# Patient Record
Sex: FEMALE | Race: WHITE | Employment: OTHER | URBAN - NONMETROPOLITAN AREA
[De-identification: names, ages, dates, MRNs, and addresses within clinical notes are randomized per-mention and may not be internally consistent; named-entity substitution may affect disease eponyms.]

---

## 2017-02-11 ENCOUNTER — HOSPITAL ENCOUNTER (INPATIENT)
Age: 77
LOS: 2 days | Discharge: HOME | DRG: 310 | End: 2017-02-13
Attending: EMERGENCY MEDICINE | Admitting: INTERNAL MEDICINE
Payer: MEDICARE

## 2017-02-11 ENCOUNTER — APPOINTMENT (OUTPATIENT)
Dept: GENERAL RADIOLOGY | Age: 77
DRG: 310 | End: 2017-02-11
Payer: MEDICARE

## 2017-02-11 DIAGNOSIS — R73.9 HYPERGLYCEMIA: ICD-10-CM

## 2017-02-11 DIAGNOSIS — I48.91 ATRIAL FIBRILLATION WITH TACHYCARDIC VENTRICULAR RATE (HCC): ICD-10-CM

## 2017-02-11 DIAGNOSIS — R07.9 CHEST PAIN, UNSPECIFIED TYPE: Primary | ICD-10-CM

## 2017-02-11 LAB
ANION GAP SERPL CALCULATED.3IONS-SCNC: 17 MMOL/L (ref 7–19)
BUN BLDV-MCNC: 23 MG/DL (ref 8–23)
CALCIUM SERPL-MCNC: 9.8 MG/DL (ref 8.8–10.2)
CHLORIDE BLD-SCNC: 93 MMOL/L (ref 98–111)
CO2: 24 MMOL/L (ref 22–29)
CREAT SERPL-MCNC: 0.7 MG/DL (ref 0.5–0.9)
GFR NON-AFRICAN AMERICAN: >60
GLUCOSE BLD-MCNC: 204 MG/DL (ref 74–109)
HCT VFR BLD CALC: 42.2 % (ref 37–47)
HEMOGLOBIN: 13.7 G/DL (ref 12–16)
INR BLD: 1.24 (ref 0.88–1.18)
MCH RBC QN AUTO: 28.8 PG (ref 27–31)
MCHC RBC AUTO-ENTMCNC: 32.5 G/DL (ref 33–37)
MCV RBC AUTO: 88.7 FL (ref 81–99)
PDW BLD-RTO: 13.9 % (ref 11.5–14.5)
PERFORMED ON: NORMAL
PERFORMED ON: NORMAL
PLATELET # BLD: 296 K/UL (ref 130–400)
PMV BLD AUTO: 10.6 FL (ref 7.4–10.4)
POC TROPONIN I: 0 NG/ML (ref 0–0.08)
POC TROPONIN I: 0.01 NG/ML (ref 0–0.08)
POTASSIUM SERPL-SCNC: 4.6 MMOL/L (ref 3.5–5)
PRO-BNP: 1288 PG/ML (ref 0–900)
PROTHROMBIN TIME: 15.5 SEC (ref 12–14.6)
RBC # BLD: 4.76 M/UL (ref 4.2–5.4)
SODIUM BLD-SCNC: 134 MMOL/L (ref 136–145)
WBC # BLD: 8.8 K/UL (ref 4.8–10.8)

## 2017-02-11 PROCEDURE — 6360000002 HC RX W HCPCS: Performed by: EMERGENCY MEDICINE

## 2017-02-11 PROCEDURE — 85027 COMPLETE CBC AUTOMATED: CPT

## 2017-02-11 PROCEDURE — 85610 PROTHROMBIN TIME: CPT

## 2017-02-11 PROCEDURE — 36415 COLL VENOUS BLD VENIPUNCTURE: CPT

## 2017-02-11 PROCEDURE — 93005 ELECTROCARDIOGRAM TRACING: CPT

## 2017-02-11 PROCEDURE — 84484 ASSAY OF TROPONIN QUANT: CPT

## 2017-02-11 PROCEDURE — 2140000000 HC CCU INTERMEDIATE R&B

## 2017-02-11 PROCEDURE — 2500000003 HC RX 250 WO HCPCS: Performed by: EMERGENCY MEDICINE

## 2017-02-11 PROCEDURE — 83880 ASSAY OF NATRIURETIC PEPTIDE: CPT

## 2017-02-11 PROCEDURE — 99285 EMERGENCY DEPT VISIT HI MDM: CPT

## 2017-02-11 PROCEDURE — 6370000000 HC RX 637 (ALT 250 FOR IP): Performed by: EMERGENCY MEDICINE

## 2017-02-11 PROCEDURE — 71010 XR CHEST PORTABLE: CPT

## 2017-02-11 PROCEDURE — 2580000003 HC RX 258: Performed by: EMERGENCY MEDICINE

## 2017-02-11 PROCEDURE — 99284 EMERGENCY DEPT VISIT MOD MDM: CPT | Performed by: EMERGENCY MEDICINE

## 2017-02-11 PROCEDURE — 80048 BASIC METABOLIC PNL TOTAL CA: CPT

## 2017-02-11 RX ORDER — ATORVASTATIN CALCIUM 40 MG/1
40 TABLET, FILM COATED ORAL NIGHTLY
COMMUNITY
End: 2022-03-21 | Stop reason: SDUPTHER

## 2017-02-11 RX ORDER — DILTIAZEM HYDROCHLORIDE 5 MG/ML
5 INJECTION INTRAVENOUS ONCE
Status: COMPLETED | OUTPATIENT
Start: 2017-02-11 | End: 2017-02-11

## 2017-02-11 RX ORDER — ASPIRIN 325 MG
325 TABLET ORAL ONCE
Status: COMPLETED | OUTPATIENT
Start: 2017-02-11 | End: 2017-02-11

## 2017-02-11 RX ORDER — PIOGLITAZONEHYDROCHLORIDE 30 MG/1
30 TABLET ORAL DAILY
COMMUNITY
End: 2017-05-03

## 2017-02-11 RX ORDER — FUROSEMIDE 20 MG/1
20 TABLET ORAL DAILY
COMMUNITY
End: 2017-03-28 | Stop reason: SDUPTHER

## 2017-02-11 RX ORDER — FUROSEMIDE 10 MG/ML
20 INJECTION INTRAMUSCULAR; INTRAVENOUS ONCE
Status: COMPLETED | OUTPATIENT
Start: 2017-02-11 | End: 2017-02-11

## 2017-02-11 RX ORDER — GABAPENTIN 300 MG/1
300 CAPSULE ORAL
COMMUNITY
End: 2021-03-29

## 2017-02-11 RX ADMIN — DILTIAZEM HYDROCHLORIDE 5 MG/HR: 5 INJECTION INTRAVENOUS at 22:16

## 2017-02-11 RX ADMIN — FUROSEMIDE 20 MG: 10 INJECTION, SOLUTION INTRAMUSCULAR; INTRAVENOUS at 23:53

## 2017-02-11 RX ADMIN — DILTIAZEM HYDROCHLORIDE 5 MG: 5 INJECTION INTRAVENOUS at 22:11

## 2017-02-11 RX ADMIN — ASPIRIN 325 MG ORAL TABLET 325 MG: 325 PILL ORAL at 22:30

## 2017-02-11 ASSESSMENT — ENCOUNTER SYMPTOMS
VOMITING: 0
ABDOMINAL PAIN: 0
EYE PAIN: 0
SHORTNESS OF BREATH: 1
DIARRHEA: 0

## 2017-02-12 PROBLEM — I48.20 CHRONIC ATRIAL FIBRILLATION (HCC): Status: ACTIVE | Noted: 2017-02-12

## 2017-02-12 LAB
CHOLESTEROL, TOTAL: 233 MG/DL (ref 160–199)
GLUCOSE BLD-MCNC: 146 MG/DL (ref 70–99)
GLUCOSE BLD-MCNC: 153 MG/DL (ref 70–99)
GLUCOSE BLD-MCNC: 166 MG/DL (ref 70–99)
GLUCOSE BLD-MCNC: 217 MG/DL (ref 70–99)
HDLC SERPL-MCNC: 54 MG/DL (ref 65–121)
LDL CHOLESTEROL CALCULATED: 148 MG/DL
PERFORMED ON: ABNORMAL
TRIGL SERPL-MCNC: 156 MG/DL (ref 150–199)
TROPONIN: <0.01 NG/ML (ref 0–0.03)

## 2017-02-12 PROCEDURE — 6370000000 HC RX 637 (ALT 250 FOR IP): Performed by: INTERNAL MEDICINE

## 2017-02-12 PROCEDURE — 80061 LIPID PANEL: CPT

## 2017-02-12 PROCEDURE — 84484 ASSAY OF TROPONIN QUANT: CPT

## 2017-02-12 PROCEDURE — 36415 COLL VENOUS BLD VENIPUNCTURE: CPT

## 2017-02-12 PROCEDURE — 82948 REAGENT STRIP/BLOOD GLUCOSE: CPT

## 2017-02-12 PROCEDURE — 99223 1ST HOSP IP/OBS HIGH 75: CPT | Performed by: INTERNAL MEDICINE

## 2017-02-12 PROCEDURE — 2140000000 HC CCU INTERMEDIATE R&B

## 2017-02-12 PROCEDURE — 2700000000 HC OXYGEN THERAPY PER DAY

## 2017-02-12 RX ORDER — FUROSEMIDE 20 MG/1
20 TABLET ORAL DAILY
Status: DISCONTINUED | OUTPATIENT
Start: 2017-02-12 | End: 2017-02-13 | Stop reason: HOSPADM

## 2017-02-12 RX ORDER — GABAPENTIN 300 MG/1
300 CAPSULE ORAL 2 TIMES DAILY
Status: DISCONTINUED | OUTPATIENT
Start: 2017-02-12 | End: 2017-02-13 | Stop reason: HOSPADM

## 2017-02-12 RX ORDER — SODIUM CHLORIDE 0.9 % (FLUSH) 0.9 %
10 SYRINGE (ML) INJECTION EVERY 12 HOURS SCHEDULED
Status: DISCONTINUED | OUTPATIENT
Start: 2017-02-12 | End: 2017-02-13 | Stop reason: HOSPADM

## 2017-02-12 RX ORDER — ACETAMINOPHEN 325 MG/1
650 TABLET ORAL EVERY 4 HOURS PRN
Status: DISCONTINUED | OUTPATIENT
Start: 2017-02-12 | End: 2017-02-13 | Stop reason: HOSPADM

## 2017-02-12 RX ORDER — ATORVASTATIN CALCIUM 40 MG/1
40 TABLET, FILM COATED ORAL DAILY
Status: DISCONTINUED | OUTPATIENT
Start: 2017-02-12 | End: 2017-02-12

## 2017-02-12 RX ORDER — SODIUM CHLORIDE 0.9 % (FLUSH) 0.9 %
10 SYRINGE (ML) INJECTION PRN
Status: DISCONTINUED | OUTPATIENT
Start: 2017-02-12 | End: 2017-02-13 | Stop reason: HOSPADM

## 2017-02-12 RX ORDER — ATORVASTATIN CALCIUM 80 MG/1
80 TABLET, FILM COATED ORAL DAILY
Status: DISCONTINUED | OUTPATIENT
Start: 2017-02-13 | End: 2017-02-13 | Stop reason: HOSPADM

## 2017-02-12 RX ORDER — ASPIRIN 325 MG
325 TABLET ORAL DAILY
Status: DISCONTINUED | OUTPATIENT
Start: 2017-02-12 | End: 2017-02-13 | Stop reason: HOSPADM

## 2017-02-12 RX ORDER — PIOGLITAZONEHYDROCHLORIDE 30 MG/1
30 TABLET ORAL DAILY
Status: DISCONTINUED | OUTPATIENT
Start: 2017-02-12 | End: 2017-02-13 | Stop reason: HOSPADM

## 2017-02-12 RX ADMIN — PIOGLITAZONE 30 MG: 30 TABLET ORAL at 20:53

## 2017-02-12 RX ADMIN — METFORMIN HYDROCHLORIDE 1000 MG: 500 TABLET, FILM COATED ORAL at 17:49

## 2017-02-12 RX ADMIN — ACETAMINOPHEN 650 MG: 325 TABLET, FILM COATED ORAL at 17:48

## 2017-02-12 RX ADMIN — ASPIRIN 325 MG ORAL TABLET 325 MG: 325 PILL ORAL at 09:35

## 2017-02-12 RX ADMIN — Medication 50 MG: at 17:49

## 2017-02-12 RX ADMIN — FUROSEMIDE 20 MG: 20 TABLET ORAL at 17:49

## 2017-02-12 RX ADMIN — GABAPENTIN 300 MG: 300 CAPSULE ORAL at 20:47

## 2017-02-12 RX ADMIN — ATORVASTATIN CALCIUM 40 MG: 40 TABLET, FILM COATED ORAL at 17:49

## 2017-02-12 ASSESSMENT — PAIN SCALES - GENERAL
PAINLEVEL_OUTOF10: 0
PAINLEVEL_OUTOF10: 8

## 2017-02-13 VITALS
WEIGHT: 219.4 LBS | SYSTOLIC BLOOD PRESSURE: 103 MMHG | HEIGHT: 65 IN | DIASTOLIC BLOOD PRESSURE: 65 MMHG | OXYGEN SATURATION: 92 % | HEART RATE: 98 BPM | BODY MASS INDEX: 36.55 KG/M2 | RESPIRATION RATE: 18 BRPM | TEMPERATURE: 97.1 F

## 2017-02-13 LAB
GLUCOSE BLD-MCNC: 136 MG/DL (ref 70–99)
GLUCOSE BLD-MCNC: 160 MG/DL (ref 70–99)
PERFORMED ON: ABNORMAL
PERFORMED ON: ABNORMAL

## 2017-02-13 PROCEDURE — 2580000003 HC RX 258: Performed by: INTERNAL MEDICINE

## 2017-02-13 PROCEDURE — 2700000000 HC OXYGEN THERAPY PER DAY

## 2017-02-13 PROCEDURE — 99238 HOSP IP/OBS DSCHRG MGMT 30/<: CPT | Performed by: INTERNAL MEDICINE

## 2017-02-13 PROCEDURE — 6370000000 HC RX 637 (ALT 250 FOR IP): Performed by: INTERNAL MEDICINE

## 2017-02-13 PROCEDURE — 82948 REAGENT STRIP/BLOOD GLUCOSE: CPT

## 2017-02-13 RX ORDER — DIGOXIN 250 MCG
250 TABLET ORAL DAILY
Qty: 30 TABLET | Refills: 3 | Status: SHIPPED | OUTPATIENT
Start: 2017-02-13 | End: 2017-02-14

## 2017-02-13 RX ORDER — DIGOXIN 250 MCG
250 TABLET ORAL DAILY
Qty: 30 TABLET | Refills: 3 | Status: SHIPPED | OUTPATIENT
Start: 2017-02-13 | End: 2017-02-13

## 2017-02-13 RX ORDER — DIGOXIN 250 MCG
250 TABLET ORAL DAILY
Status: DISCONTINUED | OUTPATIENT
Start: 2017-02-13 | End: 2017-02-13 | Stop reason: HOSPADM

## 2017-02-13 RX ADMIN — Medication 10 ML: at 08:17

## 2017-02-13 RX ADMIN — ATORVASTATIN CALCIUM 80 MG: 80 TABLET, FILM COATED ORAL at 08:18

## 2017-02-13 RX ADMIN — METFORMIN HYDROCHLORIDE 1000 MG: 500 TABLET, FILM COATED ORAL at 08:18

## 2017-02-13 RX ADMIN — FUROSEMIDE 20 MG: 20 TABLET ORAL at 08:18

## 2017-02-13 RX ADMIN — RIVAROXABAN 10 MG: 10 TABLET, FILM COATED ORAL at 08:18

## 2017-02-13 RX ADMIN — Medication 50 MG: at 08:18

## 2017-02-13 RX ADMIN — DIGOXIN 250 MCG: 250 TABLET ORAL at 15:14

## 2017-02-13 RX ADMIN — GABAPENTIN 300 MG: 300 CAPSULE ORAL at 08:18

## 2017-02-13 RX ADMIN — ACETAMINOPHEN 650 MG: 325 TABLET, FILM COATED ORAL at 15:14

## 2017-02-13 RX ADMIN — ASPIRIN 325 MG ORAL TABLET 325 MG: 325 PILL ORAL at 08:18

## 2017-02-13 RX ADMIN — PIOGLITAZONE 30 MG: 30 TABLET ORAL at 08:18

## 2017-02-13 ASSESSMENT — PAIN SCALES - GENERAL: PAINLEVEL_OUTOF10: 2

## 2017-02-14 ENCOUNTER — TELEPHONE (OUTPATIENT)
Dept: CARDIOLOGY | Age: 77
End: 2017-02-14

## 2017-02-14 LAB
EKG P AXIS: NORMAL DEGREES
EKG P AXIS: NORMAL DEGREES
EKG P-R INTERVAL: 158 MS
EKG P-R INTERVAL: 184 MS
EKG Q-T INTERVAL: 325 MS
EKG Q-T INTERVAL: 347 MS
EKG QRS DURATION: 105 MS
EKG QRS DURATION: 86 MS
EKG QTC CALCULATION (BAZETT): 470 MS
EKG QTC CALCULATION (BAZETT): 471 MS
EKG T AXIS: 3 DEGREES
EKG T AXIS: 4 DEGREES

## 2017-02-14 RX ORDER — DIGOXIN 125 MCG
250 TABLET ORAL DAILY
Qty: 60 TABLET | Refills: 3 | Status: ON HOLD | OUTPATIENT
Start: 2017-02-14 | End: 2017-04-01 | Stop reason: HOSPADM

## 2017-02-20 ENCOUNTER — TELEPHONE (OUTPATIENT)
Dept: CARDIOLOGY | Age: 77
End: 2017-02-20

## 2017-02-22 ENCOUNTER — TELEPHONE (OUTPATIENT)
Dept: CARDIOLOGY | Age: 77
End: 2017-02-22

## 2017-03-01 ENCOUNTER — TELEPHONE (OUTPATIENT)
Dept: CARDIOLOGY | Age: 77
End: 2017-03-01

## 2017-03-03 ENCOUNTER — TELEPHONE (OUTPATIENT)
Dept: CARDIOLOGY | Age: 77
End: 2017-03-03

## 2017-03-07 ENCOUNTER — TELEPHONE (OUTPATIENT)
Dept: CARDIOLOGY | Age: 77
End: 2017-03-07

## 2017-03-21 ENCOUNTER — HOSPITAL ENCOUNTER (OUTPATIENT)
Dept: NON INVASIVE DIAGNOSTICS | Age: 77
Discharge: HOME | End: 2017-03-21
Payer: MEDICARE

## 2017-03-21 ENCOUNTER — OFFICE VISIT (OUTPATIENT)
Dept: CARDIOLOGY | Age: 77
End: 2017-03-21
Payer: MEDICARE

## 2017-03-21 VITALS
BODY MASS INDEX: 37.9 KG/M2 | HEART RATE: 74 BPM | HEIGHT: 64 IN | SYSTOLIC BLOOD PRESSURE: 134 MMHG | WEIGHT: 222 LBS | DIASTOLIC BLOOD PRESSURE: 64 MMHG

## 2017-03-21 DIAGNOSIS — I48.20 CHRONIC ATRIAL FIBRILLATION (HCC): Primary | ICD-10-CM

## 2017-03-21 DIAGNOSIS — R06.09 DOE (DYSPNEA ON EXERTION): ICD-10-CM

## 2017-03-21 DIAGNOSIS — I48.20 CHRONIC ATRIAL FIBRILLATION (HCC): ICD-10-CM

## 2017-03-21 LAB
LV EF: 58 %
LVEF MODALITY: NORMAL

## 2017-03-21 PROCEDURE — G8417 CALC BMI ABV UP PARAM F/U: HCPCS | Performed by: CLINICAL NURSE SPECIALIST

## 2017-03-21 PROCEDURE — G8484 FLU IMMUNIZE NO ADMIN: HCPCS | Performed by: CLINICAL NURSE SPECIALIST

## 2017-03-21 PROCEDURE — 1123F ACP DISCUSS/DSCN MKR DOCD: CPT | Performed by: CLINICAL NURSE SPECIALIST

## 2017-03-21 PROCEDURE — G8400 PT W/DXA NO RESULTS DOC: HCPCS | Performed by: CLINICAL NURSE SPECIALIST

## 2017-03-21 PROCEDURE — 1090F PRES/ABSN URINE INCON ASSESS: CPT | Performed by: CLINICAL NURSE SPECIALIST

## 2017-03-21 PROCEDURE — 99213 OFFICE O/P EST LOW 20 MIN: CPT | Performed by: CLINICAL NURSE SPECIALIST

## 2017-03-21 PROCEDURE — 93306 TTE W/DOPPLER COMPLETE: CPT

## 2017-03-21 PROCEDURE — 93000 ELECTROCARDIOGRAM COMPLETE: CPT | Performed by: CLINICAL NURSE SPECIALIST

## 2017-03-21 PROCEDURE — 1036F TOBACCO NON-USER: CPT | Performed by: CLINICAL NURSE SPECIALIST

## 2017-03-21 PROCEDURE — 3014F SCREEN MAMMO DOC REV: CPT | Performed by: CLINICAL NURSE SPECIALIST

## 2017-03-21 PROCEDURE — 4040F PNEUMOC VAC/ADMIN/RCVD: CPT | Performed by: CLINICAL NURSE SPECIALIST

## 2017-03-21 PROCEDURE — G8427 DOCREV CUR MEDS BY ELIG CLIN: HCPCS | Performed by: CLINICAL NURSE SPECIALIST

## 2017-03-21 PROCEDURE — 3017F COLORECTAL CA SCREEN DOC REV: CPT | Performed by: CLINICAL NURSE SPECIALIST

## 2017-03-28 ENCOUNTER — DIRECT ADMIT ORDERS (OUTPATIENT)
Dept: CARDIOLOGY | Age: 77
End: 2017-03-28

## 2017-03-28 ENCOUNTER — OFFICE VISIT (OUTPATIENT)
Dept: CARDIOLOGY | Age: 77
End: 2017-03-28
Payer: MEDICARE

## 2017-03-28 VITALS
SYSTOLIC BLOOD PRESSURE: 110 MMHG | HEIGHT: 64 IN | DIASTOLIC BLOOD PRESSURE: 68 MMHG | WEIGHT: 220 LBS | BODY MASS INDEX: 37.56 KG/M2

## 2017-03-28 DIAGNOSIS — I48.20 CHRONIC ATRIAL FIBRILLATION (HCC): Primary | ICD-10-CM

## 2017-03-28 DIAGNOSIS — R06.09 DOE (DYSPNEA ON EXERTION): ICD-10-CM

## 2017-03-28 DIAGNOSIS — I48.19 PERSISTENT ATRIAL FIBRILLATION (HCC): Primary | ICD-10-CM

## 2017-03-28 PROCEDURE — 1123F ACP DISCUSS/DSCN MKR DOCD: CPT | Performed by: INTERNAL MEDICINE

## 2017-03-28 PROCEDURE — 3014F SCREEN MAMMO DOC REV: CPT | Performed by: INTERNAL MEDICINE

## 2017-03-28 PROCEDURE — G8400 PT W/DXA NO RESULTS DOC: HCPCS | Performed by: INTERNAL MEDICINE

## 2017-03-28 PROCEDURE — 1090F PRES/ABSN URINE INCON ASSESS: CPT | Performed by: INTERNAL MEDICINE

## 2017-03-28 PROCEDURE — G8427 DOCREV CUR MEDS BY ELIG CLIN: HCPCS | Performed by: INTERNAL MEDICINE

## 2017-03-28 PROCEDURE — 99212 OFFICE O/P EST SF 10 MIN: CPT | Performed by: INTERNAL MEDICINE

## 2017-03-28 PROCEDURE — G8484 FLU IMMUNIZE NO ADMIN: HCPCS | Performed by: INTERNAL MEDICINE

## 2017-03-28 PROCEDURE — 4040F PNEUMOC VAC/ADMIN/RCVD: CPT | Performed by: INTERNAL MEDICINE

## 2017-03-28 PROCEDURE — 1036F TOBACCO NON-USER: CPT | Performed by: INTERNAL MEDICINE

## 2017-03-28 PROCEDURE — 3017F COLORECTAL CA SCREEN DOC REV: CPT | Performed by: INTERNAL MEDICINE

## 2017-03-28 PROCEDURE — G8417 CALC BMI ABV UP PARAM F/U: HCPCS | Performed by: INTERNAL MEDICINE

## 2017-03-28 RX ORDER — NEOMYCIN SULFATE, POLYMYXIN B SULFATE, AND DEXAMETHASONE 3.5; 10000; 1 MG/G; [USP'U]/G; MG/G
OINTMENT OPHTHALMIC
Status: ON HOLD | COMMUNITY
Start: 2017-01-18 | End: 2017-03-30 | Stop reason: ALTCHOICE

## 2017-03-28 RX ORDER — CEFUROXIME AXETIL 250 MG/1
TABLET ORAL
Status: ON HOLD | COMMUNITY
Start: 2016-12-25 | End: 2017-03-30 | Stop reason: ALTCHOICE

## 2017-03-28 RX ORDER — FUROSEMIDE 40 MG/1
40 TABLET ORAL DAILY
Qty: 30 TABLET | Refills: 3 | Status: SHIPPED | OUTPATIENT
Start: 2017-03-28 | End: 2017-09-28 | Stop reason: SDUPTHER

## 2017-03-29 RX ORDER — SODIUM CHLORIDE 0.9 % (FLUSH) 0.9 %
10 SYRINGE (ML) INJECTION PRN
Status: CANCELLED | OUTPATIENT
Start: 2017-03-29

## 2017-03-29 RX ORDER — ONDANSETRON 2 MG/ML
4 INJECTION INTRAMUSCULAR; INTRAVENOUS EVERY 6 HOURS PRN
Status: CANCELLED | OUTPATIENT
Start: 2017-03-29

## 2017-03-29 RX ORDER — ACETAMINOPHEN 325 MG/1
650 TABLET ORAL EVERY 4 HOURS PRN
Status: CANCELLED | OUTPATIENT
Start: 2017-03-29

## 2017-03-29 RX ORDER — SODIUM CHLORIDE 0.9 % (FLUSH) 0.9 %
10 SYRINGE (ML) INJECTION EVERY 12 HOURS SCHEDULED
Status: CANCELLED | OUTPATIENT
Start: 2017-03-29

## 2017-03-30 ENCOUNTER — APPOINTMENT (OUTPATIENT)
Dept: GENERAL RADIOLOGY | Age: 77
DRG: 310 | End: 2017-03-30
Attending: INTERNAL MEDICINE
Payer: MEDICARE

## 2017-03-30 ENCOUNTER — HOSPITAL ENCOUNTER (INPATIENT)
Age: 77
LOS: 2 days | Discharge: HOME | DRG: 310 | End: 2017-04-01
Attending: INTERNAL MEDICINE | Admitting: INTERNAL MEDICINE
Payer: MEDICARE

## 2017-03-30 DIAGNOSIS — I48.19 PERSISTENT ATRIAL FIBRILLATION (HCC): ICD-10-CM

## 2017-03-30 LAB
ALBUMIN SERPL-MCNC: 4.4 G/DL (ref 3.5–5.2)
ALP BLD-CCNC: 91 U/L (ref 35–104)
ALT SERPL-CCNC: 10 U/L (ref 5–33)
ANION GAP SERPL CALCULATED.3IONS-SCNC: 17 MMOL/L (ref 7–19)
AST SERPL-CCNC: 15 U/L (ref 5–32)
BASOPHILS ABSOLUTE: 0.1 K/UL (ref 0–0.2)
BASOPHILS RELATIVE PERCENT: 0.7 % (ref 0–1)
BILIRUB SERPL-MCNC: 0.5 MG/DL (ref 0.2–1.2)
BUN BLDV-MCNC: 21 MG/DL (ref 8–23)
CALCIUM SERPL-MCNC: 9.5 MG/DL (ref 8.8–10.2)
CHLORIDE BLD-SCNC: 98 MMOL/L (ref 98–111)
CO2: 27 MMOL/L (ref 22–29)
CREAT SERPL-MCNC: 0.6 MG/DL (ref 0.5–0.9)
EOSINOPHILS ABSOLUTE: 0.2 K/UL (ref 0–0.6)
EOSINOPHILS RELATIVE PERCENT: 2.4 % (ref 0–5)
GFR NON-AFRICAN AMERICAN: >60
GLOBULIN: 3.3 G/DL
GLUCOSE BLD-MCNC: 106 MG/DL (ref 74–109)
GLUCOSE BLD-MCNC: 161 MG/DL (ref 70–99)
GLUCOSE BLD-MCNC: 164 MG/DL (ref 70–99)
HCT VFR BLD CALC: 38.7 % (ref 37–47)
HEMOGLOBIN: 12.5 G/DL (ref 12–16)
LYMPHOCYTES ABSOLUTE: 1.9 K/UL (ref 1.1–4.5)
LYMPHOCYTES RELATIVE PERCENT: 18.8 % (ref 20–40)
MCH RBC QN AUTO: 28.6 PG (ref 27–31)
MCHC RBC AUTO-ENTMCNC: 32.3 G/DL (ref 33–37)
MCV RBC AUTO: 88.6 FL (ref 81–99)
MONOCYTES ABSOLUTE: 1 K/UL (ref 0–0.9)
MONOCYTES RELATIVE PERCENT: 9.7 % (ref 0–10)
NEUTROPHILS ABSOLUTE: 6.9 K/UL (ref 1.5–7.5)
NEUTROPHILS RELATIVE PERCENT: 68.4 % (ref 50–65)
PDW BLD-RTO: 15 % (ref 11.5–14.5)
PERFORMED ON: ABNORMAL
PERFORMED ON: ABNORMAL
PLATELET # BLD: 277 K/UL (ref 130–400)
PMV BLD AUTO: 11.2 FL (ref 7.4–10.4)
POTASSIUM SERPL-SCNC: 3.8 MMOL/L (ref 3.5–5)
RBC # BLD: 4.37 M/UL (ref 4.2–5.4)
SODIUM BLD-SCNC: 142 MMOL/L (ref 136–145)
TOTAL PROTEIN: 7.7 G/DL (ref 6.6–8.7)
WBC # BLD: 10.1 K/UL (ref 4.8–10.8)

## 2017-03-30 PROCEDURE — 36415 COLL VENOUS BLD VENIPUNCTURE: CPT

## 2017-03-30 PROCEDURE — 85025 COMPLETE CBC W/AUTO DIFF WBC: CPT

## 2017-03-30 PROCEDURE — 80053 COMPREHEN METABOLIC PANEL: CPT

## 2017-03-30 PROCEDURE — 82948 REAGENT STRIP/BLOOD GLUCOSE: CPT

## 2017-03-30 PROCEDURE — 2140000000 HC CCU INTERMEDIATE R&B

## 2017-03-30 PROCEDURE — 71020 XR CHEST STANDARD TWO VW: CPT

## 2017-03-30 PROCEDURE — 6370000000 HC RX 637 (ALT 250 FOR IP): Performed by: INTERNAL MEDICINE

## 2017-03-30 PROCEDURE — 2580000003 HC RX 258: Performed by: INTERNAL MEDICINE

## 2017-03-30 PROCEDURE — 99999 PR OFFICE/OUTPT VISIT,PROCEDURE ONLY: CPT | Performed by: INTERNAL MEDICINE

## 2017-03-30 RX ORDER — SODIUM CHLORIDE 0.9 % (FLUSH) 0.9 %
10 SYRINGE (ML) INJECTION PRN
Status: DISCONTINUED | OUTPATIENT
Start: 2017-03-30 | End: 2017-04-01 | Stop reason: HOSPADM

## 2017-03-30 RX ORDER — SOTALOL HYDROCHLORIDE 80 MG/1
80 TABLET ORAL 2 TIMES DAILY
Status: DISCONTINUED | OUTPATIENT
Start: 2017-03-30 | End: 2017-03-31

## 2017-03-30 RX ORDER — PIOGLITAZONEHYDROCHLORIDE 30 MG/1
30 TABLET ORAL DAILY
Status: DISCONTINUED | OUTPATIENT
Start: 2017-03-30 | End: 2017-04-01 | Stop reason: HOSPADM

## 2017-03-30 RX ORDER — GABAPENTIN 300 MG/1
300 CAPSULE ORAL 2 TIMES DAILY
Status: DISCONTINUED | OUTPATIENT
Start: 2017-03-30 | End: 2017-04-01 | Stop reason: HOSPADM

## 2017-03-30 RX ORDER — ATORVASTATIN CALCIUM 40 MG/1
40 TABLET, FILM COATED ORAL DAILY
Status: DISCONTINUED | OUTPATIENT
Start: 2017-03-30 | End: 2017-04-01 | Stop reason: HOSPADM

## 2017-03-30 RX ORDER — ACETAMINOPHEN 325 MG/1
650 TABLET ORAL EVERY 4 HOURS PRN
Status: DISCONTINUED | OUTPATIENT
Start: 2017-03-30 | End: 2017-04-01 | Stop reason: HOSPADM

## 2017-03-30 RX ORDER — ONDANSETRON 2 MG/ML
4 INJECTION INTRAMUSCULAR; INTRAVENOUS EVERY 6 HOURS PRN
Status: DISCONTINUED | OUTPATIENT
Start: 2017-03-30 | End: 2017-04-01 | Stop reason: HOSPADM

## 2017-03-30 RX ORDER — FUROSEMIDE 40 MG/1
40 TABLET ORAL DAILY
Status: DISCONTINUED | OUTPATIENT
Start: 2017-03-30 | End: 2017-04-01 | Stop reason: HOSPADM

## 2017-03-30 RX ORDER — SODIUM CHLORIDE 0.9 % (FLUSH) 0.9 %
10 SYRINGE (ML) INJECTION EVERY 12 HOURS SCHEDULED
Status: DISCONTINUED | OUTPATIENT
Start: 2017-03-30 | End: 2017-04-01 | Stop reason: HOSPADM

## 2017-03-30 RX ADMIN — RIVAROXABAN 20 MG: 20 TABLET, FILM COATED ORAL at 17:32

## 2017-03-30 RX ADMIN — GABAPENTIN 300 MG: 300 CAPSULE ORAL at 21:59

## 2017-03-30 RX ADMIN — SOTALOL HYDROCHLORIDE 80 MG: 80 TABLET ORAL at 10:53

## 2017-03-30 RX ADMIN — ACETAMINOPHEN 650 MG: 325 TABLET, FILM COATED ORAL at 22:00

## 2017-03-30 RX ADMIN — METFORMIN HYDROCHLORIDE 1000 MG: 500 TABLET, FILM COATED ORAL at 17:32

## 2017-03-30 RX ADMIN — Medication 10 ML: at 22:13

## 2017-03-30 RX ADMIN — SOTALOL HYDROCHLORIDE 80 MG: 80 TABLET ORAL at 21:59

## 2017-03-30 ASSESSMENT — PAIN SCALES - GENERAL
PAINLEVEL_OUTOF10: 0

## 2017-03-31 LAB
GLUCOSE BLD-MCNC: 108 MG/DL (ref 70–99)
GLUCOSE BLD-MCNC: 130 MG/DL (ref 70–99)
GLUCOSE BLD-MCNC: 156 MG/DL (ref 70–99)
GLUCOSE BLD-MCNC: 221 MG/DL (ref 70–99)
PERFORMED ON: ABNORMAL

## 2017-03-31 PROCEDURE — 6370000000 HC RX 637 (ALT 250 FOR IP): Performed by: INTERNAL MEDICINE

## 2017-03-31 PROCEDURE — 99232 SBSQ HOSP IP/OBS MODERATE 35: CPT | Performed by: INTERNAL MEDICINE

## 2017-03-31 PROCEDURE — 82948 REAGENT STRIP/BLOOD GLUCOSE: CPT

## 2017-03-31 PROCEDURE — 94660 CPAP INITIATION&MGMT: CPT

## 2017-03-31 PROCEDURE — 93005 ELECTROCARDIOGRAM TRACING: CPT

## 2017-03-31 PROCEDURE — 2140000000 HC CCU INTERMEDIATE R&B

## 2017-03-31 PROCEDURE — 2700000000 HC OXYGEN THERAPY PER DAY

## 2017-03-31 PROCEDURE — 2580000003 HC RX 258: Performed by: INTERNAL MEDICINE

## 2017-03-31 RX ORDER — SOTALOL HYDROCHLORIDE 80 MG/1
40 TABLET ORAL 2 TIMES DAILY
Status: DISCONTINUED | OUTPATIENT
Start: 2017-03-31 | End: 2017-04-01 | Stop reason: HOSPADM

## 2017-03-31 RX ORDER — SODIUM CHLORIDE 9 MG/ML
INJECTION, SOLUTION INTRAVENOUS CONTINUOUS
Status: DISCONTINUED | OUTPATIENT
Start: 2017-03-31 | End: 2017-04-01 | Stop reason: HOSPADM

## 2017-03-31 RX ADMIN — GABAPENTIN 300 MG: 300 CAPSULE ORAL at 21:41

## 2017-03-31 RX ADMIN — PIOGLITAZONE 30 MG: 30 TABLET ORAL at 09:14

## 2017-03-31 RX ADMIN — METFORMIN HYDROCHLORIDE 1000 MG: 500 TABLET, FILM COATED ORAL at 17:07

## 2017-03-31 RX ADMIN — SOTALOL HYDROCHLORIDE 40 MG: 80 TABLET ORAL at 21:41

## 2017-03-31 RX ADMIN — METFORMIN HYDROCHLORIDE 1000 MG: 500 TABLET, FILM COATED ORAL at 09:14

## 2017-03-31 RX ADMIN — RIVAROXABAN 20 MG: 20 TABLET, FILM COATED ORAL at 17:07

## 2017-03-31 RX ADMIN — ACETAMINOPHEN 650 MG: 325 TABLET, FILM COATED ORAL at 15:41

## 2017-03-31 RX ADMIN — SOTALOL HYDROCHLORIDE 40 MG: 80 TABLET ORAL at 10:51

## 2017-03-31 RX ADMIN — SERTRALINE HYDROCHLORIDE 50 MG: 50 TABLET ORAL at 09:14

## 2017-03-31 RX ADMIN — Medication 10 ML: at 09:15

## 2017-03-31 RX ADMIN — FUROSEMIDE 40 MG: 40 TABLET ORAL at 09:14

## 2017-03-31 RX ADMIN — Medication 10 ML: at 21:42

## 2017-03-31 RX ADMIN — GABAPENTIN 300 MG: 300 CAPSULE ORAL at 09:14

## 2017-03-31 RX ADMIN — ATORVASTATIN CALCIUM 40 MG: 40 TABLET, FILM COATED ORAL at 09:14

## 2017-03-31 ASSESSMENT — PAIN SCALES - GENERAL
PAINLEVEL_OUTOF10: 0
PAINLEVEL_OUTOF10: 4

## 2017-04-01 VITALS
OXYGEN SATURATION: 96 % | TEMPERATURE: 97 F | HEIGHT: 64 IN | WEIGHT: 221.2 LBS | DIASTOLIC BLOOD PRESSURE: 82 MMHG | RESPIRATION RATE: 18 BRPM | SYSTOLIC BLOOD PRESSURE: 120 MMHG | HEART RATE: 72 BPM | BODY MASS INDEX: 37.76 KG/M2

## 2017-04-01 LAB
ANION GAP SERPL CALCULATED.3IONS-SCNC: 17 MMOL/L (ref 7–19)
BUN BLDV-MCNC: 33 MG/DL (ref 8–23)
CALCIUM SERPL-MCNC: 8.6 MG/DL (ref 8.8–10.2)
CHLORIDE BLD-SCNC: 93 MMOL/L (ref 98–111)
CO2: 23 MMOL/L (ref 22–29)
CREAT SERPL-MCNC: 0.8 MG/DL (ref 0.5–0.9)
GFR NON-AFRICAN AMERICAN: >60
GLUCOSE BLD-MCNC: 113 MG/DL (ref 70–99)
GLUCOSE BLD-MCNC: 125 MG/DL (ref 70–99)
GLUCOSE BLD-MCNC: 227 MG/DL (ref 74–109)
PERFORMED ON: ABNORMAL
PERFORMED ON: ABNORMAL
POTASSIUM SERPL-SCNC: 4 MMOL/L (ref 3.5–5)
SODIUM BLD-SCNC: 133 MMOL/L (ref 136–145)

## 2017-04-01 PROCEDURE — 99024 POSTOP FOLLOW-UP VISIT: CPT | Performed by: INTERNAL MEDICINE

## 2017-04-01 PROCEDURE — 6360000002 HC RX W HCPCS

## 2017-04-01 PROCEDURE — 2580000003 HC RX 258: Performed by: INTERNAL MEDICINE

## 2017-04-01 PROCEDURE — 6370000000 HC RX 637 (ALT 250 FOR IP): Performed by: INTERNAL MEDICINE

## 2017-04-01 PROCEDURE — 80048 BASIC METABOLIC PNL TOTAL CA: CPT

## 2017-04-01 PROCEDURE — 36415 COLL VENOUS BLD VENIPUNCTURE: CPT

## 2017-04-01 PROCEDURE — 92960 CARDIOVERSION ELECTRIC EXT: CPT | Performed by: INTERNAL MEDICINE

## 2017-04-01 PROCEDURE — 5A2204Z RESTORATION OF CARDIAC RHYTHM, SINGLE: ICD-10-PCS | Performed by: INTERNAL MEDICINE

## 2017-04-01 PROCEDURE — 82948 REAGENT STRIP/BLOOD GLUCOSE: CPT

## 2017-04-01 RX ORDER — SODIUM CHLORIDE 0.9 % (FLUSH) 0.9 %
10 SYRINGE (ML) INJECTION PRN
Status: DISCONTINUED | OUTPATIENT
Start: 2017-04-01 | End: 2017-04-01 | Stop reason: HOSPADM

## 2017-04-01 RX ORDER — SODIUM CHLORIDE 0.9 % (FLUSH) 0.9 %
10 SYRINGE (ML) INJECTION EVERY 12 HOURS SCHEDULED
Status: DISCONTINUED | OUTPATIENT
Start: 2017-04-01 | End: 2017-04-01 | Stop reason: HOSPADM

## 2017-04-01 RX ORDER — SOTALOL HYDROCHLORIDE 80 MG/1
40 TABLET ORAL 2 TIMES DAILY
Qty: 60 TABLET | Refills: 3 | Status: SHIPPED | OUTPATIENT
Start: 2017-04-01 | End: 2017-05-23 | Stop reason: DRUGHIGH

## 2017-04-01 RX ADMIN — GABAPENTIN 300 MG: 300 CAPSULE ORAL at 09:26

## 2017-04-01 RX ADMIN — ATORVASTATIN CALCIUM 40 MG: 40 TABLET, FILM COATED ORAL at 09:26

## 2017-04-01 RX ADMIN — SODIUM CHLORIDE: 9 INJECTION, SOLUTION INTRAVENOUS at 05:51

## 2017-04-01 RX ADMIN — SOTALOL HYDROCHLORIDE 40 MG: 80 TABLET ORAL at 09:26

## 2017-04-01 RX ADMIN — FUROSEMIDE 40 MG: 40 TABLET ORAL at 09:27

## 2017-04-01 RX ADMIN — PIOGLITAZONE 30 MG: 30 TABLET ORAL at 09:26

## 2017-04-01 RX ADMIN — SERTRALINE HYDROCHLORIDE 50 MG: 50 TABLET ORAL at 09:26

## 2017-04-01 RX ADMIN — METFORMIN HYDROCHLORIDE 1000 MG: 500 TABLET, FILM COATED ORAL at 14:34

## 2017-04-03 LAB
EKG P AXIS: NORMAL DEGREES
EKG P-R INTERVAL: NORMAL MS
EKG Q-T INTERVAL: 418 MS
EKG QRS DURATION: 84 MS
EKG QTC CALCULATION (BAZETT): 421 MS
EKG T AXIS: 157 DEGREES

## 2017-04-24 ENCOUNTER — HOSPITAL ENCOUNTER (OUTPATIENT)
Dept: WOMENS IMAGING | Age: 77
Discharge: HOME | End: 2017-04-24
Payer: MEDICARE

## 2017-04-24 DIAGNOSIS — Z12.31 ENCOUNTER FOR SCREENING MAMMOGRAM FOR MALIGNANT NEOPLASM OF BREAST: ICD-10-CM

## 2017-04-24 PROCEDURE — 77063 BREAST TOMOSYNTHESIS BI: CPT

## 2017-04-25 RX ORDER — DOXYCYCLINE HYCLATE 100 MG
100 TABLET ORAL DAILY
Status: ON HOLD | COMMUNITY
End: 2017-06-03 | Stop reason: HOSPADM

## 2017-04-25 RX ORDER — BETAMETHASONE DIPROPIONATE 0.5 MG/G
1 CREAM TOPICAL 2 TIMES DAILY
Status: ON HOLD | COMMUNITY
End: 2017-06-02 | Stop reason: ALTCHOICE

## 2017-05-03 ENCOUNTER — OFFICE VISIT (OUTPATIENT)
Dept: GASTROENTEROLOGY | Age: 77
End: 2017-05-03
Payer: MEDICARE

## 2017-05-03 VITALS
BODY MASS INDEX: 36.49 KG/M2 | DIASTOLIC BLOOD PRESSURE: 62 MMHG | WEIGHT: 219 LBS | SYSTOLIC BLOOD PRESSURE: 90 MMHG | HEIGHT: 65 IN | HEART RATE: 95 BPM | OXYGEN SATURATION: 91 %

## 2017-05-03 DIAGNOSIS — Z85.038 HISTORY OF COLON CANCER: Primary | ICD-10-CM

## 2017-05-03 PROCEDURE — 99214 OFFICE O/P EST MOD 30 MIN: CPT | Performed by: NURSE PRACTITIONER

## 2017-05-03 PROCEDURE — 1123F ACP DISCUSS/DSCN MKR DOCD: CPT | Performed by: NURSE PRACTITIONER

## 2017-05-03 PROCEDURE — 3017F COLORECTAL CA SCREEN DOC REV: CPT | Performed by: NURSE PRACTITIONER

## 2017-05-03 PROCEDURE — G8417 CALC BMI ABV UP PARAM F/U: HCPCS | Performed by: NURSE PRACTITIONER

## 2017-05-03 PROCEDURE — 3014F SCREEN MAMMO DOC REV: CPT | Performed by: NURSE PRACTITIONER

## 2017-05-03 PROCEDURE — 4040F PNEUMOC VAC/ADMIN/RCVD: CPT | Performed by: NURSE PRACTITIONER

## 2017-05-03 PROCEDURE — G8400 PT W/DXA NO RESULTS DOC: HCPCS | Performed by: NURSE PRACTITIONER

## 2017-05-03 PROCEDURE — G8427 DOCREV CUR MEDS BY ELIG CLIN: HCPCS | Performed by: NURSE PRACTITIONER

## 2017-05-03 PROCEDURE — 1036F TOBACCO NON-USER: CPT | Performed by: NURSE PRACTITIONER

## 2017-05-03 PROCEDURE — 1090F PRES/ABSN URINE INCON ASSESS: CPT | Performed by: NURSE PRACTITIONER

## 2017-05-03 RX ORDER — PIOGLITAZONEHYDROCHLORIDE 30 MG/1
30 TABLET ORAL DAILY
COMMUNITY
End: 2018-05-17 | Stop reason: ALTCHOICE

## 2017-05-03 ASSESSMENT — ENCOUNTER SYMPTOMS
ABDOMINAL PAIN: 0
CONSTIPATION: 0
COUGH: 0
DIARRHEA: 0
SHORTNESS OF BREATH: 0
NAUSEA: 0
BACK PAIN: 0
RECTAL PAIN: 0
CHEST TIGHTNESS: 0
ABDOMINAL DISTENTION: 0
VOMITING: 0
BLOOD IN STOOL: 0
VOICE CHANGE: 0
SORE THROAT: 0

## 2017-05-23 ENCOUNTER — OFFICE VISIT (OUTPATIENT)
Dept: CARDIOLOGY | Age: 77
End: 2017-05-23
Payer: MEDICARE

## 2017-05-23 VITALS
WEIGHT: 218 LBS | BODY MASS INDEX: 37.22 KG/M2 | SYSTOLIC BLOOD PRESSURE: 130 MMHG | DIASTOLIC BLOOD PRESSURE: 62 MMHG | HEART RATE: 63 BPM | HEIGHT: 64 IN

## 2017-05-23 DIAGNOSIS — I48.0 PAF (PAROXYSMAL ATRIAL FIBRILLATION) (HCC): Primary | ICD-10-CM

## 2017-05-23 DIAGNOSIS — Z79.01 CHRONIC ANTICOAGULATION: ICD-10-CM

## 2017-05-23 DIAGNOSIS — Z87.898 HISTORY OF BRADYCARDIA: ICD-10-CM

## 2017-05-23 PROCEDURE — 1090F PRES/ABSN URINE INCON ASSESS: CPT | Performed by: NURSE PRACTITIONER

## 2017-05-23 PROCEDURE — 3017F COLORECTAL CA SCREEN DOC REV: CPT | Performed by: NURSE PRACTITIONER

## 2017-05-23 PROCEDURE — 93000 ELECTROCARDIOGRAM COMPLETE: CPT | Performed by: NURSE PRACTITIONER

## 2017-05-23 PROCEDURE — 4040F PNEUMOC VAC/ADMIN/RCVD: CPT | Performed by: NURSE PRACTITIONER

## 2017-05-23 PROCEDURE — 99214 OFFICE O/P EST MOD 30 MIN: CPT | Performed by: NURSE PRACTITIONER

## 2017-05-23 PROCEDURE — G8427 DOCREV CUR MEDS BY ELIG CLIN: HCPCS | Performed by: NURSE PRACTITIONER

## 2017-05-23 PROCEDURE — 1123F ACP DISCUSS/DSCN MKR DOCD: CPT | Performed by: NURSE PRACTITIONER

## 2017-05-23 PROCEDURE — G8417 CALC BMI ABV UP PARAM F/U: HCPCS | Performed by: NURSE PRACTITIONER

## 2017-05-23 PROCEDURE — 3014F SCREEN MAMMO DOC REV: CPT | Performed by: NURSE PRACTITIONER

## 2017-05-23 PROCEDURE — G8400 PT W/DXA NO RESULTS DOC: HCPCS | Performed by: NURSE PRACTITIONER

## 2017-05-23 PROCEDURE — 1036F TOBACCO NON-USER: CPT | Performed by: NURSE PRACTITIONER

## 2017-05-23 RX ORDER — SOTALOL HYDROCHLORIDE 80 MG/1
80 TABLET ORAL 2 TIMES DAILY
COMMUNITY
End: 2017-05-30 | Stop reason: ALTCHOICE

## 2017-05-30 ENCOUNTER — OFFICE VISIT (OUTPATIENT)
Dept: CARDIOLOGY | Age: 77
End: 2017-05-30
Payer: MEDICARE

## 2017-05-30 VITALS
WEIGHT: 214 LBS | SYSTOLIC BLOOD PRESSURE: 122 MMHG | HEIGHT: 62 IN | DIASTOLIC BLOOD PRESSURE: 62 MMHG | HEART RATE: 81 BPM | BODY MASS INDEX: 39.38 KG/M2

## 2017-05-30 DIAGNOSIS — I48.19 PERSISTENT ATRIAL FIBRILLATION (HCC): Primary | ICD-10-CM

## 2017-05-30 DIAGNOSIS — I48.19 PERSISTENT ATRIAL FIBRILLATION (HCC): ICD-10-CM

## 2017-05-30 DIAGNOSIS — Z79.01 CHRONIC ANTICOAGULATION: ICD-10-CM

## 2017-05-30 LAB
ANION GAP SERPL CALCULATED.3IONS-SCNC: 20 MMOL/L (ref 7–19)
BUN BLDV-MCNC: 23 MG/DL (ref 8–23)
CALCIUM SERPL-MCNC: 10 MG/DL (ref 8.8–10.2)
CHLORIDE BLD-SCNC: 97 MMOL/L (ref 98–111)
CO2: 23 MMOL/L (ref 22–29)
CREAT SERPL-MCNC: 0.7 MG/DL (ref 0.5–0.9)
GFR NON-AFRICAN AMERICAN: >60
GLUCOSE BLD-MCNC: 51 MG/DL (ref 74–109)
POTASSIUM SERPL-SCNC: 3.7 MMOL/L (ref 3.5–5)
SODIUM BLD-SCNC: 140 MMOL/L (ref 136–145)

## 2017-05-30 PROCEDURE — 3014F SCREEN MAMMO DOC REV: CPT | Performed by: NURSE PRACTITIONER

## 2017-05-30 PROCEDURE — G8400 PT W/DXA NO RESULTS DOC: HCPCS | Performed by: NURSE PRACTITIONER

## 2017-05-30 PROCEDURE — 1123F ACP DISCUSS/DSCN MKR DOCD: CPT | Performed by: NURSE PRACTITIONER

## 2017-05-30 PROCEDURE — 93000 ELECTROCARDIOGRAM COMPLETE: CPT | Performed by: NURSE PRACTITIONER

## 2017-05-30 PROCEDURE — 99214 OFFICE O/P EST MOD 30 MIN: CPT | Performed by: NURSE PRACTITIONER

## 2017-05-30 PROCEDURE — 1036F TOBACCO NON-USER: CPT | Performed by: NURSE PRACTITIONER

## 2017-05-30 PROCEDURE — G8417 CALC BMI ABV UP PARAM F/U: HCPCS | Performed by: NURSE PRACTITIONER

## 2017-05-30 PROCEDURE — 1090F PRES/ABSN URINE INCON ASSESS: CPT | Performed by: NURSE PRACTITIONER

## 2017-05-30 PROCEDURE — G8427 DOCREV CUR MEDS BY ELIG CLIN: HCPCS | Performed by: NURSE PRACTITIONER

## 2017-05-30 PROCEDURE — 3017F COLORECTAL CA SCREEN DOC REV: CPT | Performed by: NURSE PRACTITIONER

## 2017-05-30 PROCEDURE — 4040F PNEUMOC VAC/ADMIN/RCVD: CPT | Performed by: NURSE PRACTITIONER

## 2017-05-30 RX ORDER — ERGOCALCIFEROL 1.25 MG/1
50000 CAPSULE ORAL
Status: ON HOLD | COMMUNITY
End: 2020-09-15

## 2017-05-30 RX ORDER — SOTALOL HYDROCHLORIDE 80 MG/1
80 TABLET ORAL 2 TIMES DAILY
COMMUNITY
End: 2017-11-14 | Stop reason: SDUPTHER

## 2017-05-31 ENCOUNTER — SURG/PROC ORDERS (OUTPATIENT)
Dept: CARDIOLOGY | Age: 77
End: 2017-05-31

## 2017-05-31 DIAGNOSIS — I48.19 PERSISTENT ATRIAL FIBRILLATION (HCC): Primary | ICD-10-CM

## 2017-05-31 RX ORDER — SODIUM CHLORIDE 9 MG/ML
INJECTION, SOLUTION INTRAVENOUS CONTINUOUS
Status: CANCELLED | OUTPATIENT
Start: 2017-05-31

## 2017-06-01 ENCOUNTER — APPOINTMENT (OUTPATIENT)
Dept: GENERAL RADIOLOGY | Age: 77
DRG: 638 | End: 2017-06-01
Payer: MEDICARE

## 2017-06-01 ENCOUNTER — HOSPITAL ENCOUNTER (INPATIENT)
Age: 77
LOS: 2 days | Discharge: HOME | DRG: 638 | End: 2017-06-03
Attending: EMERGENCY MEDICINE | Admitting: FAMILY MEDICINE
Payer: MEDICARE

## 2017-06-01 ENCOUNTER — APPOINTMENT (OUTPATIENT)
Dept: CT IMAGING | Age: 77
DRG: 638 | End: 2017-06-01
Payer: MEDICARE

## 2017-06-01 DIAGNOSIS — I48.19 PERSISTENT ATRIAL FIBRILLATION (HCC): ICD-10-CM

## 2017-06-01 DIAGNOSIS — J18.9 PNEUMONIA DUE TO ORGANISM: Primary | ICD-10-CM

## 2017-06-01 DIAGNOSIS — Z79.01 CHRONIC ANTICOAGULATION: ICD-10-CM

## 2017-06-01 PROBLEM — E16.0 HYPOGLYCEMIA DUE TO INSULIN: Status: ACTIVE | Noted: 2017-06-01

## 2017-06-01 PROBLEM — E11.9 TYPE 2 DIABETES MELLITUS WITHOUT COMPLICATION, WITH LONG-TERM CURRENT USE OF INSULIN (HCC): Status: ACTIVE | Noted: 2017-06-01

## 2017-06-01 PROBLEM — E87.6 HYPOKALEMIA: Status: ACTIVE | Noted: 2017-06-01

## 2017-06-01 PROBLEM — T38.3X5A HYPOGLYCEMIA DUE TO INSULIN: Status: ACTIVE | Noted: 2017-06-01

## 2017-06-01 PROBLEM — R93.89 ABNORMAL CHEST X-RAY: Status: ACTIVE | Noted: 2017-06-01

## 2017-06-01 PROBLEM — Z79.4 TYPE 2 DIABETES MELLITUS WITHOUT COMPLICATION, WITH LONG-TERM CURRENT USE OF INSULIN (HCC): Status: ACTIVE | Noted: 2017-06-01

## 2017-06-01 PROBLEM — E16.2 HYPOGLYCEMIC ENCEPHALOPATHY: Status: ACTIVE | Noted: 2017-06-01

## 2017-06-01 LAB
ALBUMIN SERPL-MCNC: 4.2 G/DL (ref 3.5–5.2)
ALP BLD-CCNC: 65 U/L (ref 35–104)
ALT SERPL-CCNC: 6 U/L (ref 5–33)
ANION GAP SERPL CALCULATED.3IONS-SCNC: 13 MMOL/L (ref 7–19)
AST SERPL-CCNC: 15 U/L (ref 5–32)
BASOPHILS ABSOLUTE: 0.1 K/UL (ref 0–0.2)
BASOPHILS RELATIVE PERCENT: 1.1 % (ref 0–1)
BILIRUB SERPL-MCNC: 0.4 MG/DL (ref 0.2–1.2)
BUN BLDV-MCNC: 18 MG/DL (ref 8–23)
CALCIUM SERPL-MCNC: 8.7 MG/DL (ref 8.8–10.2)
CHLORIDE BLD-SCNC: 102 MMOL/L (ref 98–111)
CO2: 27 MMOL/L (ref 22–29)
CREAT SERPL-MCNC: 0.7 MG/DL (ref 0.5–0.9)
EOSINOPHILS ABSOLUTE: 0.2 K/UL (ref 0–0.6)
EOSINOPHILS RELATIVE PERCENT: 2.6 % (ref 0–5)
GFR NON-AFRICAN AMERICAN: >60
GLUCOSE BLD-MCNC: 136 MG/DL (ref 70–99)
GLUCOSE BLD-MCNC: 72 MG/DL (ref 74–109)
GLUCOSE BLD-MCNC: 76 MG/DL (ref 70–99)
GLUCOSE BLD-MCNC: 81 MG/DL
GLUCOSE BLD-MCNC: 81 MG/DL (ref 70–99)
HCT VFR BLD CALC: 31.8 % (ref 37–47)
HEMOGLOBIN: 10.1 G/DL (ref 12–16)
LYMPHOCYTES ABSOLUTE: 1.6 K/UL (ref 1.1–4.5)
LYMPHOCYTES RELATIVE PERCENT: 19.4 % (ref 20–40)
MCH RBC QN AUTO: 28.9 PG (ref 27–31)
MCHC RBC AUTO-ENTMCNC: 31.8 G/DL (ref 33–37)
MCV RBC AUTO: 90.9 FL (ref 81–99)
MONOCYTES ABSOLUTE: 0.8 K/UL (ref 0–0.9)
MONOCYTES RELATIVE PERCENT: 9.7 % (ref 0–10)
NEUTROPHILS ABSOLUTE: 5.6 K/UL (ref 1.5–7.5)
NEUTROPHILS RELATIVE PERCENT: 66.5 % (ref 50–65)
PDW BLD-RTO: 15.3 % (ref 11.5–14.5)
PERFORMED ON: ABNORMAL
PERFORMED ON: NORMAL
PERFORMED ON: NORMAL
PLATELET # BLD: 262 K/UL (ref 130–400)
PMV BLD AUTO: 10.3 FL (ref 9.4–12.3)
POTASSIUM SERPL-SCNC: 3.2 MMOL/L (ref 3.5–5)
RBC # BLD: 3.5 M/UL (ref 4.2–5.4)
SODIUM BLD-SCNC: 142 MMOL/L (ref 136–145)
TOTAL PROTEIN: 6.6 G/DL (ref 6.6–8.7)
WBC # BLD: 8.5 K/UL (ref 4.8–10.8)

## 2017-06-01 PROCEDURE — 82948 REAGENT STRIP/BLOOD GLUCOSE: CPT

## 2017-06-01 PROCEDURE — 36415 COLL VENOUS BLD VENIPUNCTURE: CPT

## 2017-06-01 PROCEDURE — 6360000002 HC RX W HCPCS: Performed by: EMERGENCY MEDICINE

## 2017-06-01 PROCEDURE — 87040 BLOOD CULTURE FOR BACTERIA: CPT

## 2017-06-01 PROCEDURE — 80053 COMPREHEN METABOLIC PANEL: CPT

## 2017-06-01 PROCEDURE — 94640 AIRWAY INHALATION TREATMENT: CPT

## 2017-06-01 PROCEDURE — 70450 CT HEAD/BRAIN W/O DYE: CPT

## 2017-06-01 PROCEDURE — 1210000000 HC MED SURG R&B

## 2017-06-01 PROCEDURE — 99285 EMERGENCY DEPT VISIT HI MDM: CPT

## 2017-06-01 PROCEDURE — 99284 EMERGENCY DEPT VISIT MOD MDM: CPT | Performed by: EMERGENCY MEDICINE

## 2017-06-01 PROCEDURE — 99222 1ST HOSP IP/OBS MODERATE 55: CPT | Performed by: INTERNAL MEDICINE

## 2017-06-01 PROCEDURE — 99223 1ST HOSP IP/OBS HIGH 75: CPT | Performed by: FAMILY MEDICINE

## 2017-06-01 PROCEDURE — 85025 COMPLETE CBC W/AUTO DIFF WBC: CPT

## 2017-06-01 PROCEDURE — 2580000003 HC RX 258: Performed by: EMERGENCY MEDICINE

## 2017-06-01 PROCEDURE — 71010 XR CHEST PORTABLE: CPT

## 2017-06-01 RX ORDER — NICOTINE POLACRILEX 4 MG
15 LOZENGE BUCCAL PRN
Status: DISCONTINUED | OUTPATIENT
Start: 2017-06-01 | End: 2017-06-03 | Stop reason: HOSPADM

## 2017-06-01 RX ORDER — SODIUM CHLORIDE 0.9 % (FLUSH) 0.9 %
10 SYRINGE (ML) INJECTION EVERY 12 HOURS SCHEDULED
Status: DISCONTINUED | OUTPATIENT
Start: 2017-06-01 | End: 2017-06-03 | Stop reason: HOSPADM

## 2017-06-01 RX ORDER — DEXTROSE MONOHYDRATE 50 MG/ML
100 INJECTION, SOLUTION INTRAVENOUS PRN
Status: DISCONTINUED | OUTPATIENT
Start: 2017-06-01 | End: 2017-06-03 | Stop reason: HOSPADM

## 2017-06-01 RX ORDER — ACETAMINOPHEN 325 MG/1
650 TABLET ORAL EVERY 4 HOURS PRN
Status: DISCONTINUED | OUTPATIENT
Start: 2017-06-01 | End: 2017-06-03 | Stop reason: HOSPADM

## 2017-06-01 RX ORDER — SODIUM CHLORIDE 0.9 % (FLUSH) 0.9 %
10 SYRINGE (ML) INJECTION EVERY 12 HOURS SCHEDULED
Status: DISCONTINUED | OUTPATIENT
Start: 2017-06-01 | End: 2017-06-01 | Stop reason: SDUPTHER

## 2017-06-01 RX ORDER — DEXTROSE MONOHYDRATE 25 G/50ML
12.5 INJECTION, SOLUTION INTRAVENOUS PRN
Status: DISCONTINUED | OUTPATIENT
Start: 2017-06-01 | End: 2017-06-03 | Stop reason: HOSPADM

## 2017-06-01 RX ORDER — ONDANSETRON 2 MG/ML
4 INJECTION INTRAMUSCULAR; INTRAVENOUS EVERY 6 HOURS PRN
Status: DISCONTINUED | OUTPATIENT
Start: 2017-06-01 | End: 2017-06-03 | Stop reason: HOSPADM

## 2017-06-01 RX ORDER — ALBUTEROL SULFATE 2.5 MG/3ML
2.5 SOLUTION RESPIRATORY (INHALATION) EVERY 4 HOURS PRN
Status: DISCONTINUED | OUTPATIENT
Start: 2017-06-01 | End: 2017-06-03 | Stop reason: HOSPADM

## 2017-06-01 RX ORDER — SODIUM CHLORIDE 0.9 % (FLUSH) 0.9 %
10 SYRINGE (ML) INJECTION PRN
Status: DISCONTINUED | OUTPATIENT
Start: 2017-06-01 | End: 2017-06-01 | Stop reason: SDUPTHER

## 2017-06-01 RX ORDER — POTASSIUM CHLORIDE 20 MEQ/1
40 TABLET, EXTENDED RELEASE ORAL ONCE
Status: COMPLETED | OUTPATIENT
Start: 2017-06-02 | End: 2017-06-02

## 2017-06-01 RX ORDER — SODIUM CHLORIDE 0.9 % (FLUSH) 0.9 %
10 SYRINGE (ML) INJECTION PRN
Status: DISCONTINUED | OUTPATIENT
Start: 2017-06-01 | End: 2017-06-03 | Stop reason: HOSPADM

## 2017-06-01 RX ADMIN — CEFTRIAXONE 1 G: 1 INJECTION, POWDER, FOR SOLUTION INTRAMUSCULAR; INTRAVENOUS at 20:40

## 2017-06-01 RX ADMIN — IPRATROPIUM BROMIDE 0.5 MG: 0.5 SOLUTION RESPIRATORY (INHALATION) at 20:13

## 2017-06-01 RX ADMIN — ALBUTEROL SULFATE 2.5 MG: 2.5 SOLUTION RESPIRATORY (INHALATION) at 20:13

## 2017-06-02 ENCOUNTER — APPOINTMENT (OUTPATIENT)
Dept: GENERAL RADIOLOGY | Age: 77
DRG: 638 | End: 2017-06-02
Payer: MEDICARE

## 2017-06-02 ENCOUNTER — HOSPITAL ENCOUNTER (OUTPATIENT)
Dept: CARDIAC CATH/INVASIVE PROCEDURES | Age: 77
Discharge: HOME | End: 2017-06-02
Payer: MEDICARE

## 2017-06-02 PROBLEM — E83.42 HYPOMAGNESEMIA: Status: ACTIVE | Noted: 2017-06-02

## 2017-06-02 LAB
ANION GAP SERPL CALCULATED.3IONS-SCNC: 16 MMOL/L (ref 7–19)
ANION GAP SERPL CALCULATED.3IONS-SCNC: 18 MMOL/L (ref 7–19)
BACTERIA: ABNORMAL /HPF
BASOPHILS ABSOLUTE: 0.1 K/UL (ref 0–0.2)
BASOPHILS RELATIVE PERCENT: 0.9 % (ref 0–1)
BILIRUBIN URINE: NEGATIVE
BLOOD, URINE: NEGATIVE
BUN BLDV-MCNC: 12 MG/DL (ref 8–23)
BUN BLDV-MCNC: 16 MG/DL (ref 8–23)
CALCIUM SERPL-MCNC: 8.2 MG/DL (ref 8.8–10.2)
CALCIUM SERPL-MCNC: 8.5 MG/DL (ref 8.8–10.2)
CHLORIDE BLD-SCNC: 100 MMOL/L (ref 98–111)
CHLORIDE BLD-SCNC: 102 MMOL/L (ref 98–111)
CLARITY: CLEAR
CO2: 21 MMOL/L (ref 22–29)
CO2: 25 MMOL/L (ref 22–29)
COLOR: YELLOW
CREAT SERPL-MCNC: 0.6 MG/DL (ref 0.5–0.9)
CREAT SERPL-MCNC: 0.6 MG/DL (ref 0.5–0.9)
EOSINOPHILS ABSOLUTE: 0.2 K/UL (ref 0–0.6)
EOSINOPHILS RELATIVE PERCENT: 2.1 % (ref 0–5)
EPITHELIAL CELLS, UA: ABNORMAL /HPF
GFR NON-AFRICAN AMERICAN: >60
GFR NON-AFRICAN AMERICAN: >60
GLUCOSE BLD-MCNC: 106 MG/DL (ref 70–99)
GLUCOSE BLD-MCNC: 123 MG/DL (ref 70–99)
GLUCOSE BLD-MCNC: 127 MG/DL (ref 70–99)
GLUCOSE BLD-MCNC: 128 MG/DL (ref 74–109)
GLUCOSE BLD-MCNC: 130 MG/DL (ref 74–109)
GLUCOSE BLD-MCNC: 162 MG/DL (ref 70–99)
GLUCOSE BLD-MCNC: 238 MG/DL (ref 70–99)
GLUCOSE BLD-MCNC: 67 MG/DL (ref 70–99)
GLUCOSE URINE: NEGATIVE MG/DL
HBA1C MFR BLD: 6.7 %
HCT VFR BLD CALC: 31 % (ref 37–47)
HEMOGLOBIN: 9.9 G/DL (ref 12–16)
KETONES, URINE: NEGATIVE MG/DL
LEUKOCYTE ESTERASE, URINE: ABNORMAL
LYMPHOCYTES ABSOLUTE: 1.9 K/UL (ref 1.1–4.5)
LYMPHOCYTES RELATIVE PERCENT: 23.2 % (ref 20–40)
MAGNESIUM: 1.5 MG/DL (ref 1.6–2.4)
MCH RBC QN AUTO: 29.3 PG (ref 27–31)
MCHC RBC AUTO-ENTMCNC: 31.9 G/DL (ref 33–37)
MCV RBC AUTO: 91.7 FL (ref 81–99)
MONOCYTES ABSOLUTE: 0.8 K/UL (ref 0–0.9)
MONOCYTES RELATIVE PERCENT: 10.1 % (ref 0–10)
NEUTROPHILS ABSOLUTE: 5.1 K/UL (ref 1.5–7.5)
NEUTROPHILS RELATIVE PERCENT: 63 % (ref 50–65)
NITRITE, URINE: NEGATIVE
PDW BLD-RTO: 15.3 % (ref 11.5–14.5)
PERFORMED ON: ABNORMAL
PH UA: 5.5
PLATELET # BLD: 255 K/UL (ref 130–400)
PMV BLD AUTO: 10.4 FL (ref 9.4–12.3)
POTASSIUM SERPL-SCNC: 3.3 MMOL/L (ref 3.5–5)
POTASSIUM SERPL-SCNC: 3.6 MMOL/L (ref 3.5–5)
PROTEIN UA: NEGATIVE MG/DL
RBC # BLD: 3.38 M/UL (ref 4.2–5.4)
SODIUM BLD-SCNC: 141 MMOL/L (ref 136–145)
SODIUM BLD-SCNC: 141 MMOL/L (ref 136–145)
SPECIFIC GRAVITY UA: 1.02
UROBILINOGEN, URINE: 0.2 E.U./DL
WBC # BLD: 8.1 K/UL (ref 4.8–10.8)
WBC UA: ABNORMAL /HPF (ref 0–5)

## 2017-06-02 PROCEDURE — 6370000000 HC RX 637 (ALT 250 FOR IP): Performed by: FAMILY MEDICINE

## 2017-06-02 PROCEDURE — 92960 CARDIOVERSION ELECTRIC EXT: CPT | Performed by: INTERNAL MEDICINE

## 2017-06-02 PROCEDURE — 71020 XR CHEST STANDARD TWO VW: CPT

## 2017-06-02 PROCEDURE — 94660 CPAP INITIATION&MGMT: CPT

## 2017-06-02 PROCEDURE — 2580000003 HC RX 258: Performed by: FAMILY MEDICINE

## 2017-06-02 PROCEDURE — 82948 REAGENT STRIP/BLOOD GLUCOSE: CPT

## 2017-06-02 PROCEDURE — 99024 POSTOP FOLLOW-UP VISIT: CPT | Performed by: INTERNAL MEDICINE

## 2017-06-02 PROCEDURE — 93005 ELECTROCARDIOGRAM TRACING: CPT

## 2017-06-02 PROCEDURE — 83735 ASSAY OF MAGNESIUM: CPT

## 2017-06-02 PROCEDURE — 81001 URINALYSIS AUTO W/SCOPE: CPT

## 2017-06-02 PROCEDURE — 99232 SBSQ HOSP IP/OBS MODERATE 35: CPT | Performed by: INTERNAL MEDICINE

## 2017-06-02 PROCEDURE — 2580000003 HC RX 258: Performed by: NURSE PRACTITIONER

## 2017-06-02 PROCEDURE — 80048 BASIC METABOLIC PNL TOTAL CA: CPT

## 2017-06-02 PROCEDURE — 36415 COLL VENOUS BLD VENIPUNCTURE: CPT

## 2017-06-02 PROCEDURE — 5A2204Z RESTORATION OF CARDIAC RHYTHM, SINGLE: ICD-10-PCS | Performed by: INTERNAL MEDICINE

## 2017-06-02 PROCEDURE — 6360000002 HC RX W HCPCS: Performed by: NURSE PRACTITIONER

## 2017-06-02 PROCEDURE — 87086 URINE CULTURE/COLONY COUNT: CPT

## 2017-06-02 PROCEDURE — 6360000002 HC RX W HCPCS

## 2017-06-02 PROCEDURE — 1210000000 HC MED SURG R&B

## 2017-06-02 PROCEDURE — 83036 HEMOGLOBIN GLYCOSYLATED A1C: CPT

## 2017-06-02 PROCEDURE — 85025 COMPLETE CBC W/AUTO DIFF WBC: CPT

## 2017-06-02 RX ORDER — SOTALOL HYDROCHLORIDE 80 MG/1
80 TABLET ORAL 2 TIMES DAILY
Status: DISCONTINUED | OUTPATIENT
Start: 2017-06-02 | End: 2017-06-03 | Stop reason: HOSPADM

## 2017-06-02 RX ORDER — SODIUM CHLORIDE 9 MG/ML
INJECTION, SOLUTION INTRAVENOUS CONTINUOUS
Status: DISCONTINUED | OUTPATIENT
Start: 2017-06-02 | End: 2017-06-03 | Stop reason: HOSPADM

## 2017-06-02 RX ORDER — FLUTICASONE PROPIONATE 50 MCG
1 SPRAY, SUSPENSION (ML) NASAL 2 TIMES DAILY
Status: ON HOLD | COMMUNITY
End: 2019-01-06 | Stop reason: HOSPADM

## 2017-06-02 RX ORDER — FUROSEMIDE 40 MG/1
40 TABLET ORAL DAILY
Status: DISCONTINUED | OUTPATIENT
Start: 2017-06-02 | End: 2017-06-03 | Stop reason: HOSPADM

## 2017-06-02 RX ORDER — GABAPENTIN 300 MG/1
300 CAPSULE ORAL 2 TIMES DAILY
Status: DISCONTINUED | OUTPATIENT
Start: 2017-06-02 | End: 2017-06-03 | Stop reason: HOSPADM

## 2017-06-02 RX ORDER — ATORVASTATIN CALCIUM 40 MG/1
40 TABLET, FILM COATED ORAL NIGHTLY
Status: DISCONTINUED | OUTPATIENT
Start: 2017-06-02 | End: 2017-06-03 | Stop reason: HOSPADM

## 2017-06-02 RX ADMIN — GABAPENTIN 300 MG: 300 CAPSULE ORAL at 01:10

## 2017-06-02 RX ADMIN — GABAPENTIN 300 MG: 300 CAPSULE ORAL at 21:49

## 2017-06-02 RX ADMIN — SERTRALINE HYDROCHLORIDE 25 MG: 50 TABLET ORAL at 21:50

## 2017-06-02 RX ADMIN — SOTALOL HYDROCHLORIDE 80 MG: 80 TABLET ORAL at 01:10

## 2017-06-02 RX ADMIN — MAGNESIUM SULFATE HEPTAHYDRATE 2 G: 500 INJECTION, SOLUTION INTRAMUSCULAR; INTRAVENOUS at 10:38

## 2017-06-02 RX ADMIN — SERTRALINE HYDROCHLORIDE 50 MG: 50 TABLET ORAL at 01:10

## 2017-06-02 RX ADMIN — SOTALOL HYDROCHLORIDE 80 MG: 80 TABLET ORAL at 21:50

## 2017-06-02 RX ADMIN — RIVAROXABAN 20 MG: 20 TABLET, FILM COATED ORAL at 08:54

## 2017-06-02 RX ADMIN — FUROSEMIDE 40 MG: 40 TABLET ORAL at 08:55

## 2017-06-02 RX ADMIN — ATORVASTATIN CALCIUM 40 MG: 40 TABLET, FILM COATED ORAL at 21:50

## 2017-06-02 RX ADMIN — SOTALOL HYDROCHLORIDE 80 MG: 80 TABLET ORAL at 08:55

## 2017-06-02 RX ADMIN — GABAPENTIN 300 MG: 300 CAPSULE ORAL at 08:54

## 2017-06-02 RX ADMIN — ATORVASTATIN CALCIUM 40 MG: 40 TABLET, FILM COATED ORAL at 01:10

## 2017-06-02 RX ADMIN — DEXTROSE AND SODIUM CHLORIDE: 5; 450 INJECTION, SOLUTION INTRAVENOUS at 06:05

## 2017-06-02 RX ADMIN — POTASSIUM CHLORIDE 40 MEQ: 20 TABLET, EXTENDED RELEASE ORAL at 01:10

## 2017-06-02 RX ADMIN — DEXTROSE MONOHYDRATE 12.5 G: 25 INJECTION, SOLUTION INTRAVENOUS at 07:07

## 2017-06-02 RX ADMIN — Medication 10 ML: at 08:57

## 2017-06-02 ASSESSMENT — ENCOUNTER SYMPTOMS
WHEEZING: 0
NAUSEA: 0
DIARRHEA: 0
SPUTUM PRODUCTION: 0
ABDOMINAL PAIN: 0
BLURRED VISION: 0
CONSTIPATION: 0
SORE THROAT: 0
SHORTNESS OF BREATH: 0
COUGH: 0
VOMITING: 0

## 2017-06-03 VITALS
OXYGEN SATURATION: 94 % | HEIGHT: 64 IN | DIASTOLIC BLOOD PRESSURE: 52 MMHG | RESPIRATION RATE: 20 BRPM | TEMPERATURE: 97.2 F | WEIGHT: 216 LBS | BODY MASS INDEX: 36.88 KG/M2 | HEART RATE: 55 BPM | SYSTOLIC BLOOD PRESSURE: 100 MMHG

## 2017-06-03 LAB
GLUCOSE BLD-MCNC: 109 MG/DL (ref 70–99)
GLUCOSE BLD-MCNC: 143 MG/DL (ref 70–99)
PERFORMED ON: ABNORMAL
PERFORMED ON: ABNORMAL

## 2017-06-03 PROCEDURE — 2580000003 HC RX 258: Performed by: FAMILY MEDICINE

## 2017-06-03 PROCEDURE — 6370000000 HC RX 637 (ALT 250 FOR IP): Performed by: FAMILY MEDICINE

## 2017-06-03 PROCEDURE — 82948 REAGENT STRIP/BLOOD GLUCOSE: CPT

## 2017-06-03 RX ORDER — AMOXICILLIN AND CLAVULANATE POTASSIUM 875; 125 MG/1; MG/1
1 TABLET, FILM COATED ORAL 2 TIMES DAILY
Qty: 14 TABLET | Refills: 0 | Status: SHIPPED | OUTPATIENT
Start: 2017-06-03 | End: 2017-06-10

## 2017-06-03 RX ADMIN — Medication 10 ML: at 08:35

## 2017-06-03 RX ADMIN — GABAPENTIN 300 MG: 300 CAPSULE ORAL at 08:35

## 2017-06-03 RX ADMIN — SOTALOL HYDROCHLORIDE 80 MG: 80 TABLET ORAL at 08:35

## 2017-06-03 RX ADMIN — FUROSEMIDE 40 MG: 40 TABLET ORAL at 08:35

## 2017-06-03 RX ADMIN — RIVAROXABAN 20 MG: 20 TABLET, FILM COATED ORAL at 08:35

## 2017-06-04 LAB — URINE CULTURE, ROUTINE: NORMAL

## 2017-06-06 ENCOUNTER — ANESTHESIA (OUTPATIENT)
Dept: ENDOSCOPY | Age: 77
End: 2017-06-06
Payer: MEDICARE

## 2017-06-06 ENCOUNTER — ANESTHESIA EVENT (OUTPATIENT)
Dept: ENDOSCOPY | Age: 77
End: 2017-06-06
Payer: MEDICARE

## 2017-06-06 ENCOUNTER — HOSPITAL ENCOUNTER (OUTPATIENT)
Age: 77
Setting detail: OUTPATIENT SURGERY
Discharge: HOME | End: 2017-06-06
Attending: INTERNAL MEDICINE | Admitting: INTERNAL MEDICINE
Payer: MEDICARE

## 2017-06-06 VITALS
BODY MASS INDEX: 33.82 KG/M2 | TEMPERATURE: 97.3 F | HEART RATE: 57 BPM | SYSTOLIC BLOOD PRESSURE: 108 MMHG | OXYGEN SATURATION: 96 % | WEIGHT: 203 LBS | RESPIRATION RATE: 18 BRPM | HEIGHT: 65 IN | DIASTOLIC BLOOD PRESSURE: 58 MMHG

## 2017-06-06 VITALS
SYSTOLIC BLOOD PRESSURE: 71 MMHG | OXYGEN SATURATION: 90 % | RESPIRATION RATE: 17 BRPM | DIASTOLIC BLOOD PRESSURE: 32 MMHG

## 2017-06-06 LAB
BLOOD CULTURE, ROUTINE: NORMAL
CULTURE, BLOOD 2: NORMAL
EKG P AXIS: 64 DEGREES
EKG P-R INTERVAL: 196 MS
EKG Q-T INTERVAL: 520 MS
EKG QRS DURATION: 90 MS
EKG QTC CALCULATION (BAZETT): 508 MS
EKG T AXIS: 46 DEGREES
GLUCOSE BLD-MCNC: 87 MG/DL (ref 70–99)
PERFORMED ON: NORMAL

## 2017-06-06 PROCEDURE — 3609010300 HC COLONOSCOPY W/BIOPSY SINGLE/MULTIPLE: Performed by: INTERNAL MEDICINE

## 2017-06-06 PROCEDURE — 7100000010 HC PHASE II RECOVERY - FIRST 15 MIN: Performed by: INTERNAL MEDICINE

## 2017-06-06 PROCEDURE — 3700000000 HC ANESTHESIA ATTENDED CARE: Performed by: INTERNAL MEDICINE

## 2017-06-06 PROCEDURE — 45385 COLONOSCOPY W/LESION REMOVAL: CPT | Performed by: INTERNAL MEDICINE

## 2017-06-06 PROCEDURE — 7100000011 HC PHASE II RECOVERY - ADDTL 15 MIN: Performed by: INTERNAL MEDICINE

## 2017-06-06 PROCEDURE — 2580000003 HC RX 258: Performed by: INTERNAL MEDICINE

## 2017-06-06 PROCEDURE — 3700000001 HC ADD 15 MINUTES (ANESTHESIA): Performed by: INTERNAL MEDICINE

## 2017-06-06 PROCEDURE — 88305 TISSUE EXAM BY PATHOLOGIST: CPT

## 2017-06-06 PROCEDURE — 6360000002 HC RX W HCPCS: Performed by: NURSE ANESTHETIST, CERTIFIED REGISTERED

## 2017-06-06 PROCEDURE — 45380 COLONOSCOPY AND BIOPSY: CPT | Performed by: INTERNAL MEDICINE

## 2017-06-06 PROCEDURE — 82948 REAGENT STRIP/BLOOD GLUCOSE: CPT

## 2017-06-06 PROCEDURE — 2500000003 HC RX 250 WO HCPCS: Performed by: INTERNAL MEDICINE

## 2017-06-06 PROCEDURE — 99239 HOSP IP/OBS DSCHRG MGMT >30: CPT | Performed by: NURSE PRACTITIONER

## 2017-06-06 RX ORDER — SODIUM CHLORIDE, SODIUM LACTATE, POTASSIUM CHLORIDE, CALCIUM CHLORIDE 600; 310; 30; 20 MG/100ML; MG/100ML; MG/100ML; MG/100ML
INJECTION, SOLUTION INTRAVENOUS CONTINUOUS
Status: DISCONTINUED | OUTPATIENT
Start: 2017-06-06 | End: 2017-06-06 | Stop reason: HOSPADM

## 2017-06-06 RX ORDER — PROPOFOL 10 MG/ML
INJECTION, EMULSION INTRAVENOUS PRN
Status: DISCONTINUED | OUTPATIENT
Start: 2017-06-06 | End: 2017-06-06 | Stop reason: SDUPTHER

## 2017-06-06 RX ORDER — ONDANSETRON 2 MG/ML
4 INJECTION INTRAMUSCULAR; INTRAVENOUS
Status: DISCONTINUED | OUTPATIENT
Start: 2017-06-06 | End: 2017-06-06 | Stop reason: HOSPADM

## 2017-06-06 RX ORDER — PROMETHAZINE HYDROCHLORIDE 25 MG/ML
6.25 INJECTION, SOLUTION INTRAMUSCULAR; INTRAVENOUS
Status: DISCONTINUED | OUTPATIENT
Start: 2017-06-06 | End: 2017-06-06 | Stop reason: HOSPADM

## 2017-06-06 RX ORDER — DIPHENHYDRAMINE HYDROCHLORIDE 50 MG/ML
12.5 INJECTION INTRAMUSCULAR; INTRAVENOUS
Status: DISCONTINUED | OUTPATIENT
Start: 2017-06-06 | End: 2017-06-06 | Stop reason: HOSPADM

## 2017-06-06 RX ORDER — LIDOCAINE HYDROCHLORIDE 10 MG/ML
1 INJECTION, SOLUTION EPIDURAL; INFILTRATION; INTRACAUDAL; PERINEURAL ONCE
Status: COMPLETED | OUTPATIENT
Start: 2017-06-06 | End: 2017-06-06

## 2017-06-06 RX ADMIN — PROPOFOL 200 MG: 10 INJECTION, EMULSION INTRAVENOUS at 10:49

## 2017-06-06 RX ADMIN — LIDOCAINE HYDROCHLORIDE 5 ML: 10 INJECTION, SOLUTION EPIDURAL; INFILTRATION; INTRACAUDAL; PERINEURAL at 10:49

## 2017-06-06 RX ADMIN — SODIUM CHLORIDE, POTASSIUM CHLORIDE, SODIUM LACTATE AND CALCIUM CHLORIDE: 600; 310; 30; 20 INJECTION, SOLUTION INTRAVENOUS at 10:18

## 2017-06-06 ASSESSMENT — PAIN - FUNCTIONAL ASSESSMENT: PAIN_FUNCTIONAL_ASSESSMENT: 0-10

## 2017-07-11 ENCOUNTER — OFFICE VISIT (OUTPATIENT)
Dept: CARDIOLOGY | Age: 77
End: 2017-07-11
Payer: MEDICARE

## 2017-07-11 VITALS
WEIGHT: 214 LBS | DIASTOLIC BLOOD PRESSURE: 60 MMHG | HEIGHT: 65 IN | SYSTOLIC BLOOD PRESSURE: 100 MMHG | HEART RATE: 63 BPM | BODY MASS INDEX: 35.65 KG/M2

## 2017-07-11 DIAGNOSIS — E78.2 MIXED HYPERLIPIDEMIA: ICD-10-CM

## 2017-07-11 DIAGNOSIS — I10 ESSENTIAL HYPERTENSION: ICD-10-CM

## 2017-07-11 DIAGNOSIS — G47.33 OBSTRUCTIVE SLEEP APNEA SYNDROME: ICD-10-CM

## 2017-07-11 DIAGNOSIS — I48.0 PAF (PAROXYSMAL ATRIAL FIBRILLATION) (HCC): Primary | ICD-10-CM

## 2017-07-11 PROCEDURE — 1123F ACP DISCUSS/DSCN MKR DOCD: CPT | Performed by: CLINICAL NURSE SPECIALIST

## 2017-07-11 PROCEDURE — G8400 PT W/DXA NO RESULTS DOC: HCPCS | Performed by: CLINICAL NURSE SPECIALIST

## 2017-07-11 PROCEDURE — 1090F PRES/ABSN URINE INCON ASSESS: CPT | Performed by: CLINICAL NURSE SPECIALIST

## 2017-07-11 PROCEDURE — 4040F PNEUMOC VAC/ADMIN/RCVD: CPT | Performed by: CLINICAL NURSE SPECIALIST

## 2017-07-11 PROCEDURE — 3014F SCREEN MAMMO DOC REV: CPT | Performed by: CLINICAL NURSE SPECIALIST

## 2017-07-11 PROCEDURE — 93000 ELECTROCARDIOGRAM COMPLETE: CPT | Performed by: CLINICAL NURSE SPECIALIST

## 2017-07-11 PROCEDURE — G8427 DOCREV CUR MEDS BY ELIG CLIN: HCPCS | Performed by: CLINICAL NURSE SPECIALIST

## 2017-07-11 PROCEDURE — 3017F COLORECTAL CA SCREEN DOC REV: CPT | Performed by: CLINICAL NURSE SPECIALIST

## 2017-07-11 PROCEDURE — G8417 CALC BMI ABV UP PARAM F/U: HCPCS | Performed by: CLINICAL NURSE SPECIALIST

## 2017-07-11 PROCEDURE — 1036F TOBACCO NON-USER: CPT | Performed by: CLINICAL NURSE SPECIALIST

## 2017-07-11 PROCEDURE — 99214 OFFICE O/P EST MOD 30 MIN: CPT | Performed by: CLINICAL NURSE SPECIALIST

## 2017-07-11 ASSESSMENT — ENCOUNTER SYMPTOMS
ABDOMINAL PAIN: 0
ORTHOPNEA: 0
VOMITING: 0
SHORTNESS OF BREATH: 0
COUGH: 0
HEARTBURN: 0
BLURRED VISION: 0
NAUSEA: 0

## 2017-08-01 ENCOUNTER — OFFICE VISIT (OUTPATIENT)
Dept: NEUROLOGY | Age: 77
End: 2017-08-01
Payer: MEDICARE

## 2017-08-01 VITALS
HEART RATE: 49 BPM | DIASTOLIC BLOOD PRESSURE: 57 MMHG | BODY MASS INDEX: 35.61 KG/M2 | SYSTOLIC BLOOD PRESSURE: 121 MMHG | OXYGEN SATURATION: 97 % | WEIGHT: 214 LBS

## 2017-08-01 DIAGNOSIS — R09.02 HYPOXEMIA: ICD-10-CM

## 2017-08-01 DIAGNOSIS — G47.33 OBSTRUCTIVE SLEEP APNEA (ADULT) (PEDIATRIC): Primary | ICD-10-CM

## 2017-08-01 DIAGNOSIS — Z99.89 CPAP (CONTINUOUS POSITIVE AIRWAY PRESSURE) DEPENDENCE: ICD-10-CM

## 2017-08-01 DIAGNOSIS — G25.81 RESTLESS LEG SYNDROME: ICD-10-CM

## 2017-08-01 DIAGNOSIS — R40.0 SOMNOLENCE, DAYTIME: ICD-10-CM

## 2017-08-01 PROCEDURE — G8400 PT W/DXA NO RESULTS DOC: HCPCS | Performed by: PHYSICIAN ASSISTANT

## 2017-08-01 PROCEDURE — 4040F PNEUMOC VAC/ADMIN/RCVD: CPT | Performed by: PHYSICIAN ASSISTANT

## 2017-08-01 PROCEDURE — G8427 DOCREV CUR MEDS BY ELIG CLIN: HCPCS | Performed by: PHYSICIAN ASSISTANT

## 2017-08-01 PROCEDURE — 99214 OFFICE O/P EST MOD 30 MIN: CPT | Performed by: PHYSICIAN ASSISTANT

## 2017-08-01 PROCEDURE — 1036F TOBACCO NON-USER: CPT | Performed by: PHYSICIAN ASSISTANT

## 2017-08-01 PROCEDURE — G8417 CALC BMI ABV UP PARAM F/U: HCPCS | Performed by: PHYSICIAN ASSISTANT

## 2017-08-01 PROCEDURE — 1123F ACP DISCUSS/DSCN MKR DOCD: CPT | Performed by: PHYSICIAN ASSISTANT

## 2017-08-01 PROCEDURE — 1090F PRES/ABSN URINE INCON ASSESS: CPT | Performed by: PHYSICIAN ASSISTANT

## 2017-08-01 PROCEDURE — 3017F COLORECTAL CA SCREEN DOC REV: CPT | Performed by: PHYSICIAN ASSISTANT

## 2017-08-01 PROCEDURE — 3014F SCREEN MAMMO DOC REV: CPT | Performed by: PHYSICIAN ASSISTANT

## 2017-08-08 RX ORDER — SOTALOL HYDROCHLORIDE 80 MG/1
TABLET ORAL
Qty: 90 TABLET | Refills: 3 | Status: SHIPPED | OUTPATIENT
Start: 2017-08-08 | End: 2017-08-09 | Stop reason: SDUPTHER

## 2017-08-09 RX ORDER — SOTALOL HYDROCHLORIDE 80 MG/1
80 TABLET ORAL 2 TIMES DAILY
Qty: 180 TABLET | Refills: 3 | Status: SHIPPED | OUTPATIENT
Start: 2017-08-09 | End: 2018-08-06 | Stop reason: SDUPTHER

## 2017-08-15 ENCOUNTER — HOSPITAL ENCOUNTER (OUTPATIENT)
Dept: SLEEP CENTER | Age: 77
Discharge: HOME | End: 2017-08-15
Payer: MEDICARE

## 2017-08-15 PROCEDURE — 95811 POLYSOM 6/>YRS CPAP 4/> PARM: CPT | Performed by: PSYCHIATRY & NEUROLOGY

## 2017-08-15 PROCEDURE — 95811 POLYSOM 6/>YRS CPAP 4/> PARM: CPT

## 2017-08-20 DIAGNOSIS — G47.33 OBSTRUCTIVE SLEEP APNEA: Primary | ICD-10-CM

## 2017-09-28 RX ORDER — FUROSEMIDE 40 MG/1
TABLET ORAL
Qty: 90 TABLET | Refills: 2 | Status: ON HOLD | OUTPATIENT
Start: 2017-09-28 | End: 2019-01-06 | Stop reason: HOSPADM

## 2017-10-16 ENCOUNTER — TELEPHONE (OUTPATIENT)
Dept: NEUROLOGY | Age: 77
End: 2017-10-16

## 2017-10-16 ENCOUNTER — TELEPHONE (OUTPATIENT)
Dept: CARDIOLOGY | Age: 77
End: 2017-10-16

## 2017-10-17 NOTE — TELEPHONE ENCOUNTER
Called and spoke with patient daughter Ivan Reid. Kacy Razo know that her mom has an appointment on 12- at [phone] with our PA Lai Phillips. I asked the daughter Ivan Reid if her mom was needing to get in sooner, she stated that the December appointment should be OK. I have left patient on for 12-1-17.

## 2017-11-14 ENCOUNTER — OFFICE VISIT (OUTPATIENT)
Dept: CARDIOLOGY | Age: 77
End: 2017-11-14
Payer: MEDICARE

## 2017-11-14 VITALS
SYSTOLIC BLOOD PRESSURE: 100 MMHG | BODY MASS INDEX: 34.39 KG/M2 | HEART RATE: 50 BPM | HEIGHT: 66 IN | DIASTOLIC BLOOD PRESSURE: 58 MMHG | WEIGHT: 214 LBS

## 2017-11-14 DIAGNOSIS — I10 ESSENTIAL HYPERTENSION: ICD-10-CM

## 2017-11-14 DIAGNOSIS — I48.0 PAF (PAROXYSMAL ATRIAL FIBRILLATION) (HCC): Primary | ICD-10-CM

## 2017-11-14 DIAGNOSIS — E78.2 MIXED HYPERLIPIDEMIA: ICD-10-CM

## 2017-11-14 PROCEDURE — 3017F COLORECTAL CA SCREEN DOC REV: CPT | Performed by: INTERNAL MEDICINE

## 2017-11-14 PROCEDURE — 99213 OFFICE O/P EST LOW 20 MIN: CPT | Performed by: INTERNAL MEDICINE

## 2017-11-14 PROCEDURE — G8484 FLU IMMUNIZE NO ADMIN: HCPCS | Performed by: INTERNAL MEDICINE

## 2017-11-14 PROCEDURE — G8417 CALC BMI ABV UP PARAM F/U: HCPCS | Performed by: INTERNAL MEDICINE

## 2017-11-14 PROCEDURE — G8427 DOCREV CUR MEDS BY ELIG CLIN: HCPCS | Performed by: INTERNAL MEDICINE

## 2017-11-14 PROCEDURE — 4040F PNEUMOC VAC/ADMIN/RCVD: CPT | Performed by: INTERNAL MEDICINE

## 2017-11-14 PROCEDURE — 1123F ACP DISCUSS/DSCN MKR DOCD: CPT | Performed by: INTERNAL MEDICINE

## 2017-11-14 PROCEDURE — 1036F TOBACCO NON-USER: CPT | Performed by: INTERNAL MEDICINE

## 2017-11-14 PROCEDURE — G8400 PT W/DXA NO RESULTS DOC: HCPCS | Performed by: INTERNAL MEDICINE

## 2017-11-14 PROCEDURE — 1090F PRES/ABSN URINE INCON ASSESS: CPT | Performed by: INTERNAL MEDICINE

## 2017-11-14 NOTE — PROGRESS NOTES
[facility] [place] Office Visit    [facility] [place]  [id number]  Phone: [phone]  Fax: [phone]        11/14/2017    Chief Complaint / Reason for the Visit   Follow up of:  Atrial Fibrillation and HTN and Hyperlipidemia      Specialty Problems        Cardiology Problems    Persistent atrial fibrillation (HCC)        PAF (paroxysmal atrial fibrillation) ([facility]        Essential hypertension              Current Status Today According to the patient:  \"My hearts still beating\"    Subjective:  Ms. Ebony Patel is generally feeling stable. Ms. Ebony Patel has the following cardiac complaints / symptoms today:    1. Atrial Fibrillation, Is stable on current medications    2. HTN, Is stable on current medications    3.  Hyperlipidemia, Is stable on current medications        Ebony Patel is a 78 y.o. female with the following history as recorded in [facility]:    Patient Active Problem List    Diagnosis Date Noted    Persistent atrial fibrillation ([facility]      Priority: High    Obstructive sleep apnea 08/01/2017    CPAP (continuous positive airway pressure) dependence 08/01/2017    Somnolence, daytime 08/01/2017    Restless leg syndrome 08/01/2017    Hypoxemia 08/01/2017    Obstructive sleep apnea     Essential hypertension 07/11/2017    Mixed hyperlipidemia 07/11/2017    Obstructive sleep apnea syndrome 07/11/2017    Hypomagnesemia 06/02/2017    Hypoglycemia due to insulin 06/01/2017    Type 2 diabetes mellitus without complication, with long-term current use of insulin ([address] 06/01/2017    Hypoglycemic encephalopathy 06/01/2017    Abnormal chest x-ray 06/01/2017    Hypokalemia 06/01/2017    Pneumonia due to organism     Chronic anticoagulation 05/23/2017    PAF (paroxysmal atrial fibrillation) ([address] 05/23/2017    History of bradycardia 05/23/2017    History of colon cancer 05/03/2017     Current Outpatient Prescriptions to participate in her cardiovascular care. [facility] [address], Lisa Auguste am scribing for and in the presence of CLARISA Barahona MD,FACC. Chato Gutierrez MA     3:55 PM  CLARISA FLOWERS MD, [facility], personally performed the services described in this documentation as scribed by Chato Gutierrez in my presence, and it is both accurate and complete. Electronically signed by Jessica Peraza.  Antonia Barahona MD, [facility]    11/14/17 3:57 PM

## 2017-12-01 ENCOUNTER — OFFICE VISIT (OUTPATIENT)
Dept: NEUROLOGY | Age: 77
End: 2017-12-01
Payer: MEDICARE

## 2017-12-01 VITALS
WEIGHT: 214 LBS | BODY MASS INDEX: 35.65 KG/M2 | SYSTOLIC BLOOD PRESSURE: 104 MMHG | HEART RATE: 59 BPM | RESPIRATION RATE: 16 BRPM | HEIGHT: 65 IN | DIASTOLIC BLOOD PRESSURE: 60 MMHG

## 2017-12-01 DIAGNOSIS — Z99.89 CPAP (CONTINUOUS POSITIVE AIRWAY PRESSURE) DEPENDENCE: ICD-10-CM

## 2017-12-01 DIAGNOSIS — G25.81 RESTLESS LEG SYNDROME: ICD-10-CM

## 2017-12-01 DIAGNOSIS — R09.02 HYPOXEMIA: ICD-10-CM

## 2017-12-01 DIAGNOSIS — G47.33 OBSTRUCTIVE SLEEP APNEA: Primary | ICD-10-CM

## 2017-12-01 PROCEDURE — 1123F ACP DISCUSS/DSCN MKR DOCD: CPT | Performed by: PHYSICIAN ASSISTANT

## 2017-12-01 PROCEDURE — G8400 PT W/DXA NO RESULTS DOC: HCPCS | Performed by: PHYSICIAN ASSISTANT

## 2017-12-01 PROCEDURE — G8417 CALC BMI ABV UP PARAM F/U: HCPCS | Performed by: PHYSICIAN ASSISTANT

## 2017-12-01 PROCEDURE — G8484 FLU IMMUNIZE NO ADMIN: HCPCS | Performed by: PHYSICIAN ASSISTANT

## 2017-12-01 PROCEDURE — 1090F PRES/ABSN URINE INCON ASSESS: CPT | Performed by: PHYSICIAN ASSISTANT

## 2017-12-01 PROCEDURE — 3017F COLORECTAL CA SCREEN DOC REV: CPT | Performed by: PHYSICIAN ASSISTANT

## 2017-12-01 PROCEDURE — 1036F TOBACCO NON-USER: CPT | Performed by: PHYSICIAN ASSISTANT

## 2017-12-01 PROCEDURE — G8427 DOCREV CUR MEDS BY ELIG CLIN: HCPCS | Performed by: PHYSICIAN ASSISTANT

## 2017-12-01 PROCEDURE — 99213 OFFICE O/P EST LOW 20 MIN: CPT | Performed by: PHYSICIAN ASSISTANT

## 2017-12-01 PROCEDURE — 4040F PNEUMOC VAC/ADMIN/RCVD: CPT | Performed by: PHYSICIAN ASSISTANT

## 2017-12-01 NOTE — PATIENT INSTRUCTIONS
while you sleep. This device is only recommended for mild cases of sleep apnea. It may not be covered by your insurance. In rare cases, when nothing else helps, doctors recommend surgery to keep the airway open. Surgery to do this is not always effective, and even when it is, the problem can come back. Is sleep apnea dangerous?  It can be. People with sleep apnea do not get good-quality sleep, so they are often tired and not alert. This puts them at risk for car accidents and other types of accidents. Plus, studies show that people with sleep apnea are more likely than others to have high blood pressure, heart attacks, and other serious heart problems. In people with severe sleep apnea, getting treated (for example, with a CPAP machine) can help prevent some of these problems. Important information:  Medicare/private insurance CPAP/BiPAP/APAP requirements:  Medicare/private insurance has specific requirements for PAP compliance that must be met during the first 90 days of use to continue coverage for CPAP/BiPAP/APAP  from day 91 and beyond. The policy requires that patients use a PAP device 4 hours per 24 hour period, at least 70% of the time over a 30 day period. This data must be downloaded as a report direct from the PAP devices. This is called a compliance download. Your PAP supplier will assist you in this matter. Reminder:  Please bring a copy of the compliance download to your next office visit or have your supplier fax it to our office prior to your office visit. Note:  Where applicable, we will utilize PAP device efficiency reports, additional testing, and face-to-face  clinical evaluation subsequent to any treatment, changes in treatment, and continued treatment. Caution:  Please abstain from driving or engaging in other activities which may be hazardous in the presence of diminished alertness or daytime drowsiness.  And avoid the use of sedatives or alcohol, which can worsen sleep happening when you try to sleep. Many people get relief from symptoms when they get regular exercise, eat well, and avoid caffeine, alcohol, and tobacco.  Follow-up care is a key part of your treatment and safety. Be sure to make and go to all appointments, and call your doctor if you are having problems. It's also a good idea to know your test results and keep a list of the medicines you take. How can you care for yourself at home? · Take your medicines exactly as prescribed. Call your doctor if you think you are having a problem with your medicine. · Try bathing in hot or cold water. Applying a heating pad or ice bag to your legs may also help symptoms. · Stretch and massage your legs before bed or when discomfort begins. · Get some exercise for at least 30 minutes a day on most days of the week. Stop exercising at least 3 hours before bedtime. · Try to plan for situations where you will need to remain seated for long stretches. For example, if you are traveling by car, plan some stops so you can get out and walk around. · Tell your doctor about any medicines you are taking. This includes all over-the-counter, prescription, and herbal medicines. Some medicines, such as antidepressants, antihistamines, and cold and sinus medicines, can make your symptoms worse. · Avoid caffeine products, such as coffee, tea, cola, and chocolate. Caffeine can interrupt your sleep and stimulate you. · Do not smoke. Nicotine can make restless legs worse. If you need help quitting, talk to your doctor about stop-smoking programs and medicines. These can increase your chances of quitting for good. · Do not drink alcohol late in the evening. Take steps to help you sleep better  · Get plenty of sunlight in the outdoors, particularly later in the afternoon. · Use the evening hours for settling down. Avoid activities that challenge you in the hours before bedtime.   · Eat meals at regular times, and do not snack before bedtime. · Keep your bedroom quiet, dark, and cool. Try using a sleep mask and earplugs to help you sleep. · Limit how much you drink at night to reduce your need to get up to urinate. But do not go to bed thirsty. · Run a fan or other steady \"white noise\" during the night if noises wake you up. · [place] the bed for sleeping and sex. Do your reading or TV watching in another room. · Once you are in bed, relax from head to toe, and guide your mind to pleasant thoughts. · Do not stay in bed longer than 8 hours, and try to avoid naps. · If your symptoms usually improve around 4 a.m. to 6 a.m., try going to bed later than usual or allowing extra time for sleeping in to help you get the rest you need. When should you call for help? Watch closely for changes in your health, and be sure to contact your doctor if:  · You are still not getting enough sleep. · Your symptoms become more severe or happen more often. Where can you learn more? Go to [email or web address] and sign in to your Rdio account. Enter V891 in the [employer] box to learn more about \"Restless Legs Syndrome: Care Instructions. \"     If you do not have an account, please click on the \"Sign Up Now\" link. Current as of: October 14, 2016  Content Version: 11.3  © 9608-1087 icomply, [employer]. Care instructions adapted under license by [facility] ([facility]). If you have questions about a medical condition or this instruction, always ask your healthcare professional. [place] [address] any warranty or liability for your use of this information.

## 2017-12-01 NOTE — PROGRESS NOTES

## 2018-04-12 ENCOUNTER — TELEPHONE (OUTPATIENT)
Dept: CARDIOLOGY | Age: 78
End: 2018-04-12

## 2018-04-13 ENCOUNTER — TELEPHONE (OUTPATIENT)
Dept: CARDIOLOGY | Age: 78
End: 2018-04-13

## 2018-04-27 ENCOUNTER — HOSPITAL ENCOUNTER (OUTPATIENT)
Dept: WOMENS IMAGING | Age: 78
Discharge: HOME | End: 2018-04-27
Payer: MEDICARE

## 2018-04-27 DIAGNOSIS — Z12.31 ENCOUNTER FOR SCREENING MAMMOGRAM FOR BREAST CANCER: ICD-10-CM

## 2018-04-27 PROCEDURE — 77063 BREAST TOMOSYNTHESIS BI: CPT

## 2018-05-16 ENCOUNTER — TELEPHONE (OUTPATIENT)
Dept: CARDIOLOGY | Age: 78
End: 2018-05-16

## 2018-05-17 ENCOUNTER — OFFICE VISIT (OUTPATIENT)
Dept: CARDIOLOGY | Age: 78
End: 2018-05-17
Payer: MEDICARE

## 2018-05-17 VITALS
SYSTOLIC BLOOD PRESSURE: 142 MMHG | HEART RATE: 54 BPM | BODY MASS INDEX: 38.82 KG/M2 | DIASTOLIC BLOOD PRESSURE: 72 MMHG | HEIGHT: 65 IN | WEIGHT: 233 LBS

## 2018-05-17 DIAGNOSIS — R60.0 LOCALIZED EDEMA: ICD-10-CM

## 2018-05-17 DIAGNOSIS — I10 ESSENTIAL HYPERTENSION: ICD-10-CM

## 2018-05-17 DIAGNOSIS — I48.0 PAF (PAROXYSMAL ATRIAL FIBRILLATION) (HCC): Primary | ICD-10-CM

## 2018-05-17 PROCEDURE — G8400 PT W/DXA NO RESULTS DOC: HCPCS | Performed by: CLINICAL NURSE SPECIALIST

## 2018-05-17 PROCEDURE — 93000 ELECTROCARDIOGRAM COMPLETE: CPT | Performed by: CLINICAL NURSE SPECIALIST

## 2018-05-17 PROCEDURE — 99213 OFFICE O/P EST LOW 20 MIN: CPT | Performed by: CLINICAL NURSE SPECIALIST

## 2018-05-17 PROCEDURE — 4040F PNEUMOC VAC/ADMIN/RCVD: CPT | Performed by: CLINICAL NURSE SPECIALIST

## 2018-05-17 PROCEDURE — 1036F TOBACCO NON-USER: CPT | Performed by: CLINICAL NURSE SPECIALIST

## 2018-05-17 PROCEDURE — 1123F ACP DISCUSS/DSCN MKR DOCD: CPT | Performed by: CLINICAL NURSE SPECIALIST

## 2018-05-17 PROCEDURE — G8427 DOCREV CUR MEDS BY ELIG CLIN: HCPCS | Performed by: CLINICAL NURSE SPECIALIST

## 2018-05-17 PROCEDURE — G8417 CALC BMI ABV UP PARAM F/U: HCPCS | Performed by: CLINICAL NURSE SPECIALIST

## 2018-05-17 PROCEDURE — 1090F PRES/ABSN URINE INCON ASSESS: CPT | Performed by: CLINICAL NURSE SPECIALIST

## 2018-05-17 PROCEDURE — 3017F COLORECTAL CA SCREEN DOC REV: CPT | Performed by: CLINICAL NURSE SPECIALIST

## 2018-06-01 ENCOUNTER — OFFICE VISIT (OUTPATIENT)
Dept: NEUROLOGY | Age: 78
End: 2018-06-01
Payer: MEDICARE

## 2018-06-01 VITALS
BODY MASS INDEX: 38.32 KG/M2 | HEIGHT: 65 IN | WEIGHT: 230 LBS | OXYGEN SATURATION: 98 % | DIASTOLIC BLOOD PRESSURE: 77 MMHG | SYSTOLIC BLOOD PRESSURE: 133 MMHG | HEART RATE: 55 BPM

## 2018-06-01 DIAGNOSIS — G25.81 RESTLESS LEG SYNDROME: ICD-10-CM

## 2018-06-01 DIAGNOSIS — Z99.89 CPAP (CONTINUOUS POSITIVE AIRWAY PRESSURE) DEPENDENCE: ICD-10-CM

## 2018-06-01 DIAGNOSIS — R09.02 HYPOXEMIA: ICD-10-CM

## 2018-06-01 DIAGNOSIS — G47.33 OBSTRUCTIVE SLEEP APNEA: Primary | ICD-10-CM

## 2018-06-01 PROCEDURE — G8427 DOCREV CUR MEDS BY ELIG CLIN: HCPCS | Performed by: PHYSICIAN ASSISTANT

## 2018-06-01 PROCEDURE — 99213 OFFICE O/P EST LOW 20 MIN: CPT | Performed by: PHYSICIAN ASSISTANT

## 2018-06-01 PROCEDURE — G8417 CALC BMI ABV UP PARAM F/U: HCPCS | Performed by: PHYSICIAN ASSISTANT

## 2018-06-01 PROCEDURE — 1036F TOBACCO NON-USER: CPT | Performed by: PHYSICIAN ASSISTANT

## 2018-06-01 PROCEDURE — 4040F PNEUMOC VAC/ADMIN/RCVD: CPT | Performed by: PHYSICIAN ASSISTANT

## 2018-06-01 PROCEDURE — 3017F COLORECTAL CA SCREEN DOC REV: CPT | Performed by: PHYSICIAN ASSISTANT

## 2018-06-01 PROCEDURE — 1090F PRES/ABSN URINE INCON ASSESS: CPT | Performed by: PHYSICIAN ASSISTANT

## 2018-06-01 PROCEDURE — G8400 PT W/DXA NO RESULTS DOC: HCPCS | Performed by: PHYSICIAN ASSISTANT

## 2018-06-01 PROCEDURE — 1123F ACP DISCUSS/DSCN MKR DOCD: CPT | Performed by: PHYSICIAN ASSISTANT

## 2018-06-01 RX ORDER — PIOGLITAZONEHYDROCHLORIDE 30 MG/1
30 TABLET ORAL DAILY
COMMUNITY
End: 2018-12-18 | Stop reason: ALTCHOICE

## 2018-06-01 RX ORDER — LORATADINE 10 MG/1
10 CAPSULE, LIQUID FILLED ORAL NIGHTLY
Status: ON HOLD | COMMUNITY
End: 2020-09-15

## 2018-06-13 ENCOUNTER — TELEPHONE (OUTPATIENT)
Dept: NEUROLOGY | Age: 78
End: 2018-06-13

## 2018-08-06 RX ORDER — SOTALOL HYDROCHLORIDE 80 MG/1
TABLET ORAL
Qty: 180 TABLET | Refills: 3 | Status: ON HOLD | OUTPATIENT
Start: 2018-08-06 | End: 2019-01-06 | Stop reason: HOSPADM

## 2018-12-18 ENCOUNTER — OFFICE VISIT (OUTPATIENT)
Dept: CARDIOLOGY | Age: 78
End: 2018-12-18
Payer: MEDICARE

## 2018-12-18 VITALS
WEIGHT: 211 LBS | DIASTOLIC BLOOD PRESSURE: 60 MMHG | HEART RATE: 62 BPM | BODY MASS INDEX: 33.91 KG/M2 | SYSTOLIC BLOOD PRESSURE: 122 MMHG | HEIGHT: 66 IN

## 2018-12-18 DIAGNOSIS — I10 ESSENTIAL HYPERTENSION: ICD-10-CM

## 2018-12-18 DIAGNOSIS — G47.33 OBSTRUCTIVE SLEEP APNEA: ICD-10-CM

## 2018-12-18 DIAGNOSIS — I48.0 PAF (PAROXYSMAL ATRIAL FIBRILLATION) (HCC): Primary | ICD-10-CM

## 2018-12-18 PROCEDURE — G8427 DOCREV CUR MEDS BY ELIG CLIN: HCPCS | Performed by: CLINICAL NURSE SPECIALIST

## 2018-12-18 PROCEDURE — G8484 FLU IMMUNIZE NO ADMIN: HCPCS | Performed by: CLINICAL NURSE SPECIALIST

## 2018-12-18 PROCEDURE — G8400 PT W/DXA NO RESULTS DOC: HCPCS | Performed by: CLINICAL NURSE SPECIALIST

## 2018-12-18 PROCEDURE — 1036F TOBACCO NON-USER: CPT | Performed by: CLINICAL NURSE SPECIALIST

## 2018-12-18 PROCEDURE — G8417 CALC BMI ABV UP PARAM F/U: HCPCS | Performed by: CLINICAL NURSE SPECIALIST

## 2018-12-18 PROCEDURE — 1090F PRES/ABSN URINE INCON ASSESS: CPT | Performed by: CLINICAL NURSE SPECIALIST

## 2018-12-18 PROCEDURE — 99213 OFFICE O/P EST LOW 20 MIN: CPT | Performed by: CLINICAL NURSE SPECIALIST

## 2018-12-18 PROCEDURE — 1101F PT FALLS ASSESS-DOCD LE1/YR: CPT | Performed by: CLINICAL NURSE SPECIALIST

## 2018-12-18 PROCEDURE — 1123F ACP DISCUSS/DSCN MKR DOCD: CPT | Performed by: CLINICAL NURSE SPECIALIST

## 2018-12-18 PROCEDURE — 4040F PNEUMOC VAC/ADMIN/RCVD: CPT | Performed by: CLINICAL NURSE SPECIALIST

## 2018-12-18 ASSESSMENT — ENCOUNTER SYMPTOMS
BLURRED VISION: 0
SHORTNESS OF BREATH: 0
HEARTBURN: 0
VOMITING: 0
COUGH: 0
ORTHOPNEA: 0
NAUSEA: 0
ABDOMINAL PAIN: 0

## 2018-12-18 NOTE — PATIENT INSTRUCTIONS
chest.  ? Sweating. ? Shortness of breath. ? Nausea or vomiting. ? Pain, pressure, or a strange feeling in the back, neck, jaw, or upper belly or in one or both shoulders or arms. ? Lightheadedness or sudden weakness. ? A fast or irregular heartbeat. After you call 911, the  may tell you to chew 1 adult-strength or 2 to 4 low-dose aspirin. Wait for an ambulance. Do not try to drive yourself.     · You have symptoms of a stroke. These may include:  ? Sudden numbness, tingling, weakness, or loss of movement in your face, arm, or leg, especially on only one side of your body. ? Sudden vision changes. ? Sudden trouble speaking. ? Sudden confusion or trouble understanding simple statements. ? Sudden problems with walking or balance. ? A sudden, severe headache that is different from past headaches.     · You passed out (lost consciousness).    Call your doctor now or seek immediate medical care if:    · You have new or increased shortness of breath.     · You feel dizzy or lightheaded, or you feel like you may faint.     · Your heart rate becomes irregular.     · You can feel your heart flutter in your chest or skip heartbeats. Tell your doctor if these symptoms are new or worse.    Watch closely for changes in your health, and be sure to contact your doctor if you have any problems. Where can you learn more? Go to [email or web address] org and sign in to your Teneros account. Enter U020 in the [facility] box to learn more about \"Atrial Fibrillation: Care Instructions. \"     If you do not have an account, please click on the \"Sign Up Now\" link. Current as of: December 6, 2017  Content Version: 11.8  © 7293-6385 [employer]. Care instructions adapted under license by [employer].  If you have questions about a medical condition or this instruction, always ask your healthcare professional. Olya Ashby any warranty or liability for your

## 2018-12-18 NOTE — PROGRESS NOTES
History of blood transfusion     Hyperlipidemia     Hypertension     Mixed hyperlipidemia 7/11/2017    Obesity     Obstructive sleep apnea     AHI:  18.7;  In REM AHI:  39.3 per PSG, 11/2008; split-night PSG, 8/2017 AHI: 35.1    Osteoarthritis     Pneumonia due to organism     Restless leg syndrome 8/1/2017    Sinus complaint     Substance abuse ([facility]     Swelling      Past Surgical History:   Procedure Laterality Date    ADENOIDECTOMY      APPENDECTOMY      CARDIOVERSION      x 2     CHOLECYSTECTOMY      COLONOSCOPY  05/17/2013    Dr. Janell Heard COLONOSCOPY  03/22/2012    Dr Ken Octave yr recall    COLONOSCOPY  06/30/2008    Dr Josefa Bojorquez, 3 yr recall    HEMICOLECTOMY Right 1998    2 FT OF COLON REMOVED DUE TO CA    HYSTERECTOMY      DC COLONOSCOPY W/BIOPSY SINGLE/MULTIPLE N/A 6/6/2017    Dr Lyn Lynch colon anastomosis-Tubular AP (-) dysplasia x 2--3 yr recall    TONSILLECTOMY      TUMOR REMOVAL      UPPER GASTROINTESTINAL ENDOSCOPY  05/17/2013    Dr. Olivia Munguia ulcer disease-Chelo (+)    UPPER GASTROINTESTINAL ENDOSCOPY  03/28/2012    Dr Beverley Giron antral ulceration with a visible vessel     Family History   Problem Relation Age of Onset    No Known Problems Mother     No Known Problems Father     Colon Cancer Daughter     Colon Polyps Daughter     Esophageal Cancer Neg Hx     Liver Cancer Neg Hx     Rectal Cancer Neg Hx     Liver Disease Neg Hx     Stomach Cancer Neg Hx      Social History   Substance Use Topics    Smoking status: Never Smoker    Smokeless tobacco: Never Used    Alcohol use No      Current Outpatient Prescriptions   Medication Sig Dispense Refill    sotalol (BETAPACE) 80 MG tablet TAKE 1 TABLET BY MOUTH TWICE DAILY 180 tablet 3    loratadine (CLARITIN) 10 MG capsule Take 10 mg by mouth daily      furosemide (LASIX) 40 MG tablet TAKE 1 TABLET BY MOUTH DAILY 90 tablet 2    Neomycin-Polymyxin-Dexameth 0.1 % OINT Place 0.5 inches

## 2018-12-30 ENCOUNTER — APPOINTMENT (OUTPATIENT)
Dept: CT IMAGING | Age: 78
DRG: 871 | End: 2018-12-30
Payer: MEDICARE

## 2018-12-30 ENCOUNTER — APPOINTMENT (OUTPATIENT)
Dept: GENERAL RADIOLOGY | Age: 78
DRG: 871 | End: 2018-12-30
Payer: MEDICARE

## 2018-12-30 ENCOUNTER — HOSPITAL ENCOUNTER (INPATIENT)
Age: 78
LOS: 7 days | Discharge: HOME | DRG: 871 | End: 2019-01-06
Attending: EMERGENCY MEDICINE | Admitting: FAMILY MEDICINE
Payer: MEDICARE

## 2018-12-30 DIAGNOSIS — R19.7 VOMITING AND DIARRHEA: ICD-10-CM

## 2018-12-30 DIAGNOSIS — E87.5 HYPERKALEMIA: ICD-10-CM

## 2018-12-30 DIAGNOSIS — A41.9 SEPTICEMIA (HCC): ICD-10-CM

## 2018-12-30 DIAGNOSIS — R11.10 VOMITING AND DIARRHEA: ICD-10-CM

## 2018-12-30 DIAGNOSIS — N17.9 ACUTE RENAL FAILURE, UNSPECIFIED ACUTE RENAL FAILURE TYPE (HCC): Primary | ICD-10-CM

## 2018-12-30 DIAGNOSIS — E87.20 ACIDOSIS: ICD-10-CM

## 2018-12-30 DIAGNOSIS — G93.40 ENCEPHALOPATHY ACUTE: ICD-10-CM

## 2018-12-30 PROBLEM — R65.21 SEPTIC SHOCK (HCC): Status: ACTIVE | Noted: 2018-12-30

## 2018-12-30 PROBLEM — E87.8 HYPOCHLOREMIA: Status: ACTIVE | Noted: 2018-12-30

## 2018-12-30 PROBLEM — D53.9 MACROCYTIC ANEMIA: Status: ACTIVE | Noted: 2018-12-30

## 2018-12-30 PROBLEM — I50.9 CHF (CONGESTIVE HEART FAILURE) (HCC): Status: ACTIVE | Noted: 2018-12-30

## 2018-12-30 LAB
ALBUMIN SERPL-MCNC: 4 G/DL (ref 3.5–5.2)
ALP BLD-CCNC: 73 U/L (ref 35–104)
ALT SERPL-CCNC: 6 U/L (ref 5–33)
ANION GAP SERPL CALCULATED.3IONS-SCNC: 37 MMOL/L (ref 7–19)
ANION GAP SERPL CALCULATED.3IONS-SCNC: 38 MMOL/L (ref 7–19)
APTT: 33.4 SEC (ref 26–36.2)
AST SERPL-CCNC: 16 U/L (ref 5–32)
BASE EXCESS ARTERIAL: -23.1 MMOL/L (ref -2–2)
BASE EXCESS VENOUS: -16 MMOL/L
BASE EXCESS VENOUS: -20 MMOL/L
BASOPHILS ABSOLUTE: 0 K/UL (ref 0–0.2)
BASOPHILS RELATIVE PERCENT: 0.3 % (ref 0–1)
BILIRUB SERPL-MCNC: 0.3 MG/DL (ref 0.2–1.2)
BUN BLDV-MCNC: 94 MG/DL (ref 8–23)
BUN BLDV-MCNC: 96 MG/DL (ref 8–23)
CALCIUM SERPL-MCNC: 9.2 MG/DL (ref 8.8–10.2)
CALCIUM SERPL-MCNC: 9.8 MG/DL (ref 8.8–10.2)
CARBOXYHEMOGLOBIN ARTERIAL: 1.6 % (ref 0–5)
CARBOXYHEMOGLOBIN: 3.2 %
CARBOXYHEMOGLOBIN: 3.3 %
CHLORIDE BLD-SCNC: 88 MMOL/L (ref 98–111)
CHLORIDE BLD-SCNC: 94 MMOL/L (ref 98–111)
CO2: 6 MMOL/L (ref 22–29)
CO2: 7 MMOL/L (ref 22–29)
CREAT SERPL-MCNC: 10.1 MG/DL (ref 0.5–0.9)
CREAT SERPL-MCNC: 10.3 MG/DL (ref 0.5–0.9)
EOSINOPHILS ABSOLUTE: 0 K/UL (ref 0–0.6)
EOSINOPHILS RELATIVE PERCENT: 0 % (ref 0–5)
GFR NON-AFRICAN AMERICAN: 4
GFR NON-AFRICAN AMERICAN: 4
GLUCOSE BLD-MCNC: 140 MG/DL (ref 74–109)
GLUCOSE BLD-MCNC: 194 MG/DL (ref 74–109)
GLUCOSE BLD-MCNC: 87 MG/DL (ref 70–99)
HCO3 ARTERIAL: 5 MMOL/L (ref 22–26)
HCO3 VENOUS: 11 MMOL/L (ref 23–29)
HCO3 VENOUS: 8 MMOL/L (ref 23–29)
HCT VFR BLD CALC: 33.7 % (ref 37–47)
HEMOGLOBIN, ART, EXTENDED: 9.5 G/DL (ref 12–16)
HEMOGLOBIN: 9.6 G/DL (ref 12–16)
INR BLD: 1.37 (ref 0.88–1.18)
LACTIC ACID, SEPSIS: 8 MG/DL (ref 0.5–1.9)
LACTIC ACID, SEPSIS: 9.8 MG/DL (ref 0.5–1.9)
LACTIC ACID: 6.8 MMOL/L (ref 0.5–1.9)
LYMPHOCYTES ABSOLUTE: 2 K/UL (ref 1.1–4.5)
LYMPHOCYTES RELATIVE PERCENT: 16 % (ref 20–40)
MAGNESIUM: 1.7 MG/DL (ref 1.6–2.4)
MCH RBC QN AUTO: 28.7 PG (ref 27–31)
MCHC RBC AUTO-ENTMCNC: 28.5 G/DL (ref 33–37)
MCV RBC AUTO: 100.9 FL (ref 81–99)
METHEMOGLOBIN ARTERIAL: 1.2 %
MONOCYTES ABSOLUTE: 1 K/UL (ref 0–0.9)
MONOCYTES RELATIVE PERCENT: 7.9 % (ref 0–10)
NEUTROPHILS ABSOLUTE: 8.9 K/UL (ref 1.5–7.5)
NEUTROPHILS RELATIVE PERCENT: 72.8 % (ref 50–65)
O2 CONTENT ARTERIAL: 13 ML/DL
O2 CONTENT, VEN: 10 ML/DL
O2 CONTENT, VEN: 11 ML/DL
O2 SAT, ARTERIAL: 96.2 %
O2 SAT, VEN: 83 %
O2 SAT, VEN: 83 %
O2 THERAPY: ABNORMAL
PCO2 ARTERIAL: 17 MMHG (ref 35–45)
PCO2, VEN: 24 MMHG (ref 40–50)
PCO2, VEN: 27 MMHG (ref 40–50)
PDW BLD-RTO: 15.4 % (ref 11.5–14.5)
PERFORMED ON: NORMAL
PH ARTERIAL: 7.08 (ref 7.35–7.45)
PH VENOUS: 7.13 (ref 7.35–7.45)
PH VENOUS: 7.21 (ref 7.35–7.45)
PLATELET # BLD: 286 K/UL (ref 130–400)
PLATELET SLIDE REVIEW: ADEQUATE
PMV BLD AUTO: 10.6 FL (ref 9.4–12.3)
PO2 ARTERIAL: 100 MMHG (ref 80–100)
PO2, VEN: 46 MMHG
PO2, VEN: 49 MMHG
POTASSIUM SERPL-SCNC: 5.7 MMOL/L (ref 3.5–5)
POTASSIUM SERPL-SCNC: 6.8 MMOL/L (ref 3.5–5)
POTASSIUM, WHOLE BLOOD: 6.7
PRO-BNP: ABNORMAL PG/ML (ref 0–1800)
PROTHROMBIN TIME: 16.2 SEC (ref 12–14.6)
RBC # BLD: 3.34 M/UL (ref 4.2–5.4)
RBC # BLD: NORMAL 10*6/UL
SODIUM BLD-SCNC: 132 MMOL/L (ref 136–145)
SODIUM BLD-SCNC: 138 MMOL/L (ref 136–145)
TOTAL PROTEIN: 7.7 G/DL (ref 6.6–8.7)
TROPONIN: 0.01 NG/ML (ref 0–0.03)
WBC # BLD: 12.2 K/UL (ref 4.8–10.8)

## 2018-12-30 PROCEDURE — 2500000003 HC RX 250 WO HCPCS: Performed by: NURSE PRACTITIONER

## 2018-12-30 PROCEDURE — 6370000000 HC RX 637 (ALT 250 FOR IP): Performed by: FAMILY MEDICINE

## 2018-12-30 PROCEDURE — 99223 1ST HOSP IP/OBS HIGH 75: CPT | Performed by: FAMILY MEDICINE

## 2018-12-30 PROCEDURE — 36415 COLL VENOUS BLD VENIPUNCTURE: CPT

## 2018-12-30 PROCEDURE — 2580000003 HC RX 258: Performed by: FAMILY MEDICINE

## 2018-12-30 PROCEDURE — 76937 US GUIDE VASCULAR ACCESS: CPT | Performed by: EMERGENCY MEDICINE

## 2018-12-30 PROCEDURE — 83880 ASSAY OF NATRIURETIC PEPTIDE: CPT

## 2018-12-30 PROCEDURE — 84484 ASSAY OF TROPONIN QUANT: CPT

## 2018-12-30 PROCEDURE — 82803 BLOOD GASES ANY COMBINATION: CPT

## 2018-12-30 PROCEDURE — 6370000000 HC RX 637 (ALT 250 FOR IP): Performed by: EMERGENCY MEDICINE

## 2018-12-30 PROCEDURE — 82948 REAGENT STRIP/BLOOD GLUCOSE: CPT

## 2018-12-30 PROCEDURE — 85025 COMPLETE CBC W/AUTO DIFF WBC: CPT

## 2018-12-30 PROCEDURE — 85610 PROTHROMBIN TIME: CPT

## 2018-12-30 PROCEDURE — 02HV33Z INSERTION OF INFUSION DEVICE INTO SUPERIOR VENA CAVA, PERCUTANEOUS APPROACH: ICD-10-PCS | Performed by: EMERGENCY MEDICINE

## 2018-12-30 PROCEDURE — 36592 COLLECT BLOOD FROM PICC: CPT

## 2018-12-30 PROCEDURE — 93005 ELECTROCARDIOGRAM TRACING: CPT

## 2018-12-30 PROCEDURE — 99291 CRITICAL CARE FIRST HOUR: CPT | Performed by: EMERGENCY MEDICINE

## 2018-12-30 PROCEDURE — 74176 CT ABD & PELVIS W/O CONTRAST: CPT

## 2018-12-30 PROCEDURE — 2500000003 HC RX 250 WO HCPCS: Performed by: FAMILY MEDICINE

## 2018-12-30 PROCEDURE — 71045 X-RAY EXAM CHEST 1 VIEW: CPT

## 2018-12-30 PROCEDURE — 70450 CT HEAD/BRAIN W/O DYE: CPT

## 2018-12-30 PROCEDURE — 83735 ASSAY OF MAGNESIUM: CPT

## 2018-12-30 PROCEDURE — 2580000003 HC RX 258: Performed by: NURSE PRACTITIONER

## 2018-12-30 PROCEDURE — 2500000003 HC RX 250 WO HCPCS: Performed by: EMERGENCY MEDICINE

## 2018-12-30 PROCEDURE — C9113 INJ PANTOPRAZOLE SODIUM, VIA: HCPCS | Performed by: FAMILY MEDICINE

## 2018-12-30 PROCEDURE — 6360000002 HC RX W HCPCS: Performed by: FAMILY MEDICINE

## 2018-12-30 PROCEDURE — 2500000003 HC RX 250 WO HCPCS

## 2018-12-30 PROCEDURE — 2580000003 HC RX 258: Performed by: EMERGENCY MEDICINE

## 2018-12-30 PROCEDURE — 84132 ASSAY OF SERUM POTASSIUM: CPT

## 2018-12-30 PROCEDURE — 80053 COMPREHEN METABOLIC PANEL: CPT

## 2018-12-30 PROCEDURE — 85730 THROMBOPLASTIN TIME PARTIAL: CPT

## 2018-12-30 PROCEDURE — 36556 INSERT NON-TUNNEL CV CATH: CPT | Performed by: EMERGENCY MEDICINE

## 2018-12-30 PROCEDURE — 36600 WITHDRAWAL OF ARTERIAL BLOOD: CPT

## 2018-12-30 PROCEDURE — 2000000000 HC ICU R&B

## 2018-12-30 PROCEDURE — 87040 BLOOD CULTURE FOR BACTERIA: CPT

## 2018-12-30 PROCEDURE — 83605 ASSAY OF LACTIC ACID: CPT

## 2018-12-30 PROCEDURE — 96374 THER/PROPH/DIAG INJ IV PUSH: CPT

## 2018-12-30 PROCEDURE — 99291 CRITICAL CARE FIRST HOUR: CPT

## 2018-12-30 RX ORDER — DEXTROSE AND SODIUM CHLORIDE 5; .9 G/100ML; G/100ML
INJECTION, SOLUTION INTRAVENOUS CONTINUOUS
Status: DISCONTINUED | OUTPATIENT
Start: 2018-12-30 | End: 2018-12-31

## 2018-12-30 RX ORDER — CALCIUM CHLORIDE 100 MG/ML
1 INJECTION INTRAVENOUS; INTRAVENTRICULAR ONCE
Status: COMPLETED | OUTPATIENT
Start: 2018-12-30 | End: 2018-12-30

## 2018-12-30 RX ORDER — DEXTROSE MONOHYDRATE 25 G/50ML
12.5 INJECTION, SOLUTION INTRAVENOUS PRN
Status: DISCONTINUED | OUTPATIENT
Start: 2018-12-30 | End: 2019-01-06 | Stop reason: HOSPADM

## 2018-12-30 RX ORDER — SODIUM POLYSTYRENE SULFONATE 15 G/60ML
15 SUSPENSION ORAL; RECTAL ONCE
Status: COMPLETED | OUTPATIENT
Start: 2018-12-30 | End: 2018-12-30

## 2018-12-30 RX ORDER — ATORVASTATIN CALCIUM 40 MG/1
40 TABLET, FILM COATED ORAL DAILY
Status: DISCONTINUED | OUTPATIENT
Start: 2018-12-31 | End: 2019-01-06 | Stop reason: HOSPADM

## 2018-12-30 RX ORDER — DEXTROSE MONOHYDRATE 50 MG/ML
100 INJECTION, SOLUTION INTRAVENOUS PRN
Status: DISCONTINUED | OUTPATIENT
Start: 2018-12-30 | End: 2019-01-06 | Stop reason: HOSPADM

## 2018-12-30 RX ORDER — DEXTROSE MONOHYDRATE 25 G/50ML
25 INJECTION, SOLUTION INTRAVENOUS ONCE
Status: COMPLETED | OUTPATIENT
Start: 2018-12-30 | End: 2018-12-30

## 2018-12-30 RX ORDER — 0.9 % SODIUM CHLORIDE 0.9 %
10 VIAL (ML) INJECTION DAILY
Status: DISCONTINUED | OUTPATIENT
Start: 2018-12-30 | End: 2019-01-02

## 2018-12-30 RX ORDER — LEVOFLOXACIN 5 MG/ML
750 INJECTION, SOLUTION INTRAVENOUS ONCE
Status: DISCONTINUED | OUTPATIENT
Start: 2018-12-30 | End: 2018-12-30

## 2018-12-30 RX ORDER — ONDANSETRON 2 MG/ML
INJECTION INTRAMUSCULAR; INTRAVENOUS
Status: DISPENSED
Start: 2018-12-30 | End: 2018-12-31

## 2018-12-30 RX ORDER — PANTOPRAZOLE SODIUM 40 MG/10ML
40 INJECTION, POWDER, LYOPHILIZED, FOR SOLUTION INTRAVENOUS DAILY
Status: DISCONTINUED | OUTPATIENT
Start: 2018-12-30 | End: 2019-01-02

## 2018-12-30 RX ORDER — 0.9 % SODIUM CHLORIDE 0.9 %
30 INTRAVENOUS SOLUTION INTRAVENOUS ONCE
Status: COMPLETED | OUTPATIENT
Start: 2018-12-30 | End: 2018-12-30

## 2018-12-30 RX ORDER — SODIUM CHLORIDE 9 MG/ML
INJECTION, SOLUTION INTRAVENOUS CONTINUOUS
Status: DISCONTINUED | OUTPATIENT
Start: 2018-12-30 | End: 2018-12-31

## 2018-12-30 RX ORDER — SODIUM CHLORIDE 0.9 % (FLUSH) 0.9 %
10 SYRINGE (ML) INJECTION EVERY 12 HOURS SCHEDULED
Status: DISCONTINUED | OUTPATIENT
Start: 2018-12-30 | End: 2019-01-06 | Stop reason: HOSPADM

## 2018-12-30 RX ORDER — CEFEPIME HYDROCHLORIDE 2 G/50ML
1 INJECTION, SOLUTION INTRAVENOUS ONCE
Status: DISCONTINUED | OUTPATIENT
Start: 2018-12-30 | End: 2018-12-30

## 2018-12-30 RX ORDER — SODIUM CHLORIDE 0.9 % (FLUSH) 0.9 %
10 SYRINGE (ML) INJECTION PRN
Status: DISCONTINUED | OUTPATIENT
Start: 2018-12-30 | End: 2019-01-06 | Stop reason: HOSPADM

## 2018-12-30 RX ORDER — NICOTINE POLACRILEX 4 MG
15 LOZENGE BUCCAL PRN
Status: DISCONTINUED | OUTPATIENT
Start: 2018-12-30 | End: 2019-01-06 | Stop reason: HOSPADM

## 2018-12-30 RX ADMIN — DEXTROSE MONOHYDRATE 25 G: 25 INJECTION, SOLUTION INTRAVENOUS at 16:44

## 2018-12-30 RX ADMIN — INSULIN HUMAN 10 UNITS: 100 INJECTION, SOLUTION PARENTERAL at 16:44

## 2018-12-30 RX ADMIN — DEXTROSE MONOHYDRATE 25 G: 25 INJECTION, SOLUTION INTRAVENOUS at 20:11

## 2018-12-30 RX ADMIN — NOREPINEPHRINE BITARTRATE 10 MCG/MIN: 1 INJECTION INTRAVENOUS at 20:14

## 2018-12-30 RX ADMIN — CALCIUM CHLORIDE 1 G: 100 INJECTION, SOLUTION INTRAVENOUS at 16:44

## 2018-12-30 RX ADMIN — VANCOMYCIN HYDROCHLORIDE 1500 MG: 10 INJECTION, POWDER, LYOPHILIZED, FOR SOLUTION INTRAVENOUS at 20:17

## 2018-12-30 RX ADMIN — Medication 100 MEQ: at 22:59

## 2018-12-30 RX ADMIN — NOREPINEPHRINE BITARTRATE 2 MCG/MIN: 1 INJECTION INTRAVENOUS at 18:13

## 2018-12-30 RX ADMIN — Medication 10 ML: at 20:34

## 2018-12-30 RX ADMIN — SODIUM BICARBONATE 50 MEQ: 84 INJECTION, SOLUTION INTRAVENOUS at 16:44

## 2018-12-30 RX ADMIN — PIPERACILLIN SODIUM, TAZOBACTAM SODIUM 2.25 G: 2; .25 INJECTION, POWDER, LYOPHILIZED, FOR SOLUTION INTRAVENOUS at 20:17

## 2018-12-30 RX ADMIN — INSULIN HUMAN 10 UNITS: 100 INJECTION, SOLUTION PARENTERAL at 20:12

## 2018-12-30 RX ADMIN — SODIUM BICARBONATE 100 MEQ: 84 INJECTION, SOLUTION INTRAVENOUS at 22:59

## 2018-12-30 RX ADMIN — SODIUM CHLORIDE 2800 ML: 9 INJECTION, SOLUTION INTRAVENOUS at 16:56

## 2018-12-30 RX ADMIN — Medication 10 ML: at 20:16

## 2018-12-30 RX ADMIN — SODIUM BICARBONATE: 84 INJECTION INTRAVENOUS at 21:10

## 2018-12-30 RX ADMIN — SODIUM BICARBONATE 50 MEQ: 84 INJECTION, SOLUTION INTRAVENOUS at 20:11

## 2018-12-30 RX ADMIN — SODIUM CHLORIDE: 9 INJECTION, SOLUTION INTRAVENOUS at 18:18

## 2018-12-30 RX ADMIN — PANTOPRAZOLE SODIUM 40 MG: 40 INJECTION, POWDER, FOR SOLUTION INTRAVENOUS at 21:49

## 2018-12-30 RX ADMIN — SODIUM POLYSTYRENE SULFONATE 15 G: 15 SUSPENSION ORAL; RECTAL at 20:15

## 2018-12-30 RX ADMIN — NOREPINEPHRINE BITARTRATE 10.67 MCG/MIN: 1 INJECTION INTRAVENOUS at 18:32

## 2018-12-30 ASSESSMENT — ENCOUNTER SYMPTOMS
SORE THROAT: 0
VOMITING: 1
DIARRHEA: 1
BACK PAIN: 0
SHORTNESS OF BREATH: 0
ABDOMINAL PAIN: 1
RHINORRHEA: 0
NAUSEA: 1

## 2018-12-31 ENCOUNTER — APPOINTMENT (OUTPATIENT)
Dept: ULTRASOUND IMAGING | Age: 78
DRG: 871 | End: 2018-12-31
Payer: MEDICARE

## 2018-12-31 LAB
ABO/RH: NORMAL
ALBUMIN SERPL-MCNC: 3.2 G/DL (ref 3.5–5.2)
ALBUMIN SERPL-MCNC: 3.5 G/DL (ref 3.5–5.2)
ALBUMIN SERPL-MCNC: 3.9 G/DL (ref 3.5–5.2)
ALP BLD-CCNC: 55 U/L (ref 35–104)
ALP BLD-CCNC: 55 U/L (ref 35–104)
ALP BLD-CCNC: 65 U/L (ref 35–104)
ALT SERPL-CCNC: 7 U/L (ref 5–33)
ALT SERPL-CCNC: 8 U/L (ref 5–33)
ALT SERPL-CCNC: 8 U/L (ref 5–33)
AMORPHOUS: ABNORMAL /HPF
ANION GAP SERPL CALCULATED.3IONS-SCNC: 28 MMOL/L (ref 7–19)
ANION GAP SERPL CALCULATED.3IONS-SCNC: 34 MMOL/L (ref 7–19)
ANION GAP SERPL CALCULATED.3IONS-SCNC: 38 MMOL/L (ref 7–19)
ANION GAP SERPL CALCULATED.3IONS-SCNC: 38 MMOL/L (ref 7–19)
ANION GAP SERPL CALCULATED.3IONS-SCNC: 42 MMOL/L (ref 7–19)
ANTIBODY SCREEN: NORMAL
APTT: 31.5 SEC (ref 26–36.2)
AST SERPL-CCNC: 15 U/L (ref 5–32)
AST SERPL-CCNC: 16 U/L (ref 5–32)
AST SERPL-CCNC: 19 U/L (ref 5–32)
BACTERIA: ABNORMAL /HPF
BASE EXCESS ARTERIAL: -12.6 MMOL/L (ref -2–2)
BASE EXCESS VENOUS: -13 MMOL/L
BASE EXCESS VENOUS: -14 MMOL/L
BASOPHILS ABSOLUTE: 0 K/UL (ref 0–0.2)
BASOPHILS RELATIVE PERCENT: 0.2 % (ref 0–1)
BILIRUB SERPL-MCNC: 0.3 MG/DL (ref 0.2–1.2)
BILIRUB SERPL-MCNC: 0.3 MG/DL (ref 0.2–1.2)
BILIRUB SERPL-MCNC: <0.2 MG/DL (ref 0.2–1.2)
BILIRUBIN URINE: NEGATIVE
BLOOD, URINE: ABNORMAL
BUN BLDV-MCNC: 86 MG/DL (ref 8–23)
BUN BLDV-MCNC: 88 MG/DL (ref 8–23)
BUN BLDV-MCNC: 89 MG/DL (ref 8–23)
BUN BLDV-MCNC: 94 MG/DL (ref 8–23)
BUN BLDV-MCNC: 97 MG/DL (ref 8–23)
C DIFFICILE TOXIN, EIA: NORMAL
CALCIUM SERPL-MCNC: 8 MG/DL (ref 8.8–10.2)
CALCIUM SERPL-MCNC: 8.1 MG/DL (ref 8.8–10.2)
CALCIUM SERPL-MCNC: 8.5 MG/DL (ref 8.8–10.2)
CALCIUM SERPL-MCNC: 8.7 MG/DL (ref 8.8–10.2)
CALCIUM SERPL-MCNC: 9.2 MG/DL (ref 8.8–10.2)
CARBOXYHEMOGLOBIN ARTERIAL: 2.5 % (ref 0–5)
CARBOXYHEMOGLOBIN: 2.1 %
CARBOXYHEMOGLOBIN: 3.5 %
CHLORIDE BLD-SCNC: 85 MMOL/L (ref 98–111)
CHLORIDE BLD-SCNC: 88 MMOL/L (ref 98–111)
CHLORIDE BLD-SCNC: 92 MMOL/L (ref 98–111)
CLARITY: ABNORMAL
CO2: 12 MMOL/L (ref 22–29)
CO2: 13 MMOL/L (ref 22–29)
CO2: 18 MMOL/L (ref 22–29)
CO2: 26 MMOL/L (ref 22–29)
CO2: 9 MMOL/L (ref 22–29)
COLOR: YELLOW
CREAT SERPL-MCNC: 8 MG/DL (ref 0.5–0.9)
CREAT SERPL-MCNC: 8.7 MG/DL (ref 0.5–0.9)
CREAT SERPL-MCNC: 8.8 MG/DL (ref 0.5–0.9)
CREAT SERPL-MCNC: 9.2 MG/DL (ref 0.5–0.9)
CREAT SERPL-MCNC: 9.3 MG/DL (ref 0.5–0.9)
EOSINOPHILS ABSOLUTE: 0 K/UL (ref 0–0.6)
EOSINOPHILS RELATIVE PERCENT: 0.1 % (ref 0–5)
EPITHELIAL CELLS, UA: ABNORMAL /HPF
GFR NON-AFRICAN AMERICAN: 4
GFR NON-AFRICAN AMERICAN: 5
GLUCOSE BLD-MCNC: 191 MG/DL (ref 74–109)
GLUCOSE BLD-MCNC: 195 MG/DL (ref 70–99)
GLUCOSE BLD-MCNC: 195 MG/DL (ref 70–99)
GLUCOSE BLD-MCNC: 196 MG/DL (ref 74–109)
GLUCOSE BLD-MCNC: 200 MG/DL (ref 74–109)
GLUCOSE BLD-MCNC: 220 MG/DL (ref 70–99)
GLUCOSE BLD-MCNC: 223 MG/DL (ref 74–109)
GLUCOSE BLD-MCNC: 254 MG/DL (ref 70–99)
GLUCOSE BLD-MCNC: 261 MG/DL (ref 74–109)
GLUCOSE URINE: NEGATIVE MG/DL
HCO3 ARTERIAL: 12.6 MMOL/L (ref 22–26)
HCO3 VENOUS: 12 MMOL/L (ref 23–29)
HCO3 VENOUS: 13 MMOL/L (ref 23–29)
HCT VFR BLD CALC: 25.3 % (ref 37–47)
HCT VFR BLD CALC: 27.5 % (ref 37–47)
HEMOGLOBIN, ART, EXTENDED: 4.5 G/DL (ref 12–16)
HEMOGLOBIN: 7.9 G/DL (ref 12–16)
HEMOGLOBIN: 8.3 G/DL (ref 12–16)
INR BLD: 1.37 (ref 0.88–1.18)
KETONES, URINE: ABNORMAL MG/DL
LACTIC ACID, SEPSIS: 11.2 MG/DL (ref 0.5–1.9)
LACTIC ACID: 12.7 MMOL/L (ref 0.5–1.9)
LACTIC ACID: 12.8 MMOL/L (ref 0.5–1.9)
LACTIC ACID: 9.8 MMOL/L (ref 0.5–1.9)
LEUKOCYTE ESTERASE, URINE: ABNORMAL
LYMPHOCYTES ABSOLUTE: 1.9 K/UL (ref 1.1–4.5)
LYMPHOCYTES RELATIVE PERCENT: 14.9 % (ref 20–40)
MAGNESIUM: 1.6 MG/DL (ref 1.6–2.4)
MAGNESIUM: 1.8 MG/DL (ref 1.6–2.4)
MCH RBC QN AUTO: 28.4 PG (ref 27–31)
MCH RBC QN AUTO: 29 PG (ref 27–31)
MCHC RBC AUTO-ENTMCNC: 30.2 G/DL (ref 33–37)
MCHC RBC AUTO-ENTMCNC: 31.2 G/DL (ref 33–37)
MCV RBC AUTO: 93 FL (ref 81–99)
MCV RBC AUTO: 94.2 FL (ref 81–99)
METHEMOGLOBIN ARTERIAL: 1.1 %
METHEMOGLOBIN VENOUS: 1.4 %
METHEMOGLOBIN VENOUS: 2.9 %
MONOCYTES ABSOLUTE: 1.7 K/UL (ref 0–0.9)
MONOCYTES RELATIVE PERCENT: 13.4 % (ref 0–10)
NEUTROPHILS ABSOLUTE: 8.5 K/UL (ref 1.5–7.5)
NEUTROPHILS RELATIVE PERCENT: 67.1 % (ref 50–65)
NITRITE, URINE: NEGATIVE
O2 CONTENT ARTERIAL: 6.3 ML/DL
O2 CONTENT, VEN: 10 ML/DL
O2 CONTENT, VEN: 4 ML/DL
O2 SAT, ARTERIAL: 96.9 %
O2 SAT, VEN: 81 %
O2 SAT, VEN: 83 %
O2 THERAPY: ABNORMAL
PCO2 ARTERIAL: 25 MMHG (ref 35–45)
PCO2, VEN: 27 MMHG (ref 40–50)
PCO2, VEN: 29 MMHG (ref 40–50)
PDW BLD-RTO: 15.3 % (ref 11.5–14.5)
PDW BLD-RTO: 15.4 % (ref 11.5–14.5)
PERFORMED ON: ABNORMAL
PH ARTERIAL: 7.31 (ref 7.35–7.45)
PH UA: 5.5
PH VENOUS: 7.24 (ref 7.35–7.45)
PH VENOUS: 7.25 (ref 7.35–7.45)
PHOSPHORUS: 6.8 MG/DL (ref 2.5–4.5)
PLATELET # BLD: 237 K/UL (ref 130–400)
PLATELET # BLD: 242 K/UL (ref 130–400)
PMV BLD AUTO: 10.7 FL (ref 9.4–12.3)
PMV BLD AUTO: 9.9 FL (ref 9.4–12.3)
PO2 ARTERIAL: 80 MMHG (ref 80–100)
PO2, VEN: 41 MMHG
PO2, VEN: 45 MMHG
POTASSIUM REFLEX MAGNESIUM: 4.2 MMOL/L (ref 3.5–5)
POTASSIUM REFLEX MAGNESIUM: 4.8 MMOL/L (ref 3.5–5)
POTASSIUM REFLEX MAGNESIUM: 5.7 MMOL/L (ref 3.5–5)
POTASSIUM SERPL-SCNC: 4.6 MMOL/L (ref 3.5–5)
POTASSIUM SERPL-SCNC: 5.2 MMOL/L (ref 3.5–5)
POTASSIUM, WHOLE BLOOD: 4.6
PROTEIN UA: 300 MG/DL
PROTHROMBIN TIME: 16.2 SEC (ref 12–14.6)
RBC # BLD: 2.72 M/UL (ref 4.2–5.4)
RBC # BLD: 2.92 M/UL (ref 4.2–5.4)
RBC UA: ABNORMAL /HPF (ref 0–2)
SODIUM BLD-SCNC: 135 MMOL/L (ref 136–145)
SODIUM BLD-SCNC: 138 MMOL/L (ref 136–145)
SODIUM BLD-SCNC: 141 MMOL/L (ref 136–145)
SODIUM BLD-SCNC: 142 MMOL/L (ref 136–145)
SODIUM BLD-SCNC: 143 MMOL/L (ref 136–145)
SPECIFIC GRAVITY UA: 1.02
TOTAL PROTEIN: 5.6 G/DL (ref 6.6–8.7)
TOTAL PROTEIN: 5.9 G/DL (ref 6.6–8.7)
TOTAL PROTEIN: 6.7 G/DL (ref 6.6–8.7)
URIC ACID, SERUM: 8.9 MG/DL (ref 2.4–5.7)
URINE REFLEX TO CULTURE: YES
UROBILINOGEN, URINE: 0.2 E.U./DL
VANCOMYCIN RANDOM: 16.4 UG/ML
WBC # BLD: 12.2 K/UL (ref 4.8–10.8)
WBC # BLD: 12.7 K/UL (ref 4.8–10.8)
WBC UA: ABNORMAL /HPF (ref 0–5)

## 2018-12-31 PROCEDURE — 82948 REAGENT STRIP/BLOOD GLUCOSE: CPT

## 2018-12-31 PROCEDURE — 83735 ASSAY OF MAGNESIUM: CPT

## 2018-12-31 PROCEDURE — 84132 ASSAY OF SERUM POTASSIUM: CPT

## 2018-12-31 PROCEDURE — 82803 BLOOD GASES ANY COMBINATION: CPT

## 2018-12-31 PROCEDURE — 2500000003 HC RX 250 WO HCPCS: Performed by: NURSE PRACTITIONER

## 2018-12-31 PROCEDURE — 83605 ASSAY OF LACTIC ACID: CPT

## 2018-12-31 PROCEDURE — 87449 NOS EACH ORGANISM AG IA: CPT

## 2018-12-31 PROCEDURE — C9113 INJ PANTOPRAZOLE SODIUM, VIA: HCPCS | Performed by: FAMILY MEDICINE

## 2018-12-31 PROCEDURE — 84550 ASSAY OF BLOOD/URIC ACID: CPT

## 2018-12-31 PROCEDURE — 2580000003 HC RX 258: Performed by: NURSE PRACTITIONER

## 2018-12-31 PROCEDURE — 80053 COMPREHEN METABOLIC PANEL: CPT

## 2018-12-31 PROCEDURE — 2500000003 HC RX 250 WO HCPCS: Performed by: FAMILY MEDICINE

## 2018-12-31 PROCEDURE — 6360000002 HC RX W HCPCS: Performed by: FAMILY MEDICINE

## 2018-12-31 PROCEDURE — 36600 WITHDRAWAL OF ARTERIAL BLOOD: CPT

## 2018-12-31 PROCEDURE — 87427 SHIGA-LIKE TOXIN AG IA: CPT

## 2018-12-31 PROCEDURE — 99233 SBSQ HOSP IP/OBS HIGH 50: CPT | Performed by: FAMILY MEDICINE

## 2018-12-31 PROCEDURE — 87081 CULTURE SCREEN ONLY: CPT

## 2018-12-31 PROCEDURE — 85025 COMPLETE CBC W/AUTO DIFF WBC: CPT

## 2018-12-31 PROCEDURE — 2580000003 HC RX 258: Performed by: HOSPITALIST

## 2018-12-31 PROCEDURE — 85027 COMPLETE CBC AUTOMATED: CPT

## 2018-12-31 PROCEDURE — 84100 ASSAY OF PHOSPHORUS: CPT

## 2018-12-31 PROCEDURE — 86900 BLOOD TYPING SEROLOGIC ABO: CPT

## 2018-12-31 PROCEDURE — 6370000000 HC RX 637 (ALT 250 FOR IP): Performed by: FAMILY MEDICINE

## 2018-12-31 PROCEDURE — 36592 COLLECT BLOOD FROM PICC: CPT

## 2018-12-31 PROCEDURE — 87899 AGENT NOS ASSAY W/OPTIC: CPT

## 2018-12-31 PROCEDURE — 87015 SPECIMEN INFECT AGNT CONCNTJ: CPT

## 2018-12-31 PROCEDURE — 80202 ASSAY OF VANCOMYCIN: CPT

## 2018-12-31 PROCEDURE — 2500000003 HC RX 250 WO HCPCS: Performed by: INTERNAL MEDICINE

## 2018-12-31 PROCEDURE — 2000000000 HC ICU R&B

## 2018-12-31 PROCEDURE — 86901 BLOOD TYPING SEROLOGIC RH(D): CPT

## 2018-12-31 PROCEDURE — 2580000003 HC RX 258: Performed by: FAMILY MEDICINE

## 2018-12-31 PROCEDURE — 87045 FECES CULTURE AEROBIC BACT: CPT

## 2018-12-31 PROCEDURE — 94660 CPAP INITIATION&MGMT: CPT

## 2018-12-31 PROCEDURE — 87046 STOOL CULTR AEROBIC BACT EA: CPT

## 2018-12-31 PROCEDURE — 81001 URINALYSIS AUTO W/SCOPE: CPT

## 2018-12-31 PROCEDURE — 6370000000 HC RX 637 (ALT 250 FOR IP): Performed by: HOSPITALIST

## 2018-12-31 PROCEDURE — 85610 PROTHROMBIN TIME: CPT

## 2018-12-31 PROCEDURE — 2580000003 HC RX 258: Performed by: INTERNAL MEDICINE

## 2018-12-31 PROCEDURE — 76770 US EXAM ABDO BACK WALL COMP: CPT

## 2018-12-31 PROCEDURE — 2500000003 HC RX 250 WO HCPCS: Performed by: HOSPITALIST

## 2018-12-31 PROCEDURE — 85730 THROMBOPLASTIN TIME PARTIAL: CPT

## 2018-12-31 PROCEDURE — 6360000002 HC RX W HCPCS: Performed by: HOSPITALIST

## 2018-12-31 PROCEDURE — 87324 CLOSTRIDIUM AG IA: CPT

## 2018-12-31 PROCEDURE — 87086 URINE CULTURE/COLONY COUNT: CPT

## 2018-12-31 PROCEDURE — 86850 RBC ANTIBODY SCREEN: CPT

## 2018-12-31 RX ORDER — MAGNESIUM SULFATE 1 G/100ML
1 INJECTION INTRAVENOUS ONCE
Status: COMPLETED | OUTPATIENT
Start: 2018-12-31 | End: 2018-12-31

## 2018-12-31 RX ORDER — SODIUM CHLORIDE, SODIUM LACTATE, POTASSIUM CHLORIDE, AND CALCIUM CHLORIDE .6; .31; .03; .02 G/100ML; G/100ML; G/100ML; G/100ML
1000 INJECTION, SOLUTION INTRAVENOUS ONCE
Status: COMPLETED | OUTPATIENT
Start: 2018-12-31 | End: 2018-12-31

## 2018-12-31 RX ORDER — ACETAMINOPHEN 325 MG/1
650 TABLET ORAL EVERY 4 HOURS PRN
Status: DISCONTINUED | OUTPATIENT
Start: 2018-12-31 | End: 2019-01-06 | Stop reason: HOSPADM

## 2018-12-31 RX ORDER — SODIUM CHLORIDE, SODIUM LACTATE, POTASSIUM CHLORIDE, CALCIUM CHLORIDE 600; 310; 30; 20 MG/100ML; MG/100ML; MG/100ML; MG/100ML
INJECTION, SOLUTION INTRAVENOUS CONTINUOUS
Status: ACTIVE | OUTPATIENT
Start: 2018-12-31 | End: 2018-12-31

## 2018-12-31 RX ORDER — SODIUM CHLORIDE, SODIUM LACTATE, POTASSIUM CHLORIDE, CALCIUM CHLORIDE 600; 310; 30; 20 MG/100ML; MG/100ML; MG/100ML; MG/100ML
INJECTION, SOLUTION INTRAVENOUS CONTINUOUS
Status: DISCONTINUED | OUTPATIENT
Start: 2018-12-31 | End: 2018-12-31

## 2018-12-31 RX ADMIN — SODIUM CHLORIDE, POTASSIUM CHLORIDE, SODIUM LACTATE AND CALCIUM CHLORIDE: 600; 310; 30; 20 INJECTION, SOLUTION INTRAVENOUS at 03:30

## 2018-12-31 RX ADMIN — INSULIN LISPRO 1 UNITS: 100 INJECTION, SOLUTION INTRAVENOUS; SUBCUTANEOUS at 21:29

## 2018-12-31 RX ADMIN — CEFEPIME 1 G: 1 INJECTION, POWDER, FOR SOLUTION INTRAMUSCULAR; INTRAVENOUS at 02:05

## 2018-12-31 RX ADMIN — SODIUM BICARBONATE: 84 INJECTION INTRAVENOUS at 13:23

## 2018-12-31 RX ADMIN — METRONIDAZOLE 500 MG: 500 INJECTION, SOLUTION INTRAVENOUS at 02:08

## 2018-12-31 RX ADMIN — SODIUM CHLORIDE, POTASSIUM CHLORIDE, SODIUM LACTATE AND CALCIUM CHLORIDE 1000 ML: 600; 310; 30; 20 INJECTION, SOLUTION INTRAVENOUS at 02:06

## 2018-12-31 RX ADMIN — Medication 10 ML: at 09:00

## 2018-12-31 RX ADMIN — SODIUM BICARBONATE: 84 INJECTION INTRAVENOUS at 03:30

## 2018-12-31 RX ADMIN — NOREPINEPHRINE BITARTRATE 15.04 MCG/MIN: 1 INJECTION INTRAVENOUS at 12:09

## 2018-12-31 RX ADMIN — SODIUM BICARBONATE: 84 INJECTION INTRAVENOUS at 01:30

## 2018-12-31 RX ADMIN — SODIUM BICARBONATE 100 MEQ: 84 INJECTION, SOLUTION INTRAVENOUS at 04:00

## 2018-12-31 RX ADMIN — Medication 10 ML: at 12:05

## 2018-12-31 RX ADMIN — SODIUM BICARBONATE 100 MEQ: 84 INJECTION, SOLUTION INTRAVENOUS at 00:02

## 2018-12-31 RX ADMIN — SODIUM BICARBONATE 100 MEQ: 84 INJECTION, SOLUTION INTRAVENOUS at 02:00

## 2018-12-31 RX ADMIN — METRONIDAZOLE 500 MG: 500 INJECTION, SOLUTION INTRAVENOUS at 12:06

## 2018-12-31 RX ADMIN — MAGNESIUM SULFATE HEPTAHYDRATE 1 G: 1 INJECTION, SOLUTION INTRAVENOUS at 00:54

## 2018-12-31 RX ADMIN — METRONIDAZOLE 500 MG: 500 INJECTION, SOLUTION INTRAVENOUS at 18:55

## 2018-12-31 RX ADMIN — MAGNESIUM SULFATE HEPTAHYDRATE 1 G: 1 INJECTION, SOLUTION INTRAVENOUS at 04:30

## 2018-12-31 RX ADMIN — ATORVASTATIN CALCIUM 40 MG: 40 TABLET, FILM COATED ORAL at 08:05

## 2018-12-31 RX ADMIN — INSULIN LISPRO 3 UNITS: 100 INJECTION, SOLUTION INTRAVENOUS; SUBCUTANEOUS at 16:40

## 2018-12-31 RX ADMIN — PANTOPRAZOLE SODIUM 40 MG: 40 INJECTION, POWDER, FOR SOLUTION INTRAVENOUS at 08:06

## 2018-12-31 RX ADMIN — SODIUM BICARBONATE: 84 INJECTION INTRAVENOUS at 04:10

## 2018-12-31 RX ADMIN — SODIUM BICARBONATE: 84 INJECTION INTRAVENOUS at 23:43

## 2018-12-31 RX ADMIN — SERTRALINE HYDROCHLORIDE 50 MG: 50 TABLET ORAL at 08:05

## 2018-12-31 RX ADMIN — ACETAMINOPHEN 650 MG: 325 TABLET ORAL at 19:48

## 2018-12-31 RX ADMIN — SODIUM CHLORIDE, POTASSIUM CHLORIDE, SODIUM LACTATE AND CALCIUM CHLORIDE: 600; 310; 30; 20 INJECTION, SOLUTION INTRAVENOUS at 04:08

## 2018-12-31 ASSESSMENT — PAIN SCALES - GENERAL
PAINLEVEL_OUTOF10: 4
PAINLEVEL_OUTOF10: 0
PAINLEVEL_OUTOF10: 0

## 2019-01-01 LAB
ALBUMIN SERPL-MCNC: 3.3 G/DL (ref 3.5–5.2)
ALBUMIN SERPL-MCNC: 3.4 G/DL (ref 3.5–5.2)
ALP BLD-CCNC: 53 U/L (ref 35–104)
ALP BLD-CCNC: 57 U/L (ref 35–104)
ALT SERPL-CCNC: 7 U/L (ref 5–33)
ALT SERPL-CCNC: 8 U/L (ref 5–33)
ANION GAP SERPL CALCULATED.3IONS-SCNC: 12 MMOL/L (ref 7–19)
ANION GAP SERPL CALCULATED.3IONS-SCNC: 18 MMOL/L (ref 7–19)
AST SERPL-CCNC: 17 U/L (ref 5–32)
AST SERPL-CCNC: 18 U/L (ref 5–32)
BASE EXCESS ARTERIAL: 17.2 MMOL/L (ref -2–2)
BILIRUB SERPL-MCNC: 0.3 MG/DL (ref 0.2–1.2)
BILIRUB SERPL-MCNC: 0.4 MG/DL (ref 0.2–1.2)
BUN BLDV-MCNC: 79 MG/DL (ref 8–23)
BUN BLDV-MCNC: 88 MG/DL (ref 8–23)
CALCIUM SERPL-MCNC: 7.3 MG/DL (ref 8.8–10.2)
CALCIUM SERPL-MCNC: 7.7 MG/DL (ref 8.8–10.2)
CARBOXYHEMOGLOBIN ARTERIAL: 1.9 % (ref 0–5)
CHLORIDE BLD-SCNC: 86 MMOL/L (ref 98–111)
CHLORIDE BLD-SCNC: 88 MMOL/L (ref 98–111)
CO2: 35 MMOL/L (ref 22–29)
CO2: 40 MMOL/L (ref 22–29)
CREAT SERPL-MCNC: 6.7 MG/DL (ref 0.5–0.9)
CREAT SERPL-MCNC: 8 MG/DL (ref 0.5–0.9)
GFR NON-AFRICAN AMERICAN: 5
GFR NON-AFRICAN AMERICAN: 6
GLUCOSE BLD-MCNC: 170 MG/DL (ref 70–99)
GLUCOSE BLD-MCNC: 180 MG/DL (ref 70–99)
GLUCOSE BLD-MCNC: 189 MG/DL (ref 74–109)
GLUCOSE BLD-MCNC: 201 MG/DL (ref 74–109)
GLUCOSE BLD-MCNC: 219 MG/DL (ref 70–99)
GLUCOSE BLD-MCNC: 99 MG/DL (ref 70–99)
HCO3 ARTERIAL: 41.8 MMOL/L (ref 22–26)
HCT VFR BLD CALC: 24.5 % (ref 37–47)
HEMOGLOBIN, ART, EXTENDED: 8.6 G/DL (ref 12–16)
HEMOGLOBIN: 8 G/DL (ref 12–16)
LACTIC ACID: 3.7 MMOL/L (ref 0.5–1.9)
MAGNESIUM: 1.4 MG/DL (ref 1.6–2.4)
MAGNESIUM: 1.5 MG/DL (ref 1.6–2.4)
MCH RBC QN AUTO: 28.4 PG (ref 27–31)
MCHC RBC AUTO-ENTMCNC: 32.7 G/DL (ref 33–37)
MCV RBC AUTO: 86.9 FL (ref 81–99)
METHEMOGLOBIN ARTERIAL: 1.3 %
MRSA CULTURE ONLY: NORMAL
O2 CONTENT ARTERIAL: 11.5 ML/DL
O2 SAT, ARTERIAL: 94.2 %
O2 THERAPY: ABNORMAL
PCO2 ARTERIAL: 50 MMHG (ref 35–45)
PDW BLD-RTO: 15.1 % (ref 11.5–14.5)
PERFORMED ON: ABNORMAL
PERFORMED ON: NORMAL
PH ARTERIAL: 7.53 (ref 7.35–7.45)
PLATELET # BLD: 216 K/UL (ref 130–400)
PMV BLD AUTO: 10 FL (ref 9.4–12.3)
PO2 ARTERIAL: 71 MMHG (ref 80–100)
POTASSIUM REFLEX MAGNESIUM: 3.4 MMOL/L (ref 3.5–5)
POTASSIUM REFLEX MAGNESIUM: 3.7 MMOL/L (ref 3.5–5)
POTASSIUM, WHOLE BLOOD: 3.1
RBC # BLD: 2.82 M/UL (ref 4.2–5.4)
SODIUM BLD-SCNC: 139 MMOL/L (ref 136–145)
SODIUM BLD-SCNC: 140 MMOL/L (ref 136–145)
TOTAL CK: 188 U/L (ref 26–192)
TOTAL PROTEIN: 5.5 G/DL (ref 6.6–8.7)
TOTAL PROTEIN: 5.5 G/DL (ref 6.6–8.7)
WBC # BLD: 10.4 K/UL (ref 4.8–10.8)

## 2019-01-01 PROCEDURE — 84132 ASSAY OF SERUM POTASSIUM: CPT

## 2019-01-01 PROCEDURE — 80053 COMPREHEN METABOLIC PANEL: CPT

## 2019-01-01 PROCEDURE — 2000000000 HC ICU R&B

## 2019-01-01 PROCEDURE — 2580000003 HC RX 258: Performed by: INTERNAL MEDICINE

## 2019-01-01 PROCEDURE — 2580000003 HC RX 258: Performed by: FAMILY MEDICINE

## 2019-01-01 PROCEDURE — 82550 ASSAY OF CK (CPK): CPT

## 2019-01-01 PROCEDURE — 6360000002 HC RX W HCPCS: Performed by: HOSPITALIST

## 2019-01-01 PROCEDURE — 36592 COLLECT BLOOD FROM PICC: CPT

## 2019-01-01 PROCEDURE — 6360000002 HC RX W HCPCS: Performed by: FAMILY MEDICINE

## 2019-01-01 PROCEDURE — 2500000003 HC RX 250 WO HCPCS: Performed by: INTERNAL MEDICINE

## 2019-01-01 PROCEDURE — 82803 BLOOD GASES ANY COMBINATION: CPT

## 2019-01-01 PROCEDURE — 36600 WITHDRAWAL OF ARTERIAL BLOOD: CPT

## 2019-01-01 PROCEDURE — 2700000000 HC OXYGEN THERAPY PER DAY

## 2019-01-01 PROCEDURE — 6370000000 HC RX 637 (ALT 250 FOR IP): Performed by: HOSPITALIST

## 2019-01-01 PROCEDURE — 83735 ASSAY OF MAGNESIUM: CPT

## 2019-01-01 PROCEDURE — 2500000003 HC RX 250 WO HCPCS: Performed by: HOSPITALIST

## 2019-01-01 PROCEDURE — 94660 CPAP INITIATION&MGMT: CPT

## 2019-01-01 PROCEDURE — 6370000000 HC RX 637 (ALT 250 FOR IP): Performed by: FAMILY MEDICINE

## 2019-01-01 PROCEDURE — 83605 ASSAY OF LACTIC ACID: CPT

## 2019-01-01 PROCEDURE — 2580000003 HC RX 258: Performed by: HOSPITALIST

## 2019-01-01 PROCEDURE — 2500000003 HC RX 250 WO HCPCS: Performed by: FAMILY MEDICINE

## 2019-01-01 PROCEDURE — C9113 INJ PANTOPRAZOLE SODIUM, VIA: HCPCS | Performed by: FAMILY MEDICINE

## 2019-01-01 PROCEDURE — 99233 SBSQ HOSP IP/OBS HIGH 50: CPT | Performed by: FAMILY MEDICINE

## 2019-01-01 PROCEDURE — 85027 COMPLETE CBC AUTOMATED: CPT

## 2019-01-01 PROCEDURE — 82948 REAGENT STRIP/BLOOD GLUCOSE: CPT

## 2019-01-01 RX ORDER — 0.9 % SODIUM CHLORIDE 0.9 %
500 INTRAVENOUS SOLUTION INTRAVENOUS ONCE
Status: COMPLETED | OUTPATIENT
Start: 2019-01-01 | End: 2019-01-01

## 2019-01-01 RX ORDER — SODIUM CHLORIDE 9 MG/ML
INJECTION, SOLUTION INTRAVENOUS CONTINUOUS
Status: DISCONTINUED | OUTPATIENT
Start: 2019-01-01 | End: 2019-01-06 | Stop reason: HOSPADM

## 2019-01-01 RX ADMIN — NOREPINEPHRINE BITARTRATE 5 MCG/MIN: 1 INJECTION INTRAVENOUS at 21:43

## 2019-01-01 RX ADMIN — ACETAMINOPHEN 650 MG: 325 TABLET ORAL at 05:07

## 2019-01-01 RX ADMIN — METRONIDAZOLE 500 MG: 500 INJECTION, SOLUTION INTRAVENOUS at 03:37

## 2019-01-01 RX ADMIN — INSULIN LISPRO 2 UNITS: 100 INJECTION, SOLUTION INTRAVENOUS; SUBCUTANEOUS at 07:57

## 2019-01-01 RX ADMIN — ATORVASTATIN CALCIUM 40 MG: 40 TABLET, FILM COATED ORAL at 07:56

## 2019-01-01 RX ADMIN — VANCOMYCIN HYDROCHLORIDE 1250 MG: 10 INJECTION, POWDER, LYOPHILIZED, FOR SOLUTION INTRAVENOUS at 04:07

## 2019-01-01 RX ADMIN — SODIUM BICARBONATE: 84 INJECTION INTRAVENOUS at 09:50

## 2019-01-01 RX ADMIN — INSULIN LISPRO 1 UNITS: 100 INJECTION, SOLUTION INTRAVENOUS; SUBCUTANEOUS at 12:06

## 2019-01-01 RX ADMIN — PANTOPRAZOLE SODIUM 40 MG: 40 INJECTION, POWDER, FOR SOLUTION INTRAVENOUS at 07:56

## 2019-01-01 RX ADMIN — SODIUM CHLORIDE: 9 INJECTION, SOLUTION INTRAVENOUS at 11:43

## 2019-01-01 RX ADMIN — NOREPINEPHRINE BITARTRATE 15 MCG/MIN: 1 INJECTION INTRAVENOUS at 04:56

## 2019-01-01 RX ADMIN — SODIUM CHLORIDE 500 ML: 9 INJECTION, SOLUTION INTRAVENOUS at 12:44

## 2019-01-01 RX ADMIN — METRONIDAZOLE 500 MG: 500 INJECTION, SOLUTION INTRAVENOUS at 17:53

## 2019-01-01 RX ADMIN — INSULIN LISPRO 1 UNITS: 100 INJECTION, SOLUTION INTRAVENOUS; SUBCUTANEOUS at 20:52

## 2019-01-01 RX ADMIN — Medication 10 ML: at 11:43

## 2019-01-01 RX ADMIN — Medication 10 ML: at 20:24

## 2019-01-01 RX ADMIN — Medication 10 ML: at 11:44

## 2019-01-01 RX ADMIN — CEFEPIME 1 G: 1 INJECTION, POWDER, FOR SOLUTION INTRAMUSCULAR; INTRAVENOUS at 03:37

## 2019-01-01 RX ADMIN — METRONIDAZOLE 500 MG: 500 INJECTION, SOLUTION INTRAVENOUS at 09:49

## 2019-01-01 RX ADMIN — SERTRALINE HYDROCHLORIDE 50 MG: 50 TABLET ORAL at 07:56

## 2019-01-01 ASSESSMENT — PAIN SCALES - GENERAL
PAINLEVEL_OUTOF10: 0
PAINLEVEL_OUTOF10: 0

## 2019-01-02 LAB
ALBUMIN SERPL-MCNC: 2.7 G/DL (ref 3.5–5.2)
ALBUMIN SERPL-MCNC: 3.1 G/DL (ref 3.5–5.2)
ALP BLD-CCNC: 52 U/L (ref 35–104)
ALP BLD-CCNC: 56 U/L (ref 35–104)
ALT SERPL-CCNC: 5 U/L (ref 5–33)
ALT SERPL-CCNC: 7 U/L (ref 5–33)
ANION GAP SERPL CALCULATED.3IONS-SCNC: 12 MMOL/L (ref 7–19)
ANION GAP SERPL CALCULATED.3IONS-SCNC: 14 MMOL/L (ref 7–19)
ANION GAP SERPL CALCULATED.3IONS-SCNC: 14 MMOL/L (ref 7–19)
APTT: 34.8 SEC (ref 26–36.2)
APTT: >200 SEC (ref 26–36.2)
AST SERPL-CCNC: 15 U/L (ref 5–32)
AST SERPL-CCNC: 19 U/L (ref 5–32)
BILIRUB SERPL-MCNC: 0.3 MG/DL (ref 0.2–1.2)
BILIRUB SERPL-MCNC: 0.5 MG/DL (ref 0.2–1.2)
BUN BLDV-MCNC: 59 MG/DL (ref 8–23)
BUN BLDV-MCNC: 70 MG/DL (ref 8–23)
BUN BLDV-MCNC: 71 MG/DL (ref 8–23)
CALCIUM SERPL-MCNC: 7.3 MG/DL (ref 8.8–10.2)
CALCIUM SERPL-MCNC: 7.3 MG/DL (ref 8.8–10.2)
CALCIUM SERPL-MCNC: 7.4 MG/DL (ref 8.8–10.2)
CHLORIDE BLD-SCNC: 91 MMOL/L (ref 98–111)
CHLORIDE BLD-SCNC: 92 MMOL/L (ref 98–111)
CHLORIDE BLD-SCNC: 95 MMOL/L (ref 98–111)
CO2: 37 MMOL/L (ref 22–29)
CORTISOL TOTAL: 13.4 UG/DL
CREAT SERPL-MCNC: 4.7 MG/DL (ref 0.5–0.9)
CREAT SERPL-MCNC: 5.9 MG/DL (ref 0.5–0.9)
CREAT SERPL-MCNC: 6 MG/DL (ref 0.5–0.9)
EKG P AXIS: 67 DEGREES
EKG P AXIS: NORMAL DEGREES
EKG P-R INTERVAL: 182 MS
EKG P-R INTERVAL: NORMAL MS
EKG Q-T INTERVAL: 516 MS
EKG Q-T INTERVAL: 542 MS
EKG QRS DURATION: 110 MS
EKG QRS DURATION: 98 MS
EKG QTC CALCULATION (BAZETT): 508 MS
EKG QTC CALCULATION (BAZETT): 521 MS
EKG T AXIS: 15 DEGREES
EKG T AXIS: 39 DEGREES
GFR NON-AFRICAN AMERICAN: 7
GFR NON-AFRICAN AMERICAN: 7
GFR NON-AFRICAN AMERICAN: 9
GLUCOSE BLD-MCNC: 131 MG/DL (ref 74–109)
GLUCOSE BLD-MCNC: 132 MG/DL (ref 74–109)
GLUCOSE BLD-MCNC: 133 MG/DL (ref 70–99)
GLUCOSE BLD-MCNC: 144 MG/DL (ref 74–109)
GLUCOSE BLD-MCNC: 159 MG/DL (ref 70–99)
GLUCOSE BLD-MCNC: 163 MG/DL (ref 70–99)
GLUCOSE BLD-MCNC: 173 MG/DL (ref 70–99)
HCT VFR BLD CALC: 25.1 % (ref 37–47)
HEMOGLOBIN: 8.1 G/DL (ref 12–16)
LACTIC ACID: 1.2 MMOL/L (ref 0.5–1.9)
MAGNESIUM: 1.3 MG/DL (ref 1.6–2.4)
MAGNESIUM: 1.3 MG/DL (ref 1.6–2.4)
MAGNESIUM: 1.5 MG/DL (ref 1.6–2.4)
MAGNESIUM: 1.6 MG/DL (ref 1.6–2.4)
MCH RBC QN AUTO: 28.8 PG (ref 27–31)
MCHC RBC AUTO-ENTMCNC: 32.3 G/DL (ref 33–37)
MCV RBC AUTO: 89.3 FL (ref 81–99)
PARATHYROID HORMONE INTACT: 185.2 PG/ML (ref 15–65)
PDW BLD-RTO: 14.9 % (ref 11.5–14.5)
PERFORMED ON: ABNORMAL
PHOSPHORUS: 3.7 MG/DL (ref 2.5–4.5)
PLATELET # BLD: 186 K/UL (ref 130–400)
PMV BLD AUTO: 10.4 FL (ref 9.4–12.3)
POTASSIUM REFLEX MAGNESIUM: 3 MMOL/L (ref 3.5–5)
POTASSIUM REFLEX MAGNESIUM: 3.2 MMOL/L (ref 3.5–5)
POTASSIUM REFLEX MAGNESIUM: 3.2 MMOL/L (ref 3.5–5)
RBC # BLD: 2.81 M/UL (ref 4.2–5.4)
SODIUM BLD-SCNC: 142 MMOL/L (ref 136–145)
SODIUM BLD-SCNC: 143 MMOL/L (ref 136–145)
SODIUM BLD-SCNC: 144 MMOL/L (ref 136–145)
TOTAL PROTEIN: 5 G/DL (ref 6.6–8.7)
TOTAL PROTEIN: 5.6 G/DL (ref 6.6–8.7)
TSH SERPL DL<=0.05 MIU/L-ACNC: 2.25 UIU/ML (ref 0.27–4.2)
URINE CULTURE, ROUTINE: NORMAL
VANCOMYCIN TROUGH: 20.7 UG/ML (ref 10–20)
VITAMIN D 25-HYDROXY: 85.5 NG/ML
WBC # BLD: 8.3 K/UL (ref 4.8–10.8)

## 2019-01-02 PROCEDURE — 2580000003 HC RX 258: Performed by: HOSPITALIST

## 2019-01-02 PROCEDURE — 6360000002 HC RX W HCPCS: Performed by: HOSPITALIST

## 2019-01-02 PROCEDURE — 6370000000 HC RX 637 (ALT 250 FOR IP): Performed by: INTERNAL MEDICINE

## 2019-01-02 PROCEDURE — 80202 ASSAY OF VANCOMYCIN: CPT

## 2019-01-02 PROCEDURE — 2580000003 HC RX 258: Performed by: INTERNAL MEDICINE

## 2019-01-02 PROCEDURE — 2000000000 HC ICU R&B

## 2019-01-02 PROCEDURE — 6370000000 HC RX 637 (ALT 250 FOR IP): Performed by: FAMILY MEDICINE

## 2019-01-02 PROCEDURE — 36592 COLLECT BLOOD FROM PICC: CPT

## 2019-01-02 PROCEDURE — 84443 ASSAY THYROID STIM HORMONE: CPT

## 2019-01-02 PROCEDURE — 85730 THROMBOPLASTIN TIME PARTIAL: CPT

## 2019-01-02 PROCEDURE — 6370000000 HC RX 637 (ALT 250 FOR IP): Performed by: HOSPITALIST

## 2019-01-02 PROCEDURE — 83605 ASSAY OF LACTIC ACID: CPT

## 2019-01-02 PROCEDURE — 6360000002 HC RX W HCPCS: Performed by: INTERNAL MEDICINE

## 2019-01-02 PROCEDURE — 83970 ASSAY OF PARATHORMONE: CPT

## 2019-01-02 PROCEDURE — 83735 ASSAY OF MAGNESIUM: CPT

## 2019-01-02 PROCEDURE — 80053 COMPREHEN METABOLIC PANEL: CPT

## 2019-01-02 PROCEDURE — 2700000000 HC OXYGEN THERAPY PER DAY

## 2019-01-02 PROCEDURE — 82306 VITAMIN D 25 HYDROXY: CPT

## 2019-01-02 PROCEDURE — C9113 INJ PANTOPRAZOLE SODIUM, VIA: HCPCS | Performed by: HOSPITALIST

## 2019-01-02 PROCEDURE — 94664 DEMO&/EVAL PT USE INHALER: CPT

## 2019-01-02 PROCEDURE — C9113 INJ PANTOPRAZOLE SODIUM, VIA: HCPCS | Performed by: FAMILY MEDICINE

## 2019-01-02 PROCEDURE — 2500000003 HC RX 250 WO HCPCS: Performed by: HOSPITALIST

## 2019-01-02 PROCEDURE — 84100 ASSAY OF PHOSPHORUS: CPT

## 2019-01-02 PROCEDURE — 85027 COMPLETE CBC AUTOMATED: CPT

## 2019-01-02 PROCEDURE — 82948 REAGENT STRIP/BLOOD GLUCOSE: CPT

## 2019-01-02 PROCEDURE — 6360000002 HC RX W HCPCS: Performed by: FAMILY MEDICINE

## 2019-01-02 PROCEDURE — 2580000003 HC RX 258: Performed by: FAMILY MEDICINE

## 2019-01-02 PROCEDURE — 82533 TOTAL CORTISOL: CPT

## 2019-01-02 PROCEDURE — 99291 CRITICAL CARE FIRST HOUR: CPT | Performed by: INTERNAL MEDICINE

## 2019-01-02 RX ORDER — MAGNESIUM SULFATE 1 G/100ML
1 INJECTION INTRAVENOUS ONCE
Status: COMPLETED | OUTPATIENT
Start: 2019-01-02 | End: 2019-01-02

## 2019-01-02 RX ORDER — ONDANSETRON 2 MG/ML
4 INJECTION INTRAMUSCULAR; INTRAVENOUS ONCE
Status: COMPLETED | OUTPATIENT
Start: 2019-01-02 | End: 2019-01-02

## 2019-01-02 RX ORDER — HEPARIN SODIUM 1000 [USP'U]/ML
80 INJECTION, SOLUTION INTRAVENOUS; SUBCUTANEOUS PRN
Status: DISCONTINUED | OUTPATIENT
Start: 2019-01-02 | End: 2019-01-06 | Stop reason: HOSPADM

## 2019-01-02 RX ORDER — HEPARIN SODIUM 5000 [USP'U]/ML
5000 INJECTION, SOLUTION INTRAVENOUS; SUBCUTANEOUS EVERY 8 HOURS SCHEDULED
Status: DISCONTINUED | OUTPATIENT
Start: 2019-01-02 | End: 2019-01-02

## 2019-01-02 RX ORDER — PANTOPRAZOLE SODIUM 40 MG/1
40 TABLET, DELAYED RELEASE ORAL
Status: DISCONTINUED | OUTPATIENT
Start: 2019-01-03 | End: 2019-01-04

## 2019-01-02 RX ORDER — DOCUSATE SODIUM 100 MG/1
100 CAPSULE, LIQUID FILLED ORAL 2 TIMES DAILY PRN
Status: DISCONTINUED | OUTPATIENT
Start: 2019-01-02 | End: 2019-01-06 | Stop reason: HOSPADM

## 2019-01-02 RX ORDER — POTASSIUM CHLORIDE 20 MEQ/1
40 TABLET, EXTENDED RELEASE ORAL ONCE
Status: COMPLETED | OUTPATIENT
Start: 2019-01-02 | End: 2019-01-02

## 2019-01-02 RX ORDER — CALCITRIOL 0.25 UG/1
0.25 CAPSULE, LIQUID FILLED ORAL DAILY
Status: DISCONTINUED | OUTPATIENT
Start: 2019-01-02 | End: 2019-01-06 | Stop reason: HOSPADM

## 2019-01-02 RX ORDER — HEPARIN SODIUM 1000 [USP'U]/ML
80 INJECTION, SOLUTION INTRAVENOUS; SUBCUTANEOUS ONCE
Status: COMPLETED | OUTPATIENT
Start: 2019-01-02 | End: 2019-01-02

## 2019-01-02 RX ORDER — HEPARIN SODIUM 1000 [USP'U]/ML
40 INJECTION, SOLUTION INTRAVENOUS; SUBCUTANEOUS PRN
Status: DISCONTINUED | OUTPATIENT
Start: 2019-01-02 | End: 2019-01-06 | Stop reason: HOSPADM

## 2019-01-02 RX ORDER — PANTOPRAZOLE SODIUM 40 MG/10ML
40 INJECTION, POWDER, LYOPHILIZED, FOR SOLUTION INTRAVENOUS ONCE
Status: COMPLETED | OUTPATIENT
Start: 2019-01-02 | End: 2019-01-02

## 2019-01-02 RX ORDER — HEPARIN SODIUM 10000 [USP'U]/100ML
18 INJECTION, SOLUTION INTRAVENOUS CONTINUOUS
Status: DISCONTINUED | OUTPATIENT
Start: 2019-01-02 | End: 2019-01-04

## 2019-01-02 RX ADMIN — MAGNESIUM SULFATE HEPTAHYDRATE 1 G: 1 INJECTION, SOLUTION INTRAVENOUS at 12:17

## 2019-01-02 RX ADMIN — ONDANSETRON 4 MG: 2 INJECTION INTRAMUSCULAR; INTRAVENOUS at 22:50

## 2019-01-02 RX ADMIN — INSULIN LISPRO 1 UNITS: 100 INJECTION, SOLUTION INTRAVENOUS; SUBCUTANEOUS at 08:45

## 2019-01-02 RX ADMIN — MAGNESIUM SULFATE HEPTAHYDRATE 1 G: 1 INJECTION, SOLUTION INTRAVENOUS at 04:50

## 2019-01-02 RX ADMIN — CALCITRIOL 0.25 MCG: 0.25 CAPSULE, LIQUID FILLED ORAL at 12:17

## 2019-01-02 RX ADMIN — Medication 10 ML: at 21:11

## 2019-01-02 RX ADMIN — METRONIDAZOLE 500 MG: 500 INJECTION, SOLUTION INTRAVENOUS at 17:31

## 2019-01-02 RX ADMIN — INSULIN LISPRO 1 UNITS: 100 INJECTION, SOLUTION INTRAVENOUS; SUBCUTANEOUS at 12:17

## 2019-01-02 RX ADMIN — METRONIDAZOLE 500 MG: 500 INJECTION, SOLUTION INTRAVENOUS at 09:33

## 2019-01-02 RX ADMIN — CEFTRIAXONE 1 G: 1 INJECTION, POWDER, FOR SOLUTION INTRAMUSCULAR; INTRAVENOUS at 16:00

## 2019-01-02 RX ADMIN — Medication 10 ML: at 08:41

## 2019-01-02 RX ADMIN — METRONIDAZOLE 500 MG: 500 INJECTION, SOLUTION INTRAVENOUS at 02:03

## 2019-01-02 RX ADMIN — POTASSIUM CHLORIDE 40 MEQ: 20 TABLET, EXTENDED RELEASE ORAL at 04:53

## 2019-01-02 RX ADMIN — CEFEPIME 1 G: 1 INJECTION, POWDER, FOR SOLUTION INTRAMUSCULAR; INTRAVENOUS at 02:03

## 2019-01-02 RX ADMIN — ATORVASTATIN CALCIUM 40 MG: 40 TABLET, FILM COATED ORAL at 08:40

## 2019-01-02 RX ADMIN — Medication 10 ML: at 08:42

## 2019-01-02 RX ADMIN — SERTRALINE HYDROCHLORIDE 50 MG: 50 TABLET ORAL at 21:11

## 2019-01-02 RX ADMIN — PANTOPRAZOLE SODIUM 40 MG: 40 INJECTION, POWDER, FOR SOLUTION INTRAVENOUS at 08:40

## 2019-01-02 RX ADMIN — INSULIN LISPRO 1 UNITS: 100 INJECTION, SOLUTION INTRAVENOUS; SUBCUTANEOUS at 17:27

## 2019-01-02 RX ADMIN — HEPARIN SODIUM 8470 UNITS: 1000 INJECTION INTRAVENOUS; SUBCUTANEOUS at 16:24

## 2019-01-02 RX ADMIN — VANCOMYCIN HYDROCHLORIDE 1250 MG: 10 INJECTION, POWDER, LYOPHILIZED, FOR SOLUTION INTRAVENOUS at 08:39

## 2019-01-02 RX ADMIN — HEPARIN SODIUM AND DEXTROSE 18 UNITS/KG/HR: 10000; 5 INJECTION INTRAVENOUS at 16:38

## 2019-01-02 RX ADMIN — SODIUM CHLORIDE: 9 INJECTION, SOLUTION INTRAVENOUS at 04:50

## 2019-01-02 RX ADMIN — PANTOPRAZOLE SODIUM 40 MG: 40 INJECTION, POWDER, FOR SOLUTION INTRAVENOUS at 22:50

## 2019-01-02 ASSESSMENT — PAIN SCALES - GENERAL
PAINLEVEL_OUTOF10: 0

## 2019-01-03 ENCOUNTER — APPOINTMENT (OUTPATIENT)
Dept: GENERAL RADIOLOGY | Age: 79
DRG: 871 | End: 2019-01-03
Payer: MEDICARE

## 2019-01-03 LAB
ALBUMIN SERPL-MCNC: 2.9 G/DL (ref 3.5–5.2)
ALBUMIN SERPL-MCNC: 3 G/DL (ref 3.5–5.2)
ALP BLD-CCNC: 55 U/L (ref 35–104)
ALP BLD-CCNC: 55 U/L (ref 35–104)
ALT SERPL-CCNC: 6 U/L (ref 5–33)
ALT SERPL-CCNC: 7 U/L (ref 5–33)
ANION GAP SERPL CALCULATED.3IONS-SCNC: 13 MMOL/L (ref 7–19)
ANION GAP SERPL CALCULATED.3IONS-SCNC: 9 MMOL/L (ref 7–19)
APTT: 183.2 SEC (ref 26–36.2)
APTT: 62.5 SEC (ref 26–36.2)
APTT: 73.5 SEC (ref 26–36.2)
APTT: >200 SEC (ref 26–36.2)
APTT: >200 SEC (ref 26–36.2)
AST SERPL-CCNC: 14 U/L (ref 5–32)
AST SERPL-CCNC: 15 U/L (ref 5–32)
BILIRUB SERPL-MCNC: 0.3 MG/DL (ref 0.2–1.2)
BILIRUB SERPL-MCNC: <0.2 MG/DL (ref 0.2–1.2)
BUN BLDV-MCNC: 40 MG/DL (ref 8–23)
BUN BLDV-MCNC: 55 MG/DL (ref 8–23)
CALCIUM SERPL-MCNC: 7.3 MG/DL (ref 8.8–10.2)
CALCIUM SERPL-MCNC: 7.8 MG/DL (ref 8.8–10.2)
CHLORIDE BLD-SCNC: 96 MMOL/L (ref 98–111)
CHLORIDE BLD-SCNC: 97 MMOL/L (ref 98–111)
CO2: 33 MMOL/L (ref 22–29)
CO2: 39 MMOL/L (ref 22–29)
CORTISOL - AM: 11.2 UG/DL (ref 6.2–19.4)
CREAT SERPL-MCNC: 3.2 MG/DL (ref 0.5–0.9)
CREAT SERPL-MCNC: 4.2 MG/DL (ref 0.5–0.9)
CULTURE, STOOL: NORMAL
E COLI SHIGA TOXIN ASSAY: NORMAL
GFR NON-AFRICAN AMERICAN: 10
GFR NON-AFRICAN AMERICAN: 14
GLUCOSE BLD-MCNC: 117 MG/DL (ref 70–99)
GLUCOSE BLD-MCNC: 132 MG/DL (ref 70–99)
GLUCOSE BLD-MCNC: 132 MG/DL (ref 74–109)
GLUCOSE BLD-MCNC: 146 MG/DL (ref 70–99)
GLUCOSE BLD-MCNC: 172 MG/DL (ref 74–109)
GLUCOSE BLD-MCNC: 180 MG/DL (ref 70–99)
HCT VFR BLD CALC: 24.1 % (ref 37–47)
HEMOGLOBIN: 7.5 G/DL (ref 12–16)
MAGNESIUM: 1.6 MG/DL (ref 1.6–2.4)
MAGNESIUM: 1.6 MG/DL (ref 1.6–2.4)
MCH RBC QN AUTO: 29 PG (ref 27–31)
MCHC RBC AUTO-ENTMCNC: 31.1 G/DL (ref 33–37)
MCV RBC AUTO: 93.1 FL (ref 81–99)
PDW BLD-RTO: 15 % (ref 11.5–14.5)
PERFORMED ON: ABNORMAL
PLATELET # BLD: 159 K/UL (ref 130–400)
PMV BLD AUTO: 10.1 FL (ref 9.4–12.3)
POTASSIUM REFLEX MAGNESIUM: 3.1 MMOL/L (ref 3.5–5)
POTASSIUM REFLEX MAGNESIUM: 3.8 MMOL/L (ref 3.5–5)
POTASSIUM SERPL-SCNC: 4.4 MMOL/L (ref 3.5–5)
RBC # BLD: 2.59 M/UL (ref 4.2–5.4)
SODIUM BLD-SCNC: 143 MMOL/L (ref 136–145)
SODIUM BLD-SCNC: 144 MMOL/L (ref 136–145)
TOTAL PROTEIN: 5.2 G/DL (ref 6.6–8.7)
TOTAL PROTEIN: 5.4 G/DL (ref 6.6–8.7)
WBC # BLD: 6.7 K/UL (ref 4.8–10.8)

## 2019-01-03 PROCEDURE — 71045 X-RAY EXAM CHEST 1 VIEW: CPT

## 2019-01-03 PROCEDURE — 6360000002 HC RX W HCPCS: Performed by: INTERNAL MEDICINE

## 2019-01-03 PROCEDURE — 2580000003 HC RX 258: Performed by: FAMILY MEDICINE

## 2019-01-03 PROCEDURE — 2500000003 HC RX 250 WO HCPCS: Performed by: HOSPITALIST

## 2019-01-03 PROCEDURE — 6370000000 HC RX 637 (ALT 250 FOR IP): Performed by: INTERNAL MEDICINE

## 2019-01-03 PROCEDURE — 2700000000 HC OXYGEN THERAPY PER DAY

## 2019-01-03 PROCEDURE — 99291 CRITICAL CARE FIRST HOUR: CPT | Performed by: INTERNAL MEDICINE

## 2019-01-03 PROCEDURE — 85027 COMPLETE CBC AUTOMATED: CPT

## 2019-01-03 PROCEDURE — 6370000000 HC RX 637 (ALT 250 FOR IP): Performed by: FAMILY MEDICINE

## 2019-01-03 PROCEDURE — 2000000000 HC ICU R&B

## 2019-01-03 PROCEDURE — 83735 ASSAY OF MAGNESIUM: CPT

## 2019-01-03 PROCEDURE — 2580000003 HC RX 258: Performed by: INTERNAL MEDICINE

## 2019-01-03 PROCEDURE — 87046 STOOL CULTR AEROBIC BACT EA: CPT

## 2019-01-03 PROCEDURE — 80053 COMPREHEN METABOLIC PANEL: CPT

## 2019-01-03 PROCEDURE — 84132 ASSAY OF SERUM POTASSIUM: CPT

## 2019-01-03 PROCEDURE — 85730 THROMBOPLASTIN TIME PARTIAL: CPT

## 2019-01-03 PROCEDURE — 87045 FECES CULTURE AEROBIC BACT: CPT

## 2019-01-03 PROCEDURE — 82948 REAGENT STRIP/BLOOD GLUCOSE: CPT

## 2019-01-03 PROCEDURE — 82533 TOTAL CORTISOL: CPT

## 2019-01-03 RX ORDER — POTASSIUM CHLORIDE 29.8 MG/ML
40 INJECTION INTRAVENOUS ONCE
Status: DISCONTINUED | OUTPATIENT
Start: 2019-01-03 | End: 2019-01-03 | Stop reason: SDUPTHER

## 2019-01-03 RX ORDER — POTASSIUM CHLORIDE 20MEQ/15ML
40 LIQUID (ML) ORAL 3 TIMES DAILY
Status: DISCONTINUED | OUTPATIENT
Start: 2019-01-03 | End: 2019-01-03

## 2019-01-03 RX ORDER — POTASSIUM CHLORIDE 29.8 MG/ML
20 INJECTION INTRAVENOUS
Status: COMPLETED | OUTPATIENT
Start: 2019-01-03 | End: 2019-01-03

## 2019-01-03 RX ORDER — ONDANSETRON 2 MG/ML
4 INJECTION INTRAMUSCULAR; INTRAVENOUS EVERY 6 HOURS PRN
Status: DISCONTINUED | OUTPATIENT
Start: 2019-01-03 | End: 2019-01-06 | Stop reason: HOSPADM

## 2019-01-03 RX ADMIN — Medication 10 ML: at 08:36

## 2019-01-03 RX ADMIN — CALCITRIOL 0.25 MCG: 0.25 CAPSULE, LIQUID FILLED ORAL at 09:50

## 2019-01-03 RX ADMIN — Medication 10 ML: at 21:33

## 2019-01-03 RX ADMIN — METRONIDAZOLE 500 MG: 500 INJECTION, SOLUTION INTRAVENOUS at 09:50

## 2019-01-03 RX ADMIN — METRONIDAZOLE 500 MG: 500 INJECTION, SOLUTION INTRAVENOUS at 17:55

## 2019-01-03 RX ADMIN — POTASSIUM CHLORIDE 40 MEQ: 20 SOLUTION ORAL at 09:50

## 2019-01-03 RX ADMIN — METRONIDAZOLE 500 MG: 500 INJECTION, SOLUTION INTRAVENOUS at 02:26

## 2019-01-03 RX ADMIN — INSULIN LISPRO 1 UNITS: 100 INJECTION, SOLUTION INTRAVENOUS; SUBCUTANEOUS at 21:36

## 2019-01-03 RX ADMIN — POTASSIUM CHLORIDE 20 MEQ: 29.8 INJECTION, SOLUTION INTRAVENOUS at 10:32

## 2019-01-03 RX ADMIN — SODIUM CHLORIDE: 9 INJECTION, SOLUTION INTRAVENOUS at 21:57

## 2019-01-03 RX ADMIN — ONDANSETRON 4 MG: 2 INJECTION INTRAMUSCULAR; INTRAVENOUS at 18:12

## 2019-01-03 RX ADMIN — POTASSIUM CHLORIDE 20 MEQ: 29.8 INJECTION, SOLUTION INTRAVENOUS at 11:19

## 2019-01-03 RX ADMIN — CEFTRIAXONE 1 G: 1 INJECTION, POWDER, FOR SOLUTION INTRAMUSCULAR; INTRAVENOUS at 15:28

## 2019-01-03 RX ADMIN — SERTRALINE HYDROCHLORIDE 50 MG: 50 TABLET ORAL at 21:33

## 2019-01-03 RX ADMIN — PANTOPRAZOLE SODIUM 40 MG: 40 TABLET, DELAYED RELEASE ORAL at 08:34

## 2019-01-03 RX ADMIN — ATORVASTATIN CALCIUM 40 MG: 40 TABLET, FILM COATED ORAL at 09:50

## 2019-01-03 RX ADMIN — INSULIN LISPRO 1 UNITS: 100 INJECTION, SOLUTION INTRAVENOUS; SUBCUTANEOUS at 13:20

## 2019-01-03 ASSESSMENT — PAIN SCALES - GENERAL
PAINLEVEL_OUTOF10: 0

## 2019-01-04 PROBLEM — N17.0 ATN (ACUTE TUBULAR NECROSIS) (HCC): Status: ACTIVE | Noted: 2019-01-04

## 2019-01-04 LAB
ALBUMIN SERPL-MCNC: 3 G/DL (ref 3.5–5.2)
ALP BLD-CCNC: 54 U/L (ref 35–104)
ALT SERPL-CCNC: <5 U/L (ref 5–33)
ANION GAP SERPL CALCULATED.3IONS-SCNC: 11 MMOL/L (ref 7–19)
APTT: 59.6 SEC (ref 26–36.2)
APTT: 66 SEC (ref 26–36.2)
AST SERPL-CCNC: 13 U/L (ref 5–32)
BILIRUB SERPL-MCNC: <0.2 MG/DL (ref 0.2–1.2)
BUN BLDV-MCNC: 35 MG/DL (ref 8–23)
CALCIUM SERPL-MCNC: 7.9 MG/DL (ref 8.8–10.2)
CHLORIDE BLD-SCNC: 101 MMOL/L (ref 98–111)
CO2: 34 MMOL/L (ref 22–29)
CREAT SERPL-MCNC: 2.6 MG/DL (ref 0.5–0.9)
GFR NON-AFRICAN AMERICAN: 18
GLUCOSE BLD-MCNC: 126 MG/DL (ref 70–99)
GLUCOSE BLD-MCNC: 140 MG/DL (ref 70–99)
GLUCOSE BLD-MCNC: 142 MG/DL (ref 70–99)
GLUCOSE BLD-MCNC: 97 MG/DL (ref 70–99)
GLUCOSE BLD-MCNC: 98 MG/DL (ref 74–109)
HCT VFR BLD CALC: 23.2 % (ref 37–47)
HCT VFR BLD CALC: 28.5 % (ref 37–47)
HEMOGLOBIN: 7 G/DL (ref 12–16)
HEMOGLOBIN: 8.1 G/DL (ref 12–16)
MAGNESIUM: 1.4 MG/DL (ref 1.6–2.4)
MCH RBC QN AUTO: 28.6 PG (ref 27–31)
MCH RBC QN AUTO: 28.7 PG (ref 27–31)
MCHC RBC AUTO-ENTMCNC: 28.4 G/DL (ref 33–37)
MCHC RBC AUTO-ENTMCNC: 30.2 G/DL (ref 33–37)
MCV RBC AUTO: 100.7 FL (ref 81–99)
MCV RBC AUTO: 95.1 FL (ref 81–99)
OCCULT BLOOD QC: ABNORMAL
OCCULT BLOOD SCREENING: ABNORMAL
PDW BLD-RTO: 14.9 % (ref 11.5–14.5)
PDW BLD-RTO: 14.9 % (ref 11.5–14.5)
PERFORMED ON: ABNORMAL
PERFORMED ON: NORMAL
PLATELET # BLD: 151 K/UL (ref 130–400)
PLATELET # BLD: 182 K/UL (ref 130–400)
PMV BLD AUTO: 10.2 FL (ref 9.4–12.3)
PMV BLD AUTO: 10.6 FL (ref 9.4–12.3)
POTASSIUM REFLEX MAGNESIUM: 3.8 MMOL/L (ref 3.5–5)
RBC # BLD: 2.44 M/UL (ref 4.2–5.4)
RBC # BLD: 2.83 M/UL (ref 4.2–5.4)
SODIUM BLD-SCNC: 146 MMOL/L (ref 136–145)
TOTAL PROTEIN: 4.9 G/DL (ref 6.6–8.7)
WBC # BLD: 5.8 K/UL (ref 4.8–10.8)
WBC # BLD: 6.6 K/UL (ref 4.8–10.8)

## 2019-01-04 PROCEDURE — 6370000000 HC RX 637 (ALT 250 FOR IP): Performed by: INTERNAL MEDICINE

## 2019-01-04 PROCEDURE — 99221 1ST HOSP IP/OBS SF/LOW 40: CPT | Performed by: INTERNAL MEDICINE

## 2019-01-04 PROCEDURE — 2580000003 HC RX 258: Performed by: FAMILY MEDICINE

## 2019-01-04 PROCEDURE — 83735 ASSAY OF MAGNESIUM: CPT

## 2019-01-04 PROCEDURE — 99291 CRITICAL CARE FIRST HOUR: CPT | Performed by: INTERNAL MEDICINE

## 2019-01-04 PROCEDURE — C9113 INJ PANTOPRAZOLE SODIUM, VIA: HCPCS | Performed by: INTERNAL MEDICINE

## 2019-01-04 PROCEDURE — 82948 REAGENT STRIP/BLOOD GLUCOSE: CPT

## 2019-01-04 PROCEDURE — 6370000000 HC RX 637 (ALT 250 FOR IP): Performed by: FAMILY MEDICINE

## 2019-01-04 PROCEDURE — 1210000000 HC MED SURG R&B

## 2019-01-04 PROCEDURE — 6360000002 HC RX W HCPCS: Performed by: INTERNAL MEDICINE

## 2019-01-04 PROCEDURE — 2580000003 HC RX 258: Performed by: INTERNAL MEDICINE

## 2019-01-04 PROCEDURE — 80053 COMPREHEN METABOLIC PANEL: CPT

## 2019-01-04 PROCEDURE — 2500000003 HC RX 250 WO HCPCS: Performed by: HOSPITALIST

## 2019-01-04 PROCEDURE — 36592 COLLECT BLOOD FROM PICC: CPT

## 2019-01-04 PROCEDURE — 85730 THROMBOPLASTIN TIME PARTIAL: CPT

## 2019-01-04 PROCEDURE — G0328 FECAL BLOOD SCRN IMMUNOASSAY: HCPCS

## 2019-01-04 PROCEDURE — 85027 COMPLETE CBC AUTOMATED: CPT

## 2019-01-04 PROCEDURE — 2700000000 HC OXYGEN THERAPY PER DAY

## 2019-01-04 RX ORDER — PANTOPRAZOLE SODIUM 40 MG/10ML
40 INJECTION, POWDER, LYOPHILIZED, FOR SOLUTION INTRAVENOUS 2 TIMES DAILY
Status: DISCONTINUED | OUTPATIENT
Start: 2019-01-04 | End: 2019-01-06 | Stop reason: HOSPADM

## 2019-01-04 RX ORDER — MAGNESIUM SULFATE IN WATER 40 MG/ML
2 INJECTION, SOLUTION INTRAVENOUS ONCE
Status: COMPLETED | OUTPATIENT
Start: 2019-01-04 | End: 2019-01-04

## 2019-01-04 RX ORDER — CIPROFLOXACIN 500 MG/1
500 TABLET, FILM COATED ORAL
Status: DISPENSED | OUTPATIENT
Start: 2019-01-04 | End: 2019-01-06

## 2019-01-04 RX ORDER — METRONIDAZOLE 500 MG/1
500 TABLET ORAL EVERY 8 HOURS SCHEDULED
Status: DISPENSED | OUTPATIENT
Start: 2019-01-04 | End: 2019-01-06

## 2019-01-04 RX ADMIN — CIPROFLOXACIN 500 MG: 500 TABLET, FILM COATED ORAL at 11:42

## 2019-01-04 RX ADMIN — METRONIDAZOLE 500 MG: 500 TABLET ORAL at 14:52

## 2019-01-04 RX ADMIN — PANTOPRAZOLE SODIUM 40 MG: 40 INJECTION, POWDER, FOR SOLUTION INTRAVENOUS at 14:53

## 2019-01-04 RX ADMIN — PANTOPRAZOLE SODIUM 40 MG: 40 TABLET, DELAYED RELEASE ORAL at 08:26

## 2019-01-04 RX ADMIN — METRONIDAZOLE 500 MG: 500 TABLET ORAL at 21:56

## 2019-01-04 RX ADMIN — METRONIDAZOLE 500 MG: 500 INJECTION, SOLUTION INTRAVENOUS at 02:15

## 2019-01-04 RX ADMIN — INSULIN LISPRO 1 UNITS: 100 INJECTION, SOLUTION INTRAVENOUS; SUBCUTANEOUS at 12:25

## 2019-01-04 RX ADMIN — Medication 10 ML: at 08:26

## 2019-01-04 RX ADMIN — MAGNESIUM SULFATE HEPTAHYDRATE 2 G: 40 INJECTION, SOLUTION INTRAVENOUS at 08:34

## 2019-01-04 RX ADMIN — Medication 10 ML: at 14:53

## 2019-01-04 RX ADMIN — SODIUM CHLORIDE: 9 INJECTION, SOLUTION INTRAVENOUS at 17:06

## 2019-01-04 RX ADMIN — PANTOPRAZOLE SODIUM 40 MG: 40 INJECTION, POWDER, FOR SOLUTION INTRAVENOUS at 21:56

## 2019-01-04 RX ADMIN — CALCITRIOL 0.25 MCG: 0.25 CAPSULE, LIQUID FILLED ORAL at 08:26

## 2019-01-04 RX ADMIN — ATORVASTATIN CALCIUM 40 MG: 40 TABLET, FILM COATED ORAL at 08:26

## 2019-01-04 RX ADMIN — Medication 10 ML: at 22:09

## 2019-01-04 RX ADMIN — SERTRALINE HYDROCHLORIDE 50 MG: 50 TABLET ORAL at 21:56

## 2019-01-04 ASSESSMENT — PAIN SCALES - GENERAL
PAINLEVEL_OUTOF10: 0

## 2019-01-05 LAB
ALBUMIN SERPL-MCNC: 3.4 G/DL (ref 3.5–5.2)
ALP BLD-CCNC: 80 U/L (ref 35–104)
ALT SERPL-CCNC: 11 U/L (ref 5–33)
ANION GAP SERPL CALCULATED.3IONS-SCNC: 12 MMOL/L (ref 7–19)
AST SERPL-CCNC: 35 U/L (ref 5–32)
BILIRUB SERPL-MCNC: 0.4 MG/DL (ref 0.2–1.2)
BLOOD CULTURE, ROUTINE: NORMAL
BUN BLDV-MCNC: 21 MG/DL (ref 8–23)
CALCIUM SERPL-MCNC: 8.2 MG/DL (ref 8.8–10.2)
CHLORIDE BLD-SCNC: 100 MMOL/L (ref 98–111)
CO2: 32 MMOL/L (ref 22–29)
CREAT SERPL-MCNC: 1.6 MG/DL (ref 0.5–0.9)
CULTURE, BLOOD 2: NORMAL
GFR NON-AFRICAN AMERICAN: 31
GLUCOSE BLD-MCNC: 102 MG/DL (ref 70–99)
GLUCOSE BLD-MCNC: 114 MG/DL (ref 74–109)
GLUCOSE BLD-MCNC: 115 MG/DL (ref 70–99)
GLUCOSE BLD-MCNC: 116 MG/DL (ref 70–99)
GLUCOSE BLD-MCNC: 125 MG/DL (ref 70–99)
PERFORMED ON: ABNORMAL
POTASSIUM REFLEX MAGNESIUM: 3.6 MMOL/L (ref 3.5–5)
SODIUM BLD-SCNC: 144 MMOL/L (ref 136–145)
TOTAL PROTEIN: 5.9 G/DL (ref 6.6–8.7)

## 2019-01-05 PROCEDURE — 82948 REAGENT STRIP/BLOOD GLUCOSE: CPT

## 2019-01-05 PROCEDURE — 6370000000 HC RX 637 (ALT 250 FOR IP): Performed by: INTERNAL MEDICINE

## 2019-01-05 PROCEDURE — 36415 COLL VENOUS BLD VENIPUNCTURE: CPT

## 2019-01-05 PROCEDURE — 99232 SBSQ HOSP IP/OBS MODERATE 35: CPT | Performed by: FAMILY MEDICINE

## 2019-01-05 PROCEDURE — 2700000000 HC OXYGEN THERAPY PER DAY

## 2019-01-05 PROCEDURE — C9113 INJ PANTOPRAZOLE SODIUM, VIA: HCPCS | Performed by: INTERNAL MEDICINE

## 2019-01-05 PROCEDURE — 6360000002 HC RX W HCPCS: Performed by: INTERNAL MEDICINE

## 2019-01-05 PROCEDURE — 1210000000 HC MED SURG R&B

## 2019-01-05 PROCEDURE — 99232 SBSQ HOSP IP/OBS MODERATE 35: CPT | Performed by: INTERNAL MEDICINE

## 2019-01-05 PROCEDURE — 80053 COMPREHEN METABOLIC PANEL: CPT

## 2019-01-05 PROCEDURE — 2580000003 HC RX 258: Performed by: FAMILY MEDICINE

## 2019-01-05 RX ADMIN — PANTOPRAZOLE SODIUM 40 MG: 40 INJECTION, POWDER, FOR SOLUTION INTRAVENOUS at 21:00

## 2019-01-05 RX ADMIN — PANTOPRAZOLE SODIUM 40 MG: 40 INJECTION, POWDER, FOR SOLUTION INTRAVENOUS at 07:49

## 2019-01-05 RX ADMIN — DOCUSATE SODIUM 100 MG: 100 CAPSULE, LIQUID FILLED ORAL at 20:16

## 2019-01-05 RX ADMIN — METRONIDAZOLE 500 MG: 500 TABLET ORAL at 13:50

## 2019-01-05 RX ADMIN — METRONIDAZOLE 500 MG: 500 TABLET ORAL at 20:16

## 2019-01-05 RX ADMIN — SERTRALINE HYDROCHLORIDE 50 MG: 50 TABLET ORAL at 20:16

## 2019-01-05 RX ADMIN — Medication 10 ML: at 07:50

## 2019-01-06 VITALS
WEIGHT: 238.6 LBS | HEIGHT: 67 IN | DIASTOLIC BLOOD PRESSURE: 82 MMHG | BODY MASS INDEX: 37.45 KG/M2 | RESPIRATION RATE: 18 BRPM | SYSTOLIC BLOOD PRESSURE: 154 MMHG | OXYGEN SATURATION: 95 % | HEART RATE: 71 BPM | TEMPERATURE: 98.4 F

## 2019-01-06 PROBLEM — E87.5 HYPERKALEMIA: Status: RESOLVED | Noted: 2018-12-30 | Resolved: 2019-01-06

## 2019-01-06 PROBLEM — R65.21 SEPTIC SHOCK (HCC): Status: RESOLVED | Noted: 2018-12-30 | Resolved: 2019-01-06

## 2019-01-06 PROBLEM — N17.0 ATN (ACUTE TUBULAR NECROSIS) (HCC): Status: RESOLVED | Noted: 2019-01-04 | Resolved: 2019-01-06

## 2019-01-06 PROBLEM — E87.20 METABOLIC ACIDOSIS: Status: RESOLVED | Noted: 2018-12-30 | Resolved: 2019-01-06

## 2019-01-06 PROBLEM — A41.9 SEPTIC SHOCK (HCC): Status: RESOLVED | Noted: 2018-12-30 | Resolved: 2019-01-06

## 2019-01-06 LAB
ALBUMIN SERPL-MCNC: 3 G/DL (ref 3.5–5.2)
ALP BLD-CCNC: 79 U/L (ref 35–104)
ALT SERPL-CCNC: 15 U/L (ref 5–33)
ANION GAP SERPL CALCULATED.3IONS-SCNC: 16 MMOL/L (ref 7–19)
AST SERPL-CCNC: 58 U/L (ref 5–32)
BILIRUB SERPL-MCNC: 0.3 MG/DL (ref 0.2–1.2)
BUN BLDV-MCNC: 18 MG/DL (ref 8–23)
CALCIUM SERPL-MCNC: 8.2 MG/DL (ref 8.8–10.2)
CHLORIDE BLD-SCNC: 103 MMOL/L (ref 98–111)
CO2: 26 MMOL/L (ref 22–29)
CREAT SERPL-MCNC: 1.5 MG/DL (ref 0.5–0.9)
CULTURE, STOOL: NORMAL
E COLI SHIGA TOXIN ASSAY: NORMAL
GFR NON-AFRICAN AMERICAN: 34
GLUCOSE BLD-MCNC: 128 MG/DL (ref 70–99)
GLUCOSE BLD-MCNC: 155 MG/DL (ref 70–99)
GLUCOSE BLD-MCNC: 90 MG/DL (ref 70–99)
GLUCOSE BLD-MCNC: 99 MG/DL (ref 74–109)
HCT VFR BLD CALC: 26.4 % (ref 37–47)
HEMOGLOBIN: 7.9 G/DL (ref 12–16)
MAGNESIUM: 1.3 MG/DL (ref 1.6–2.4)
MCH RBC QN AUTO: 28.6 PG (ref 27–31)
MCHC RBC AUTO-ENTMCNC: 29.9 G/DL (ref 33–37)
MCV RBC AUTO: 95.7 FL (ref 81–99)
PDW BLD-RTO: 14.9 % (ref 11.5–14.5)
PERFORMED ON: ABNORMAL
PERFORMED ON: ABNORMAL
PERFORMED ON: NORMAL
PLATELET # BLD: 192 K/UL (ref 130–400)
PMV BLD AUTO: 10.4 FL (ref 9.4–12.3)
POTASSIUM REFLEX MAGNESIUM: 3.7 MMOL/L (ref 3.5–5)
RBC # BLD: 2.76 M/UL (ref 4.2–5.4)
SODIUM BLD-SCNC: 145 MMOL/L (ref 136–145)
TOTAL PROTEIN: 5.7 G/DL (ref 6.6–8.7)
WBC # BLD: 6.1 K/UL (ref 4.8–10.8)

## 2019-01-06 PROCEDURE — 6360000002 HC RX W HCPCS: Performed by: INTERNAL MEDICINE

## 2019-01-06 PROCEDURE — 83735 ASSAY OF MAGNESIUM: CPT

## 2019-01-06 PROCEDURE — 80053 COMPREHEN METABOLIC PANEL: CPT

## 2019-01-06 PROCEDURE — 6370000000 HC RX 637 (ALT 250 FOR IP): Performed by: INTERNAL MEDICINE

## 2019-01-06 PROCEDURE — 97750 PHYSICAL PERFORMANCE TEST: CPT

## 2019-01-06 PROCEDURE — 97161 PT EVAL LOW COMPLEX 20 MIN: CPT

## 2019-01-06 PROCEDURE — 2700000000 HC OXYGEN THERAPY PER DAY

## 2019-01-06 PROCEDURE — 6370000000 HC RX 637 (ALT 250 FOR IP): Performed by: FAMILY MEDICINE

## 2019-01-06 PROCEDURE — 99239 HOSP IP/OBS DSCHRG MGMT >30: CPT | Performed by: FAMILY MEDICINE

## 2019-01-06 PROCEDURE — 2580000003 HC RX 258: Performed by: FAMILY MEDICINE

## 2019-01-06 PROCEDURE — 36415 COLL VENOUS BLD VENIPUNCTURE: CPT

## 2019-01-06 PROCEDURE — 85027 COMPLETE CBC AUTOMATED: CPT

## 2019-01-06 PROCEDURE — C9113 INJ PANTOPRAZOLE SODIUM, VIA: HCPCS | Performed by: INTERNAL MEDICINE

## 2019-01-06 PROCEDURE — 99232 SBSQ HOSP IP/OBS MODERATE 35: CPT | Performed by: INTERNAL MEDICINE

## 2019-01-06 PROCEDURE — 82948 REAGENT STRIP/BLOOD GLUCOSE: CPT

## 2019-01-06 RX ORDER — CALCITRIOL 0.25 UG/1
0.25 CAPSULE, LIQUID FILLED ORAL DAILY
Qty: 30 CAPSULE | Refills: 3 | Status: SHIPPED | OUTPATIENT
Start: 2019-01-07 | End: 2021-05-03

## 2019-01-06 RX ORDER — FERROUS SULFATE 325(65) MG
325 TABLET ORAL 2 TIMES DAILY
Qty: 60 TABLET | Refills: 0 | Status: ON HOLD | OUTPATIENT
Start: 2019-01-06 | End: 2020-09-15

## 2019-01-06 RX ORDER — PANTOPRAZOLE SODIUM 40 MG/1
40 TABLET, DELAYED RELEASE ORAL DAILY
Qty: 30 TABLET | Refills: 3 | Status: ON HOLD | OUTPATIENT
Start: 2019-01-06 | End: 2019-08-02 | Stop reason: ALTCHOICE

## 2019-01-06 RX ORDER — CIPROFLOXACIN 500 MG/1
500 TABLET, FILM COATED ORAL
Qty: 10 TABLET | Refills: 0 | Status: ON HOLD | OUTPATIENT
Start: 2019-01-06 | End: 2019-01-12 | Stop reason: HOSPADM

## 2019-01-06 RX ORDER — METRONIDAZOLE 500 MG/1
500 TABLET ORAL EVERY 8 HOURS SCHEDULED
Qty: 30 TABLET | Refills: 0 | Status: ON HOLD | OUTPATIENT
Start: 2019-01-06 | End: 2019-01-12 | Stop reason: HOSPADM

## 2019-01-06 RX ORDER — METOPROLOL TARTRATE 50 MG/1
50 TABLET, FILM COATED ORAL 2 TIMES DAILY
Qty: 60 TABLET | Refills: 3 | Status: ON HOLD | OUTPATIENT
Start: 2019-01-06 | End: 2019-01-12 | Stop reason: HOSPADM

## 2019-01-06 RX ORDER — M-VIT,TX,IRON,MINS/CALC/FOLIC 27MG-0.4MG
1 TABLET ORAL DAILY
Qty: 30 TABLET | Refills: 0 | Status: SHIPPED | OUTPATIENT
Start: 2019-01-06 | End: 2022-08-11 | Stop reason: SDUPTHER

## 2019-01-06 RX ADMIN — Medication 10 ML: at 07:36

## 2019-01-06 RX ADMIN — CALCITRIOL 0.25 MCG: 0.25 CAPSULE, LIQUID FILLED ORAL at 07:35

## 2019-01-06 RX ADMIN — ONDANSETRON 4 MG: 2 INJECTION INTRAMUSCULAR; INTRAVENOUS at 07:41

## 2019-01-06 RX ADMIN — PANTOPRAZOLE SODIUM 40 MG: 40 INJECTION, POWDER, FOR SOLUTION INTRAVENOUS at 07:36

## 2019-01-06 RX ADMIN — ATORVASTATIN CALCIUM 40 MG: 40 TABLET, FILM COATED ORAL at 07:36

## 2019-01-06 RX ADMIN — METRONIDAZOLE 500 MG: 500 TABLET ORAL at 07:35

## 2019-01-06 ASSESSMENT — PAIN SCALES - GENERAL: PAINLEVEL_OUTOF10: 0

## 2019-01-07 ENCOUNTER — CARE COORDINATION (OUTPATIENT)
Dept: CASE MANAGEMENT | Age: 79
End: 2019-01-07

## 2019-01-07 ENCOUNTER — TELEPHONE (OUTPATIENT)
Dept: CARDIOLOGY | Age: 79
End: 2019-01-07

## 2019-01-07 ENCOUNTER — APPOINTMENT (OUTPATIENT)
Dept: CT IMAGING | Age: 79
DRG: 291 | End: 2019-01-07
Payer: MEDICARE

## 2019-01-07 ENCOUNTER — APPOINTMENT (OUTPATIENT)
Dept: GENERAL RADIOLOGY | Age: 79
DRG: 291 | End: 2019-01-07
Payer: MEDICARE

## 2019-01-07 ENCOUNTER — HOSPITAL ENCOUNTER (INPATIENT)
Age: 79
LOS: 5 days | Discharge: HOME | DRG: 291 | End: 2019-01-12
Attending: EMERGENCY MEDICINE | Admitting: HOSPITALIST
Payer: MEDICARE

## 2019-01-07 DIAGNOSIS — I50.9 ACUTE ON CHRONIC CONGESTIVE HEART FAILURE, UNSPECIFIED HEART FAILURE TYPE (HCC): Primary | ICD-10-CM

## 2019-01-07 DIAGNOSIS — R55 SYNCOPE AND COLLAPSE: ICD-10-CM

## 2019-01-07 DIAGNOSIS — R77.8 ELEVATED TROPONIN: ICD-10-CM

## 2019-01-07 PROBLEM — I50.33 ACUTE ON CHRONIC DIASTOLIC HEART FAILURE (HCC): Status: ACTIVE | Noted: 2019-01-07

## 2019-01-07 LAB
ALBUMIN SERPL-MCNC: 3.4 G/DL (ref 3.5–5.2)
ALP BLD-CCNC: 109 U/L (ref 35–104)
ALT SERPL-CCNC: 19 U/L (ref 5–33)
ANION GAP SERPL CALCULATED.3IONS-SCNC: 15 MMOL/L (ref 7–19)
APTT: 24.5 SEC (ref 26–36.2)
AST SERPL-CCNC: 48 U/L (ref 5–32)
BASE EXCESS ARTERIAL: 6.2 MMOL/L (ref -2–2)
BASOPHILS ABSOLUTE: 0.1 K/UL (ref 0–0.2)
BASOPHILS RELATIVE PERCENT: 0.6 % (ref 0–1)
BILIRUB SERPL-MCNC: 0.4 MG/DL (ref 0.2–1.2)
BILIRUBIN URINE: NEGATIVE
BLOOD, URINE: NEGATIVE
BUN BLDV-MCNC: 14 MG/DL (ref 8–23)
CALCIUM SERPL-MCNC: 8.4 MG/DL (ref 8.8–10.2)
CARBOXYHEMOGLOBIN ARTERIAL: 2.1 % (ref 0–5)
CHLORIDE BLD-SCNC: 101 MMOL/L (ref 98–111)
CLARITY: CLEAR
CO2: 28 MMOL/L (ref 22–29)
COLOR: YELLOW
CREAT SERPL-MCNC: 1.3 MG/DL (ref 0.5–0.9)
EOSINOPHILS ABSOLUTE: 0.2 K/UL (ref 0–0.6)
EOSINOPHILS RELATIVE PERCENT: 2.5 % (ref 0–5)
GFR NON-AFRICAN AMERICAN: 40
GLUCOSE BLD-MCNC: 129 MG/DL (ref 70–99)
GLUCOSE BLD-MCNC: 136 MG/DL (ref 74–109)
GLUCOSE URINE: 100 MG/DL
HCO3 ARTERIAL: 30.6 MMOL/L (ref 22–26)
HCT VFR BLD CALC: 29 % (ref 37–47)
HEMOGLOBIN, ART, EXTENDED: 8.6 G/DL (ref 12–16)
HEMOGLOBIN: 8.6 G/DL (ref 12–16)
INR BLD: 1.15 (ref 0.88–1.18)
KETONES, URINE: NEGATIVE MG/DL
LEUKOCYTE ESTERASE, URINE: NEGATIVE
LYMPHOCYTES ABSOLUTE: 1.2 K/UL (ref 1.1–4.5)
LYMPHOCYTES RELATIVE PERCENT: 14.3 % (ref 20–40)
MAGNESIUM: 1.2 MG/DL (ref 1.6–2.4)
MCH RBC QN AUTO: 28.1 PG (ref 27–31)
MCHC RBC AUTO-ENTMCNC: 29.7 G/DL (ref 33–37)
MCV RBC AUTO: 94.8 FL (ref 81–99)
METHEMOGLOBIN ARTERIAL: 1 %
MONOCYTES ABSOLUTE: 1.1 K/UL (ref 0–0.9)
MONOCYTES RELATIVE PERCENT: 12.3 % (ref 0–10)
NEUTROPHILS ABSOLUTE: 6 K/UL (ref 1.5–7.5)
NEUTROPHILS RELATIVE PERCENT: 69.1 % (ref 50–65)
NITRITE, URINE: NEGATIVE
O2 CONTENT ARTERIAL: 11.7 ML/DL
O2 SAT, ARTERIAL: 95.9 %
O2 THERAPY: ABNORMAL
PCO2 ARTERIAL: 43 MMHG (ref 35–45)
PDW BLD-RTO: 15.4 % (ref 11.5–14.5)
PERFORMED ON: ABNORMAL
PH ARTERIAL: 7.46 (ref 7.35–7.45)
PH UA: 6
PLATELET # BLD: 236 K/UL (ref 130–400)
PMV BLD AUTO: 10.1 FL (ref 9.4–12.3)
PO2 ARTERIAL: 81 MMHG (ref 80–100)
POTASSIUM SERPL-SCNC: 3.4 MMOL/L (ref 3.5–5)
POTASSIUM, WHOLE BLOOD: 3.1
PRO-BNP: ABNORMAL PG/ML (ref 0–1800)
PROTEIN UA: ABNORMAL MG/DL
PROTHROMBIN TIME: 14.1 SEC (ref 12–14.6)
RBC # BLD: 3.06 M/UL (ref 4.2–5.4)
SODIUM BLD-SCNC: 144 MMOL/L (ref 136–145)
SPECIFIC GRAVITY UA: 1.01
TOTAL PROTEIN: 6.2 G/DL (ref 6.6–8.7)
TROPONIN: 0.12 NG/ML (ref 0–0.03)
TROPONIN: 0.13 NG/ML (ref 0–0.03)
TSH SERPL DL<=0.05 MIU/L-ACNC: 4.67 UIU/ML (ref 0.27–4.2)
URINE REFLEX TO CULTURE: ABNORMAL
UROBILINOGEN, URINE: 0.2 E.U./DL
WBC # BLD: 8.7 K/UL (ref 4.8–10.8)

## 2019-01-07 PROCEDURE — 83880 ASSAY OF NATRIURETIC PEPTIDE: CPT

## 2019-01-07 PROCEDURE — 85025 COMPLETE CBC W/AUTO DIFF WBC: CPT

## 2019-01-07 PROCEDURE — 6370000000 HC RX 637 (ALT 250 FOR IP): Performed by: EMERGENCY MEDICINE

## 2019-01-07 PROCEDURE — 71045 X-RAY EXAM CHEST 1 VIEW: CPT

## 2019-01-07 PROCEDURE — 80053 COMPREHEN METABOLIC PANEL: CPT

## 2019-01-07 PROCEDURE — 85730 THROMBOPLASTIN TIME PARTIAL: CPT

## 2019-01-07 PROCEDURE — 96374 THER/PROPH/DIAG INJ IV PUSH: CPT

## 2019-01-07 PROCEDURE — 84484 ASSAY OF TROPONIN QUANT: CPT

## 2019-01-07 PROCEDURE — 1210000000 HC MED SURG R&B

## 2019-01-07 PROCEDURE — 82948 REAGENT STRIP/BLOOD GLUCOSE: CPT

## 2019-01-07 PROCEDURE — 70450 CT HEAD/BRAIN W/O DYE: CPT

## 2019-01-07 PROCEDURE — 84443 ASSAY THYROID STIM HORMONE: CPT

## 2019-01-07 PROCEDURE — 6370000000 HC RX 637 (ALT 250 FOR IP): Performed by: INTERNAL MEDICINE

## 2019-01-07 PROCEDURE — 81003 URINALYSIS AUTO W/O SCOPE: CPT

## 2019-01-07 PROCEDURE — 99222 1ST HOSP IP/OBS MODERATE 55: CPT | Performed by: INTERNAL MEDICINE

## 2019-01-07 PROCEDURE — 82803 BLOOD GASES ANY COMBINATION: CPT

## 2019-01-07 PROCEDURE — 36600 WITHDRAWAL OF ARTERIAL BLOOD: CPT

## 2019-01-07 PROCEDURE — 99285 EMERGENCY DEPT VISIT HI MDM: CPT

## 2019-01-07 PROCEDURE — 6360000002 HC RX W HCPCS: Performed by: EMERGENCY MEDICINE

## 2019-01-07 PROCEDURE — 84132 ASSAY OF SERUM POTASSIUM: CPT

## 2019-01-07 PROCEDURE — 2580000003 HC RX 258: Performed by: INTERNAL MEDICINE

## 2019-01-07 PROCEDURE — 83735 ASSAY OF MAGNESIUM: CPT

## 2019-01-07 PROCEDURE — 85610 PROTHROMBIN TIME: CPT

## 2019-01-07 PROCEDURE — 36415 COLL VENOUS BLD VENIPUNCTURE: CPT

## 2019-01-07 PROCEDURE — 93005 ELECTROCARDIOGRAM TRACING: CPT

## 2019-01-07 PROCEDURE — 99285 EMERGENCY DEPT VISIT HI MDM: CPT | Performed by: EMERGENCY MEDICINE

## 2019-01-07 PROCEDURE — 6360000002 HC RX W HCPCS: Performed by: INTERNAL MEDICINE

## 2019-01-07 RX ORDER — POTASSIUM CHLORIDE 20MEQ/15ML
40 LIQUID (ML) ORAL ONCE
Status: COMPLETED | OUTPATIENT
Start: 2019-01-07 | End: 2019-01-07

## 2019-01-07 RX ORDER — SODIUM CHLORIDE 0.9 % (FLUSH) 0.9 %
10 SYRINGE (ML) INJECTION EVERY 12 HOURS SCHEDULED
Status: DISCONTINUED | OUTPATIENT
Start: 2019-01-07 | End: 2019-01-12 | Stop reason: SDUPTHER

## 2019-01-07 RX ORDER — METRONIDAZOLE 500 MG/1
500 TABLET ORAL EVERY 8 HOURS SCHEDULED
Status: DISCONTINUED | OUTPATIENT
Start: 2019-01-07 | End: 2019-01-09

## 2019-01-07 RX ORDER — SODIUM CHLORIDE 0.9 % (FLUSH) 0.9 %
10 SYRINGE (ML) INJECTION PRN
Status: DISCONTINUED | OUTPATIENT
Start: 2019-01-07 | End: 2019-01-12 | Stop reason: SDUPTHER

## 2019-01-07 RX ORDER — MAGNESIUM SULFATE IN WATER 40 MG/ML
2 INJECTION, SOLUTION INTRAVENOUS ONCE
Status: COMPLETED | OUTPATIENT
Start: 2019-01-07 | End: 2019-01-07

## 2019-01-07 RX ORDER — ONDANSETRON 2 MG/ML
4 INJECTION INTRAMUSCULAR; INTRAVENOUS EVERY 6 HOURS PRN
Status: DISCONTINUED | OUTPATIENT
Start: 2019-01-07 | End: 2019-01-11

## 2019-01-07 RX ORDER — FUROSEMIDE 10 MG/ML
40 INJECTION INTRAMUSCULAR; INTRAVENOUS 2 TIMES DAILY
Status: DISCONTINUED | OUTPATIENT
Start: 2019-01-08 | End: 2019-01-11

## 2019-01-07 RX ORDER — PANTOPRAZOLE SODIUM 40 MG/1
40 TABLET, DELAYED RELEASE ORAL DAILY
Status: DISCONTINUED | OUTPATIENT
Start: 2019-01-07 | End: 2019-01-12 | Stop reason: HOSPADM

## 2019-01-07 RX ORDER — CIPROFLOXACIN 500 MG/1
500 TABLET, FILM COATED ORAL
Status: DISCONTINUED | OUTPATIENT
Start: 2019-01-08 | End: 2019-01-08

## 2019-01-07 RX ORDER — GABAPENTIN 300 MG/1
300 CAPSULE ORAL 2 TIMES DAILY
Status: DISCONTINUED | OUTPATIENT
Start: 2019-01-07 | End: 2019-01-12 | Stop reason: HOSPADM

## 2019-01-07 RX ORDER — FUROSEMIDE 10 MG/ML
40 INJECTION INTRAMUSCULAR; INTRAVENOUS ONCE
Status: COMPLETED | OUTPATIENT
Start: 2019-01-07 | End: 2019-01-07

## 2019-01-07 RX ORDER — ASPIRIN 325 MG
325 TABLET ORAL ONCE
Status: COMPLETED | OUTPATIENT
Start: 2019-01-07 | End: 2019-01-07

## 2019-01-07 RX ORDER — METOPROLOL TARTRATE 50 MG/1
50 TABLET, FILM COATED ORAL 2 TIMES DAILY
Status: DISCONTINUED | OUTPATIENT
Start: 2019-01-07 | End: 2019-01-10

## 2019-01-07 RX ORDER — ATORVASTATIN CALCIUM 40 MG/1
40 TABLET, FILM COATED ORAL DAILY
Status: DISCONTINUED | OUTPATIENT
Start: 2019-01-07 | End: 2019-01-12 | Stop reason: HOSPADM

## 2019-01-07 RX ADMIN — METOPROLOL TARTRATE 50 MG: 50 TABLET ORAL at 21:46

## 2019-01-07 RX ADMIN — MAGNESIUM SULFATE HEPTAHYDRATE 2 G: 40 INJECTION, SOLUTION INTRAVENOUS at 21:48

## 2019-01-07 RX ADMIN — FUROSEMIDE 40 MG: 10 INJECTION, SOLUTION INTRAMUSCULAR; INTRAVENOUS at 18:16

## 2019-01-07 RX ADMIN — POTASSIUM CHLORIDE 40 MEQ: 20 SOLUTION ORAL at 18:16

## 2019-01-07 RX ADMIN — Medication 10 ML: at 21:47

## 2019-01-07 RX ADMIN — ASPIRIN 325 MG ORAL TABLET 325 MG: 325 PILL ORAL at 18:16

## 2019-01-07 RX ADMIN — METRONIDAZOLE 500 MG: 500 TABLET ORAL at 21:46

## 2019-01-07 RX ADMIN — GABAPENTIN 300 MG: 300 CAPSULE ORAL at 21:46

## 2019-01-07 RX ADMIN — ATORVASTATIN CALCIUM 40 MG: 40 TABLET, FILM COATED ORAL at 21:51

## 2019-01-07 RX ADMIN — ENOXAPARIN SODIUM 40 MG: 40 INJECTION SUBCUTANEOUS at 21:46

## 2019-01-07 RX ADMIN — PANTOPRAZOLE SODIUM 40 MG: 40 TABLET, DELAYED RELEASE ORAL at 21:51

## 2019-01-07 ASSESSMENT — ENCOUNTER SYMPTOMS
DIARRHEA: 0
BACK PAIN: 0
RHINORRHEA: 0
SORE THROAT: 0
ABDOMINAL PAIN: 0
VOMITING: 0
COUGH: 0
NAUSEA: 0
SHORTNESS OF BREATH: 1

## 2019-01-07 ASSESSMENT — PAIN SCALES - GENERAL: PAINLEVEL_OUTOF10: 0

## 2019-01-08 PROBLEM — Z51.5 PALLIATIVE CARE PATIENT: Status: ACTIVE | Noted: 2019-01-08

## 2019-01-08 LAB
ANION GAP SERPL CALCULATED.3IONS-SCNC: 9 MMOL/L (ref 7–19)
BASOPHILS ABSOLUTE: 0.1 K/UL (ref 0–0.2)
BASOPHILS RELATIVE PERCENT: 0.7 % (ref 0–1)
BUN BLDV-MCNC: 12 MG/DL (ref 8–23)
CALCIUM SERPL-MCNC: 7.9 MG/DL (ref 8.8–10.2)
CHLORIDE BLD-SCNC: 105 MMOL/L (ref 98–111)
CHOLESTEROL, TOTAL: 113 MG/DL (ref 160–199)
CO2: 32 MMOL/L (ref 22–29)
CREAT SERPL-MCNC: 1.2 MG/DL (ref 0.5–0.9)
EKG P AXIS: NORMAL DEGREES
EKG P-R INTERVAL: NORMAL MS
EKG Q-T INTERVAL: 468 MS
EKG QRS DURATION: 96 MS
EKG QTC CALCULATION (BAZETT): 476 MS
EKG T AXIS: 19 DEGREES
EOSINOPHILS ABSOLUTE: 0.3 K/UL (ref 0–0.6)
EOSINOPHILS RELATIVE PERCENT: 3.2 % (ref 0–5)
GFR NON-AFRICAN AMERICAN: 44
GLUCOSE BLD-MCNC: 109 MG/DL (ref 74–109)
GLUCOSE BLD-MCNC: 180 MG/DL (ref 70–99)
HCT VFR BLD CALC: 25.8 % (ref 37–47)
HDLC SERPL-MCNC: 35 MG/DL (ref 65–121)
HEMOGLOBIN: 7.8 G/DL (ref 12–16)
LDL CHOLESTEROL CALCULATED: 58 MG/DL
LV EF: 58 %
LVEF MODALITY: NORMAL
LYMPHOCYTES ABSOLUTE: 1.6 K/UL (ref 1.1–4.5)
LYMPHOCYTES RELATIVE PERCENT: 17.9 % (ref 20–40)
MAGNESIUM: 1.5 MG/DL (ref 1.6–2.4)
MCH RBC QN AUTO: 28.6 PG (ref 27–31)
MCHC RBC AUTO-ENTMCNC: 30.2 G/DL (ref 33–37)
MCV RBC AUTO: 94.5 FL (ref 81–99)
MONOCYTES ABSOLUTE: 1.4 K/UL (ref 0–0.9)
MONOCYTES RELATIVE PERCENT: 16 % (ref 0–10)
NEUTROPHILS ABSOLUTE: 5.5 K/UL (ref 1.5–7.5)
NEUTROPHILS RELATIVE PERCENT: 61.2 % (ref 50–65)
PDW BLD-RTO: 15.4 % (ref 11.5–14.5)
PERFORMED ON: ABNORMAL
PLATELET # BLD: 217 K/UL (ref 130–400)
PMV BLD AUTO: 10.5 FL (ref 9.4–12.3)
POTASSIUM SERPL-SCNC: 3 MMOL/L (ref 3.5–5)
RBC # BLD: 2.73 M/UL (ref 4.2–5.4)
SODIUM BLD-SCNC: 146 MMOL/L (ref 136–145)
TRIGL SERPL-MCNC: 100 MG/DL (ref 0–149)
TROPONIN: 0.13 NG/ML (ref 0–0.03)
WBC # BLD: 9 K/UL (ref 4.8–10.8)

## 2019-01-08 PROCEDURE — 80048 BASIC METABOLIC PNL TOTAL CA: CPT

## 2019-01-08 PROCEDURE — 1210000000 HC MED SURG R&B

## 2019-01-08 PROCEDURE — 36415 COLL VENOUS BLD VENIPUNCTURE: CPT

## 2019-01-08 PROCEDURE — 99223 1ST HOSP IP/OBS HIGH 75: CPT | Performed by: INTERNAL MEDICINE

## 2019-01-08 PROCEDURE — 6360000002 HC RX W HCPCS: Performed by: HOSPITALIST

## 2019-01-08 PROCEDURE — 2580000003 HC RX 258: Performed by: INTERNAL MEDICINE

## 2019-01-08 PROCEDURE — 2700000000 HC OXYGEN THERAPY PER DAY

## 2019-01-08 PROCEDURE — 99233 SBSQ HOSP IP/OBS HIGH 50: CPT | Performed by: HOSPITALIST

## 2019-01-08 PROCEDURE — 93306 TTE W/DOPPLER COMPLETE: CPT

## 2019-01-08 PROCEDURE — 6360000002 HC RX W HCPCS: Performed by: INTERNAL MEDICINE

## 2019-01-08 PROCEDURE — 84484 ASSAY OF TROPONIN QUANT: CPT

## 2019-01-08 PROCEDURE — 6370000000 HC RX 637 (ALT 250 FOR IP): Performed by: INTERNAL MEDICINE

## 2019-01-08 PROCEDURE — 83735 ASSAY OF MAGNESIUM: CPT

## 2019-01-08 PROCEDURE — 85025 COMPLETE CBC W/AUTO DIFF WBC: CPT

## 2019-01-08 PROCEDURE — 6370000000 HC RX 637 (ALT 250 FOR IP): Performed by: HOSPITALIST

## 2019-01-08 PROCEDURE — 80061 LIPID PANEL: CPT

## 2019-01-08 PROCEDURE — 82948 REAGENT STRIP/BLOOD GLUCOSE: CPT

## 2019-01-08 RX ORDER — CIPROFLOXACIN 500 MG/1
500 TABLET, FILM COATED ORAL EVERY 12 HOURS SCHEDULED
Status: DISCONTINUED | OUTPATIENT
Start: 2019-01-08 | End: 2019-01-09

## 2019-01-08 RX ORDER — MAGNESIUM SULFATE IN WATER 40 MG/ML
2 INJECTION, SOLUTION INTRAVENOUS ONCE
Status: COMPLETED | OUTPATIENT
Start: 2019-01-08 | End: 2019-01-08

## 2019-01-08 RX ORDER — POTASSIUM CHLORIDE 20MEQ/15ML
40 LIQUID (ML) ORAL ONCE
Status: COMPLETED | OUTPATIENT
Start: 2019-01-08 | End: 2019-01-08

## 2019-01-08 RX ADMIN — PANTOPRAZOLE SODIUM 40 MG: 40 TABLET, DELAYED RELEASE ORAL at 08:03

## 2019-01-08 RX ADMIN — GABAPENTIN 300 MG: 300 CAPSULE ORAL at 20:44

## 2019-01-08 RX ADMIN — POTASSIUM CHLORIDE 40 MEQ: 20 SOLUTION ORAL at 12:47

## 2019-01-08 RX ADMIN — Medication 10 ML: at 08:03

## 2019-01-08 RX ADMIN — ENOXAPARIN SODIUM 40 MG: 40 INJECTION SUBCUTANEOUS at 20:44

## 2019-01-08 RX ADMIN — FUROSEMIDE 40 MG: 10 INJECTION, SOLUTION INTRAMUSCULAR; INTRAVENOUS at 17:25

## 2019-01-08 RX ADMIN — METRONIDAZOLE 500 MG: 500 TABLET ORAL at 06:14

## 2019-01-08 RX ADMIN — METRONIDAZOLE 500 MG: 500 TABLET ORAL at 14:56

## 2019-01-08 RX ADMIN — Medication 10 ML: at 20:45

## 2019-01-08 RX ADMIN — METRONIDAZOLE 500 MG: 500 TABLET ORAL at 21:56

## 2019-01-08 RX ADMIN — CIPROFLOXACIN HYDROCHLORIDE 500 MG: 500 TABLET, FILM COATED ORAL at 08:03

## 2019-01-08 RX ADMIN — GABAPENTIN 300 MG: 300 CAPSULE ORAL at 08:03

## 2019-01-08 RX ADMIN — METOPROLOL TARTRATE 50 MG: 50 TABLET ORAL at 08:03

## 2019-01-08 RX ADMIN — ATORVASTATIN CALCIUM 40 MG: 40 TABLET, FILM COATED ORAL at 08:02

## 2019-01-08 RX ADMIN — CIPROFLOXACIN HYDROCHLORIDE 500 MG: 500 TABLET, FILM COATED ORAL at 20:44

## 2019-01-08 RX ADMIN — MAGNESIUM SULFATE HEPTAHYDRATE 2 G: 40 INJECTION, SOLUTION INTRAVENOUS at 12:47

## 2019-01-08 RX ADMIN — METOPROLOL TARTRATE 50 MG: 50 TABLET ORAL at 20:44

## 2019-01-08 RX ADMIN — FUROSEMIDE 40 MG: 10 INJECTION, SOLUTION INTRAMUSCULAR; INTRAVENOUS at 08:02

## 2019-01-08 ASSESSMENT — PAIN SCALES - GENERAL: PAINLEVEL_OUTOF10: 0

## 2019-01-09 LAB
ANION GAP SERPL CALCULATED.3IONS-SCNC: 9 MMOL/L (ref 7–19)
BUN BLDV-MCNC: 12 MG/DL (ref 8–23)
CALCIUM SERPL-MCNC: 7.9 MG/DL (ref 8.8–10.2)
CHLORIDE BLD-SCNC: 100 MMOL/L (ref 98–111)
CO2: 34 MMOL/L (ref 22–29)
CREAT SERPL-MCNC: 1.3 MG/DL (ref 0.5–0.9)
GFR NON-AFRICAN AMERICAN: 40
GLUCOSE BLD-MCNC: 173 MG/DL (ref 70–99)
GLUCOSE BLD-MCNC: 184 MG/DL (ref 74–109)
GLUCOSE BLD-MCNC: 196 MG/DL (ref 70–99)
GLUCOSE BLD-MCNC: 218 MG/DL (ref 70–99)
HCT VFR BLD CALC: 25.4 % (ref 37–47)
HEMOGLOBIN: 7.7 G/DL (ref 12–16)
MAGNESIUM: 1.7 MG/DL (ref 1.6–2.4)
MCH RBC QN AUTO: 28.6 PG (ref 27–31)
MCHC RBC AUTO-ENTMCNC: 30.3 G/DL (ref 33–37)
MCV RBC AUTO: 94.4 FL (ref 81–99)
PDW BLD-RTO: 15.9 % (ref 11.5–14.5)
PERFORMED ON: ABNORMAL
PLATELET # BLD: 231 K/UL (ref 130–400)
PMV BLD AUTO: 10.2 FL (ref 9.4–12.3)
POTASSIUM SERPL-SCNC: 3.2 MMOL/L (ref 3.5–5)
PRO-BNP: ABNORMAL PG/ML (ref 0–1800)
RBC # BLD: 2.69 M/UL (ref 4.2–5.4)
SODIUM BLD-SCNC: 143 MMOL/L (ref 136–145)
WBC # BLD: 7 K/UL (ref 4.8–10.8)

## 2019-01-09 PROCEDURE — 80048 BASIC METABOLIC PNL TOTAL CA: CPT

## 2019-01-09 PROCEDURE — 99232 SBSQ HOSP IP/OBS MODERATE 35: CPT | Performed by: INTERNAL MEDICINE

## 2019-01-09 PROCEDURE — 6370000000 HC RX 637 (ALT 250 FOR IP): Performed by: HOSPITALIST

## 2019-01-09 PROCEDURE — 36415 COLL VENOUS BLD VENIPUNCTURE: CPT

## 2019-01-09 PROCEDURE — 99233 SBSQ HOSP IP/OBS HIGH 50: CPT | Performed by: HOSPITALIST

## 2019-01-09 PROCEDURE — 82948 REAGENT STRIP/BLOOD GLUCOSE: CPT

## 2019-01-09 PROCEDURE — 6370000000 HC RX 637 (ALT 250 FOR IP): Performed by: INTERNAL MEDICINE

## 2019-01-09 PROCEDURE — 85027 COMPLETE CBC AUTOMATED: CPT

## 2019-01-09 PROCEDURE — 83880 ASSAY OF NATRIURETIC PEPTIDE: CPT

## 2019-01-09 PROCEDURE — 2580000003 HC RX 258: Performed by: INTERNAL MEDICINE

## 2019-01-09 PROCEDURE — 1210000000 HC MED SURG R&B

## 2019-01-09 PROCEDURE — 83735 ASSAY OF MAGNESIUM: CPT

## 2019-01-09 PROCEDURE — 2700000000 HC OXYGEN THERAPY PER DAY

## 2019-01-09 PROCEDURE — 6360000002 HC RX W HCPCS: Performed by: INTERNAL MEDICINE

## 2019-01-09 RX ORDER — POTASSIUM CHLORIDE 20MEQ/15ML
40 LIQUID (ML) ORAL ONCE
Status: COMPLETED | OUTPATIENT
Start: 2019-01-09 | End: 2019-01-09

## 2019-01-09 RX ADMIN — ATORVASTATIN CALCIUM 40 MG: 40 TABLET, FILM COATED ORAL at 10:14

## 2019-01-09 RX ADMIN — METOPROLOL TARTRATE 50 MG: 50 TABLET ORAL at 10:14

## 2019-01-09 RX ADMIN — METRONIDAZOLE 500 MG: 500 TABLET ORAL at 06:00

## 2019-01-09 RX ADMIN — FUROSEMIDE 40 MG: 10 INJECTION, SOLUTION INTRAMUSCULAR; INTRAVENOUS at 10:14

## 2019-01-09 RX ADMIN — GABAPENTIN 300 MG: 300 CAPSULE ORAL at 10:14

## 2019-01-09 RX ADMIN — POTASSIUM CHLORIDE 40 MEQ: 20 SOLUTION ORAL at 10:15

## 2019-01-09 RX ADMIN — METOPROLOL TARTRATE 50 MG: 50 TABLET ORAL at 21:38

## 2019-01-09 RX ADMIN — SERTRALINE HYDROCHLORIDE 25 MG: 50 TABLET ORAL at 21:37

## 2019-01-09 RX ADMIN — CIPROFLOXACIN HYDROCHLORIDE 500 MG: 500 TABLET, FILM COATED ORAL at 10:14

## 2019-01-09 RX ADMIN — FUROSEMIDE 40 MG: 10 INJECTION, SOLUTION INTRAMUSCULAR; INTRAVENOUS at 18:00

## 2019-01-09 RX ADMIN — Medication 10 ML: at 10:15

## 2019-01-09 RX ADMIN — GABAPENTIN 300 MG: 300 CAPSULE ORAL at 21:37

## 2019-01-09 RX ADMIN — ENOXAPARIN SODIUM 40 MG: 40 INJECTION SUBCUTANEOUS at 21:38

## 2019-01-09 RX ADMIN — PANTOPRAZOLE SODIUM 40 MG: 40 TABLET, DELAYED RELEASE ORAL at 10:14

## 2019-01-09 RX ADMIN — Medication 10 ML: at 21:38

## 2019-01-09 ASSESSMENT — PAIN SCALES - GENERAL
PAINLEVEL_OUTOF10: 0

## 2019-01-10 LAB
ANION GAP SERPL CALCULATED.3IONS-SCNC: 7 MMOL/L (ref 7–19)
BUN BLDV-MCNC: 11 MG/DL (ref 8–23)
CALCIUM SERPL-MCNC: 8 MG/DL (ref 8.8–10.2)
CHLORIDE BLD-SCNC: 99 MMOL/L (ref 98–111)
CO2: 34 MMOL/L (ref 22–29)
CREAT SERPL-MCNC: 1.3 MG/DL (ref 0.5–0.9)
EKG P AXIS: NORMAL DEGREES
EKG P-R INTERVAL: NORMAL MS
EKG Q-T INTERVAL: 436 MS
EKG QRS DURATION: 86 MS
EKG QTC CALCULATION (BAZETT): 464 MS
EKG T AXIS: 30 DEGREES
GFR NON-AFRICAN AMERICAN: 40
GLUCOSE BLD-MCNC: 179 MG/DL (ref 70–99)
GLUCOSE BLD-MCNC: 184 MG/DL (ref 74–109)
HCT VFR BLD CALC: 26 % (ref 37–47)
HEMOGLOBIN: 7.8 G/DL (ref 12–16)
MAGNESIUM: 1.5 MG/DL (ref 1.6–2.4)
MCH RBC QN AUTO: 28.5 PG (ref 27–31)
MCHC RBC AUTO-ENTMCNC: 30 G/DL (ref 33–37)
MCV RBC AUTO: 94.9 FL (ref 81–99)
PDW BLD-RTO: 16.2 % (ref 11.5–14.5)
PERFORMED ON: ABNORMAL
PLATELET # BLD: 253 K/UL (ref 130–400)
PMV BLD AUTO: 10.4 FL (ref 9.4–12.3)
POTASSIUM SERPL-SCNC: 3.5 MMOL/L (ref 3.5–5)
RBC # BLD: 2.74 M/UL (ref 4.2–5.4)
SODIUM BLD-SCNC: 140 MMOL/L (ref 136–145)
WBC # BLD: 6.4 K/UL (ref 4.8–10.8)

## 2019-01-10 PROCEDURE — 83735 ASSAY OF MAGNESIUM: CPT

## 2019-01-10 PROCEDURE — 82948 REAGENT STRIP/BLOOD GLUCOSE: CPT

## 2019-01-10 PROCEDURE — 93005 ELECTROCARDIOGRAM TRACING: CPT

## 2019-01-10 PROCEDURE — 6370000000 HC RX 637 (ALT 250 FOR IP): Performed by: INTERNAL MEDICINE

## 2019-01-10 PROCEDURE — 97162 PT EVAL MOD COMPLEX 30 MIN: CPT

## 2019-01-10 PROCEDURE — 2580000003 HC RX 258: Performed by: INTERNAL MEDICINE

## 2019-01-10 PROCEDURE — 80048 BASIC METABOLIC PNL TOTAL CA: CPT

## 2019-01-10 PROCEDURE — 36415 COLL VENOUS BLD VENIPUNCTURE: CPT

## 2019-01-10 PROCEDURE — 2700000000 HC OXYGEN THERAPY PER DAY

## 2019-01-10 PROCEDURE — 97750 PHYSICAL PERFORMANCE TEST: CPT

## 2019-01-10 PROCEDURE — 99233 SBSQ HOSP IP/OBS HIGH 50: CPT | Performed by: INTERNAL MEDICINE

## 2019-01-10 PROCEDURE — 85027 COMPLETE CBC AUTOMATED: CPT

## 2019-01-10 PROCEDURE — 1210000000 HC MED SURG R&B

## 2019-01-10 PROCEDURE — 6360000002 HC RX W HCPCS: Performed by: INTERNAL MEDICINE

## 2019-01-10 PROCEDURE — 6370000000 HC RX 637 (ALT 250 FOR IP): Performed by: HOSPITALIST

## 2019-01-10 PROCEDURE — 6360000002 HC RX W HCPCS: Performed by: HOSPITALIST

## 2019-01-10 PROCEDURE — 99233 SBSQ HOSP IP/OBS HIGH 50: CPT | Performed by: HOSPITALIST

## 2019-01-10 RX ORDER — MAGNESIUM SULFATE IN WATER 40 MG/ML
2 INJECTION, SOLUTION INTRAVENOUS ONCE
Status: COMPLETED | OUTPATIENT
Start: 2019-01-10 | End: 2019-01-10

## 2019-01-10 RX ORDER — SOTALOL HYDROCHLORIDE 80 MG/1
80 TABLET ORAL 2 TIMES DAILY
Status: DISCONTINUED | OUTPATIENT
Start: 2019-01-10 | End: 2019-01-11

## 2019-01-10 RX ORDER — SOTALOL HYDROCHLORIDE 80 MG/1
80 TABLET ORAL 2 TIMES DAILY
Status: ON HOLD | COMMUNITY
End: 2019-01-12 | Stop reason: HOSPADM

## 2019-01-10 RX ADMIN — SERTRALINE HYDROCHLORIDE 25 MG: 50 TABLET ORAL at 20:39

## 2019-01-10 RX ADMIN — ENOXAPARIN SODIUM 40 MG: 40 INJECTION SUBCUTANEOUS at 20:39

## 2019-01-10 RX ADMIN — PANTOPRAZOLE SODIUM 40 MG: 40 TABLET, DELAYED RELEASE ORAL at 08:53

## 2019-01-10 RX ADMIN — FUROSEMIDE 40 MG: 10 INJECTION, SOLUTION INTRAMUSCULAR; INTRAVENOUS at 08:52

## 2019-01-10 RX ADMIN — ATORVASTATIN CALCIUM 40 MG: 40 TABLET, FILM COATED ORAL at 08:53

## 2019-01-10 RX ADMIN — GABAPENTIN 300 MG: 300 CAPSULE ORAL at 08:52

## 2019-01-10 RX ADMIN — SOTALOL HYDROCHLORIDE 80 MG: 80 TABLET ORAL at 20:39

## 2019-01-10 RX ADMIN — FUROSEMIDE 40 MG: 10 INJECTION, SOLUTION INTRAMUSCULAR; INTRAVENOUS at 17:40

## 2019-01-10 RX ADMIN — SOTALOL HYDROCHLORIDE 80 MG: 80 TABLET ORAL at 15:06

## 2019-01-10 RX ADMIN — Medication 10 ML: at 08:52

## 2019-01-10 RX ADMIN — GABAPENTIN 300 MG: 300 CAPSULE ORAL at 20:39

## 2019-01-10 RX ADMIN — MAGNESIUM SULFATE HEPTAHYDRATE 2 G: 40 INJECTION, SOLUTION INTRAVENOUS at 11:43

## 2019-01-10 RX ADMIN — ONDANSETRON 4 MG: 2 INJECTION INTRAMUSCULAR; INTRAVENOUS at 18:11

## 2019-01-10 RX ADMIN — Medication 10 ML: at 20:39

## 2019-01-10 ASSESSMENT — PAIN SCALES - GENERAL
PAINLEVEL_OUTOF10: 0

## 2019-01-11 PROBLEM — J96.01 ACUTE HYPOXEMIC RESPIRATORY FAILURE (HCC): Status: ACTIVE | Noted: 2019-01-11

## 2019-01-11 LAB
ANION GAP SERPL CALCULATED.3IONS-SCNC: 9 MMOL/L (ref 7–19)
BUN BLDV-MCNC: 9 MG/DL (ref 8–23)
CALCIUM SERPL-MCNC: 8 MG/DL (ref 8.8–10.2)
CHLORIDE BLD-SCNC: 100 MMOL/L (ref 98–111)
CO2: 33 MMOL/L (ref 22–29)
CREAT SERPL-MCNC: 1.1 MG/DL (ref 0.5–0.9)
EKG P AXIS: NORMAL DEGREES
EKG P-R INTERVAL: NORMAL MS
EKG Q-T INTERVAL: 434 MS
EKG QRS DURATION: 84 MS
EKG QTC CALCULATION (BAZETT): 465 MS
EKG T AXIS: 39 DEGREES
GFR NON-AFRICAN AMERICAN: 48
GLUCOSE BLD-MCNC: 200 MG/DL (ref 74–109)
HCT VFR BLD CALC: 28 % (ref 37–47)
HEMOGLOBIN: 8.2 G/DL (ref 12–16)
MAGNESIUM: 1.6 MG/DL (ref 1.6–2.4)
MCH RBC QN AUTO: 29.3 PG (ref 27–31)
MCHC RBC AUTO-ENTMCNC: 29.3 G/DL (ref 33–37)
MCV RBC AUTO: 100 FL (ref 81–99)
PDW BLD-RTO: 16.5 % (ref 11.5–14.5)
PLATELET # BLD: 232 K/UL (ref 130–400)
PMV BLD AUTO: 10.1 FL (ref 9.4–12.3)
POTASSIUM SERPL-SCNC: 3.5 MMOL/L (ref 3.5–5)
PRO-BNP: 8902 PG/ML (ref 0–1800)
RBC # BLD: 2.8 M/UL (ref 4.2–5.4)
SODIUM BLD-SCNC: 142 MMOL/L (ref 136–145)
WBC # BLD: 6.9 K/UL (ref 4.8–10.8)

## 2019-01-11 PROCEDURE — 92960 CARDIOVERSION ELECTRIC EXT: CPT | Performed by: INTERNAL MEDICINE

## 2019-01-11 PROCEDURE — 6370000000 HC RX 637 (ALT 250 FOR IP): Performed by: PHYSICIAN ASSISTANT

## 2019-01-11 PROCEDURE — 97116 GAIT TRAINING THERAPY: CPT

## 2019-01-11 PROCEDURE — 5A2204Z RESTORATION OF CARDIAC RHYTHM, SINGLE: ICD-10-PCS | Performed by: INTERNAL MEDICINE

## 2019-01-11 PROCEDURE — 6360000002 HC RX W HCPCS: Performed by: INTERNAL MEDICINE

## 2019-01-11 PROCEDURE — 2700000000 HC OXYGEN THERAPY PER DAY

## 2019-01-11 PROCEDURE — 6370000000 HC RX 637 (ALT 250 FOR IP): Performed by: HOSPITALIST

## 2019-01-11 PROCEDURE — 80048 BASIC METABOLIC PNL TOTAL CA: CPT

## 2019-01-11 PROCEDURE — APPSS30 APP SPLIT SHARED TIME 16-30 MINUTES: Performed by: PHYSICIAN ASSISTANT

## 2019-01-11 PROCEDURE — 6360000002 HC RX W HCPCS

## 2019-01-11 PROCEDURE — 83735 ASSAY OF MAGNESIUM: CPT

## 2019-01-11 PROCEDURE — 6370000000 HC RX 637 (ALT 250 FOR IP): Performed by: INTERNAL MEDICINE

## 2019-01-11 PROCEDURE — 85027 COMPLETE CBC AUTOMATED: CPT

## 2019-01-11 PROCEDURE — 94761 N-INVAS EAR/PLS OXIMETRY MLT: CPT

## 2019-01-11 PROCEDURE — 93005 ELECTROCARDIOGRAM TRACING: CPT

## 2019-01-11 PROCEDURE — 1210000000 HC MED SURG R&B

## 2019-01-11 PROCEDURE — 2580000003 HC RX 258: Performed by: INTERNAL MEDICINE

## 2019-01-11 PROCEDURE — 99152 MOD SED SAME PHYS/QHP 5/>YRS: CPT | Performed by: INTERNAL MEDICINE

## 2019-01-11 PROCEDURE — 36415 COLL VENOUS BLD VENIPUNCTURE: CPT

## 2019-01-11 PROCEDURE — 99232 SBSQ HOSP IP/OBS MODERATE 35: CPT | Performed by: HOSPITALIST

## 2019-01-11 PROCEDURE — 83880 ASSAY OF NATRIURETIC PEPTIDE: CPT

## 2019-01-11 RX ORDER — CALCITRIOL 0.25 UG/1
0.25 CAPSULE, LIQUID FILLED ORAL DAILY
Status: DISCONTINUED | OUTPATIENT
Start: 2019-01-11 | End: 2019-01-12 | Stop reason: HOSPADM

## 2019-01-11 RX ORDER — FERROUS SULFATE 325(65) MG
325 TABLET ORAL 2 TIMES DAILY
Status: DISCONTINUED | OUTPATIENT
Start: 2019-01-11 | End: 2019-01-12 | Stop reason: HOSPADM

## 2019-01-11 RX ORDER — FUROSEMIDE 40 MG/1
40 TABLET ORAL DAILY
Status: DISCONTINUED | OUTPATIENT
Start: 2019-01-11 | End: 2019-01-12 | Stop reason: HOSPADM

## 2019-01-11 RX ORDER — SOTALOL HYDROCHLORIDE 80 MG/1
40 TABLET ORAL 2 TIMES DAILY
Status: DISCONTINUED | OUTPATIENT
Start: 2019-01-11 | End: 2019-01-12 | Stop reason: HOSPADM

## 2019-01-11 RX ADMIN — FUROSEMIDE 40 MG: 40 TABLET ORAL at 09:23

## 2019-01-11 RX ADMIN — CALCITRIOL 0.25 MCG: 0.25 CAPSULE ORAL at 15:19

## 2019-01-11 RX ADMIN — FERROUS SULFATE TAB 325 MG (65 MG ELEMENTAL FE) 325 MG: 325 (65 FE) TAB at 20:49

## 2019-01-11 RX ADMIN — ENOXAPARIN SODIUM 40 MG: 40 INJECTION SUBCUTANEOUS at 20:49

## 2019-01-11 RX ADMIN — Medication 10 ML: at 20:49

## 2019-01-11 RX ADMIN — ATORVASTATIN CALCIUM 40 MG: 40 TABLET, FILM COATED ORAL at 08:58

## 2019-01-11 RX ADMIN — SOTALOL HYDROCHLORIDE 40 MG: 80 TABLET ORAL at 08:58

## 2019-01-11 RX ADMIN — FERROUS SULFATE TAB 325 MG (65 MG ELEMENTAL FE) 325 MG: 325 (65 FE) TAB at 15:19

## 2019-01-11 RX ADMIN — SOTALOL HYDROCHLORIDE 40 MG: 80 TABLET ORAL at 20:49

## 2019-01-11 RX ADMIN — SERTRALINE HYDROCHLORIDE 25 MG: 50 TABLET ORAL at 20:49

## 2019-01-11 RX ADMIN — PANTOPRAZOLE SODIUM 40 MG: 40 TABLET, DELAYED RELEASE ORAL at 08:57

## 2019-01-11 RX ADMIN — GABAPENTIN 300 MG: 300 CAPSULE ORAL at 20:49

## 2019-01-11 RX ADMIN — GABAPENTIN 300 MG: 300 CAPSULE ORAL at 09:03

## 2019-01-11 ASSESSMENT — PAIN SCALES - GENERAL
PAINLEVEL_OUTOF10: 0

## 2019-01-12 VITALS
BODY MASS INDEX: 36.9 KG/M2 | DIASTOLIC BLOOD PRESSURE: 66 MMHG | SYSTOLIC BLOOD PRESSURE: 123 MMHG | HEIGHT: 66 IN | WEIGHT: 229.6 LBS | TEMPERATURE: 97.2 F | OXYGEN SATURATION: 93 % | HEART RATE: 55 BPM | RESPIRATION RATE: 16 BRPM

## 2019-01-12 LAB
ANION GAP SERPL CALCULATED.3IONS-SCNC: 14 MMOL/L (ref 7–19)
BUN BLDV-MCNC: 10 MG/DL (ref 8–23)
CALCIUM SERPL-MCNC: 8.3 MG/DL (ref 8.8–10.2)
CHLORIDE BLD-SCNC: 100 MMOL/L (ref 98–111)
CO2: 29 MMOL/L (ref 22–29)
CREAT SERPL-MCNC: 1.2 MG/DL (ref 0.5–0.9)
GFR NON-AFRICAN AMERICAN: 44
GLUCOSE BLD-MCNC: 181 MG/DL (ref 74–109)
HCT VFR BLD CALC: 24.7 % (ref 37–47)
HEMOGLOBIN: 7.1 G/DL (ref 12–16)
MAGNESIUM: 1.5 MG/DL (ref 1.6–2.4)
MCH RBC QN AUTO: 28.1 PG (ref 27–31)
MCHC RBC AUTO-ENTMCNC: 28.7 G/DL (ref 33–37)
MCV RBC AUTO: 97.6 FL (ref 81–99)
PDW BLD-RTO: 16.6 % (ref 11.5–14.5)
PLATELET # BLD: 231 K/UL (ref 130–400)
PMV BLD AUTO: 10.5 FL (ref 9.4–12.3)
POTASSIUM SERPL-SCNC: 3.5 MMOL/L (ref 3.5–5)
RBC # BLD: 2.53 M/UL (ref 4.2–5.4)
SODIUM BLD-SCNC: 143 MMOL/L (ref 136–145)
WBC # BLD: 6.2 K/UL (ref 4.8–10.8)

## 2019-01-12 PROCEDURE — 6370000000 HC RX 637 (ALT 250 FOR IP): Performed by: INTERNAL MEDICINE

## 2019-01-12 PROCEDURE — 93005 ELECTROCARDIOGRAM TRACING: CPT

## 2019-01-12 PROCEDURE — 6370000000 HC RX 637 (ALT 250 FOR IP): Performed by: PHYSICIAN ASSISTANT

## 2019-01-12 PROCEDURE — 99239 HOSP IP/OBS DSCHRG MGMT >30: CPT | Performed by: HOSPITALIST

## 2019-01-12 PROCEDURE — 83735 ASSAY OF MAGNESIUM: CPT

## 2019-01-12 PROCEDURE — 97116 GAIT TRAINING THERAPY: CPT

## 2019-01-12 PROCEDURE — 80048 BASIC METABOLIC PNL TOTAL CA: CPT

## 2019-01-12 PROCEDURE — 99221 1ST HOSP IP/OBS SF/LOW 40: CPT | Performed by: INTERNAL MEDICINE

## 2019-01-12 PROCEDURE — 85027 COMPLETE CBC AUTOMATED: CPT

## 2019-01-12 PROCEDURE — 2580000003 HC RX 258: Performed by: INTERNAL MEDICINE

## 2019-01-12 PROCEDURE — 6360000002 HC RX W HCPCS: Performed by: HOSPITALIST

## 2019-01-12 PROCEDURE — 36415 COLL VENOUS BLD VENIPUNCTURE: CPT

## 2019-01-12 RX ORDER — SOTALOL HYDROCHLORIDE 80 MG/1
40 TABLET ORAL 2 TIMES DAILY
Qty: 60 TABLET | Refills: 0 | Status: SHIPPED | OUTPATIENT
Start: 2019-01-12 | End: 2019-02-26 | Stop reason: SDUPTHER

## 2019-01-12 RX ORDER — SODIUM CHLORIDE 0.9 % (FLUSH) 0.9 %
10 SYRINGE (ML) INJECTION EVERY 12 HOURS SCHEDULED
Status: DISCONTINUED | OUTPATIENT
Start: 2019-01-12 | End: 2019-01-12 | Stop reason: HOSPADM

## 2019-01-12 RX ORDER — MAGNESIUM SULFATE IN WATER 40 MG/ML
2 INJECTION, SOLUTION INTRAVENOUS ONCE
Status: COMPLETED | OUTPATIENT
Start: 2019-01-12 | End: 2019-01-12

## 2019-01-12 RX ORDER — SODIUM CHLORIDE 0.9 % (FLUSH) 0.9 %
10 SYRINGE (ML) INJECTION PRN
Status: DISCONTINUED | OUTPATIENT
Start: 2019-01-12 | End: 2019-01-12 | Stop reason: HOSPADM

## 2019-01-12 RX ORDER — FUROSEMIDE 40 MG/1
40 TABLET ORAL DAILY
Qty: 60 TABLET | Refills: 0 | Status: SHIPPED | OUTPATIENT
Start: 2019-01-13 | End: 2019-08-20 | Stop reason: DRUGHIGH

## 2019-01-12 RX ORDER — SODIUM CHLORIDE 9 MG/ML
INJECTION, SOLUTION INTRAVENOUS CONTINUOUS
Status: DISCONTINUED | OUTPATIENT
Start: 2019-01-12 | End: 2019-01-12

## 2019-01-12 RX ADMIN — GABAPENTIN 300 MG: 300 CAPSULE ORAL at 08:43

## 2019-01-12 RX ADMIN — FUROSEMIDE 40 MG: 40 TABLET ORAL at 08:43

## 2019-01-12 RX ADMIN — CALCITRIOL 0.25 MCG: 0.25 CAPSULE ORAL at 08:43

## 2019-01-12 RX ADMIN — SOTALOL HYDROCHLORIDE 40 MG: 80 TABLET ORAL at 09:37

## 2019-01-12 RX ADMIN — Medication 10 ML: at 08:43

## 2019-01-12 RX ADMIN — MAGNESIUM SULFATE HEPTAHYDRATE 2 G: 40 INJECTION, SOLUTION INTRAVENOUS at 10:03

## 2019-01-12 RX ADMIN — FERROUS SULFATE TAB 325 MG (65 MG ELEMENTAL FE) 325 MG: 325 (65 FE) TAB at 08:43

## 2019-01-12 RX ADMIN — PANTOPRAZOLE SODIUM 40 MG: 40 TABLET, DELAYED RELEASE ORAL at 08:43

## 2019-01-12 RX ADMIN — ATORVASTATIN CALCIUM 40 MG: 40 TABLET, FILM COATED ORAL at 08:43

## 2019-01-12 ASSESSMENT — PAIN SCALES - GENERAL
PAINLEVEL_OUTOF10: 0
PAINLEVEL_OUTOF10: 0

## 2019-01-14 ENCOUNTER — CARE COORDINATION (OUTPATIENT)
Dept: CASE MANAGEMENT | Age: 79
End: 2019-01-14

## 2019-01-15 LAB
EKG P AXIS: 59 DEGREES
EKG P-R INTERVAL: 186 MS
EKG Q-T INTERVAL: 528 MS
EKG QRS DURATION: 92 MS
EKG QTC CALCULATION (BAZETT): 516 MS
EKG T AXIS: 14 DEGREES

## 2019-02-26 ENCOUNTER — OFFICE VISIT (OUTPATIENT)
Dept: CARDIOLOGY | Age: 79
End: 2019-02-26
Payer: MEDICARE

## 2019-02-26 VITALS
HEIGHT: 64 IN | WEIGHT: 210 LBS | BODY MASS INDEX: 35.85 KG/M2 | SYSTOLIC BLOOD PRESSURE: 128 MMHG | HEART RATE: 61 BPM | DIASTOLIC BLOOD PRESSURE: 78 MMHG

## 2019-02-26 DIAGNOSIS — I50.32 CHRONIC DIASTOLIC CONGESTIVE HEART FAILURE (HCC): ICD-10-CM

## 2019-02-26 DIAGNOSIS — I10 ESSENTIAL HYPERTENSION: ICD-10-CM

## 2019-02-26 DIAGNOSIS — I48.0 PAF (PAROXYSMAL ATRIAL FIBRILLATION) (HCC): Primary | ICD-10-CM

## 2019-02-26 PROCEDURE — G8427 DOCREV CUR MEDS BY ELIG CLIN: HCPCS | Performed by: CLINICAL NURSE SPECIALIST

## 2019-02-26 PROCEDURE — 99213 OFFICE O/P EST LOW 20 MIN: CPT | Performed by: CLINICAL NURSE SPECIALIST

## 2019-02-26 PROCEDURE — 93000 ELECTROCARDIOGRAM COMPLETE: CPT | Performed by: CLINICAL NURSE SPECIALIST

## 2019-02-26 PROCEDURE — 4040F PNEUMOC VAC/ADMIN/RCVD: CPT | Performed by: CLINICAL NURSE SPECIALIST

## 2019-02-26 PROCEDURE — G8417 CALC BMI ABV UP PARAM F/U: HCPCS | Performed by: CLINICAL NURSE SPECIALIST

## 2019-02-26 PROCEDURE — G8400 PT W/DXA NO RESULTS DOC: HCPCS | Performed by: CLINICAL NURSE SPECIALIST

## 2019-02-26 PROCEDURE — 1036F TOBACCO NON-USER: CPT | Performed by: CLINICAL NURSE SPECIALIST

## 2019-02-26 PROCEDURE — 1123F ACP DISCUSS/DSCN MKR DOCD: CPT | Performed by: CLINICAL NURSE SPECIALIST

## 2019-02-26 PROCEDURE — G8484 FLU IMMUNIZE NO ADMIN: HCPCS | Performed by: CLINICAL NURSE SPECIALIST

## 2019-02-26 PROCEDURE — 1101F PT FALLS ASSESS-DOCD LE1/YR: CPT | Performed by: CLINICAL NURSE SPECIALIST

## 2019-02-26 PROCEDURE — 1090F PRES/ABSN URINE INCON ASSESS: CPT | Performed by: CLINICAL NURSE SPECIALIST

## 2019-02-26 RX ORDER — SOTALOL HYDROCHLORIDE 80 MG/1
40 TABLET ORAL 2 TIMES DAILY
Qty: 90 TABLET | Refills: 3 | Status: SHIPPED | OUTPATIENT
Start: 2019-02-26 | End: 2020-04-24

## 2019-03-13 ASSESSMENT — ENCOUNTER SYMPTOMS
RECTAL PAIN: 0
CONSTIPATION: 0
SHORTNESS OF BREATH: 0
ABDOMINAL DISTENTION: 0
VOMITING: 0
DIARRHEA: 0
BACK PAIN: 0
ABDOMINAL PAIN: 0
NAUSEA: 0
BLOOD IN STOOL: 0
CHEST TIGHTNESS: 0
COUGH: 0

## 2019-03-14 ENCOUNTER — OFFICE VISIT (OUTPATIENT)
Dept: GASTROENTEROLOGY | Age: 79
End: 2019-03-14
Payer: MEDICARE

## 2019-03-14 VITALS
WEIGHT: 203 LBS | DIASTOLIC BLOOD PRESSURE: 65 MMHG | OXYGEN SATURATION: 96 % | HEART RATE: 58 BPM | HEIGHT: 66 IN | SYSTOLIC BLOOD PRESSURE: 120 MMHG | BODY MASS INDEX: 32.62 KG/M2

## 2019-03-14 DIAGNOSIS — Z85.038 HISTORY OF COLON CANCER: ICD-10-CM

## 2019-03-14 DIAGNOSIS — D50.9 IRON DEFICIENCY ANEMIA, UNSPECIFIED IRON DEFICIENCY ANEMIA TYPE: ICD-10-CM

## 2019-03-14 DIAGNOSIS — R19.5 HEME POSITIVE STOOL: ICD-10-CM

## 2019-03-14 DIAGNOSIS — Z79.01 CHRONIC ANTICOAGULATION: Primary | ICD-10-CM

## 2019-03-14 DIAGNOSIS — Z86.010 HISTORY OF ADENOMATOUS POLYP OF COLON: ICD-10-CM

## 2019-03-14 LAB
ALBUMIN SERPL-MCNC: 4.4 G/DL (ref 3.5–5.2)
ALP BLD-CCNC: 72 U/L (ref 35–104)
ALT SERPL-CCNC: <5 U/L (ref 5–33)
ANION GAP SERPL CALCULATED.3IONS-SCNC: 15 MMOL/L (ref 7–19)
AST SERPL-CCNC: 18 U/L (ref 5–32)
BILIRUB SERPL-MCNC: <0.2 MG/DL (ref 0.2–1.2)
BUN BLDV-MCNC: 44 MG/DL (ref 8–23)
CALCIUM SERPL-MCNC: 9.2 MG/DL (ref 8.8–10.2)
CHLORIDE BLD-SCNC: 103 MMOL/L (ref 98–111)
CO2: 24 MMOL/L (ref 22–29)
CREAT SERPL-MCNC: 1.8 MG/DL (ref 0.5–0.9)
GFR NON-AFRICAN AMERICAN: 27
GLUCOSE BLD-MCNC: 114 MG/DL (ref 74–109)
HCT VFR BLD CALC: 31.1 % (ref 37–47)
HEMOGLOBIN: 9.6 G/DL (ref 12–16)
IRON SATURATION: 13 % (ref 14–50)
IRON: 40 UG/DL (ref 37–145)
MCH RBC QN AUTO: 28.6 PG (ref 27–31)
MCHC RBC AUTO-ENTMCNC: 30.9 G/DL (ref 33–37)
MCV RBC AUTO: 92.6 FL (ref 81–99)
PDW BLD-RTO: 14.6 % (ref 11.5–14.5)
PLATELET # BLD: 275 K/UL (ref 130–400)
PMV BLD AUTO: 10.3 FL (ref 9.4–12.3)
POTASSIUM SERPL-SCNC: 4.6 MMOL/L (ref 3.5–5)
RBC # BLD: 3.36 M/UL (ref 4.2–5.4)
SODIUM BLD-SCNC: 142 MMOL/L (ref 136–145)
TOTAL IRON BINDING CAPACITY: 300 UG/DL (ref 250–400)
TOTAL PROTEIN: 7.6 G/DL (ref 6.6–8.7)
WBC # BLD: 6.7 K/UL (ref 4.8–10.8)

## 2019-03-14 PROCEDURE — 1036F TOBACCO NON-USER: CPT | Performed by: NURSE PRACTITIONER

## 2019-03-14 PROCEDURE — G8427 DOCREV CUR MEDS BY ELIG CLIN: HCPCS | Performed by: NURSE PRACTITIONER

## 2019-03-14 PROCEDURE — 1123F ACP DISCUSS/DSCN MKR DOCD: CPT | Performed by: NURSE PRACTITIONER

## 2019-03-14 PROCEDURE — 4040F PNEUMOC VAC/ADMIN/RCVD: CPT | Performed by: NURSE PRACTITIONER

## 2019-03-14 PROCEDURE — 1090F PRES/ABSN URINE INCON ASSESS: CPT | Performed by: NURSE PRACTITIONER

## 2019-03-14 PROCEDURE — G8417 CALC BMI ABV UP PARAM F/U: HCPCS | Performed by: NURSE PRACTITIONER

## 2019-03-14 PROCEDURE — 1101F PT FALLS ASSESS-DOCD LE1/YR: CPT | Performed by: NURSE PRACTITIONER

## 2019-03-14 PROCEDURE — G8400 PT W/DXA NO RESULTS DOC: HCPCS | Performed by: NURSE PRACTITIONER

## 2019-03-14 PROCEDURE — G8482 FLU IMMUNIZE ORDER/ADMIN: HCPCS | Performed by: NURSE PRACTITIONER

## 2019-03-14 PROCEDURE — 99214 OFFICE O/P EST MOD 30 MIN: CPT | Performed by: NURSE PRACTITIONER

## 2019-03-14 RX ORDER — NYSTATIN 100000 [USP'U]/G
POWDER TOPICAL 4 TIMES DAILY
COMMUNITY
End: 2022-08-11 | Stop reason: SDUPTHER

## 2019-03-14 RX ORDER — DONEPEZIL HYDROCHLORIDE 5 MG/1
5 TABLET, FILM COATED ORAL DAILY
COMMUNITY
End: 2021-05-03

## 2019-03-14 RX ORDER — FLUTICASONE PROPIONATE 50 MCG
1 SPRAY, SUSPENSION (ML) NASAL DAILY
Status: ON HOLD | COMMUNITY
End: 2020-09-15

## 2019-04-29 ENCOUNTER — TELEPHONE (OUTPATIENT)
Dept: NEUROLOGY | Age: 79
End: 2019-04-29

## 2019-07-08 ENCOUNTER — TELEPHONE (OUTPATIENT)
Dept: CARDIOLOGY | Age: 79
End: 2019-07-08

## 2019-07-15 ENCOUNTER — TELEPHONE (OUTPATIENT)
Dept: CARDIOLOGY | Age: 79
End: 2019-07-15

## 2019-07-15 NOTE — TELEPHONE ENCOUNTER
unable to reach patient regarding appointment being moved to different provider for same day.  Provider is thinning schedule out and requesting patient to be moved to nurse pracitioner

## 2019-07-29 ENCOUNTER — ANESTHESIA EVENT (OUTPATIENT)
Dept: ENDOSCOPY | Age: 79
End: 2019-07-29
Payer: MEDICARE

## 2019-08-02 ENCOUNTER — ANESTHESIA (OUTPATIENT)
Dept: ENDOSCOPY | Age: 79
End: 2019-08-02
Payer: MEDICARE

## 2019-08-02 ENCOUNTER — HOSPITAL ENCOUNTER (OUTPATIENT)
Age: 79
Setting detail: OUTPATIENT SURGERY
Discharge: HOME | End: 2019-08-02
Attending: INTERNAL MEDICINE | Admitting: INTERNAL MEDICINE
Payer: MEDICARE

## 2019-08-02 VITALS
DIASTOLIC BLOOD PRESSURE: 85 MMHG | TEMPERATURE: 97.8 F | RESPIRATION RATE: 18 BRPM | BODY MASS INDEX: 35.2 KG/M2 | SYSTOLIC BLOOD PRESSURE: 147 MMHG | HEART RATE: 65 BPM | OXYGEN SATURATION: 99 % | WEIGHT: 219 LBS | HEIGHT: 66 IN

## 2019-08-02 VITALS
RESPIRATION RATE: 13 BRPM | SYSTOLIC BLOOD PRESSURE: 127 MMHG | OXYGEN SATURATION: 96 % | DIASTOLIC BLOOD PRESSURE: 48 MMHG

## 2019-08-02 LAB
GLUCOSE BLD-MCNC: 95 MG/DL (ref 70–99)
PERFORMED ON: NORMAL

## 2019-08-02 PROCEDURE — 2709999900 HC NON-CHARGEABLE SUPPLY: Performed by: INTERNAL MEDICINE

## 2019-08-02 PROCEDURE — 82948 REAGENT STRIP/BLOOD GLUCOSE: CPT

## 2019-08-02 PROCEDURE — 3700000001 HC ADD 15 MINUTES (ANESTHESIA): Performed by: INTERNAL MEDICINE

## 2019-08-02 PROCEDURE — 7100000010 HC PHASE II RECOVERY - FIRST 15 MIN: Performed by: INTERNAL MEDICINE

## 2019-08-02 PROCEDURE — 88305 TISSUE EXAM BY PATHOLOGIST: CPT

## 2019-08-02 PROCEDURE — 2580000003 HC RX 258: Performed by: INTERNAL MEDICINE

## 2019-08-02 PROCEDURE — 6360000002 HC RX W HCPCS: Performed by: NURSE ANESTHETIST, CERTIFIED REGISTERED

## 2019-08-02 PROCEDURE — 43239 EGD BIOPSY SINGLE/MULTIPLE: CPT | Performed by: INTERNAL MEDICINE

## 2019-08-02 PROCEDURE — 88312 SPECIAL STAINS GROUP 1: CPT

## 2019-08-02 PROCEDURE — 3609009500 HC COLONOSCOPY DIAGNOSTIC OR SCREENING: Performed by: INTERNAL MEDICINE

## 2019-08-02 PROCEDURE — 7100000011 HC PHASE II RECOVERY - ADDTL 15 MIN: Performed by: INTERNAL MEDICINE

## 2019-08-02 PROCEDURE — 45378 DIAGNOSTIC COLONOSCOPY: CPT | Performed by: INTERNAL MEDICINE

## 2019-08-02 PROCEDURE — 3700000000 HC ANESTHESIA ATTENDED CARE: Performed by: INTERNAL MEDICINE

## 2019-08-02 PROCEDURE — 3609012400 HC EGD TRANSORAL BIOPSY SINGLE/MULTIPLE: Performed by: INTERNAL MEDICINE

## 2019-08-02 PROCEDURE — 2500000003 HC RX 250 WO HCPCS: Performed by: NURSE ANESTHETIST, CERTIFIED REGISTERED

## 2019-08-02 RX ORDER — LIDOCAINE HYDROCHLORIDE 20 MG/ML
INJECTION, SOLUTION INFILTRATION; PERINEURAL PRN
Status: DISCONTINUED | OUTPATIENT
Start: 2019-08-02 | End: 2019-08-02 | Stop reason: SDUPTHER

## 2019-08-02 RX ORDER — LIDOCAINE HYDROCHLORIDE 10 MG/ML
1 INJECTION, SOLUTION EPIDURAL; INFILTRATION; INTRACAUDAL; PERINEURAL ONCE
Status: DISCONTINUED | OUTPATIENT
Start: 2019-08-02 | End: 2019-08-02 | Stop reason: HOSPADM

## 2019-08-02 RX ORDER — PROPOFOL 10 MG/ML
INJECTION, EMULSION INTRAVENOUS PRN
Status: DISCONTINUED | OUTPATIENT
Start: 2019-08-02 | End: 2019-08-02 | Stop reason: SDUPTHER

## 2019-08-02 RX ORDER — SODIUM CHLORIDE, SODIUM LACTATE, POTASSIUM CHLORIDE, CALCIUM CHLORIDE 600; 310; 30; 20 MG/100ML; MG/100ML; MG/100ML; MG/100ML
INJECTION, SOLUTION INTRAVENOUS CONTINUOUS
Status: DISCONTINUED | OUTPATIENT
Start: 2019-08-02 | End: 2019-08-02 | Stop reason: HOSPADM

## 2019-08-02 RX ADMIN — LIDOCAINE HYDROCHLORIDE 40 MG: 20 INJECTION, SOLUTION INFILTRATION; PERINEURAL at 10:23

## 2019-08-02 RX ADMIN — SODIUM CHLORIDE, POTASSIUM CHLORIDE, SODIUM LACTATE AND CALCIUM CHLORIDE: 600; 310; 30; 20 INJECTION, SOLUTION INTRAVENOUS at 08:59

## 2019-08-02 RX ADMIN — PROPOFOL 230 MG: 10 INJECTION, EMULSION INTRAVENOUS at 10:23

## 2019-08-02 ASSESSMENT — PAIN SCALES - GENERAL
PAINLEVEL_OUTOF10: 0
PAINLEVEL_OUTOF10: 0

## 2019-08-02 ASSESSMENT — PAIN - FUNCTIONAL ASSESSMENT: PAIN_FUNCTIONAL_ASSESSMENT: 0-10

## 2019-08-02 NOTE — OP NOTE
Patient: Hola Kendrick : 1940  Med Rec#: [id number] Acc#: [id number]   Primary Care Provider Jens Covington MD    Date of Procedure:  2019    Endoscopist: Jules Mcnamara MD    Referring Provider: Jens Covington MD,     Operation Performed: Colonoscopy     Indications: anemia     Anesthesia:  Sedation was administered by anesthesia who monitored the patient during the procedure. I met with Hola Kendrick prior to procedure. We discussed the procedure itself, and I have discussed the risks of endoscopy (including-- but not limited to-- pain, discomfort, bleeding potentially requiring second endoscopic procedure and/or blood transfusion, organ perforation requiring operative repair, damage to organs near the colon, infection, aspiration, cardiopulmonary/allergic reaction), benefits, indications to endoscopy. Additionally, we discussed options other than colonoscopy. The patient expressed understanding. All questions answered. The patient decided to proceed with the procedure. Signed informed consent was placed on the chart. Blood Loss: minimal    Withdrawal time: n/a  Bowel Prep: adequate     Complications: no immediate complications    DESCRIPTION OF PROCEDURE:     A time out was performed. After written informed consent was obtained, the patient was placed in the left lateral position. The perianal area was inspected, and a digital rectal exam was performed. A rectal exam was performed: normal tone, no palpable lesions. At this point, a forward viewing Olympus colonoscope was inserted into the anus and carefully advanced to the ileo-colonic anastomosis. The colonoscope was then slowly withdrawn with careful inspection of the mucosa in a linear and circumferential fashion. The scope was retroflexed in the rectum. Suction was utilized during the procedure to remove as much air as possible from the bowel.  The colonoscope was removed from the patient, and the procedure was

## 2019-08-02 NOTE — OP NOTE
Endoscopic Procedure Note    Patient: Hola Kendrick : 1940  Med Rec#: [id number] Acc#: [id number]     Primary Care Provider Jens Covington MD  Referring Provider: Jimena QUEVEDO    Endoscopist: Jules Mcnamara MD    Date of Procedure:  2019    Procedure:   1. EGD with biopsy    Indications:   1. Anemia, no overt bleeding. 2. Patient denies significant diarrhea/constipation    Anesthesia:  Sedation was administered by anesthesia who monitored the patient during the procedure. Estimated Blood Loss: minimal    Procedure:   After reviewing the patient's chart and obtaining informed consent, the patient was placed in the left lateral decubitus position. A forward-viewing Olympus endoscope was lubricated and inserted through the mouth into the oropharynx. Under direct visualization, the upper esophagus was intubated. The scope was advanced to the level of the third portion of duodenum. Scope was slowly withdrawn with careful inspection of the mucosal surfaces. The scope was retroflexed for inspection of the gastric fundus and incisura. Findings and maneuvers are listed in impression below. The patient tolerated the procedure well. The scope was removed. There were no immediate complications. Findings:   Esophagus: normal  There is no hiatal hernia present. Stomach:  abnormal: mild mucosal changes suggestive of gastritis noted -  Gastric biopsies were taken from the antrum and body to rule out Helicobacter pylori infection. Duodenum: normal      IMPRESSION:  1. No obvious source lesions for anemia noted. 2. S/p gastric biopsies as above     RECOMMENDATIONS:    1. Await path results, the patient will be contacted in 7-10 days with biopsy results. 2.  Colonoscopy today  3. Avoid NSAIDs    The results were discussed with the patient and family. A copy of the images obtained were given to the patient.      Uzma Strange MD  2019  10:42 AM

## 2019-08-02 NOTE — ANESTHESIA PRE PROCEDURE
Department of Anesthesiology  Preprocedure Note       Name:  Yana Grady   Age:  70 y.o.  :  1940                                          MRN:  [id number]         Date:  2019      Surgeon: Shereen Morin):  David Howard MD    Procedure: EGD BIOPSY (N/A Abdomen)  COLONOSCOPY DIAGNOSTIC (N/A )    Medications prior to admission:   Prior to Admission medications    Medication Sig Start Date End Date Taking? Authorizing Provider   donepezil (ARICEPT) 5 MG tablet Take 5 mg by mouth nightly   Yes Historical Provider, MD   sotalol (BETAPACE) 80 MG tablet Take 0.5 tablets by mouth 2 times daily 19  Yes EMY Jennings   furosemide (LASIX) 40 MG tablet Take 1 tablet by mouth daily 19  Yes Loretta Noble MD   Multiple Vitamins-Minerals (THERAPEUTIC MULTIVITAMIN-MINERALS) tablet Take 1 tablet by mouth daily 19 Yes Stephen Rodriguez MD   ferrous sulfate 325 (65 Fe) MG tablet Take 1 tablet by mouth 2 times daily 19 Yes Stephen Rodriguez MD   loratadine (CLARITIN) 10 MG capsule Take 10 mg by mouth daily   Yes Historical Provider, MD   gabapentin (NEURONTIN) 300 MG capsule Take 300 mg by mouth 2 times daily   Yes Historical Provider, MD   atorvastatin (LIPITOR) 40 MG tablet Take 40 mg by mouth daily   Yes Historical Provider, MD   Sertraline HCl (ZOLOFT PO) Take 25 mg by mouth daily    Yes Historical Provider, MD   nystatin (MYCOSTATIN) 101678 UNIT/GM powder Apply topically 4 times daily Apply topically 4 times daily.     Historical Provider, MD   fluticasone (FLONASE) 50 MCG/ACT nasal spray 1 spray by Each Nare route daily    Historical Provider, MD   rivaroxaban (XARELTO) 15 MG TABS tablet Take 1 tablet by mouth daily (with breakfast) 19   EMY Jennings   calcitRIOL (ROCALTROL) 0.25 MCG capsule Take 1 capsule by mouth daily 19   Stephen Rodriguez MD   vitamin D (ERGOCALCIFEROL) 51387 UNITS CAPS capsule Take 50,000 Units by mouth Twice a Week    Historical Provider, MD ([facility] 2/12/2017    Depression     Diabetes mellitus ([facility]     Essential hypertension 7/11/2017    History of blood transfusion     Hyperlipidemia     Hypertension     Mixed hyperlipidemia 7/11/2017    Obesity     Obstructive sleep apnea     AHI:  18.7; In REM AHI:  39.3 per PSG, 11/2008; split-night PSG, 8/2017 AHI: 35.1    Osteoarthritis     Palliative care patient 01/08/2019    Pneumonia due to organism     Restless leg syndrome 8/1/2017    Sinus complaint     Swelling        Past Surgical History:        Procedure Laterality Date    ADENOIDECTOMY      APPENDECTOMY      CARDIOVERSION      x 2     CHOLECYSTECTOMY      COLONOSCOPY  05/17/2013    Dr. Maisha Avery COLONOSCOPY  03/22/2012    Dr Chel Diez yr recall    COLONOSCOPY  06/30/2008    Dr Cristel Ventura, 3 yr recall    HEMICOLECTOMY Right 1998    2 FT OF COLON REMOVED DUE TO CA    HYSTERECTOMY      MS COLONOSCOPY W/BIOPSY SINGLE/MULTIPLE N/A 6/6/2017    Dr Lorena Rutledge colon anastomosis-Tubular AP (-) dysplasia x 2--3 yr recall    TONSILLECTOMY      TUMOR REMOVAL      stomach    UPPER GASTROINTESTINAL ENDOSCOPY  05/17/2013    Dr. Altman Em ulcer disease-Chelo (+)    UPPER GASTROINTESTINAL ENDOSCOPY  03/28/2012    Dr Issac Cuevas antral ulceration with a visible vessel       Social History:    Social History     Tobacco Use    Smoking status: Never Smoker    Smokeless tobacco: Never Used   Substance Use Topics    Alcohol use:  No                                Counseling given: Not Answered      Vital Signs (Current):   Vitals:    08/02/19 0819   BP: (!) 150/69   Pulse: 52   Resp: 20   Temp: 97.8 °F (36.6 °C)   TempSrc: Temporal   SpO2: 95%   Weight: 219 lb (99.3 kg)   Height: 5' 6\" (1.676 m)                                              BP Readings from Last 3 Encounters:   08/02/19 (!) 150/69   03/14/19 120/65   02/26/19 128/78       NPO Status: Time of last liquid consumption: 2100                        Time

## 2019-08-13 ENCOUNTER — TELEPHONE (OUTPATIENT)
Dept: NEUROLOGY | Age: 79
End: 2019-08-13

## 2019-08-14 ENCOUNTER — TELEPHONE (OUTPATIENT)
Dept: NEUROLOGY | Age: 79
End: 2019-08-14

## 2019-08-15 ENCOUNTER — OFFICE VISIT (OUTPATIENT)
Dept: NEUROLOGY | Age: 79
End: 2019-08-15
Payer: MEDICARE

## 2019-08-15 VITALS
HEIGHT: 66 IN | HEART RATE: 74 BPM | BODY MASS INDEX: 35.03 KG/M2 | DIASTOLIC BLOOD PRESSURE: 64 MMHG | SYSTOLIC BLOOD PRESSURE: 134 MMHG | OXYGEN SATURATION: 98 % | WEIGHT: 218 LBS

## 2019-08-15 DIAGNOSIS — G47.33 OBSTRUCTIVE SLEEP APNEA: Primary | ICD-10-CM

## 2019-08-15 DIAGNOSIS — G25.81 RESTLESS LEG SYNDROME: ICD-10-CM

## 2019-08-15 DIAGNOSIS — Z99.89 BIPAP (BIPHASIC POSITIVE AIRWAY PRESSURE) DEPENDENCE: ICD-10-CM

## 2019-08-15 PROCEDURE — 4040F PNEUMOC VAC/ADMIN/RCVD: CPT | Performed by: PHYSICIAN ASSISTANT

## 2019-08-15 PROCEDURE — G8427 DOCREV CUR MEDS BY ELIG CLIN: HCPCS | Performed by: PHYSICIAN ASSISTANT

## 2019-08-15 PROCEDURE — 1036F TOBACCO NON-USER: CPT | Performed by: PHYSICIAN ASSISTANT

## 2019-08-15 PROCEDURE — G8417 CALC BMI ABV UP PARAM F/U: HCPCS | Performed by: PHYSICIAN ASSISTANT

## 2019-08-15 PROCEDURE — 99213 OFFICE O/P EST LOW 20 MIN: CPT | Performed by: PHYSICIAN ASSISTANT

## 2019-08-15 PROCEDURE — 1090F PRES/ABSN URINE INCON ASSESS: CPT | Performed by: PHYSICIAN ASSISTANT

## 2019-08-15 PROCEDURE — 1123F ACP DISCUSS/DSCN MKR DOCD: CPT | Performed by: PHYSICIAN ASSISTANT

## 2019-08-15 PROCEDURE — G8400 PT W/DXA NO RESULTS DOC: HCPCS | Performed by: PHYSICIAN ASSISTANT

## 2019-08-15 NOTE — PROGRESS NOTES
APPENDECTOMY      CARDIOVERSION      x 2     CHOLECYSTECTOMY      COLONOSCOPY  05/17/2013    Dr. Sandra Fitch COLONOSCOPY  03/22/2012    Dr Romero [place] yr recall    COLONOSCOPY  06/30/2008    Dr Murali Prieto, 3 yr recall    COLONOSCOPY N/A 8/2/2019    Dr Boo Leeton sided anastomosis appeared normal and patent-prn (age)   [place] HEMICOLECTOMY Right 1998    2 FT OF COLON REMOVED DUE TO CA    HYSTERECTOMY      [place] COLONOSCOPY W/BIOPSY SINGLE/MULTIPLE N/A 6/6/2017    Dr Rica Saenz colon anastomosis-Tubular AP (-) dysplasia x 2--3 yr recall    TONSILLECTOMY      TUMOR REMOVAL      stomach    UPPER GASTROINTESTINAL ENDOSCOPY  05/17/2013    Dr. Reginald Tiwari ulcer disease-Chelo (+)    UPPER GASTROINTESTINAL ENDOSCOPY  03/28/2012    Dr Cindy Hernández antral ulceration with a visible vessel    UPPER GASTROINTESTINAL ENDOSCOPY N/A 8/2/2019    Dr Wyatt Montelongo-Gastritis       Recent Hospitalizations  ·     Significant Injuries  ·     Family History   Problem Relation Age of Onset    No Known Problems Mother     No Known Problems Father     Colon Cancer Daughter     Colon Polyps Daughter     Esophageal Cancer Neg Hx     Liver Cancer Neg Hx     Rectal Cancer Neg Hx     Liver Disease Neg Hx     Stomach Cancer Neg Hx        Social History  Social History     Tobacco Use   Smoking Status Never Smoker   Smokeless Tobacco Never Used     Social History     Substance and Sexual Activity   Alcohol Use No     Social History     Substance and Sexual Activity   Drug Use No         Current Outpatient Medications   Medication Sig Dispense Refill    nystatin (MYCOSTATIN) 594666 UNIT/GM powder Apply topically 4 times daily Apply topically 4 times daily.       donepezil (ARICEPT) 5 MG tablet Take 5 mg by mouth nightly      fluticasone (FLONASE) 50 MCG/ACT nasal spray 1 spray by Each Nare route daily      sotalol (BETAPACE) 80 MG tablet Take 0.5 tablets by mouth 2 times daily 90 tablet 3    rivaroxaban (Wendi Gonzalez) [] Slurred Speech [] Trouble swallowing  [] Vertigo [] Tingling [] Numbness [] Weakness [] Loss of Balance   [] Loss of Consciousness [] Memory Loss [] Seizures  [x] Denies all unless marked  Psychiatric/Behavioral:[] Depression [] Anxiety  [x] Denies all unless marked  Sleep: []  Insomnia [] Sleep Disturbance [] Snoring [x] Restless Legs [] Daytime Sleepiness [x] Sleep Apnea  [x] Denies all unless marked    The MA has completed the ROS with the patient. I have reviewed it in its' entirety with the patient and agree with the documentation. PHYSICAL EXAM  /64   Pulse 74   Ht 5' 6\" (1.676 m)   Wt 218 lb (98.9 kg)   SpO2 98%   BMI 35.19 kg/m²      Constitutional - No acute distress    HEENT- Conjunctiva normal.  No scars, masses, or lesions over external nose or ears, no neck masses noted, no jugular vein distension, no bruit  Cardiac- Regular rate and rhythm  Pulmonary- Wheeze noted, good expansion, normal effort without use of accessory muscles  Musculoskeletal - No significant wasting of muscles noted, no bony deformities  Extremities - No clubbing, cyanosis or edema  Skin - Warm, dry, and intact. No rash, erythema, or pallor  Psychiatric - Mood, affect, and behavior appear normal      Neurologic:  Extraocular movements are intact without nystagmus. Visual fields are full to confrontation. Facial movements are symmetrical and normal.  Speech is precise. Extremity strength is normal in both uppers and lowers. Deep tendon reflexes are intact and symmetrical.  Rapid alternating movements are unimpaired. Finger-to-nose testing is performed well, without dysmetria.   Gait is normal.    I reviewed the following studies:       []  :  Clinical laboratory test results     []  :  Radiology reports                    [x]  :  Review and summarization of medical records and/or obtain medical records        []  :  Previous/recent polysomnogram report(s)     []  :  Thompson Sleepiness Scale      [X]  :

## 2019-08-15 NOTE — PATIENT INSTRUCTIONS
another sleep test. While the treatment may seem uncomfortable, noisy, or bulky at first, most people accept the treatment after experiencing better sleep. However, difficulty with mask comfort and nasal congestion prevent up to 50 percent of people from using the treatment on a regular basis. Continued follow up with a healthcare provider helps to ensure that the treatment is effective and comfortable. Information from the CPAP machine is often used by physicians, therapists, and insurers to track the success of treatment. CPAP can be delivered with different features to improve comfort and solve problems that may come up during treatment. Changes in treatment may be needed if symptoms do not improve or if the persons condition changes, such as a gain or loss of weight. Adjust sleep position -- Adjusting sleep position (to stay off the back) may help improve sleep quality in people who have MARILEE when sleeping on the back. However, this is difficult to maintain throughout the night and is rarely an adequate solution. Weight loss -- Weight loss may be helpful for obese or overweight patients. Weight loss may be accomplished with dietary changes, exercise, and/or surgical treatment. However, it can be difficult to maintain weight loss; the five-year success of non-surgical weight loss is only 5 percent, meaning that 95 percent of people regain lost weight. Avoid alcohol and other sedatives -- Alcohol can worsen sleepiness, potentially increasing the risk of accidents or injury. People with MARILEE are often counseled to drink little to no alcohol, even during the daytime. Similarly, people who take anti-anxiety medications or sedatives to sleep should speak with their healthcare provider about the safety of these medications. People with MARILEE must notify all healthcare providers, including surgeons, about their condition and the potential risks of being sedated.  People with MARILEE who are given anesthesia and/or pain medications require special management and close monitoring to reduce the risk of a blocked airway. Dental devices -- A dental device, called an oral appliance or mandibular advancement device, can reposition the jaw (mandible), bringing the tongue and soft palate forward as well. This may relieve obstruction in some people. This treatment is excellent for reducing snoring, although the effect on MARILEE is sometimes more limited. As a result, dental devices are best used for mild cases of MARILEE when relief of snoring is the main goal. Failure to tolerate and accept CPAP is another indication for dental devices. While dental devices are not as effective as CPAP for MARILEE, some patients prefer a dental device to CPAP. Side effects of dental devices are generally minor but may include changes to the bite with prolonged use. Surgical treatment -- Surgery is an alternative therapy for patients who cannot tolerate or do not improve with nonsurgical treatments such as CPAP or oral devices. Surgery can also be used in combination with other nonsurgical treatments. Surgical procedures reshape structures in the upper airways or surgically reposition bone or soft tissue. Uvulopalatopharyngoplasty (UPPP) removes the uvula and excessive tissue in the throat, including the tonsils, if present. Other procedures, such as maxillomandibular advancement (MMA), address both the upper and lower pharyngeal airway more globally. UPPP alone has limited success rates (less than 50 percent) and people can relapse (when MARILEE symptoms return after surgery). As a result, this surgery is only recommended in a minority of people and should be considered with caution. MMA may have a higher success rate, particularly in people with abnormal jaw (maxilla and mandible) anatomy, but it is the most complicated procedure. A newer surgical approach, nerve stimulation to protrude the tongue, has promising success rates in very selected people.   Tracheostomy period. This data must be downloaded as a report direct from the PAP devices. This is called a compliance download. Your PAP supplier will assist you in this matter. Reminder:  Please bring a copy of the compliance download to your next office visit or have your supplier fax it to our office prior to your office visit. Note:  Where applicable, we will utilize PAP device efficiency reports, additional testing, and face-to-face  clinical evaluation subsequent to any treatment, changes in treatment, and continued treatment. Caution:  Please abstain from driving or engaging in other activities which may be hazardous in the presence of diminished alertness or daytime drowsiness. And avoid the use of sedatives or alcohol, which can worsen sleep apnea and daytime drowsiness. Mask suggestions:  - Resmed Airfit N20 (Nasal) or F20 (Full face mask). They conform to your face, thus decreasing the potential for mask leakage. You might like the [employer] (full face mask). It has a \"memory foam\" like cushion. The AirFit F30 is a smaller style full face mask designed to sit low on and cover less of your face for fewer facial marks. Respironics: You might also like to try a nasal mask called a Dreamwear nasal mask or the Dreamwear nasal pillow. Another suggestion is the [facility], it is a minimal contact full face mask. The Deajavier Powell incredible under the nose design makes it the only full face mask that won't cause red marks on the bridge of your nose when compared to other full face masks. The Dreamwear full face mask has a  soft feel, unique in-frame air-flow, and innovative air tube connection at the top of the head for the ultimate in sleep comfort. As of 2/2019 there is 1 additional Resmed masks: The AirFit M81i-xx has a top of the head tube with a nasal mask.

## 2019-08-20 ENCOUNTER — OFFICE VISIT (OUTPATIENT)
Dept: CARDIOLOGY | Age: 79
End: 2019-08-20
Payer: MEDICARE

## 2019-08-20 VITALS
HEART RATE: 55 BPM | DIASTOLIC BLOOD PRESSURE: 78 MMHG | HEIGHT: 66 IN | WEIGHT: 231 LBS | SYSTOLIC BLOOD PRESSURE: 124 MMHG | OXYGEN SATURATION: 96 % | BODY MASS INDEX: 37.12 KG/M2

## 2019-08-20 DIAGNOSIS — I48.0 PAF (PAROXYSMAL ATRIAL FIBRILLATION) (HCC): Primary | ICD-10-CM

## 2019-08-20 DIAGNOSIS — I10 ESSENTIAL HYPERTENSION: ICD-10-CM

## 2019-08-20 DIAGNOSIS — G47.33 OSA ON CPAP: ICD-10-CM

## 2019-08-20 DIAGNOSIS — I50.32 CHRONIC DIASTOLIC CONGESTIVE HEART FAILURE (HCC): ICD-10-CM

## 2019-08-20 DIAGNOSIS — Z99.89 OSA ON CPAP: ICD-10-CM

## 2019-08-20 PROCEDURE — G8400 PT W/DXA NO RESULTS DOC: HCPCS | Performed by: CLINICAL NURSE SPECIALIST

## 2019-08-20 PROCEDURE — 4040F PNEUMOC VAC/ADMIN/RCVD: CPT | Performed by: CLINICAL NURSE SPECIALIST

## 2019-08-20 PROCEDURE — 99214 OFFICE O/P EST MOD 30 MIN: CPT | Performed by: CLINICAL NURSE SPECIALIST

## 2019-08-20 PROCEDURE — G8427 DOCREV CUR MEDS BY ELIG CLIN: HCPCS | Performed by: CLINICAL NURSE SPECIALIST

## 2019-08-20 PROCEDURE — 1123F ACP DISCUSS/DSCN MKR DOCD: CPT | Performed by: CLINICAL NURSE SPECIALIST

## 2019-08-20 PROCEDURE — 1036F TOBACCO NON-USER: CPT | Performed by: CLINICAL NURSE SPECIALIST

## 2019-08-20 PROCEDURE — G8417 CALC BMI ABV UP PARAM F/U: HCPCS | Performed by: CLINICAL NURSE SPECIALIST

## 2019-08-20 PROCEDURE — 1090F PRES/ABSN URINE INCON ASSESS: CPT | Performed by: CLINICAL NURSE SPECIALIST

## 2019-08-20 RX ORDER — FUROSEMIDE 40 MG/1
40 TABLET ORAL 2 TIMES DAILY
Status: ON HOLD | COMMUNITY
End: 2020-09-17 | Stop reason: SDUPTHER

## 2019-08-20 NOTE — PATIENT INSTRUCTIONS
See kidney doctor as planned- they can adjust diuretic as needed based on kidney function   Follow up in 6 mos With Dr. Carolyne Chacon  Call with any questions or concerns  Follow up with Bess Chapman MD for non cardiac problems  Report any new problems  Cardiovascular Fitness-Exercise as tolerated. Fall precautions   Cardiac / Healthy Diet- low salt   Continue current medications as directed  Continue plan of treatment  It is always recommended that you bring your medications bottles with you to each visit - this is for your safety!

## 2019-08-20 NOTE — PROGRESS NOTES
negative. Objective  Vital Signs - /78   Pulse 55   Ht 5' 6\" (1.676 m)   Wt 231 lb (104.8 kg)   SpO2 96%   BMI 37.28 kg/m²   General - Shanita is alert, cooperative, and pleasant. Well groomed. No acute distress. Body habitus is obese. HEENT - The head is normocephalic. No circumoral cyanosis. Dentition is normal.   EYES -  No Xanthelasma, no arcus senilis, no conjunctival hemorrhages or discharge. Neck - Supple, without increased jugular venous pressures. No carotid bruits. No mass. Respiratory - Lungs are clear bilaterally. No wheezes. left  Basilar rales. Normal effort without use of accessory muscles. Cardiovascular - Heart has regular rhythm and rate. No murmurs, rubs or gallops. + pedal pulses and no varicosities. Abdominal -  Soft, nontender, nondistended. Bowel sounds are intact. Extremities - No clubbing, cyanosis, or  edema. Musculoskeletal -  No clubbing . No Osler's nodes. Gait - in W/C.  + kyphosis   No  scoliosis. Skin -  no statis ulcers or dermatitis. Neurological - No focal signs are identified. Oriented to person, place and time. Psychiatric -  Appropriate affect and mood. Assessment:     Diagnosis Orders   1. PAF (paroxysmal atrial fibrillation) ([facility] [address]     2. Essential hypertension     3. Chronic diastolic congestive heart failure ([facility] [address]     4. MARILEE on CPAP       Data:  BP Readings from Last 3 Encounters:   08/20/19 124/78   08/15/19 134/64   08/02/19 (!) 127/48    Pulse Readings from Last 3 Encounters:   08/20/19 55   08/15/19 74   08/02/19 65        Wt Readings from Last 3 Encounters:   08/20/19 231 lb (104.8 kg)   08/15/19 218 lb (98.9 kg)   08/02/19 219 lb (99.3 kg)     Blood pressure and heart rate control. Rhythm is controlled on low-dose Betapace. Remains anticoagulated on renal dose of Xarelto. She is on iron replacement for anemia. On statin and diuretic    2D echo from January.   Mild MR, mild aortic sclerosis with no

## 2020-04-01 ENCOUNTER — TELEPHONE (OUTPATIENT)
Dept: CARDIOLOGY | Age: 80
End: 2020-04-01

## 2020-04-24 RX ORDER — SOTALOL HYDROCHLORIDE 80 MG/1
TABLET ORAL
Qty: 90 TABLET | Refills: 3 | Status: SHIPPED | OUTPATIENT
Start: 2020-04-24 | End: 2021-01-22

## 2020-08-10 ENCOUNTER — TELEPHONE (OUTPATIENT)
Dept: CARDIOLOGY | Age: 80
End: 2020-08-10

## 2020-08-10 NOTE — TELEPHONE ENCOUNTER
No Answer;  Unable to contact pt; Dr. Audie Carrillo is no longer with the organization; Please r/s pt with any of the other doctors

## 2020-08-13 ENCOUNTER — TELEPHONE (OUTPATIENT)
Dept: CARDIOLOGY | Age: 80
End: 2020-08-13

## 2020-08-13 NOTE — TELEPHONE ENCOUNTER
No Answer; Unable to contact pt over appt with Dr. Marleen Hammans; Dr. Marleen Hammans is no longer with the organization; Please r/s with any of the doctors.

## 2020-09-15 ENCOUNTER — HOSPITAL ENCOUNTER (INPATIENT)
Age: 80
LOS: 2 days | Discharge: HOME | DRG: 682 | End: 2020-09-17
Attending: EMERGENCY MEDICINE | Admitting: HOSPITALIST
Payer: MEDICARE

## 2020-09-15 ENCOUNTER — APPOINTMENT (OUTPATIENT)
Dept: CT IMAGING | Age: 80
DRG: 682 | End: 2020-09-15
Payer: MEDICARE

## 2020-09-15 ENCOUNTER — APPOINTMENT (OUTPATIENT)
Dept: GENERAL RADIOLOGY | Age: 80
DRG: 682 | End: 2020-09-15
Payer: MEDICARE

## 2020-09-15 PROBLEM — R41.82 AMS (ALTERED MENTAL STATUS): Status: ACTIVE | Noted: 2020-09-15

## 2020-09-15 LAB
ALBUMIN SERPL-MCNC: 4.5 G/DL (ref 3.5–5.2)
ALP BLD-CCNC: 119 U/L (ref 35–104)
ALT SERPL-CCNC: 10 U/L (ref 5–33)
ANION GAP SERPL CALCULATED.3IONS-SCNC: 14 MMOL/L (ref 7–19)
AST SERPL-CCNC: 24 U/L (ref 5–32)
BACTERIA: ABNORMAL /HPF
BASOPHILS ABSOLUTE: 0.1 K/UL (ref 0–0.2)
BASOPHILS RELATIVE PERCENT: 0.8 % (ref 0–1)
BILIRUB SERPL-MCNC: 0.4 MG/DL (ref 0.2–1.2)
BILIRUBIN URINE: NEGATIVE
BLOOD, URINE: NEGATIVE
BUN BLDV-MCNC: 45 MG/DL (ref 8–23)
CALCIUM SERPL-MCNC: 9.3 MG/DL (ref 8.8–10.2)
CHLORIDE BLD-SCNC: 97 MMOL/L (ref 98–111)
CLARITY: ABNORMAL
CO2: 28 MMOL/L (ref 22–29)
COLOR: YELLOW
CREAT SERPL-MCNC: 2.2 MG/DL (ref 0.5–0.9)
EOSINOPHILS ABSOLUTE: 0.3 K/UL (ref 0–0.6)
EOSINOPHILS RELATIVE PERCENT: 2.7 % (ref 0–5)
EPITHELIAL CELLS, UA: 1 /HPF (ref 0–5)
GFR AFRICAN AMERICAN: 26
GFR NON-AFRICAN AMERICAN: 22
GLUCOSE BLD-MCNC: 103 MG/DL (ref 70–99)
GLUCOSE BLD-MCNC: 127 MG/DL (ref 74–109)
GLUCOSE URINE: NEGATIVE MG/DL
HCT VFR BLD CALC: 37.6 % (ref 37–47)
HEMOGLOBIN: 11.8 G/DL (ref 12–16)
HYALINE CASTS: 7 /HPF (ref 0–8)
IMMATURE GRANULOCYTES #: 0.1 K/UL
KETONES, URINE: NEGATIVE MG/DL
LACTIC ACID: 0.9 MMOL/L (ref 0.5–1.9)
LEUKOCYTE ESTERASE, URINE: ABNORMAL
LYMPHOCYTES ABSOLUTE: 1.9 K/UL (ref 1.1–4.5)
LYMPHOCYTES RELATIVE PERCENT: 17.3 % (ref 20–40)
MCH RBC QN AUTO: 28.9 PG (ref 27–31)
MCHC RBC AUTO-ENTMCNC: 31.4 G/DL (ref 33–37)
MCV RBC AUTO: 91.9 FL (ref 81–99)
MONOCYTES ABSOLUTE: 1 K/UL (ref 0–0.9)
MONOCYTES RELATIVE PERCENT: 9.5 % (ref 0–10)
NEUTROPHILS ABSOLUTE: 7.5 K/UL (ref 1.5–7.5)
NEUTROPHILS RELATIVE PERCENT: 69.1 % (ref 50–65)
NITRITE, URINE: NEGATIVE
PDW BLD-RTO: 15.5 % (ref 11.5–14.5)
PERFORMED ON: ABNORMAL
PH UA: 5 (ref 5–8)
PLATELET # BLD: 275 K/UL (ref 130–400)
PMV BLD AUTO: 11.1 FL (ref 9.4–12.3)
POTASSIUM REFLEX MAGNESIUM: 4.8 MMOL/L (ref 3.5–5)
PROTEIN UA: NEGATIVE MG/DL
RBC # BLD: 4.09 M/UL (ref 4.2–5.4)
RBC UA: 1 /HPF (ref 0–4)
SODIUM BLD-SCNC: 139 MMOL/L (ref 136–145)
SPECIFIC GRAVITY UA: 1.01 (ref 1–1.03)
TOTAL PROTEIN: 8 G/DL (ref 6.6–8.7)
UROBILINOGEN, URINE: 0.2 E.U./DL
WBC # BLD: 10.8 K/UL (ref 4.8–10.8)
WBC UA: 35 /HPF (ref 0–5)

## 2020-09-15 PROCEDURE — 81001 URINALYSIS AUTO W/SCOPE: CPT

## 2020-09-15 PROCEDURE — 6360000002 HC RX W HCPCS: Performed by: EMERGENCY MEDICINE

## 2020-09-15 PROCEDURE — 80053 COMPREHEN METABOLIC PANEL: CPT

## 2020-09-15 PROCEDURE — 1210000000 HC MED SURG R&B

## 2020-09-15 PROCEDURE — 36415 COLL VENOUS BLD VENIPUNCTURE: CPT

## 2020-09-15 PROCEDURE — 82947 ASSAY GLUCOSE BLOOD QUANT: CPT

## 2020-09-15 PROCEDURE — 99283 EMERGENCY DEPT VISIT LOW MDM: CPT

## 2020-09-15 PROCEDURE — 87086 URINE CULTURE/COLONY COUNT: CPT

## 2020-09-15 PROCEDURE — 87077 CULTURE AEROBIC IDENTIFY: CPT

## 2020-09-15 PROCEDURE — 70450 CT HEAD/BRAIN W/O DYE: CPT

## 2020-09-15 PROCEDURE — 99999 PR OFFICE/OUTPT VISIT,PROCEDURE ONLY: CPT | Performed by: EMERGENCY MEDICINE

## 2020-09-15 PROCEDURE — 2580000003 HC RX 258: Performed by: EMERGENCY MEDICINE

## 2020-09-15 PROCEDURE — 85025 COMPLETE CBC W/AUTO DIFF WBC: CPT

## 2020-09-15 PROCEDURE — 71045 X-RAY EXAM CHEST 1 VIEW: CPT

## 2020-09-15 PROCEDURE — 83605 ASSAY OF LACTIC ACID: CPT

## 2020-09-15 RX ORDER — 0.9 % SODIUM CHLORIDE 0.9 %
1000 INTRAVENOUS SOLUTION INTRAVENOUS ONCE
Status: COMPLETED | OUTPATIENT
Start: 2020-09-15 | End: 2020-09-15

## 2020-09-15 RX ADMIN — CEFTRIAXONE 1 G: 1 INJECTION, POWDER, FOR SOLUTION INTRAMUSCULAR; INTRAVENOUS at 19:08

## 2020-09-15 RX ADMIN — SODIUM CHLORIDE 1000 ML: 9 INJECTION, SOLUTION INTRAVENOUS at 18:48

## 2020-09-15 RX ADMIN — Medication 500 MG: at 19:13

## 2020-09-15 ASSESSMENT — ENCOUNTER SYMPTOMS: VOMITING: 0

## 2020-09-15 ASSESSMENT — PAIN SCALES - GENERAL: PAINLEVEL_OUTOF10: 0

## 2020-09-15 NOTE — ED PROVIDER NOTES
mellitus ([facility] [address]     Essential hypertension 7/11/2017    History of blood transfusion     Hyperlipidemia     Hypertension     Mixed hyperlipidemia 7/11/2017    Obesity     Obstructive sleep apnea     AHI:  18.7; In REM AHI:  39.3 per PSG, 11/2008; split-night PSG, 8/2017 AHI: 35.1    Osteoarthritis     Palliative care patient 01/08/2019    Pneumonia due to organism     Restless leg syndrome 8/1/2017    Sinus complaint     Swelling          SURGICALHISTORY       Past Surgical History:   Procedure Laterality Date    ADENOIDECTOMY      APPENDECTOMY      CARDIOVERSION      x 2     CHOLECYSTECTOMY      COLONOSCOPY  05/17/2013    Dr. Harriet Joy COLONOSCOPY  03/22/2012    Dr Jessica Hernandez yr recall    COLONOSCOPY  06/30/2008    Dr Damian Méndez, 3 yr recall    COLONOSCOPY N/A 8/2/2019    Dr Call Aid sided anastomosis appeared normal and patent-prn (age)   [facility] HEMICOLECTOMY Right 1998    2 FT OF COLON REMOVED DUE TO CA    HYSTERECTOMY      [facility] COLONOSCOPY W/BIOPSY SINGLE/MULTIPLE N/A 6/6/2017    Dr Jose Taylor colon anastomosis-Tubular AP (-) dysplasia x 2--3 yr recall    TONSILLECTOMY      TUMOR REMOVAL      stomach    UPPER GASTROINTESTINAL ENDOSCOPY  05/17/2013    Dr. Yrn Gilbert ulcer disease-Chelo (+)    UPPER GASTROINTESTINAL ENDOSCOPY  03/28/2012    Dr Apurva Fofana antral ulceration with a visible vessel    UPPER GASTROINTESTINAL ENDOSCOPY N/A 8/2/2019    Dr Conchita Montelongo-Gastritis         CURRENT MEDICATIONS       Current Discharge Medication List      CONTINUE these medications which have NOT CHANGED    Details   sotalol (BETAPACE) 80 MG tablet TAKE 1/2 TABLET BY MOUTH TWICE DAILY  Qty: 90 tablet, Refills: 3      furosemide (LASIX) 40 MG tablet Take 40 mg by mouth 2 times daily      nystatin (MYCOSTATIN) 636639 UNIT/GM powder Apply topically 4 times daily Apply topically 4 times daily.       donepezil (ARICEPT) 5 MG tablet Take 5 mg by mouth daily       rivaroxaban ([place]) 15 MG TABS tablet Take 1 tablet by mouth daily (with breakfast)  Qty: 90 tablet, Refills: 3      calcitRIOL (ROCALTROL) 0.25 MCG capsule Take 1 capsule by mouth daily  Qty: 30 capsule, Refills: 3      Multiple Vitamins-Minerals (THERAPEUTIC MULTIVITAMIN-MINERALS) tablet Take 1 tablet by mouth daily  Qty: 30 tablet, Refills: 0      gabapentin (NEURONTIN) 300 MG capsule Take 300 mg by mouth every 3 days. atorvastatin (LIPITOR) 40 MG tablet Take 40 mg by mouth nightly       Sertraline HCl (ZOLOFT PO) Take 25 mg by mouth daily              ALLERGIES     Patient has no known allergies.     FAMILY HISTORY       Family History   Problem Relation Age of Onset    No Known Problems Mother     No Known Problems Father     Colon Cancer Daughter     Colon Polyps Daughter     Esophageal Cancer Neg Hx     Liver Cancer Neg Hx     Rectal Cancer Neg Hx     Liver Disease Neg Hx     Stomach Cancer Neg Hx           SOCIAL HISTORY       Social History     Socioeconomic History    Marital status:      Spouse name: None    Number of children: None    Years of education: None    Highest education level: None   Occupational History    None   Social Needs    Financial resource strain: None    Food insecurity     Worry: None     Inability: None    Transportation needs     Medical: None     Non-medical: None   Tobacco Use    Smoking status: Never Smoker    Smokeless tobacco: Never Used   Substance and Sexual Activity    Alcohol use: No    Drug use: No    Sexual activity: Not Currently   Lifestyle    Physical activity     Days per week: None     Minutes per session: None    Stress: None   Relationships    Social connections     Talks on phone: None     Gets together: None     Attends Denominational service: None     Active member of club or organization: None     Attends meetings of clubs or organizations: None     Relationship status: None    Intimate partner violence     Fear of current or ex partner: None Co-signsthis chart in the absence of a cardiologist.        RADIOLOGY:   Corean Polio such as CT, Ultrasound and MRI are read by the radiologist. Plain radiographic images are visualized and preliminarily interpreted by the emergency physician with the below findings:        Interpretation per the Radiologist below, if available at the time ofthis note:    CT Head WO Contrast   Final Result   Moderate cerebral and cerebellar volume loss with chronic   microvascular disease but no evidence of acute intracranial process. Signed by Dr Maribel Box on 9/15/2020 6:21 PM      XR CHEST PORTABLE   Final Result   1. Hypoventilation with vascular crowding. 2. Patchy infiltrates in both lung bases. Oval opacity at the right   lung base may represent rounded atelectasis or a rounded mass. Follow-up recommended. The full report of this exam was immediately signed and available to   the emergency room. The patient is currently in the emergency room.     Signed by Dr Reece Martin on 9/15/2020 4:46 PM            ED BEDSIDE ULTRASOUND:   Performed by ED Physician - none    LABS:  Labs Reviewed   CBC WITH AUTO DIFFERENTIAL - Abnormal; Notable for the following components:       Result Value    RBC 4.09 (*)     Hemoglobin 11.8 (*)     MCHC 31.4 (*)     RDW 15.5 (*)     Neutrophils % 69.1 (*)     Lymphocytes % 17.3 (*)     Monocytes Absolute 1.00 (*)     All other components within normal limits   COMPREHENSIVE METABOLIC PANEL W/ REFLEX TO MG FOR LOW K - Abnormal; Notable for the following components:    Chloride 97 (*)     Glucose 127 (*)     BUN 45 (*)     CREATININE 2.2 (*)     GFR Non- 22 (*)     GFR African American 26 (*)     Alkaline Phosphatase 119 (*)     All other components within normal limits   URINE RT REFLEX TO CULTURE - Abnormal; Notable for the following components:    Clarity, UA CLOUDY (*)     Leukocyte Esterase, Urine SMALL (*)     All other components within normal limits MICROSCOPIC URINALYSIS - Abnormal; Notable for the following components:    Bacteria, UA None Seen (*)     WBC, UA 35 (*)     All other components within normal limits   CULTURE, URINE   LACTIC ACID, PLASMA       All other labs were within normal range or not returned as of this dictation. EMERGENCY DEPARTMENT COURSE and DIFFERENTIAL DIAGNOSIS/MDM:   Vitals:    Vitals:    09/15/20 1914 09/15/20 1930 09/15/20 1949 09/15/20 2009   BP: (!) 145/65 (!) 147/65 134/73    Pulse: 57 51 51 55   Resp: 20 20 20    Temp:   97 °F (36.1 °C)    TempSrc:   Temporal    SpO2: 94% 93% 92%    Weight:   236 lb (107 kg)    Height:   5' 5\" (1.651 m)            MDM  Number of Diagnoses or Management Options  Abnormal chest x-ray:   KANCHAN (acute kidney injury) ([facility] [address]): Altered mental status, unspecified altered mental status type: Bacterial UTI:   Diagnosis management comments: Discussed with Dr. Myles Giles, Zmax, admit. CRITICAL CARE TIME   Total Critical Care time was 0 minutes, excluding separately reportable procedures. There was a high probability of clinically significant/lifethreatening deterioration in the patient's condition which required my urgent intervention. CONSULTS:  None    PROCEDURES:  Unless otherwise noted below, none     Procedures    FINAL IMPRESSION      1. Altered mental status, unspecified altered mental status type    2. KANCHAN (acute kidney injury) ([address] [address]    3. Bacterial UTI    4.  Abnormal chest x-ray          DISPOSITION/PLAN   DISPOSITION        PATIENT REFERRED TO:  Emely Swan MD  [address]  [phone]            DISCHARGE MEDICATIONS:  Current Discharge Medication List             (Please note that portions of this note were completed with a voice recognition program.  Efforts were made to edit the dictations but occasionally words are mis-transcribed.)    Missy Rodriguez MD (electronically signed)  Attending Emergency Physician          Missy Rodriguez MD  09/15/20 2030

## 2020-09-16 PROBLEM — G92.8 TOXIC METABOLIC ENCEPHALOPATHY: Status: ACTIVE | Noted: 2020-09-16

## 2020-09-16 PROBLEM — N30.00 ACUTE CYSTITIS: Status: ACTIVE | Noted: 2020-09-16

## 2020-09-16 LAB
ANION GAP SERPL CALCULATED.3IONS-SCNC: 12 MMOL/L (ref 7–19)
BUN BLDV-MCNC: 43 MG/DL (ref 8–23)
CALCIUM SERPL-MCNC: 8.4 MG/DL (ref 8.8–10.2)
CHLORIDE BLD-SCNC: 103 MMOL/L (ref 98–111)
CO2: 24 MMOL/L (ref 22–29)
CREAT SERPL-MCNC: 2.1 MG/DL (ref 0.5–0.9)
CREATININE URINE: 115.6 MG/DL (ref 4.2–622)
EOSINOPHIL,URINE: NORMAL
GFR AFRICAN AMERICAN: 28
GFR NON-AFRICAN AMERICAN: 23
GLUCOSE BLD-MCNC: 121 MG/DL (ref 70–99)
GLUCOSE BLD-MCNC: 161 MG/DL (ref 74–109)
GLUCOSE BLD-MCNC: 162 MG/DL (ref 70–99)
GLUCOSE BLD-MCNC: 174 MG/DL (ref 70–99)
GLUCOSE BLD-MCNC: 187 MG/DL (ref 70–99)
OSMOLALITY URINE: 257 MOSM/KG (ref 250–1200)
PERFORMED ON: ABNORMAL
POTASSIUM REFLEX MAGNESIUM: 3.8 MMOL/L (ref 3.5–5)
SODIUM BLD-SCNC: 139 MMOL/L (ref 136–145)
SODIUM URINE: 34 MMOL/L

## 2020-09-16 PROCEDURE — 6370000000 HC RX 637 (ALT 250 FOR IP): Performed by: HOSPITALIST

## 2020-09-16 PROCEDURE — 1210000000 HC MED SURG R&B

## 2020-09-16 PROCEDURE — 97535 SELF CARE MNGMENT TRAINING: CPT

## 2020-09-16 PROCEDURE — 2500000003 HC RX 250 WO HCPCS: Performed by: HOSPITALIST

## 2020-09-16 PROCEDURE — 80048 BASIC METABOLIC PNL TOTAL CA: CPT

## 2020-09-16 PROCEDURE — 84300 ASSAY OF URINE SODIUM: CPT

## 2020-09-16 PROCEDURE — 36415 COLL VENOUS BLD VENIPUNCTURE: CPT

## 2020-09-16 PROCEDURE — 97110 THERAPEUTIC EXERCISES: CPT

## 2020-09-16 PROCEDURE — 82570 ASSAY OF URINE CREATININE: CPT

## 2020-09-16 PROCEDURE — 2580000003 HC RX 258: Performed by: HOSPITALIST

## 2020-09-16 PROCEDURE — 87205 SMEAR GRAM STAIN: CPT

## 2020-09-16 PROCEDURE — 97165 OT EVAL LOW COMPLEX 30 MIN: CPT

## 2020-09-16 PROCEDURE — 2580000003 HC RX 258: Performed by: INTERNAL MEDICINE

## 2020-09-16 PROCEDURE — 83935 ASSAY OF URINE OSMOLALITY: CPT

## 2020-09-16 PROCEDURE — 97530 THERAPEUTIC ACTIVITIES: CPT

## 2020-09-16 PROCEDURE — 97161 PT EVAL LOW COMPLEX 20 MIN: CPT

## 2020-09-16 PROCEDURE — 6360000002 HC RX W HCPCS: Performed by: HOSPITALIST

## 2020-09-16 PROCEDURE — 82947 ASSAY GLUCOSE BLOOD QUANT: CPT

## 2020-09-16 RX ORDER — DONEPEZIL HYDROCHLORIDE 5 MG/1
5 TABLET, FILM COATED ORAL DAILY
Status: DISCONTINUED | OUTPATIENT
Start: 2020-09-16 | End: 2020-09-17 | Stop reason: HOSPADM

## 2020-09-16 RX ORDER — ACETAMINOPHEN 325 MG/1
650 TABLET ORAL EVERY 6 HOURS PRN
Status: DISCONTINUED | OUTPATIENT
Start: 2020-09-16 | End: 2020-09-17 | Stop reason: HOSPADM

## 2020-09-16 RX ORDER — SODIUM CHLORIDE 9 MG/ML
INJECTION, SOLUTION INTRAVENOUS CONTINUOUS
Status: DISCONTINUED | OUTPATIENT
Start: 2020-09-16 | End: 2020-09-17 | Stop reason: HOSPADM

## 2020-09-16 RX ORDER — SOTALOL HYDROCHLORIDE 80 MG/1
40 TABLET ORAL 2 TIMES DAILY
Status: DISCONTINUED | OUTPATIENT
Start: 2020-09-16 | End: 2020-09-17 | Stop reason: HOSPADM

## 2020-09-16 RX ORDER — GABAPENTIN 300 MG/1
300 CAPSULE ORAL
Status: DISCONTINUED | OUTPATIENT
Start: 2020-09-17 | End: 2020-09-17 | Stop reason: HOSPADM

## 2020-09-16 RX ORDER — ONDANSETRON 2 MG/ML
4 INJECTION INTRAMUSCULAR; INTRAVENOUS EVERY 6 HOURS PRN
Status: DISCONTINUED | OUTPATIENT
Start: 2020-09-16 | End: 2020-09-16

## 2020-09-16 RX ORDER — M-VIT,TX,IRON,MINS/CALC/FOLIC 27MG-0.4MG
1 TABLET ORAL DAILY
Status: DISCONTINUED | OUTPATIENT
Start: 2020-09-16 | End: 2020-09-17 | Stop reason: HOSPADM

## 2020-09-16 RX ORDER — POLYETHYLENE GLYCOL 3350 17 G/17G
17 POWDER, FOR SOLUTION ORAL DAILY PRN
Status: DISCONTINUED | OUTPATIENT
Start: 2020-09-16 | End: 2020-09-17 | Stop reason: HOSPADM

## 2020-09-16 RX ORDER — NICOTINE POLACRILEX 4 MG
15 LOZENGE BUCCAL PRN
Status: DISCONTINUED | OUTPATIENT
Start: 2020-09-16 | End: 2020-09-17 | Stop reason: HOSPADM

## 2020-09-16 RX ORDER — ATORVASTATIN CALCIUM 20 MG/1
40 TABLET, FILM COATED ORAL NIGHTLY
Status: DISCONTINUED | OUTPATIENT
Start: 2020-09-16 | End: 2020-09-17 | Stop reason: HOSPADM

## 2020-09-16 RX ORDER — ONDANSETRON 4 MG/1
4 TABLET, ORALLY DISINTEGRATING ORAL EVERY 8 HOURS PRN
Status: DISCONTINUED | OUTPATIENT
Start: 2020-09-16 | End: 2020-09-16

## 2020-09-16 RX ORDER — SODIUM CHLORIDE 0.9 % (FLUSH) 0.9 %
10 SYRINGE (ML) INJECTION PRN
Status: DISCONTINUED | OUTPATIENT
Start: 2020-09-16 | End: 2020-09-17 | Stop reason: HOSPADM

## 2020-09-16 RX ORDER — HEPARIN SODIUM 5000 [USP'U]/ML
5000 INJECTION, SOLUTION INTRAVENOUS; SUBCUTANEOUS EVERY 8 HOURS SCHEDULED
Status: DISCONTINUED | OUTPATIENT
Start: 2020-09-16 | End: 2020-09-16

## 2020-09-16 RX ORDER — ACETAMINOPHEN 650 MG/1
650 SUPPOSITORY RECTAL EVERY 6 HOURS PRN
Status: DISCONTINUED | OUTPATIENT
Start: 2020-09-16 | End: 2020-09-17 | Stop reason: HOSPADM

## 2020-09-16 RX ORDER — FUROSEMIDE 40 MG/1
40 TABLET ORAL 2 TIMES DAILY
Status: DISCONTINUED | OUTPATIENT
Start: 2020-09-16 | End: 2020-09-17 | Stop reason: HOSPADM

## 2020-09-16 RX ORDER — SODIUM CHLORIDE 0.9 % (FLUSH) 0.9 %
10 SYRINGE (ML) INJECTION EVERY 12 HOURS SCHEDULED
Status: DISCONTINUED | OUTPATIENT
Start: 2020-09-16 | End: 2020-09-17 | Stop reason: HOSPADM

## 2020-09-16 RX ORDER — CALCITRIOL 0.25 UG/1
0.25 CAPSULE, LIQUID FILLED ORAL DAILY
Status: DISCONTINUED | OUTPATIENT
Start: 2020-09-16 | End: 2020-09-17 | Stop reason: HOSPADM

## 2020-09-16 RX ADMIN — DONEPEZIL HYDROCHLORIDE 5 MG: 5 TABLET, FILM COATED ORAL at 08:42

## 2020-09-16 RX ADMIN — ATORVASTATIN CALCIUM 40 MG: 20 TABLET, FILM COATED ORAL at 20:17

## 2020-09-16 RX ADMIN — FUROSEMIDE 40 MG: 40 TABLET ORAL at 08:42

## 2020-09-16 RX ADMIN — RIVAROXABAN 15 MG: 15 TABLET, FILM COATED ORAL at 18:02

## 2020-09-16 RX ADMIN — MICONAZOLE NITRATE: 2 POWDER TOPICAL at 08:42

## 2020-09-16 RX ADMIN — CEFTRIAXONE 1 G: 1 INJECTION, POWDER, FOR SOLUTION INTRAMUSCULAR; INTRAVENOUS at 18:02

## 2020-09-16 RX ADMIN — AZITHROMYCIN MONOHYDRATE 250 MG: 500 INJECTION, POWDER, LYOPHILIZED, FOR SOLUTION INTRAVENOUS at 18:02

## 2020-09-16 RX ADMIN — RIVAROXABAN 15 MG: 15 TABLET, FILM COATED ORAL at 01:28

## 2020-09-16 RX ADMIN — SODIUM CHLORIDE, PRESERVATIVE FREE 10 ML: 5 INJECTION INTRAVENOUS at 08:42

## 2020-09-16 RX ADMIN — SODIUM CHLORIDE: 9 INJECTION, SOLUTION INTRAVENOUS at 10:29

## 2020-09-16 RX ADMIN — SOTALOL HYDROCHLORIDE 40 MG: 80 TABLET ORAL at 08:42

## 2020-09-16 RX ADMIN — SOTALOL HYDROCHLORIDE 40 MG: 80 TABLET ORAL at 01:28

## 2020-09-16 RX ADMIN — SERTRALINE HYDROCHLORIDE 25 MG: 50 TABLET ORAL at 08:42

## 2020-09-16 RX ADMIN — SOTALOL HYDROCHLORIDE 40 MG: 80 TABLET ORAL at 20:17

## 2020-09-16 RX ADMIN — MICONAZOLE NITRATE: 2 POWDER TOPICAL at 20:17

## 2020-09-16 RX ADMIN — SODIUM CHLORIDE: 9 INJECTION, SOLUTION INTRAVENOUS at 20:17

## 2020-09-16 RX ADMIN — CALCITRIOL CAPSULES 0.25 MCG 0.25 MCG: 0.25 CAPSULE ORAL at 08:42

## 2020-09-16 RX ADMIN — ATORVASTATIN CALCIUM 40 MG: 20 TABLET, FILM COATED ORAL at 01:28

## 2020-09-16 RX ADMIN — MULTIPLE VITAMINS W/ MINERALS TAB 1 TABLET: TAB at 08:42

## 2020-09-16 ASSESSMENT — PAIN SCALES - GENERAL
PAINLEVEL_OUTOF10: 0
PAINLEVEL_OUTOF10: 0

## 2020-09-16 ASSESSMENT — ENCOUNTER SYMPTOMS
DIARRHEA: 0
ABDOMINAL PAIN: 0
CONSTIPATION: 0
SHORTNESS OF BREATH: 0

## 2020-09-16 NOTE — PROGRESS NOTES
Occupational Therapy   Occupational Therapy Initial Assessment  Date: 2020   Patient Name: Michael Bower  MRN: [id number]     : 1940    Date of Service: 2020    Discharge Recommendations:  Patient would benefit from continued therapy after discharge       Assessment   Performance deficits / Impairments: Decreased functional mobility ; Decreased ADL status; Decreased high-level IADLs;Decreased fine motor control;Decreased balance  Assessment: Pt benefits from skilled Occupational Therapy to address decreased pt I to complete functional mobility, balance, endurance, and ADL tasks s/p hospitlization. Pt at this time CGA for functional mobility, balance, and ADL tasks. Prognosis: Good  Decision Making: Low Complexity  REQUIRES OT FOLLOW UP: Yes  Activity Tolerance  Activity Tolerance: Patient Tolerated treatment well  Activity Tolerance: pt limited by confusion at times  Safety Devices  Safety Devices in place: Yes  Type of devices: Call light within reach;Nurse notified; Left in chair(daughter in room)           Patient Diagnosis(es): The primary encounter diagnosis was Altered mental status, unspecified altered mental status type. Diagnoses of KANCHAN (acute kidney injury) ([address] Bacterial UTI, and Abnormal chest x-ray were also pertinent to this visit. has a past medical history of KANCHAN (acute kidney injury) ([address] Arthritis, Atrial fibrillation ([address] BiPAP (biphasic positive airway pressure) dependence, Cancer ([address] CHF (congestive heart failure) ([address] Chronic atrial fibrillation, Depression, Diabetes mellitus ([address] Essential hypertension, History of blood transfusion, Hyperlipidemia, Hypertension, Mixed hyperlipidemia, Obesity, Obstructive sleep apnea, Osteoarthritis, Palliative care patient, Pneumonia due to organism, Restless leg syndrome, Sinus complaint, and Swelling.   has a past surgical history that includes Cholecystectomy; Appendectomy; Hysterectomy; Tonsillectomy;  Adenoidectomy; tumor mobility in room and into hallway  ADL  Feeding: Setup  Grooming: Stand by assistance  UE Bathing: Stand by assistance  LE Bathing: Contact guard assistance  UE Dressing: Stand by assistance  LE Dressing: Contact guard assistance  Toileting: Contact guard assistance  Tone RUE  RUE Tone: Normotonic  Tone LUE  LUE Tone: Normotonic  Coordination  Movements Are Fluid And Coordinated: Yes     Bed mobility  Supine to Sit: Stand by assistance  Sit to Supine: Stand by assistance  Scooting: Stand by assistance  Transfers  Sit to stand: Contact guard assistance  Stand to sit: Contact guard assistance     Cognition  Overall Cognitive Status: WFL        Sensation  Overall Sensation Status: WFL           LUE Strength  Gross LUE Strength: WFL  RUE Strength  Gross RUE Strength: WFL        Plan   Plan  Times per week: 3-5x/wk  Current Treatment Recommendations: Functional Mobility Training, Endurance Training, Balance Training, Strengthening, ROM, Self-Care / ADL, Equipment Evaluation, Education, & procurement, Home Management Training, Safety Education & Training          Goals  Short term goals  Time Frame for Short term goals: 1 wek  Short term goal 1: Pt will be Modified I for toileting task/transfer  Short term goal 2: Pt will be Modified I for functional mobility to/from bathroom  Patient Goals   Patient goals :  To go home        Electronically signed by Jonnathan Watson OT on 9/16/2020 at [address] [place] [address],

## 2020-09-16 NOTE — PLAN OF CARE
Problem: Falls - Risk of:  Goal: Will remain free from falls  Description: Will remain free from falls  Outcome: Ongoing  Goal: Absence of physical injury  Description: Absence of physical injury  Outcome: Ongoing     Problem: Skin Integrity:  Goal: Will show no infection signs and symptoms  Description: Will show no infection signs and symptoms  Outcome: Ongoing  Goal: Absence of new skin breakdown  Description: Absence of new skin breakdown  Outcome: Ongoing  Goal: Demonstration of wound healing without infection will improve  Description: Demonstration of wound healing without infection will improve  Outcome: Ongoing  Goal: Complications related to intravenous access or infusion will be avoided or minimized  Description: Complications related to intravenous access or infusion will be avoided or minimized  Outcome: Ongoing     Problem: Mental Status - Impaired:  Goal: Mental status will improve  Description: Mental status will improve  Outcome: Ongoing     Problem: Nutritional:  Goal: Nutritional status will improve  Description: Nutritional status will improve  Outcome: Ongoing     Problem: Physical Regulation:  Goal: Diagnostic test results will improve  Description: Diagnostic test results will improve  Outcome: Ongoing  Goal: Will remain free from infection  Description: Will remain free from infection  Outcome: Ongoing  Goal: Ability to maintain vital signs within normal range will improve  Description: Ability to maintain vital signs within normal range will improve  Outcome: Ongoing     Problem: Respiratory:  Goal: Ability to maintain normal respiratory secretions will improve  Description: Ability to maintain normal respiratory secretions will improve  Outcome: Ongoing     Problem: Neurological  Goal: Maximum potential motor/sensory/cognitive function  Outcome: Ongoing

## 2020-09-16 NOTE — PROGRESS NOTES
Nephrology ([facility]) Consult Note      Patient:  Mavis Vazquez  YOB: 1940  Date of Service: 9/16/2020  MRN: [id number]   Acct: [de-identified]   Primary Care Physician: Tiki Kincaid MD  Advance Directive: Full Code  Admit Date: 9/15/2020       Hospital Day: 1  Referring Provider: Sinai Faustin MD    Patient independently seen and examined, Chart, Consults, Notes, Operative notes, Labs, Cardiology, and Radiology studies reviewed as available. Chief complaint: Abnormal labs. Subjective:  Mavis Vazquez is a 70 y.o. female  whom we were consulted for chronic kidney disease. Patient has stage IV chronic kidney disease due to diabetic nephropathy. She follows Dewain Harada, APRN in the office. Presented with encephalopathy/confusion. She is diagnosed with urinary tract infection, receiving IV antibiotics. Patient has history of type 2 diabetes insulin-dependent, CHF, hypertension, chronic kidney disease stage IV and chronic atrial fibrillation. This morning she is confused and disoriented and wants to go home immediately. Allergies:  Patient has no known allergies.     Medicines:  Current Facility-Administered Medications   Medication Dose Route Frequency Provider Last Rate Last Dose    azithromycin (ZITHROMAX) 250 mg in dextrose 5 % 250 mL IVPB  250 mg Intravenous Q24H Angela Heimlich, MD        cefTRIAXone (ROCEPHIN) 1 g in sterile water 10 mL IV syringe  1 g Intravenous Q24H Angela Heimlich, MD        furosemide (LASIX) tablet 40 mg  40 mg Oral BID Angela Heimlich, MD   40 mg at 09/16/20 0842    [START ON 9/17/2020] gabapentin (NEURONTIN) capsule 300 mg  300 mg Oral Q3 Days Angela Heimlich, MD        calcitRIOL (ROCALTROL) capsule 0.25 mcg  0.25 mcg Oral Daily Angela Heimlich, MD   0.25 mcg at 09/16/20 0842    atorvastatin (LIPITOR) tablet 40 mg  40 mg Oral Nightly Angela Heimlich, MD   40 mg at 09/16/20 0128    donepezil (ARICEPT) tablet 5 mg  5 mg Oral Daily Juvenal Houser MD   5 mg at 09/16/20 6726    therapeutic multivitamin-minerals 1 tablet  1 tablet Oral Daily Juvenal Houser MD   1 tablet at 09/16/20 4134    rivaroxaban (XARELTO) tablet 15 mg  15 mg Oral Dinner Juvenal Houser MD   15 mg at 09/16/20 0128    sotalol (BETAPACE) tablet 40 mg  40 mg Oral BID Juvenal Houser MD   40 mg at 09/16/20 7801    sertraline (ZOLOFT) tablet 25 mg  25 mg Oral Daily Juvenal Houser MD   25 mg at 09/16/20 0842    miconazole (MICOTIN) 2 % powder   Topical BID Juvenal Houser MD        sodium chloride flush 0.9 % injection 10 mL  10 mL Intravenous 2 times per day Juvenal Houser MD   10 mL at 09/16/20 0842    sodium chloride flush 0.9 % injection 10 mL  10 mL Intravenous PRN Juvenal Houser MD        acetaminophen (TYLENOL) tablet 650 mg  650 mg Oral Q6H PRN Juvenal Houser MD        Or   Gladystine Mower acetaminophen (TYLENOL) suppository 650 mg  650 mg Rectal Q6H PRN Juvenal Houser MD        polyethylene glycol [facility]) packet 17 g  17 g Oral Daily PRN Juvenal Houser MD        insulin lispro (HUMALOG) injection vial 0-6 Units  0-6 Units Subcutaneous TID WC Juvenal Houser MD        insulin lispro (HUMALOG) injection vial 0-3 Units  0-3 Units Subcutaneous Nightly Juvenal Houser MD        glucose (GLUTOSE) 40 % oral gel 15 g  15 g Oral PRN Juvenal Houser MD        glucagon (rDNA) injection 1 mg  1 mg Intramuscular PRN Juvenal Houser MD           Past Medical History:  Past Medical History:   Diagnosis Date    [facility]ANCHAN (acute [facility]) ([facility] 12/30/2018    Arthritis     Atrial fibrillation (HCC)     BiPAP (biphasic positive airway pressure) dependence     15cm/11cm    Cancer ([facility]     COLON CA    CHF (congestive heart failure) ([facility] 12/30/2018    Chronic atrial fibrillation 2/12/2017    Depression     Diabetes mellitus ([facility]     Essential hypertension 7/11/2017    History of blood transfusion     Hyperlipidemia     Hypertension     Mixed hyperlipidemia 7/11/2017    Obesity     Obstructive sleep apnea     AHI:  18.7;  In REM AHI:  39.3 per PSG, 11/2008; split-night PSG, 8/2017 AHI: 35.1    Osteoarthritis     Palliative care patient 01/08/2019    Pneumonia due to organism     Restless leg syndrome 8/1/2017    Sinus complaint     Swelling        Past Surgical History:  Past Surgical History:   Procedure Laterality Date    ADENOIDECTOMY      APPENDECTOMY      CARDIOVERSION      x 2     CHOLECYSTECTOMY      COLONOSCOPY  05/17/2013    Dr. Henrique Matias  03/22/2012    Dr Rios Jb yr recall    COLONOSCOPY  06/30/2008    Dr Niurka Dominguez, 3 yr recall    COLONOSCOPY N/A 8/2/2019    Dr Lily Seay sided anastomosis appeared normal and patent-prn (age)   [place] HEMICOLECTOMY Right 1998    2 FT OF COLON REMOVED DUE TO CA    HYSTERECTOMY      [place] COLONOSCOPY W/BIOPSY SINGLE/MULTIPLE N/A 6/6/2017    Dr Sergio Blancas colon anastomosis-Tubular AP (-) dysplasia x 2--3 yr recall    TONSILLECTOMY      TUMOR REMOVAL      stomach    UPPER GASTROINTESTINAL ENDOSCOPY  05/17/2013    Dr. Mahad Farrell ulcer disease-Chelo (+)    UPPER GASTROINTESTINAL ENDOSCOPY  03/28/2012    Dr Syeda Gentile antral ulceration with a visible vessel    UPPER GASTROINTESTINAL ENDOSCOPY N/A 8/2/2019    Dr Marilou Goodell       Family History  Family History   Problem Relation Age of Onset    No Known Problems Mother     No Known Problems Father     Colon Cancer Daughter     Colon Polyps Daughter     Esophageal Cancer Neg Hx     Liver Cancer Neg Hx     Rectal Cancer Neg Hx     Liver Disease Neg Hx     Stomach Cancer Neg Hx        Social History  Social History     Socioeconomic History    Marital status:      Spouse name: Not on file    Number of children: Not on file    Years of education: Not on file    Highest education level: Not on file   Occupational History    Not on file   Social Needs    Financial resource strain: Not on file    Food insecurity     Worry: Not on file     Inability: Not on file    Transportation needs     Medical: Not on file     Non-medical: Not on file   Tobacco Use    Smoking status: Never Smoker    Smokeless tobacco: Never Used   Substance and Sexual Activity    Alcohol use: No    Drug use: No    Sexual activity: Not Currently   Lifestyle    Physical activity     Days per week: Not on file     Minutes per session: Not on file    Stress: Not on file   Relationships    Social connections     Talks on phone: Not on file     Gets together: Not on file     Attends Baptism service: Not on file     Active member of club or organization: Not on file     Attends meetings of clubs or organizations: Not on file     Relationship status: Not on file    Intimate partner violence     Fear of current or ex partner: Not on file     Emotionally abused: Not on file     Physically abused: Not on file     Forced sexual activity: Not on file   Other Topics Concern    Not on file   Social History Narrative    Not on file         Review of Systems:  History obtained from chart review and the patient  General ROS: No fever or chills  Respiratory ROS: No cough, shortness of breath, wheezing  Cardiovascular ROS: No chest pain or palpitations  Gastrointestinal ROS: No abdominal pain or melena  Genito-Urinary ROS: positive for - dysuria  Musculoskeletal ROS: No joint pain or swelling   14 point ROS reviewed with the patient and negative except as noted above and in the HPI unless unable to obtain.     Objective:  Patient Vitals for the past 24 hrs:   BP Temp Temp src Pulse Resp SpO2 Height Weight   09/16/20 0757 134/66 98.2 °F (36.8 °C) -- 51 16 93 % -- --   09/16/20 0215 (!) 96/44 97.5 °F (36.4 °C) Temporal 56 20 91 % -- --   09/15/20 2236 128/68 97.1 °F (36.2 °C) Temporal 53 20 94 % -- --   09/15/20 2009 -- -- -- 55 -- -- -- --   09/15/20 1949 134/73 97 °F (36.1 °C) Temporal 51 20 92 % 5' 5\" (1.651 m) 236 lb (107 kg)   09/15/20 1930 (!) 147/65 -- -- 51 20 93 % input(s): CXSURG in the last 72 hours. Radiology reports as per the Radiologist  Radiology: Ct Head Wo Contrast    Result Date: 9/15/2020  CT BRAIN without contrast 9/15/2020 4:53 PM HISTORY: Altered mental status. COMPARISON: 1/7/2019 DLP: 797 mGy cm. Automated exposure control was utilized to diminish patient radiation dose. TECHNIQUE: Serial axial tomographic images of the brain were obtained without the use of intravenous contrast. FINDINGS: The midline structures are nondisplaced. There is moderate cerebral and cerebellar volume loss, with an associated increase in the prominence of the ventricles and sulci. The basilar cisterns are normal in size and configuration. There is no evidence of intracranial hemorrhage or mass-effect. There is low attenuation in the periventricular white matter, consistent with chronic ischemic change. There are no abnormal extra-axial fluid collections. There is no evidence of tonsillar herniation. The included orbits and their contents are unremarkable. The visualized paranasal sinuses, mastoid air cells and middle ear cavities are clear. The visualized osseous structures and overlying soft tissues of the skull and face are intact. Moderate cerebral and cerebellar volume loss with chronic microvascular disease but no evidence of acute intracranial process. Signed by Dr Deshawn Nowak on 9/15/2020 6:21 PM    Xr Chest Portable    Result Date: 9/15/2020  EXAMINATION:  XR CHEST PORTABLE  9/15/2020 4:44 PM HISTORY: Altered mental status. COMPARISON: 1/7/2019. FINDINGS:  There is hypoventilation and vascular crowding. There are patchy infiltrates at the left lung base. The density in the right lung base is somewhat lobular and masslike. There is mild cardiomegaly. No significant pleural effusion is seen. 1. Hypoventilation with vascular crowding. 2. Patchy infiltrates in both lung bases. Oval opacity at the right lung base may represent rounded atelectasis or a rounded mass. Follow-up recommended. The full report of this exam was immediately signed and available to the emergency room. The patient is currently in the emergency room. Signed by Dr Dora Roa on 9/15/2020 4:46 PM       Assessment   1. Stage IV chronic kidney disease baseline. 2.  Type II diabetic nephropathy. 3.  Encephalopathy/metabolic. 4.  Active urinary tract infection. 5.  Bilateral patchy infiltrate in lungs. 6.  Insulin-dependent type 2 diabetes. Plan:  1. Agree with IV antibiotics. 2.  Slow hydration. 3.  Urinary electrolytes. Thank you for the consult, we appreciate the opportunity to provide care to your patients. Feel free to contact me if I can be of any further assistance.       Cesar Arango MD  09/16/20  9:36 AM

## 2020-09-16 NOTE — PROGRESS NOTES
Physical Therapy    Facility/Department: [facility] ONCOLOGY UNIT  Initial Assessment    NAME: Amol Berrios  : 1940  MRN: [id number]    Date of Service: 2020    Discharge Recommendations:  Continue to assess pending progress, Patient would benefit from continued therapy after discharge, 24 hour supervision or assist        Assessment   Body structures, Functions, Activity limitations: Decreased functional mobility ; Decreased strength;Decreased safe awareness;Decreased balance;Decreased posture  Assessment: Pt. will benefit from PT to decrease impairments. Pt. would be safe to d/c home with 24 hr. care. Pt. needs to use RW and gait belt for amb. with staff and family. Will progress pt. with mobility as she tolerates. Treatment Diagnosis: impaired gait and mobility  Prognosis: Good  Decision Making: Low Complexity  PT Education: Goals;PT Role;Plan of Care;Gait Training;Transfer Training;General Safety; Functional Mobility Training;Family Education  Patient Education: staff A, call bell use  Barriers to Learning: dementia  REQUIRES PT FOLLOW UP: No  Activity Tolerance  Activity Tolerance: Patient Tolerated treatment well;Patient limited by cognitive status  Activity Tolerance: pt. has dementia       Patient Diagnosis(es): The primary encounter diagnosis was Altered mental status, unspecified altered mental status type. Diagnoses of KANCHAN (acute kidney injury) ([address] Bacterial UTI, and Abnormal chest x-ray were also pertinent to this visit.      has a past medical history of KANCHAN (acute kidney injury) ([address] Arthritis, Atrial fibrillation ([address] BiPAP (biphasic positive airway pressure) dependence, Cancer ([address] CHF (congestive heart failure) ([address] Chronic atrial fibrillation, Depression, Diabetes mellitus ([address] Essential hypertension, History of blood transfusion, Hyperlipidemia, Hypertension, Mixed hyperlipidemia, Obesity, Obstructive sleep apnea, Osteoarthritis, Palliative care patient, Pneumonia due to organism, Restless leg syndrome, Sinus complaint, and Swelling.   has a past surgical history that includes Cholecystectomy; Appendectomy; Hysterectomy; Tonsillectomy; Adenoidectomy; tumor removal; Colonoscopy (05/17/2013); Upper gastrointestinal endoscopy (05/17/2013); Upper gastrointestinal endoscopy (03/28/2012); Colonoscopy (03/22/2012); hemicolectomy (Right, 1998); Colonoscopy (06/30/2008); Cardioversion; pr colonoscopy w/biopsy single/multiple (N/A, 6/6/2017); Upper gastrointestinal endoscopy (N/A, 8/2/2019); and Colonoscopy (N/A, 8/2/2019). Restrictions  Restrictions/Precautions  Restrictions/Precautions: Fall Risk  Required Braces or Orthoses?: No  Vision/Hearing  Vision: Within Functional Limits  Hearing: Within functional limits     Subjective  General  Chart Reviewed: Yes  Patient assessed for rehabilitation services?: Yes  Response To Previous Treatment: Not applicable  Family / Caregiver Present: Yes(daughter)  Referring Practitioner: Bradley Mckeon MD  Referral Date : 09/16/20  Diagnosis: AMS, UTI, RLL oval mass suspected  Follows Commands: Impaired  Other (Comment): easily distracted  General Comment  Comments: RNDariel PT. Subjective  Subjective: Pt. asking if she was going to go home today, wants to be in her own bed.   Pain Screening  Patient Currently in Pain: No  Pain Assessment  Pain Assessment: 0-10  Pain Level: 0  Vital Signs  Patient Currently in Pain: No  Pre Treatment Pain Screening  Intervention List: Patient able to continue with treatment    Orientation     Social/Functional History  Social/Functional History  Lives With: Alone  Type of Home: House  Home Layout: One level  Bathroom Shower/Tub: Tub/Shower unit  Bathroom Toilet: Standard  Bathroom Equipment: Shower chair, Grab bars in shower  Home Equipment: Rolling walker, Cane  ADL Assistance: Independent  Homemaking Assistance: Independent  Ambulation Assistance: Independent  Transfer Assistance: Independent  Active : No  Additional Comments: pt does not wear shoes, family checks on her frequently  Cognition   Cognition  Overall Cognitive Status: Exceptions  Following Commands: Follows one step commands with increased time; Follows one step commands with repetition  Attention Span: Attends with cues to redirect  Safety Judgement: Decreased awareness of need for assistance;Decreased awareness of need for safety  Problem Solving: Assistance required to generate solutions  Initiation: Requires cues for some  Sequencing: Requires cues for some    Objective     Observation/Palpation  Posture: Fair  Observation: pt has curvature of thoracic spine causing pt to hunch forward; according to daughter wore brace as a child. Pt. had depends fastened tightly. AROM RLE (degrees)  RLE AROM: WFL  AROM LLE (degrees)  LLE AROM : WFL  Strength RLE  Strength RLE: WFL  Comment: grossly 4/5  Strength LLE  Strength LLE: WFL  Comment: grossly 4/5        Bed mobility  Supine to Sit: Stand by assistance  Sit to Supine: Stand by assistance  Transfers  Sit to Stand: Contact guard assistance(with RW). Pt. Sat in chair and assisted with donning mesh underwear to hold dpeneds on and PT/OT unfastened depends to relieve pressure on skin.   Stand to sit: Contact guard assistance  Ambulation  Ambulation?: Yes  Ambulation 1  Surface: level tile  Device: Rolling Walker  Assistance: Contact guard assistance  Quality of Gait: v. cues to stay close to RW  Gait Deviations: Slow Maria Guadalupe;Decreased step length;Decreased step height  Distance: 80'     Balance  Posture: Good  Sitting - Static: Good;+  Sitting - Dynamic: Good;-  Standing - Static: Fair;-  Standing - Dynamic: Poor;+        Plan   Plan  Times per week: 3-7  Plan weeks: 2  Current Treatment Recommendations: Strengthening, Balance Training, Functional Mobility Training, Transfer Training, Gait Training, Safety Education & Training, Positioning, Equipment Evaluation, Education, & procurement, Patient/Caregiver Education & Training  Plan Comment: cont. PT per POC.   Safety Devices  Type of devices: Gait belt, Patient at risk for falls, Nurse notified, Left in chair, Call light within reach(no chair alarm available in room, family member states she will make sure nursing knows if she leaves room)    G-Code       OutComes Score                                                  AM-PAC Score             Goals  Short term goals  Time Frame for Short term goals: 2 wks  Short term goal 1: supine to sit indep  Short term goal 2: sit to stand indep  Short term goal 3: amb. 100' with RW SBA  Patient Goals   Patient goals : go home       Therapy Time   Individual Concurrent Group Co-treatment   Time In           Time Out           Minutes                   Judi Lemus, PT    Electronically signed by Judi Lemus PT on 9/16/2020 at 11:41 AM

## 2020-09-16 NOTE — ACP (ADVANCE CARE PLANNING)
Advance Care Planning     Advance Care Planning Activator (Inpatient)  Conversation Note      Date of ACP Conversation: 9/16/2020    Conversation Conducted with: Patient and pt's daughter Sage Lawson    ACP Activator: Jet Oquendo      [facility] Decision Maker:   Primary Decision Maker: Sonia Priest Child - [phone]    Care Preferences    Ventilation: \"If you were in your present state of health and suddenly became very ill and were unable to breathe on your own, what would your preference be about the use of a ventilator (breathing machine) if it were available to you? \"      Would the patient desire the use of ventilator (breathing machine)?: Yes    \"If your health worsens and it becomes clear that your chance of recovery is unlikely, what would your preference be about the use of a ventilator (breathing machine) if it were available to you? \"     Would the patient desire the use of ventilator (breathing machine)?: Yes      Resuscitation  \"CPR works best to restart the heart when there is a sudden event, like a heart attack, in someone who is otherwise healthy. Unfortunately, CPR does not typically restart the heart for people who have serious health conditions or who are very sick. \"    \"In the event your heart stopped as a result of an underlying serious health condition, would you want attempts to be made to restart your heart (answer \"yes\" for attempt to resuscitate) or would you prefer a natural death (answer \"no\" for do not attempt to resuscitate)? \" Yes       [] Yes   [x] No   Educated Patient / Matt Armendariz regarding differences between Advance Directives and portable DNR orders. Conversation Outcomes:  [x] ACP discussion completed    Patient's daughter to contact this  to confirm current code status with POA.      Electronically signed by Jet Oquendo on 9/16/2020 at 1:22 PM

## 2020-09-16 NOTE — PROGRESS NOTES
Palliative Care/Spiritual Care: Met with pt and pt's daughter [facility] to initiate Palliative care. Pt's daughter says pt has a UTI and sepsis. Advance Directives: Pt has a GPOA and her daughter Rita Jarrett. Liseth Nicole is POA. Pt is a full code with CPR and Ventilator but no Ventilator after no brain activity, according to her daughter [facility]. SEE ACP NOTE. Pain/other symptoms: Pt is not having pain. Social/Spiritual: Pt says she was raised [employer]. Pt/family discussion r/t goals: Pt lives at home and has three daughters, grandchildren, and great-grandchildren. Pt's daughter says pt lived at home but daughters helped by buying groceries and helping with daily living skills. Pt's goal is to return home but daughter says pt will probably go home with her sister José Hernandez because she can't return home at this time. Ultimate goal is to return home with previous quality of life. Provided spiritual care with sustaining presence, nurtured hope, and prayer. Pt's daughter expressed gratitude for spiritual care.     Electronically signed by Marcy Rutledge on 9/16/2020 at 1:17 PM

## 2020-09-16 NOTE — PROGRESS NOTES
4 Eyes Skin Assessment    Shanita Meza is being assessed upon: Admission    I agree that Jairo Mcneill, along with Veronica Smith RN (either 2 RN's or 1 LPN and 1 RN) have performed a thorough Head to Toe Skin Assessment on the patient. ALL assessment sites listed below have been assessed. Areas assessed by both nurses:     [x]   Head, Face, and Ears   [x]   Shoulders, Back, and Chest  [x]   Arms, Elbows, and Hands   [x]   Coccyx, Sacrum, and Ischium  [x]   Legs, Feet, and Heels    Does the Patient have Skin Breakdown?  No    Ronan Prevention initiated: No  Wound Care Orders initiated: No    [facility] nurse consulted for Pressure Injury (Stage 3,4, Unstageable, DTI, NWPT, and Complex wounds) and New or Established Ostomies: No        Primary Nurse eSignature: Ricky Pena RN on 9/15/2020 at 8:25 PM      Co-Signer eSignature: Electronically signed by Veronica Smith RN on 9/15/20 at 9:51 PM CDT

## 2020-09-16 NOTE — PROGRESS NOTES
Christos Sheffield arrived to room # 428. Presented with: altered mental status, acute kidney injury  Mental Status: Patient is disoriented. Vitals:    09/15/20 2009   BP:    Pulse: 55   Resp:    Temp:    SpO2:      Patient safety contract and falls prevention contract reviewed with patient Yes. Oriented Family and patient to room. Call light within reach. Yes.   Needs, issues or concerns expressed at this time: no.      Electronically signed by Eliel Peterson RN on 9/15/2020 at 8:24 PM

## 2020-09-16 NOTE — H&P
Patient Information:  Patient: Abby Cunningham  MRN: [id number]   Acct: [de-identified]  YOB: 1940  Admit Date: 9/15/2020      Primary Care Physician: Princess Jordin MD  Advance Directive: Full Code  Health Care Proxy: her daughter Mrs. Cara Kwon, her daughter - changing from former daughter        SUBJECTIVE:    Chief Complaint   Patient presents with    Abnormal Lab     decreased kidney fcn. pt had labs drawn last week that showed a decrease in kidney fcn from 26% to 18%    Altered Mental Status     increased confusion. EP Sign Out:  10 days AMS  UTI +  Has \"abnormal renal function\" as well    HPI:  Mrs. Abby Cunningham is a pleasant 70year old  American lady from home. She has a history of moderate dementia with sundowning at night. She was more confused than her usual self last night, went outside and was screaming. She has been more wild over the last week and a half. Review of Systems:   Review of Systems   Constitutional: Negative for chills, diaphoresis, fatigue and fever. HENT: Negative for congestion and sneezing. Respiratory: Negative for shortness of breath. Cardiovascular: Negative for chest pain. Gastrointestinal: Negative for abdominal pain, constipation and diarrhea. Genitourinary: Negative for dysuria and frequency.         Has urgency always - no changes   Neurological: Negative for weakness and light-headedness.      (following subjective sections as per chart review)    Past Medical History:   Diagnosis Date    KANCHAN (acute kidney injury) ([facility] [address] 12/30/2018    Arthritis     Atrial fibrillation (HCC)     BiPAP (biphasic positive airway pressure) dependence     15cm/11cm    Cancer (HCC)     COLON CA    CHF (congestive heart failure) ([facility] [address] 12/30/2018    Chronic atrial fibrillation 2/12/2017    Depression     Diabetes mellitus ([facility] [address]     Essential hypertension 7/11/2017    History of blood transfusion     Hyperlipidemia     Hypertension     Medication Dose Route Frequency Provider Last Rate Last Dose    azithromycin (ZITHROMAX) 250 mg in dextrose 5 % 250 mL IVPB  250 mg Intravenous Q24H Kenneth Tan MD        cefTRIAXone (ROCEPHIN) 1 g in sterile water 10 mL IV syringe  1 g Intravenous Q24H Kenneth Tan MD        [Held by provider] furosemide (LASIX) tablet 40 mg  40 mg Oral BID Kenneth Tan MD   40 mg at 09/16/20 0842    [START ON 9/17/2020] gabapentin (NEURONTIN) capsule 300 mg  300 mg Oral Q3 Days Kenneth Tan MD        calcitRIOL (ROCALTROL) capsule 0.25 mcg  0.25 mcg Oral Daily Kenneth Tan MD   0.25 mcg at 09/16/20 2125    atorvastatin (LIPITOR) tablet 40 mg  40 mg Oral Nightly Kenneth Tan MD   40 mg at 09/16/20 0128    donepezil (ARICEPT) tablet 5 mg  5 mg Oral Daily Kenneth Tan MD   5 mg at 09/16/20 3659    therapeutic multivitamin-minerals 1 tablet  1 tablet Oral Daily Kenneth Tan MD   1 tablet at 09/16/20 6562    rivaroxaban (XARELTO) tablet 15 mg  15 mg Oral Dinner Kenneth Tan MD   15 mg at 09/16/20 0128    sotalol (BETAPACE) tablet 40 mg  40 mg Oral BID Kenneth Tan MD   40 mg at 09/16/20 2539    sertraline (ZOLOFT) tablet 25 mg  25 mg Oral Daily Kenneth Tan MD   25 mg at 09/16/20 0842    miconazole (MICOTIN) 2 % powder   Topical BID Kenneth Tan MD        sodium chloride flush 0.9 % injection 10 mL  10 mL Intravenous 2 times per day Kenneth Tan MD   10 mL at 09/16/20 0842    sodium chloride flush 0.9 % injection 10 mL  10 mL Intravenous PRN Kenneth Tan MD        acetaminophen (TYLENOL) tablet 650 mg  650 mg Oral Q6H PRN Kenneth Tan MD        Or   Keegan Brantley acetaminophen (TYLENOL) suppository 650 mg  650 mg Rectal Q6H PRN Kenneth Tan MD        polyethylene glycol [facility]) packet 17 g  17 g Oral Daily PRN Kenneth Tan MD        insulin lispro (HUMALOG) injection vial 0-6 Units  0-6 Units Subcutaneous TID WC Kenneth Tan MD        insulin lispro (HUMALOG) injection vial 0-3 Units  0-3 Units Subcutaneous Nightly Tanvir Del Toro MD        glucose (GLUTOSE) 40 % oral gel 15 g  15 g Oral PRN Tanvir Del Toro MD        glucagon (rDNA) injection 1 mg  1 mg Intramuscular PRN Tanvir Del Toro MD        sodium bicarbonate 75 mEq in sodium chloride 0.45 % 1,000 mL infusion   Intravenous Continuous Tanvir Del Toro MD         Home Medications:  Prior to Admission medications    Medication Sig Start Date End Date Taking? Authorizing Provider   sotalol (BETAPACE) 80 MG tablet TAKE 1/2 TABLET BY MOUTH TWICE DAILY 4/24/20  Yes EMY Cohen   furosemide (LASIX) 40 MG tablet Take 40 mg by mouth 2 times daily   Yes Historical Provider, MD   nystatin (MYCOSTATIN) 045740 UNIT/GM powder Apply topically 4 times daily Apply topically 4 times daily. Yes Historical Provider, MD   donepezil (ARICEPT) 5 MG tablet Take 5 mg by mouth daily    Yes Historical Provider, MD   rivaroxaban (XARELTO) 15 MG TABS tablet Take 1 tablet by mouth daily (with breakfast)  Patient taking differently: Take 15 mg by mouth nightly  2/26/19  Yes Obed Shone, APRN   calcitRIOL (ROCALTROL) 0.25 MCG capsule Take 1 capsule by mouth daily 1/7/19  Yes Leonel Victoria MD   Multiple Vitamins-Minerals (THERAPEUTIC MULTIVITAMIN-MINERALS) tablet Take 1 tablet by mouth daily 1/6/19 9/15/20 Yes Leonel Victoria MD   gabapentin (NEURONTIN) 300 MG capsule Take 300 mg by mouth every 3 days.     Yes Historical Provider, MD   atorvastatin (LIPITOR) 40 MG tablet Take 40 mg by mouth nightly    Yes Historical Provider, MD   Sertraline HCl (ZOLOFT PO) Take 25 mg by mouth daily    Yes Historical Provider, MD         OBJECTIVE:    Vitals:    09/16/20 0757   BP: 134/66   Pulse: 51   Resp: 16   Temp: 98.2 °F (36.8 °C)   SpO2: 93%   breathing room air    /66   Pulse 51   Temp 98.2 °F (36.8 °C)   Resp 16   Ht 5' 5\" (1.651 m)   Wt 236 lb (107 kg)   SpO2 93%   BMI 39.27 kg/m²     No intake or output data in the 24 hours ending 09/16/20 5693    Physical Exam  Vitals signs reviewed. Exam conducted with a chaperone present. Constitutional:       Appearance: Normal appearance. She is obese. HENT:      Head: Normocephalic and atraumatic. Nose: No congestion or rhinorrhea. Eyes:      General:         Right eye: No discharge. Left eye: No discharge. Neck:      Musculoskeletal: Neck supple. Comments: Trachea appears midline  Cardiovascular:      Rate and Rhythm: Normal rate and regular rhythm. Heart sounds: No murmur. No friction rub. No gallop. Pulmonary:      Effort: No respiratory distress. Breath sounds: No stridor. No wheezing, rhonchi or rales. Chest:      Chest wall: No tenderness. Abdominal:      General: Bowel sounds are normal.      Tenderness: There is no abdominal tenderness. There is no guarding or rebound. Comments: Has abdominal obesity   Skin:     General: Skin is warm. Comments: nondiaphoretic   Neurological:      Mental Status: She is alert. Comments: Oriented to person and place but no elements of date   Psychiatric:         Mood and Affect: Mood normal.         Behavior: Behavior normal.         LABORATORY DATA:    CBC:   Recent Labs     09/15/20  1534   WBC 10.8   HGB 11.8*   HCT 37.6        BMP:   Recent Labs     09/15/20  1534 09/16/20  0309    139   K 4.8 3.8   CL 97* 103   CO2 28 24   BUN 45* 43*   CREATININE 2.2* 2.1*   CALCIUM 9.3 8.4*     Hepatic Profile:   Recent Labs     09/15/20  1534   AST 24   ALT 10   BILITOT 0.4   ALKPHOS 119*     Coag Panel: No results for input(s): INR, PROTIME, APTT in the last 72 hours. Cardiac Enzymes: No results for input(s): Judi Ke in the last 72 hours. Pro-BNP: No results for input(s): PROBNP in the last 72 hours. A1C: No results for input(s): LABA1C in the last 72 hours.     TSH: Invalid input(s): LABTSH    ABG: No results for input(s): PHART, EFD2SSK, PO2ART, LVY1PDA, BEART, HGBAE, K9YERLAP, CARBOXHGBART in the last 72 hours. Urinalysis:   Lab Results   Component Value Date    NITRU Negative 09/15/2020    WBCUA 35 09/15/2020    BACTERIA None Seen 09/15/2020    RBCUA 1 09/15/2020    BLOODU Negative 09/15/2020    SPECGRAV 1.011 09/15/2020    GLUCOSEU Negative 09/15/2020       IMAGING:  Ct Head Wo Contrast  Result Date: 9/15/2020  Moderate cerebral and cerebellar volume loss with chronic microvascular disease but no evidence of acute intracranial process. Signed by Dr Devon Ramirez on 9/15/2020 6:21 PM  Xr Chest Portable  Result Date: 9/15/2020  1. Hypoventilation with vascular crowding. 2. Patchy infiltrates in both lung bases. Oval opacity at the right lung base may represent rounded atelectasis or a rounded mass. Follow-up recommended. The full report of this exam was immediately signed and available to the emergency room. The patient is currently in the emergency room.  Signed by Dr Scottie Bennett on 9/15/2020 4:46 PM        ASESSMENTS & PLANS:    10 days AMS most likley due to UTI +  Has \"abnormal renal function\" as well (KANCHAN on CKD 3A versus 3B with GFR 48 versus 44 respectively)  Admit to medical lopez under hospitalist team  Rocephin and Azithromycin  IVF with NaHCO3 - will defer to Nephrology  Nephro consult in AM  Urine studies added on to UA from ED  F/U Urine Cx  Will defer on renal US    Hx Yeast Infection:  Miconazole PRN    RLL oval mass suspected:  Needs OP follow up      Chronic Medical Problems:  Continue home regimen as indicated      Patient Active Problem List   Diagnosis    History of colon cancer    Chronic anticoagulation    PAF (paroxysmal atrial fibrillation) ([address]    History of bradycardia    Hypoglycemia due to insulin    Type 2 diabetes mellitus without complication, with long-term current use of insulin ([address]    Pneumonia due to organism    Hypoglycemic encephalopathy    Abnormal chest x-ray    Hypokalemia    Hypomagnesemia    Essential hypertension    Mixed hyperlipidemia    CPAP (continuous positive airway pressure) dependence    Somnolence, daytime    Restless leg syndrome    Hypoxemia    Obstructive sleep apnea    BiPAP (biphasic positive airway pressure) dependence    [facility] [address]    Hypochloremia    [facility]    Macrocytic anemia    Acute on chronic diastolic heart failure (HCC)    Palliative care patient    Acute hypoxemic respiratory failure (HCC)    Chronic anemia    History of adenomatous polyp of colon    Iron deficiency anemia    Heme positive stool    AMS (altered mental status)      sodium chloride        azithromycin  250 mg Intravenous Q24H    cefTRIAXone (ROCEPHIN) IV  1 g Intravenous Q24H    [Held by provider] furosemide  40 mg Oral BID    [START ON 9/17/2020] gabapentin  300 mg Oral Q3 Days    calcitRIOL  0.25 mcg Oral Daily    atorvastatin  40 mg Oral Nightly    donepezil  5 mg Oral Daily    therapeutic multivitamin-minerals  1 tablet Oral Daily    rivaroxaban  15 mg Oral Dinner    sotalol  40 mg Oral BID    sertraline  25 mg Oral Daily    miconazole   Topical BID    sodium chloride flush  10 mL Intravenous 2 times per day    insulin lispro  0-6 Units Subcutaneous TID WC    insulin lispro  0-3 Units Subcutaneous Nightly       Supoportive and Prophylactic Txx:  DVT Prophylaxis: AC  GI (PUD) Ppx: not indicated as such  PT consult for evalutation and Txx as indicated: as per age  DIET GENERAL;  sodium chloride flush, acetaminophen **OR** acetaminophen, polyethylene glycol, glucose, glucagon (rDNA)      Care time of >45 minutes  Pt seen/examined and admitted to inpatient status. Inpatient status is used for patients with an expected LOS extending past two midnights due to medical therapy and or critical care needs, otherwise patients are placed to OBServation status. Signed:  Electronically signed by Tanvir Del Toro MD on 9/16/20 at 09:56 AM CDT.

## 2020-09-17 VITALS
TEMPERATURE: 97.7 F | BODY MASS INDEX: 39.32 KG/M2 | DIASTOLIC BLOOD PRESSURE: 52 MMHG | HEART RATE: 62 BPM | OXYGEN SATURATION: 96 % | RESPIRATION RATE: 16 BRPM | HEIGHT: 65 IN | SYSTOLIC BLOOD PRESSURE: 116 MMHG | WEIGHT: 236 LBS

## 2020-09-17 LAB
ALBUMIN SERPL-MCNC: 3.8 G/DL (ref 3.5–5.2)
ALP BLD-CCNC: 101 U/L (ref 35–104)
ALT SERPL-CCNC: 7 U/L (ref 5–33)
ANION GAP SERPL CALCULATED.3IONS-SCNC: 12 MMOL/L (ref 7–19)
AST SERPL-CCNC: 17 U/L (ref 5–32)
BILIRUB SERPL-MCNC: 0.3 MG/DL (ref 0.2–1.2)
BUN BLDV-MCNC: 34 MG/DL (ref 8–23)
CALCIUM SERPL-MCNC: 9 MG/DL (ref 8.8–10.2)
CHLORIDE BLD-SCNC: 105 MMOL/L (ref 98–111)
CO2: 24 MMOL/L (ref 22–29)
CREAT SERPL-MCNC: 1.6 MG/DL (ref 0.5–0.9)
GFR AFRICAN AMERICAN: 38
GFR NON-AFRICAN AMERICAN: 31
GLUCOSE BLD-MCNC: 136 MG/DL (ref 70–99)
GLUCOSE BLD-MCNC: 139 MG/DL (ref 74–109)
GLUCOSE BLD-MCNC: 208 MG/DL (ref 70–99)
HCT VFR BLD CALC: 34.1 % (ref 37–47)
HEMOGLOBIN: 10.7 G/DL (ref 12–16)
MAGNESIUM: 1.9 MG/DL (ref 1.6–2.4)
MCH RBC QN AUTO: 28.1 PG (ref 27–31)
MCHC RBC AUTO-ENTMCNC: 31.4 G/DL (ref 33–37)
MCV RBC AUTO: 89.5 FL (ref 81–99)
ORGANISM: ABNORMAL
PDW BLD-RTO: 15.2 % (ref 11.5–14.5)
PERFORMED ON: ABNORMAL
PERFORMED ON: ABNORMAL
PHOSPHORUS: 2.8 MG/DL (ref 2.5–4.5)
PLATELET # BLD: 244 K/UL (ref 130–400)
PMV BLD AUTO: 11 FL (ref 9.4–12.3)
POTASSIUM SERPL-SCNC: 4.5 MMOL/L (ref 3.5–5)
RBC # BLD: 3.81 M/UL (ref 4.2–5.4)
SODIUM BLD-SCNC: 141 MMOL/L (ref 136–145)
TOTAL PROTEIN: 6.2 G/DL (ref 6.6–8.7)
URINE CULTURE, ROUTINE: ABNORMAL
URINE CULTURE, ROUTINE: ABNORMAL
WBC # BLD: 9.3 K/UL (ref 4.8–10.8)

## 2020-09-17 PROCEDURE — 84100 ASSAY OF PHOSPHORUS: CPT

## 2020-09-17 PROCEDURE — 83735 ASSAY OF MAGNESIUM: CPT

## 2020-09-17 PROCEDURE — 97110 THERAPEUTIC EXERCISES: CPT

## 2020-09-17 PROCEDURE — 80053 COMPREHEN METABOLIC PANEL: CPT

## 2020-09-17 PROCEDURE — 82947 ASSAY GLUCOSE BLOOD QUANT: CPT

## 2020-09-17 PROCEDURE — 6360000002 HC RX W HCPCS: Performed by: HOSPITALIST

## 2020-09-17 PROCEDURE — 6370000000 HC RX 637 (ALT 250 FOR IP): Performed by: HOSPITALIST

## 2020-09-17 PROCEDURE — 97116 GAIT TRAINING THERAPY: CPT

## 2020-09-17 PROCEDURE — 36415 COLL VENOUS BLD VENIPUNCTURE: CPT

## 2020-09-17 PROCEDURE — 2580000003 HC RX 258: Performed by: HOSPITALIST

## 2020-09-17 PROCEDURE — 85027 COMPLETE CBC AUTOMATED: CPT

## 2020-09-17 RX ORDER — FUROSEMIDE 40 MG/1
TABLET ORAL
Qty: 30 TABLET | Refills: 0 | Status: ON HOLD | OUTPATIENT
Start: 2020-09-17 | End: 2022-05-14 | Stop reason: HOSPADM

## 2020-09-17 RX ORDER — CEFUROXIME AXETIL 500 MG/1
500 TABLET ORAL 2 TIMES DAILY
Qty: 14 TABLET | Refills: 0 | Status: SHIPPED | OUTPATIENT
Start: 2020-09-18 | End: 2020-09-25

## 2020-09-17 RX ADMIN — MULTIPLE VITAMINS W/ MINERALS TAB 1 TABLET: TAB at 08:26

## 2020-09-17 RX ADMIN — AZITHROMYCIN MONOHYDRATE 250 MG: 500 INJECTION, POWDER, LYOPHILIZED, FOR SOLUTION INTRAVENOUS at 13:56

## 2020-09-17 RX ADMIN — GABAPENTIN 300 MG: 300 CAPSULE ORAL at 08:27

## 2020-09-17 RX ADMIN — CEFTRIAXONE 1 G: 1 INJECTION, POWDER, FOR SOLUTION INTRAMUSCULAR; INTRAVENOUS at 14:11

## 2020-09-17 RX ADMIN — INSULIN LISPRO 2 UNITS: 100 INJECTION, SOLUTION INTRAVENOUS; SUBCUTANEOUS at 11:40

## 2020-09-17 RX ADMIN — CALCITRIOL CAPSULES 0.25 MCG 0.25 MCG: 0.25 CAPSULE ORAL at 08:26

## 2020-09-17 RX ADMIN — SOTALOL HYDROCHLORIDE 40 MG: 80 TABLET ORAL at 08:27

## 2020-09-17 RX ADMIN — DONEPEZIL HYDROCHLORIDE 5 MG: 5 TABLET, FILM COATED ORAL at 08:26

## 2020-09-17 RX ADMIN — SERTRALINE HYDROCHLORIDE 25 MG: 50 TABLET ORAL at 08:26

## 2020-09-17 NOTE — PROGRESS NOTES
of blood transfusion     Hyperlipidemia     Hypertension     Mixed hyperlipidemia 7/11/2017    Obesity     Obstructive sleep apnea     AHI:  18.7; In REM AHI:  39.3 per PSG, 11/2008; split-night PSG, 8/2017 AHI: 35.1    Osteoarthritis     Palliative care patient 01/08/2019    Pneumonia due to organism     Restless leg syndrome 8/1/2017    Sinus complaint     Swelling          PAST SURGICAL HISTORY:  Past Surgical History:   Procedure Laterality Date    ADENOIDECTOMY      APPENDECTOMY      CARDIOVERSION      x 2     CHOLECYSTECTOMY      COLONOSCOPY  05/17/2013    Dr. Yuli Naqvi COLONOSCOPY  03/22/2012    Dr Wilhelm Rina yr recall    COLONOSCOPY  06/30/2008    Dr Jeronimo Collazo, 3 yr recall    COLONOSCOPY N/A 8/2/2019    Dr Wright Purpsammie sided anastomosis appeared normal and patent-prn (age)   Anthony Olp HEMICOLECTOMY Right 1998    2 FT OF COLON REMOVED DUE TO CA    HYSTERECTOMY      PA COLONOSCOPY W/BIOPSY SINGLE/MULTIPLE N/A 6/6/2017    Dr Najma Waller colon anastomosis-Tubular AP (-) dysplasia x 2--3 yr recall    TONSILLECTOMY      TUMOR REMOVAL      stomach    UPPER GASTROINTESTINAL ENDOSCOPY  05/17/2013    Dr. Anne Apo ulcer disease-Chelo (+)    UPPER GASTROINTESTINAL ENDOSCOPY  03/28/2012    Dr Rachel Servin antral ulceration with a visible vessel    UPPER GASTROINTESTINAL ENDOSCOPY N/A 8/2/2019    Dr Clarice Montelongo-Gastritis        FAMILY HISTORY:  Family History   Problem Relation Age of Onset    No Known Problems Mother     No Known Problems Father     Colon Cancer Daughter     Colon Polyps Daughter     Esophageal Cancer Neg Hx     Liver Cancer Neg Hx     Rectal Cancer Neg Hx     Liver Disease Neg Hx     Stomach Cancer Neg Hx            OBJECTIVE:  /75   Pulse 59   Temp 96.9 °F (36.1 °C) (Temporal)   Resp 15   Ht 5' 5\" (1.651 m)   Wt 236 lb (107 kg)   SpO2 92%   BMI 39.27 kg/m²   No intake/output data recorded.     PHYSICAL  EXAMINATION:    RIMA:   Patient is pleasantly confused, appears tired and fatigued   Head/Eyes:  Normocephalic, atraumatic, EOMI and PERRLA bilaterally   Respiratory:   Bilateral decreased air entry in both lung fields, mild B/L crackles, patient has mild bibasilar rhonchi   Cardiovascular:  Regular rate and rhythm, S1+S2+0, no murmurs/rubs   Abdomen:   Soft, non-tender, bowel sounds +ve, no organomegaly   Extremities: Moves all, decreased range of motion, no edema   Neurologic:  Unable to be obtained . .. Patient is pleasantly confused! Psychiatric:  Unable to be obtained . .. Patient is pleasantly confused! CURRENT MEDICATIONS:  Scheduled:   azithromycin  250 mg Intravenous Q24H    cefTRIAXone (ROCEPHIN) IV  1 g Intravenous Q24H    [Held by provider] furosemide  40 mg Oral BID    [START ON 9/17/2020] gabapentin  300 mg Oral Q3 Days    calcitRIOL  0.25 mcg Oral Daily    atorvastatin  40 mg Oral Nightly    donepezil  5 mg Oral Daily    therapeutic multivitamin-minerals  1 tablet Oral Daily    rivaroxaban  15 mg Oral Dinner    sotalol  40 mg Oral BID    sertraline  25 mg Oral Daily    miconazole   Topical BID    sodium chloride flush  10 mL Intravenous 2 times per day    insulin lispro  0-6 Units Subcutaneous TID WC    insulin lispro  0-3 Units Subcutaneous Nightly        PRN:  sodium chloride flush, 10 mL, PRN  acetaminophen, 650 mg, Q6H PRN    Or  acetaminophen, 650 mg, Q6H PRN  polyethylene glycol, 17 g, Daily PRN  glucose, 15 g, PRN  glucagon (rDNA), 1 mg, PRN        Infusions:   sodium chloride 75 mL/hr at 09/16/20 2017       Laboratory Data:  Recent Labs     09/15/20  1534   WBC 10.8   HGB 11.8*        Recent Labs     09/15/20  1534 09/16/20  0309    139   K 4.8 3.8   CL 97* 103   CO2 28 24   BUN 45* 43*   CREATININE 2.2* 2.1*   GLUCOSE 127* 161*     Recent Labs     09/15/20  1534   AST 24   ALT 10   BILITOT 0.4   ALKPHOS 119*     Troponin T: No results for input(s): TROPONINI in the last 72 hours.   Pro-BNP: No results for input(s): BNP in the last 72 hours. INR: No results for input(s): INR in the last 72 hours. UA:  Recent Labs     09/15/20  1650   COLORU YELLOW   PHUR 5.0   WBCUA 35*   RBCUA 1   BACTERIA None Seen*   CLARITYU CLOUDY*   SPECGRAV 1.011   LEUKOCYTESUR SMALL*   UROBILINOGEN 0.2   BILIRUBINUR Negative   BLOODU Negative   GLUCOSEU Negative     A1C: No results for input(s): LABA1C in the last 72 hours. ABG:No results for input(s): PHART, RYK5BXD, PO2ART, XYP2HSB, BEART, HGBAE, S1XJCUNL, CARBOXHGBART in the last 72 hours. Imaging:    Ct Head Wo Contrast    Result Date: 9/15/2020  Moderate cerebral and cerebellar volume loss with chronic microvascular disease but no evidence of acute intracranial process. Signed by Dr Jackie Shah on 9/15/2020 6:21 PM    Xr Chest Portable    Result Date: 9/15/2020  1. Hypoventilation with vascular crowding. 2. Patchy infiltrates in both lung bases. Oval opacity at the right lung base may represent rounded atelectasis or a rounded mass. Follow-up recommended. The full report of this exam was immediately signed and available to the emergency room. The patient is currently in the emergency room. Signed by Dr Estanislado Boxer on 9/15/2020 4:46 PM       ASSESSMENT & PLAN:    Principal Problem:    Toxic metabolic encephalopathy  Active Problems:    Chronic anticoagulation    PAF (paroxysmal atrial fibrillation) ([facility])    Type 2 diabetes mellitus without complication, with long-term current use of insulin ([facility])    Pneumonia    Restless leg syndrome    KANCHAN (acute kidney injury) ([address]    Acute kidney injury superimposed on CKD ([address]    Palliative care patient    Chronic anemia    Iron deficiency anemia    AMS (altered mental status)    Acute cystitis  Resolved Problems:    * No resolved hospital problems.  *    Continue with current medication  Continue with gentle IV hydration with normal saline at 75 cc/h  Continue with IV antibiotic in the form of Rocephin and

## 2020-09-17 NOTE — DISCHARGE SUMMARY
[place], [place], [address]        DEPARTMENT OF HOSPITALIST MEDICINE        DISCHARGE SUMMARY:      PATIENT NAME:  Liliya Snow  :  1940  MRN:  [id number]    Admission Date:   9/15/2020  2:49 PM Attending: Isaura Carter MD   Discharge Date:   2020              PCP: Shawn Bragg MD  Length of Stay: 2 days     Chief Complaint on Admission:   Chief Complaint   Patient presents with    Abnormal Lab     decreased kidney fcn. pt had labs drawn last week that showed a decrease in kidney fcn from 26% to 18%    Altered Mental Status     increased confusion. Consultants:     IP CONSULT TO NEPHROLOGY  PALLIATIVE CARE EVAL  IP CONSULT TO HOME CARE NEEDS  IP CONSULT TO CASE MANAGEMENT       Discharge Problem List:   Principal Problem:    Toxic metabolic encephalopathy  Active Problems:    Chronic anticoagulation    PAF (paroxysmal atrial fibrillation) ([facility]    Type 2 diabetes mellitus without complication, with long-term current use of insulin (HCC)    Pneumonia    Restless leg syndrome    Acute kidney injury superimposed on CKD ([facility] [facility]    Palliative care patient    Chronic anemia    Iron deficiency anemia    AMS (altered mental status)    Acute cystitis  Resolved Problems:    * No resolved hospital problems. [facility]  COURSE  AND  TREATMENT:    2020  Patient is feeling much better today and her creatinine is steadily improving down to 1.6. Her previous creatinine and records from 2019 was 1.8 so she is approaching her baseline. Nephrology and  I recommended her staying for another day of gentle IV hydration, but patient feels that she is back to her baseline and she wants to go home. I am going to ask her to cut down on her Lasix from 40 mg IV twice daily to 40 mg p.o. daily for 3 to 5 days only when she has swelling of her left or she gains fluid weight.   She has probable pneumonia on chest x-ray for which she is being treated for 3 days of IV antibiotics in Negative   BLOODU Negative   GLUCOSEU Negative     A1C: No results for input(s): LABA1C in the last 72 hours. ABG:No results for input(s): PHART, ZOO4EUV, PO2ART, JQL9KKG, BEART, HGBAE, R4ZTTGKI, CARBOXHGBART in the last 72 hours. Impressions of imaging performed in 48 hours before discharge:    Ct Head Wo Contrast    Result Date: 9/15/2020  Moderate cerebral and cerebellar volume loss with chronic microvascular disease but no evidence of acute intracranial process. Signed by Dr Jt Cohen on 9/15/2020 6:21 PM    Xr Chest Portable    Result Date: 9/15/2020  1. Hypoventilation with vascular crowding. 2. Patchy infiltrates in both lung bases. Oval opacity at the right lung base may represent rounded atelectasis or a rounded mass. Follow-up recommended. The full report of this exam was immediately signed and available to the emergency room. The patient is currently in the emergency room. Signed by Dr Adithya Rodrigues on 9/15/2020 4:46 PM        [facility] Medication Instructions [id number]    Printed on:09/17/20 4566   Medication Information                      atorvastatin (LIPITOR) 40 MG tablet  Take 40 mg by mouth nightly              calcitRIOL (ROCALTROL) 0.25 MCG capsule  Take 1 capsule by mouth daily             cefUROXime (CEFTIN) 500 MG tablet  Take 1 tablet by mouth 2 times daily for 7 days             donepezil (ARICEPT) 5 MG tablet  Take 5 mg by mouth daily              furosemide (LASIX) 40 MG tablet  Take 1 tablet by mouth for 3 to 5 days if patient develops lower leg swelling or fluid weight gain             gabapentin (NEURONTIN) 300 MG capsule  Take 300 mg by mouth every 3 days. Multiple Vitamins-Minerals (THERAPEUTIC MULTIVITAMIN-MINERALS) tablet  Take 1 tablet by mouth daily             nystatin (MYCOSTATIN) 398941 UNIT/GM powder  Apply topically 4 times daily Apply topically 4 times daily.              rivaroxaban (XARELTO) 15 MG TABS tablet  Take 1 tablet by

## 2020-09-17 NOTE — PROGRESS NOTES
Nephrology ([facility]) Progress Note      Patient:  Kira Conti  YOB: 1940  Date of Service: 9/17/2020  MRN: [id number]   Acct: [de-identified]   Primary Care Physician: Robert Ireland MD  Advance Directive: Full Code  Admit Date: 9/15/2020       Hospital Day: 2  Referring Provider: Maegan Benton MD    Patient independently seen and examined, Chart, Consults, Notes, Operative notes, Labs, Cardiology, and Radiology studies reviewed as available. Chief complaint: Abnormal labs. Subjective:  Kira Conti is a 70 y.o. female  whom we were consulted for chronic kidney disease. Patient has stage IV chronic kidney disease due to diabetic nephropathy. She follows EMY Ramos in the office. Presented with encephalopathy/confusion. She is diagnosed with urinary tract infection, receiving IV antibiotics. Patient has history of type 2 diabetes insulin-dependent, CHF, hypertension, chronic kidney disease stage IV and chronic atrial fibrillation. This morning she is more alert and awake. She is sitting in bed eating breakfast    Allergies:  Patient has no known allergies.     Medicines:  Current Facility-Administered Medications   Medication Dose Route Frequency Provider Last Rate Last Dose    azithromycin (ZITHROMAX) 250 mg in dextrose 5 % 250 mL IVPB  250 mg Intravenous Q24H Fay Gonzalez MD   Stopped at 09/16/20 1901    cefTRIAXone (ROCEPHIN) 1 g in sterile water 10 mL IV syringe  1 g Intravenous Q24H Fay Gonzalez MD   1 g at 09/16/20 1802    [Held by provider] furosemide (LASIX) tablet 40 mg  40 mg Oral BID Fay Gonzalez MD   40 mg at 09/16/20 7397    gabapentin (NEURONTIN) capsule 300 mg  300 mg Oral Q3 Days Fay Gonzalez MD   300 mg at 09/17/20 0827    calcitRIOL (ROCALTROL) capsule 0.25 mcg  0.25 mcg Oral Daily Fay Gonzalez MD   0.25 mcg at 09/17/20 0826    atorvastatin (LIPITOR) tablet 40 mg  40 mg Oral Nightly Fay Gonzalez MD   40 mg at 09/16/20 2017    donepezil (ARICEPT) tablet 5 mg  5 mg Oral Daily Krzysztof Wilkins MD   5 mg at 09/17/20 0815    therapeutic multivitamin-minerals 1 tablet  1 tablet Oral Daily Krzysztof Wilkins MD   1 tablet at 09/17/20 9190    rivaroxaban (XARELTO) tablet 15 mg  15 mg Oral Dinner Krzysztof Wilkins MD   15 mg at 09/16/20 1802    sotalol (BETAPACE) tablet 40 mg  40 mg Oral BID Krzysztof Wilkins MD   40 mg at 09/17/20 0827    sertraline (ZOLOFT) tablet 25 mg  25 mg Oral Daily Krzysztof Wilkins MD   25 mg at 09/17/20 0826    miconazole (MICOTIN) 2 % powder   Topical BID Krzysztof Wilkins MD        sodium chloride flush 0.9 % injection 10 mL  10 mL Intravenous 2 times per day Krzysztof Wilkins MD   10 mL at 09/16/20 0842    sodium chloride flush 0.9 % injection 10 mL  10 mL Intravenous PRN Krzysztof Wilkins MD        acetaminophen (TYLENOL) tablet 650 mg  650 mg Oral Q6H PRN Krzysztof Wilkins MD        Or   [facility] acetaminophen (TYLENOL) suppository 650 mg  650 mg Rectal Q6H PRN Krzysztof Wilkins MD        polyethylene glycol [facility]) packet 17 g  17 g Oral Daily PRN Krzysztof Wilkins MD        insulin lispro (HUMALOG) injection vial 0-6 Units  0-6 Units Subcutaneous TID WC Krzysztof Wilkins MD        insulin lispro (HUMALOG) injection vial 0-3 Units  0-3 Units Subcutaneous Nightly Krzysztof Wilkins MD        glucose (GLUTOSE) 40 % oral gel 15 g  15 g Oral PRN Krzysztof Wilkins MD        glucagon (rDNA) injection 1 mg  1 mg Intramuscular PRN Krzysztof Wilkins MD        0.9 % sodium chloride infusion   Intravenous Continuous Bony Vanessa MD 75 mL/hr at 09/16/20 2017         Past Medical History:  Past Medical History:   Diagnosis Date    [facility] 12/30/2018    Arthritis     Atrial fibrillation (HCC)     BiPAP (biphasic positive airway pressure) dependence     15cm/11cm    Cancer ([facility]     COLON CA    CHF (congestive heart failure) ([facility] [address] 12/30/2018    Chronic atrial fibrillation 2/12/2017    Depression     Diabetes mellitus ([facility]     Essential hypertension 7/11/2017    History of blood transfusion     Hyperlipidemia     Hypertension     Mixed hyperlipidemia 7/11/2017    Obesity     Obstructive sleep apnea     AHI:  18.7;  In REM AHI:  39.3 per PSG, 11/2008; split-night PSG, 8/2017 AHI: 35.1    Osteoarthritis     Palliative care patient 01/08/2019    Pneumonia due to organism     Restless leg syndrome 8/1/2017    Sinus complaint     Swelling        Past Surgical History:  Past Surgical History:   Procedure Laterality Date    ADENOIDECTOMY      APPENDECTOMY      CARDIOVERSION      x 2     CHOLECYSTECTOMY      COLONOSCOPY  05/17/2013    Dr. Louis Aleman  03/22/2012    Dr Leila Jeffers yr recall    COLONOSCOPY  06/30/2008    Dr Karla Ku, 3 yr recall    COLONOSCOPY N/A 8/2/2019    Dr Lester Scheuermann sided anastomosis appeared normal and patent-prn (age)   [facility] HEMICOLECTOMY Right 1998    2 FT OF COLON REMOVED DUE TO CA    HYSTERECTOMY      FL COLONOSCOPY W/BIOPSY SINGLE/MULTIPLE N/A 6/6/2017    Dr [facility] colon anastomosis-Tubular AP (-) dysplasia x 2--3 yr recall    TONSILLECTOMY      TUMOR REMOVAL      stomach    UPPER GASTROINTESTINAL ENDOSCOPY  05/17/2013    Dr. Fifi Carver ulcer disease-Chelo (+)    UPPER GASTROINTESTINAL ENDOSCOPY  03/28/2012    Dr Misael Wilson antral ulceration with a visible vessel    UPPER GASTROINTESTINAL ENDOSCOPY N/A 8/2/2019    Dr Zev Pickard       Family History  Family History   Problem Relation Age of Onset    No Known Problems Mother     No Known Problems Father     Colon Cancer Daughter     Colon Polyps Daughter     Esophageal Cancer Neg Hx     Liver Cancer Neg Hx     Rectal Cancer Neg Hx     Liver Disease Neg Hx     Stomach Cancer Neg Hx        Social History  Social History     Socioeconomic History    Marital status:      Spouse name: Not on file    Number of children: Not on file    Years of education: Not on file    Highest education level: Not on file   Occupational History    Not on file   Social Needs    Financial resource strain: Not on file    Food insecurity     Worry: Not on file     Inability: Not on file    Transportation needs     Medical: Not on file     Non-medical: Not on file   Tobacco Use    Smoking status: Never Smoker    Smokeless tobacco: Never Used   Substance and Sexual Activity    Alcohol use: No    Drug use: No    Sexual activity: Not Currently   Lifestyle    Physical activity     Days per week: Not on file     Minutes per session: Not on file    Stress: Not on file   Relationships    Social connections     Talks on phone: Not on file     Gets together: Not on file     Attends Buddhist service: Not on file     Active member of club or organization: Not on file     Attends meetings of clubs or organizations: Not on file     Relationship status: Not on file    Intimate partner violence     Fear of current or ex partner: Not on file     Emotionally abused: Not on file     Physically abused: Not on file     Forced sexual activity: Not on file   Other Topics Concern    Not on file   Social History Narrative    Not on file         Review of Systems:  History obtained from chart review and the patient  General ROS: No fever or chills  Respiratory ROS: No cough, shortness of breath, wheezing  Cardiovascular ROS: No chest pain or palpitations  Gastrointestinal ROS: No abdominal pain or melena  Genito-Urinary ROS: positive for - dysuria  Musculoskeletal ROS: No joint pain or swelling   14 point ROS reviewed with the patient and negative except as noted above and in the HPI unless unable to obtain.     Objective:  Patient Vitals for the past 24 hrs:   BP Temp Temp src Pulse Resp SpO2   09/17/20 0705 (!) 172/73 97.2 °F (36.2 °C) Temporal 58 20 95 %   09/17/20 0431 (!) 149/75 96 °F (35.6 °C) Temporal 58 18 93 %   09/16/20 2205 128/61 96.7 °F (35.9 °C) Temporal 59 18 94 %   09/16/20 2117 -- -- -- 73 -- --   09/16/20 1820 139/75 96.9 °F (36.1 °C) Temporal 59 15 92 %       Intake/Output Summary (Last 24 hours) at 9/17/2020 1009  Last data filed at 9/17/2020 0839  Gross per 24 hour   Intake 1995.05 ml   Output 600 ml   Net 1395.05 ml     General: awake/alert   HEENT: Normocephalic atraumatic head  Neck: Supple with no JVD or carotid bruits. Chest:  clear to auscultation bilaterally  CVS: Irregularly irregular, S1 and S2  Abdominal: soft, nontender, normal bowel sounds  Extremities: no cyanosis or edema  Skin: warm and dry without rash      Labs:  BMP:   Recent Labs     09/15/20  1534 09/16/20  0309 09/17/20  0349    139 141   K 4.8 3.8 4.5   CL 97* 103 105   CO2 28 24 24   PHOS  --   --  2.8   BUN 45* 43* 34*   CREATININE 2.2* 2.1* 1.6*   CALCIUM 9.3 8.4* 9.0     CBC:   Recent Labs     09/15/20  1534 09/17/20  0349   WBC 10.8 9.3   HGB 11.8* 10.7*   HCT 37.6 34.1*   MCV 91.9 89.5    244     LIVER PROFILE:   Recent Labs     09/15/20  1534 09/17/20  0349   AST 24 17   ALT 10 7   BILITOT 0.4 0.3   ALKPHOS 119* 101     PT/INR: No results for input(s): PROTIME, INR in the last 72 hours. APTT: No results for input(s): APTT in the last 72 hours. BNP:  No results for input(s): BNP in the last 72 hours. Ionized Calcium:No results for input(s): IONCA in the last 72 hours. Magnesium:  Recent Labs     09/17/20  0349   MG 1.9     Phosphorus:  Recent Labs     09/17/20 0349   PHOS 2.8     HgbA1C: No results for input(s): LABA1C in the last 72 hours. Hepatic:   Recent Labs     09/15/20  1534 09/17/20  0349   ALKPHOS 119* 101   ALT 10 7   AST 24 17   PROT 8.0 6.2*   BILITOT 0.4 0.3   LABALBU 4.5 3.8     Lactic Acid:   Recent Labs     09/15/20  1645   LACTA 0.9     Troponin: No results for input(s): CKTOTAL, CKMB, TROPONINT in the last 72 hours. ABGs: No results for input(s): PH, PCO2, PO2, HCO3, O2SAT in the last 72 hours. CRP:  No results for input(s): CRP in the last 72 hours.   Sed Rate:  No results for input(s): SEDRATE in the last 72 hours. Cultures:   No results for input(s): CULTURE in the last 72 hours. No results for input(s): BC, Ari Octavio in the last 72 hours. No results for input(s): CXSURG in the last 72 hours. Radiology reports as per the Radiologist  Radiology: Ct Head Wo Contrast    Result Date: 9/15/2020  CT BRAIN without contrast 9/15/2020 4:53 PM HISTORY: Altered mental status. COMPARISON: 1/7/2019 DLP: 797 mGy cm. Automated exposure control was utilized to diminish patient radiation dose. TECHNIQUE: Serial axial tomographic images of the brain were obtained without the use of intravenous contrast. FINDINGS: The midline structures are nondisplaced. There is moderate cerebral and cerebellar volume loss, with an associated increase in the prominence of the ventricles and sulci. The basilar cisterns are normal in size and configuration. There is no evidence of intracranial hemorrhage or mass-effect. There is low attenuation in the periventricular white matter, consistent with chronic ischemic change. There are no abnormal extra-axial fluid collections. There is no evidence of tonsillar herniation. The included orbits and their contents are unremarkable. The visualized paranasal sinuses, mastoid air cells and middle ear cavities are clear. The visualized osseous structures and overlying soft tissues of the skull and face are intact. Moderate cerebral and cerebellar volume loss with chronic microvascular disease but no evidence of acute intracranial process. Signed by Dr Deshawn Nowak on 9/15/2020 6:21 PM    Xr Chest Portable    Result Date: 9/15/2020  EXAMINATION:  XR CHEST PORTABLE  9/15/2020 4:44 PM HISTORY: Altered mental status. COMPARISON: 1/7/2019. FINDINGS:  There is hypoventilation and vascular crowding. There are patchy infiltrates at the left lung base. The density in the right lung base is somewhat lobular and masslike. There is mild cardiomegaly.  No significant pleural effusion is seen. 1. Hypoventilation with vascular crowding. 2. Patchy infiltrates in both lung bases. Oval opacity at the right lung base may represent rounded atelectasis or a rounded mass. Follow-up recommended. The full report of this exam was immediately signed and available to the emergency room. The patient is currently in the emergency room. Signed by Dr Kimberly Stearns on 9/15/2020 4:46 PM       Assessment     1. Acute renal failure/volume depletion/improved. 2  Stage IV chronic kidney disease baseline. 2.  Type II diabetic nephropathy. 3.  Encephalopathy/metabolic. 4.  Active urinary tract infection. 5.  Bilateral patchy infiltrate in lungs. 6.  Insulin-dependent type 2 diabetes. Plan:  1. Agree with IV antibiotics. 2.  Slow hydration. 3.  Urinary electrolytes. 4.  Possible discharge soon.     Saloni Ray MD  09/17/20  10:09 AM

## 2020-09-17 NOTE — CARE COORDINATION
Spoke to patient regarding MD orders for home health services. She asked if I had talked with Ashley Sanchez, her daughter. I said no. She said I need to talk with Ashley Sanchez. Patient states she lives alone but will be going to [facility] at discharge and to talk to her. I called Ashley Sanchez and spoke with her. Ashley Sanchez states that she and her sister would love for patient to have [facility] [place] but she has always refused in past.  Ashley Sanchez states they do everything for her. Ashley Sanchez is a CNA and her sister is a nurse. They take care of her. Patient's PCP has tried to set up [facility] [place] in past and she would not let them come. So at this time Ashley Sanchez said not to set patient up with [facility]. I gave Ashley Sanchez my phone number and said if anything changes, to call me and I would be glad to set patient up with services. Ashley Sanchez voiced understanding.    Electronically signed by Mike Ribera RN on 9/17/20 at 1:25 PM CDT

## 2020-09-24 NOTE — CONSULTS
Malia Guerrier MD    Physician    Nephrology    Progress Notes    Signed    Date of Service: 9/16/2020 9:35 AM                 Signed      Expand All Collapse All       Show:Clear all   ManualTemplateCopied  Added by: Malia Guerrier MD  [facility] for details                                                                                                                                                                                                                                                       Nephrology ([facility]) Consult Note   Patient: Tyler Almaraz   YOB: 1940   Date of Service: 9/16/2020   MRN: [id number]   Acct: [de-identified]   Primary Care Physician: Mardi Peabody, MD   Advance Directive: Full Code   Admit Date: 9/15/2020   Hospital Day: 1   Referring Provider: Gabo Urban MD   Patient independently seen and examined, Chart, Consults, Notes, Operative notes, Labs, Cardiology, and Radiology studies reviewed as available. Chief complaint: Abnormal labs. Subjective:   Tyler Almaraz is a 70 y.o. female whom we were consulted for chronic kidney disease. Patient has stage IV chronic kidney disease due to diabetic nephropathy. She follows EMY Kim in the office. Presented with encephalopathy/confusion. She is diagnosed with urinary tract infection, receiving IV antibiotics. Patient has history of type 2 diabetes insulin-dependent, CHF, hypertension, chronic kidney disease stage IV and chronic atrial fibrillation. This morning she is confused and disoriented and wants to go home immediately. Allergies:   Patient has no known allergies.    Medicines:     Current Facility-Administered Medications

## 2020-11-19 ENCOUNTER — TELEPHONE (OUTPATIENT)
Dept: NEUROLOGY | Age: 80
End: 2020-11-19

## 2020-11-19 NOTE — TELEPHONE ENCOUNTER
Called patient about an appointment with Dr Jossy Cole, left message on patient voice mail abut the appointment , with phone number and phone number.

## 2020-12-21 ENCOUNTER — TELEPHONE (OUTPATIENT)
Dept: NEUROLOGY | Age: 80
End: 2020-12-21

## 2020-12-21 NOTE — TELEPHONE ENCOUNTER
Called pt to reschedule no show appt with Alesia Moreno, left a voicemail for the pt to give us a call back if she would like to reschedule.

## 2021-01-22 RX ORDER — SOTALOL HYDROCHLORIDE 80 MG/1
TABLET ORAL
Qty: 90 TABLET | Refills: 3 | Status: SHIPPED | OUTPATIENT
Start: 2021-01-22 | End: 2021-11-12 | Stop reason: ALTCHOICE

## 2021-03-03 ENCOUNTER — TELEPHONE (OUTPATIENT)
Dept: ADMINISTRATIVE | Age: 81
End: 2021-03-03

## 2021-03-03 NOTE — TELEPHONE ENCOUNTER
Pt daughter Binta Lou called to get New Patient appt with Dr. Mireya Andrea, please contact daughter if dr. Mireya Andrea is accepting new patients, did not give reason for switch.

## 2021-03-04 NOTE — TELEPHONE ENCOUNTER
Akira Infante, [id number], -10/09/1973, phone (same as Mother Shana Gold as she moved in with Robbie Rich) -[phone], thanks!

## 2021-03-04 NOTE — TELEPHONE ENCOUNTER
Sunny Joe already sees Dr. Mireya Andrea, MRN [id number]  it is this patient Shanita that need to transfer to Dr. Mireya Andrea, I sent previous message of daughter infor who is req that her mother be moved to Dr. Mireya Andrea.

## 2021-03-29 ENCOUNTER — OFFICE VISIT (OUTPATIENT)
Dept: NEUROLOGY | Age: 81
End: 2021-03-29
Payer: MEDICARE

## 2021-03-29 VITALS
BODY MASS INDEX: 39.32 KG/M2 | SYSTOLIC BLOOD PRESSURE: 126 MMHG | DIASTOLIC BLOOD PRESSURE: 72 MMHG | HEIGHT: 65 IN | WEIGHT: 236 LBS | HEART RATE: 58 BPM

## 2021-03-29 DIAGNOSIS — R41.3 MEMORY LOSS: ICD-10-CM

## 2021-03-29 DIAGNOSIS — Z86.39 HISTORY OF DIABETES MELLITUS: ICD-10-CM

## 2021-03-29 DIAGNOSIS — R41.3 MEMORY LOSS: Primary | ICD-10-CM

## 2021-03-29 DIAGNOSIS — Z86.79 HISTORY OF HYPERTENSION: ICD-10-CM

## 2021-03-29 LAB — VITAMIN B-12: 521 PG/ML (ref 211–946)

## 2021-03-29 PROCEDURE — 1036F TOBACCO NON-USER: CPT | Performed by: PSYCHIATRY & NEUROLOGY

## 2021-03-29 PROCEDURE — 1090F PRES/ABSN URINE INCON ASSESS: CPT | Performed by: PSYCHIATRY & NEUROLOGY

## 2021-03-29 PROCEDURE — 99204 OFFICE O/P NEW MOD 45 MIN: CPT | Performed by: PSYCHIATRY & NEUROLOGY

## 2021-03-29 PROCEDURE — G8427 DOCREV CUR MEDS BY ELIG CLIN: HCPCS | Performed by: PSYCHIATRY & NEUROLOGY

## 2021-03-29 PROCEDURE — 4040F PNEUMOC VAC/ADMIN/RCVD: CPT | Performed by: PSYCHIATRY & NEUROLOGY

## 2021-03-29 PROCEDURE — G8400 PT W/DXA NO RESULTS DOC: HCPCS | Performed by: PSYCHIATRY & NEUROLOGY

## 2021-03-29 PROCEDURE — G8417 CALC BMI ABV UP PARAM F/U: HCPCS | Performed by: PSYCHIATRY & NEUROLOGY

## 2021-03-29 PROCEDURE — 1123F ACP DISCUSS/DSCN MKR DOCD: CPT | Performed by: PSYCHIATRY & NEUROLOGY

## 2021-03-29 PROCEDURE — G8484 FLU IMMUNIZE NO ADMIN: HCPCS | Performed by: PSYCHIATRY & NEUROLOGY

## 2021-03-29 RX ORDER — DONEPEZIL HYDROCHLORIDE 10 MG/1
10 TABLET, FILM COATED ORAL NIGHTLY
Qty: 30 TABLET | Refills: 2 | Status: SHIPPED | OUTPATIENT
Start: 2021-03-29 | End: 2021-07-12

## 2021-03-29 RX ORDER — PANTOPRAZOLE SODIUM 20 MG/1
20 TABLET, DELAYED RELEASE ORAL DAILY
COMMUNITY
Start: 2020-09-04 | End: 2022-08-11 | Stop reason: SDUPTHER

## 2021-03-29 RX ORDER — SITAGLIPTIN 100 MG/1
100 TABLET, FILM COATED ORAL DAILY
COMMUNITY
Start: 2021-03-12 | End: 2021-07-22

## 2021-03-29 RX ORDER — ARIPIPRAZOLE 2 MG/1
2 TABLET ORAL EVERY EVENING
Qty: 30 TABLET | Refills: 3 | Status: SHIPPED | OUTPATIENT
Start: 2021-03-29 | End: 2021-07-16 | Stop reason: SDUPTHER

## 2021-03-29 NOTE — PROGRESS NOTES
Chief Complaint   Patient presents with    Consultation     New patient referral from [facility] to evaluate patient with altered mental status. Julianne Magdaleno is a 68y.o. year old female who is seen for evaluation of probable dementia. She is here today with one of her daughters. The patient has had difficulty for at least a year which appears to be slowly progressive. She does not sleep well at night and she is intermittently confused. She talks to the television as if it is a real person. She paces around a lot. She has poor short-term memory. There is a strong family history of dementia. The patient may have some chronic depression as well. She has been on Aricept 5 mg a day for about a month without any obvious improvement. She had been on gabapentin and Tegretol at 1 point for neuropathy but these have been discontinued. In addition to diabetic neuropathy she has a history of chronic kidney disease, CHF and atrial fibrillation. She had a CT scan of the head 9/20. Those films are reviewed. There is diffuse atrophy particularly in the frontal and temporal lobes, in my opinion.     Active Ambulatory Problems     Diagnosis Date Noted    History of colon cancer 05/03/2017    Chronic anticoagulation 05/23/2017    PAF (paroxysmal atrial fibrillation) ([facility] 05/23/2017    History of bradycardia 05/23/2017    Hypoglycemia due to insulin 06/01/2017    Type 2 diabetes mellitus without complication, with long-term current use of insulin ([facility] [facility] 06/01/2017    Pneumonia     Hypoglycemic encephalopathy 06/01/2017    Abnormal chest x-ray 06/01/2017    Hypokalemia 06/01/2017    Hypomagnesemia 06/02/2017    Essential hypertension 07/11/2017    Mixed hyperlipidemia 07/11/2017    CPAP (continuous positive airway pressure) dependence 08/01/2017    Somnolence, daytime 08/01/2017    Restless leg syndrome 08/01/2017    Hypoxemia 08/01/2017    Obstructive sleep apnea     BiPAP (biphasic positive airway pressure) dependence     Hypochloremia 12/30/2018    CHF (congestive heart failure) ([address] 12/30/2018    Macrocytic anemia 12/30/2018    Acute kidney injury superimposed on CKD ([address]     Acute on chronic diastolic heart failure ([address] 01/07/2019    Palliative care patient 01/08/2019    Acute hypoxemic respiratory failure ([address] 01/11/2019    Chronic anemia     History of adenomatous polyp of colon 03/14/2019    Iron deficiency anemia 03/14/2019    Heme positive stool 03/14/2019    AMS (altered mental status) 09/15/2020    Acute cystitis 09/16/2020    Toxic metabolic encephalopathy 31/54/7154     Resolved Ambulatory Problems     Diagnosis Date Noted    Persistent atrial fibrillation ([address]     Obstructive sleep apnea syndrome 07/11/2017    Obstructive sleep apnea 08/01/2017    Septic shock (HCC) 12/30/2018    Hyperkalemia 46/21/3612    Metabolic acidosis 26/46/6695    ATN (acute tubular necrosis) (HCC) 01/04/2019    Elevated troponin      Past Medical History:   Diagnosis Date    KANCHAN (acute kidney injury) ([address] 12/30/2018    Arthritis     Atrial fibrillation (HCC)     Cancer (HCC)     Chronic atrial fibrillation ([address] 2/12/2017    Depression     Diabetes mellitus ([address]     History of blood transfusion     Hyperlipidemia     Hypertension     Obesity     Osteoarthritis     Pneumonia due to organism     Sinus complaint     Swelling        Past Surgical History:   Procedure Laterality Date    ADENOIDECTOMY      APPENDECTOMY      CARDIOVERSION      x 2     CHOLECYSTECTOMY      COLONOSCOPY  05/17/2013    Dr. Kellee Melgar COLONOSCOPY  03/22/2012    Dr Mccray Si yr recall    COLONOSCOPY  06/30/2008    Dr Griselda Joyner, 3 yr recall    COLONOSCOPY N/A 8/2/2019    Dr Latha Funes sided anastomosis appeared normal and patent-prn (age)   [facility] HEMICOLECTOMY Right 1998    2 FT OF COLON REMOVED DUE TO CA    HYSTERECTOMY      [place] COLONOSCOPY W/BIOPSY SINGLE/MULTIPLE N/A 6/6/2017    Dr Cunningham-right colon anastomosis-Tubular AP (-) dysplasia x 2--3 yr recall    TONSILLECTOMY      TUMOR REMOVAL      stomach    UPPER GASTROINTESTINAL ENDOSCOPY  05/17/2013    Dr. Franc Jay ulcer disease-Chelo (+)    UPPER GASTROINTESTINAL ENDOSCOPY  03/28/2012    Dr Peg Miller antral ulceration with a visible vessel    UPPER GASTROINTESTINAL ENDOSCOPY N/A 8/2/2019    Dr Anderson Kendrick       Family History   Problem Relation Age of Onset    No Known Problems Mother     No Known Problems Father     Colon Cancer Daughter     Colon Polyps Daughter     Esophageal Cancer Neg Hx     Liver Cancer Neg Hx     Rectal Cancer Neg Hx     Liver Disease Neg Hx     Stomach Cancer Neg Hx        No Known Allergies    Social History     Socioeconomic History    Marital status:      Spouse name: Not on file    Number of children: Not on file    Years of education: Not on file    Highest education level: Not on file   Occupational History    Not on file   Social Needs    Financial resource strain: Not on file    Food insecurity     Worry: Not on file     Inability: Not on file    Transportation needs     Medical: Not on file     Non-medical: Not on file   Tobacco Use    Smoking status: Never Smoker    Smokeless tobacco: Never Used   Substance and Sexual Activity    Alcohol use: No    Drug use: No    Sexual activity: Not Currently   Lifestyle    Physical activity     Days per week: Not on file     Minutes per session: Not on file    Stress: Not on file   Relationships    Social connections     Talks on phone: Not on file     Gets together: Not on file     Attends Sabianism service: Not on file     Active member of club or organization: Not on file     Attends meetings of clubs or organizations: Not on file     Relationship status: Not on file    Intimate partner violence     Fear of current or ex partner: Not on file     Emotionally abused: Not on file     Physically abused: Not on file     Forced sexual activity: Not on file   Other Topics Concern    Not on file   Social History Narrative    Not on file       Review of Systems    Constitutional: ?Fever ? Sweats ? Chills ? Recent Injury ? Denies all unless marked   HENT:?Headache ? Head Injury ? Sore Throat ? Ear Pain ? Dizziness ? Hearing Loss ? Denies all unless marked   Spine: ? Neck pain ? Back pain ? Sciaticia ? Denies all unless marked   Cardiovascular:?Chest Pain ? Palpitations ? Heart Disease ? Denies all unless marked   Pulmonary: ? Shortness of Breath ? Cough ? Denies all unless marked   Gastrointestinal: ?Abdominal Pain ? Blood in Stool ? Diarrhea ? Constipation ? Nausea ? Vomiting ? Denies all unless marked   Genitourinary: ? Dysuria ? Frequency ? Incontinence ? Urgency ? Denies all unless marked   Musculoskeletal: ? Arthralgia ? Myalgias ? Muscle cramps ? Muscle twitches   ? Denies all unless marked   Extremities: ? Pain ? Swelling ? Denies all unless marked   Skin:? Rash ? Color Change ? Denies all unless marked   Neurological:? Visual Disturbance ? Double Vision ? Slurred Speech ? Trouble swallowing ? Vertigo ? Tingling ? Numbness ? Weakness ? Loss of Balance   ? Loss of Consciousness ? Memory Loss ? Seizures ? Denies all unless marked   Psychiatric/Behavioral:? Depression ? Anxiety ? Denies all unless marked   Sleep: ? Insomnia ? Sleep Disturbance ? Snoring ? Restless Legs ? Daytime Sleepiness ? Sleep Apnea ?  Denies all unless marked            Current Outpatient Medications   Medication Sig Dispense Refill    pantoprazole (PROTONIX) 20 MG tablet Take 20 mg by mouth daily       JANUVIA 100 MG tablet 100 mg daily       donepezil (ARICEPT) 10 MG tablet Take 1 tablet by mouth nightly 30 tablet 2    ARIPiprazole (ABILIFY) 2 MG tablet Take 1 tablet by mouth every evening 30 tablet 3    sotalol (BETAPACE) 80 MG tablet TAKE 1/2 TABLET BY MOUTH TWICE DAILY  90 tablet 3    furosemide (LASIX) 40 MG tablet Take 1 tablet by mouth for 3 to 5 days if patient develops lower leg swelling or fluid weight gain (Patient taking differently: Take 40 mg by mouth 2 times daily ) 30 tablet 0    nystatin (MYCOSTATIN) 847132 UNIT/GM powder Apply topically 4 times daily Apply topically 4 times daily.  donepezil (ARICEPT) 5 MG tablet Take 5 mg by mouth daily       rivaroxaban (XARELTO) 15 MG TABS tablet Take 1 tablet by mouth daily (with breakfast) (Patient taking differently: Take 15 mg by mouth nightly ) 90 tablet 3    Multiple Vitamins-Minerals (THERAPEUTIC MULTIVITAMIN-MINERALS) tablet Take 1 tablet by mouth daily 30 tablet 0    atorvastatin (LIPITOR) 40 MG tablet Take 40 mg by mouth nightly       sertraline (ZOLOFT) 100 MG tablet Take 100 mg by mouth daily       calcitRIOL (ROCALTROL) 0.25 MCG capsule Take 1 capsule by mouth daily (Patient not taking: Reported on 3/29/2021) 30 capsule 3     No current facility-administered medications for this visit. Outpatient Medications Marked as Taking for the 3/29/21 encounter (Office Visit) with Jerri Hanna MD   Medication Sig Dispense Refill    pantoprazole (PROTONIX) 20 MG tablet Take 20 mg by mouth daily       JANUVIA 100 MG tablet 100 mg daily       donepezil (ARICEPT) 10 MG tablet Take 1 tablet by mouth nightly 30 tablet 2    ARIPiprazole (ABILIFY) 2 MG tablet Take 1 tablet by mouth every evening 30 tablet 3    sotalol (BETAPACE) 80 MG tablet TAKE 1/2 TABLET BY MOUTH TWICE DAILY  90 tablet 3    furosemide (LASIX) 40 MG tablet Take 1 tablet by mouth for 3 to 5 days if patient develops lower leg swelling or fluid weight gain (Patient taking differently: Take 40 mg by mouth 2 times daily ) 30 tablet 0    nystatin (MYCOSTATIN) 703072 UNIT/GM powder Apply topically 4 times daily Apply topically 4 times daily.       donepezil (ARICEPT) 5 MG tablet Take 5 mg by mouth daily       rivaroxaban (XARELTO) 15 MG TABS tablet Take 1 tablet by mouth daily (with breakfast) (Patient taking differently: Take 15 mg by mouth nightly ) 90 tablet 3    Multiple Vitamins-Minerals (THERAPEUTIC MULTIVITAMIN-MINERALS) tablet Take 1 tablet by mouth daily 30 tablet 0    atorvastatin (LIPITOR) 40 MG tablet Take 40 mg by mouth nightly       sertraline (ZOLOFT) 100 MG tablet Take 100 mg by mouth daily          /72   Pulse 58   Ht 5' 5\" (1.651 m)   Wt 236 lb (107 kg)   BMI 39.27 kg/m²       Constitutional - well developed, well nourished. Eyes - conjunctiva normal.  Pupils react to light  Ear, nose, throat -hearing intact to finger rub No scars, masses, or lesions over external nose or ears, no atrophy of tongue  Neck-symmetric, no masses noted, no jugular vein distension. No bruits noted. Respiration- chest wall appears symmetric, good expansion,   normal effort without use of accessory muscles  Cardiovascular- RRR  Musculoskeletal - no significant wasting of muscles noted, no bony deformities, gait no gross ataxia  Extremities-no clubbing, cyanosis or edema  Skin - warm, dry, and intact. No rash, erythema, or pallor. 2+ edema in the legs  Psychiatric - mood, affect, and behavior appear normal.      Neurological exam  Awake, alert, fluent. She could not tell me the season of the year, the month or the year. She had difficulty naming her oldest grandchild and telling me the names of her grandchildren in general.  Questionable effort. She had trouble following complex commands. Poor clock drawing. Cranial Nerve Exam   CN II- Visual fields grossly unremarkable. VA adequate.  Discs sharp  CN III, IV,VI- PERRLA, EOMI, No nystagmus, conjugate eye movements, no ptosis  CN V-sensation intact to LT over face  CN VII-no facial asymmetry  CN VIII-Hearing intact   CN IX and X- Palate elevates in midline  CN XI-good shoulder shrug  CN XII-Tongue midline with no fasciculations or fibrillations    Motor Exam  V/V throughout upper and lower extremities bilaterally, no cogwheeling, normal tone    Sensory Exam  Decreased vibration in the toes. Reflexes   2+ at the knees and otherwise absent  No clonus ankles  No Farrlel's sign bilateral hands. No Babinski sign. Tremors- no tremors in hands or head noted    Gait  Very slow and with small steps. Thoracic kyphosis noted    Coordination  Finger to nose and SAMANTHA-unremarkable    Lab Results   Component Value Date    CKSXGSCO15 521 03/29/2021     Lab Results   Component Value Date    WBC 9.3 09/17/2020    HGB 10.7 (L) 09/17/2020    HCT 34.1 (L) 09/17/2020    MCV 89.5 09/17/2020     09/17/2020     Lab Results   Component Value Date     09/17/2020    K 4.5 09/17/2020     09/17/2020    CO2 24 09/17/2020    BUN 34 (H) 09/17/2020    CREATININE 1.6 (H) 09/17/2020    GLUCOSE 139 (H) 09/17/2020    CALCIUM 9.0 09/17/2020    PROT 6.2 (L) 09/17/2020    LABALBU 3.8 09/17/2020    BILITOT 0.3 09/17/2020    ALKPHOS 101 09/17/2020    AST 17 09/17/2020    ALT 7 09/17/2020    LABGLOM 31 (A) 09/17/2020    GFRAA 38 (L) 09/17/2020    GLOB 3.3 03/30/2017           Assessment    ICD-10-CM    1. Memory loss  R41.3 Vitamin B12   2. History of diabetes mellitus  Z86.39    3. 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 Will check B12 level and increase Aricept to 10 mg a day. After a couple of weeks add Abilify 2 mg at bedtime. Risk, benefits and alternatives were discussed. Consider increasing Zoloft in the future and adding Namenda. May have a vascular component to her dementia as well.     Plan  Orders Placed This Encounter   Procedures    Vitamin B12     Standing Status:   Future     Number of Occurrences:   1     Standing Expiration Date:   3/29/2022         Orders Placed This Encounter   Medications    donepezil (ARICEPT) 10 MG tablet     Sig: Take 1 tablet by mouth nightly     Dispense:  30 tablet     Refill:  2    ARIPiprazole (ABILIFY) 2 MG tablet     Sig: Take 1 tablet by mouth every evening Dispense:  30 tablet     Refill:  3       Pt warned of potential side effects of medication changes/new medicines. They are to call for new or ongoing difficulties. Return in about 4 weeks (around 4/26/2021).     (Please note that portions of this note were completed with a voice recognition program. Efforts were made to edit the dictations but occasionally words are mis-transcribed.)

## 2021-03-29 NOTE — PROGRESS NOTES
REVIEW OF SYSTEMS    Constitutional: []Fever []Sweats []Chills [] Recent Injury   [x] Denies all unless marked  HENT:[]Headache  [] Head Injury  [] Sore Throat  [] Ear Pain  [] Dizziness [] Hearing Loss   [x] Denies all unless marked  Spine:  [] Neck pain  [] Back pain  [] Sciaticia  [x] Denies all unless marked  Cardiovascular:[]Chest Pain []Palpitations [] Heart Disease  [x] Denies all unless marked  Pulmonary: []Shortness of Breath []Cough   [x] Denies all unless marked  Gastrointestinal:  []Abdominal Pain  []Blood in Stool  []Diarrhea []Constipation []Nausea  []Vomiting  [x] Denies all unless marked  Genitourinary:  [] Dysuria [] Frequency  [] Incontinence [] Urgency   [x] Denies all unless marked  Musculoskeletal: [] Arthralgia  [] Myalgias [] Muscle cramps  [] Muscle twitches   [x] Denies all unless marked   Extremities:   [] Pain   [] Swelling   [x] Denies all unless marked  Skin:[] Rash  [] Color Change  [x] Denies all unless marked  Neurological:[] Visual Disturbance [] Double Vision [] Slurred Speech [] Trouble swallowing  [] Vertigo [] Tingling [] Numbness [] Weakness [] Loss of Balance   [] Loss of Consciousness [x] Memory Loss [] Seizures  [] Denies all unless marked  Psychiatric/Behavioral:[] Depression [] Anxiety  [x] Denies all unless marked  Sleep: [x]  Insomnia [x] Sleep Disturbance [] Snoring [] Restless Legs [] Daytime Sleepiness [] Sleep Apnea  [] Denies all unless marked

## 2021-04-21 ENCOUNTER — HOSPITAL ENCOUNTER (OUTPATIENT)
Dept: WOMENS IMAGING | Age: 81
Discharge: HOME | End: 2021-04-21
Payer: MEDICARE

## 2021-04-21 DIAGNOSIS — Z12.31 BREAST CANCER SCREENING BY MAMMOGRAM: ICD-10-CM

## 2021-04-21 PROCEDURE — 77067 SCR MAMMO BI INCL CAD: CPT

## 2021-04-30 ENCOUNTER — TELEPHONE (OUTPATIENT)
Dept: NEUROLOGY | Age: 81
End: 2021-04-30

## 2021-04-30 NOTE — TELEPHONE ENCOUNTER
Called and left patient a VM to confirm appointment for 05-03-21 with Dr. Charley Min.
Authored by Resident/PA/NP

## 2021-05-03 ENCOUNTER — OFFICE VISIT (OUTPATIENT)
Dept: NEUROLOGY | Age: 81
End: 2021-05-03
Payer: MEDICARE

## 2021-05-03 VITALS
HEIGHT: 65 IN | DIASTOLIC BLOOD PRESSURE: 86 MMHG | SYSTOLIC BLOOD PRESSURE: 138 MMHG | WEIGHT: 236 LBS | HEART RATE: 98 BPM | BODY MASS INDEX: 39.32 KG/M2

## 2021-05-03 DIAGNOSIS — Z86.39 HISTORY OF DIABETES MELLITUS: ICD-10-CM

## 2021-05-03 DIAGNOSIS — Z86.79 HISTORY OF HYPERTENSION: ICD-10-CM

## 2021-05-03 DIAGNOSIS — R41.3 MEMORY LOSS: Primary | ICD-10-CM

## 2021-05-03 PROCEDURE — 1036F TOBACCO NON-USER: CPT | Performed by: PSYCHIATRY & NEUROLOGY

## 2021-05-03 PROCEDURE — 99214 OFFICE O/P EST MOD 30 MIN: CPT | Performed by: PSYCHIATRY & NEUROLOGY

## 2021-05-03 PROCEDURE — 1123F ACP DISCUSS/DSCN MKR DOCD: CPT | Performed by: PSYCHIATRY & NEUROLOGY

## 2021-05-03 PROCEDURE — G8427 DOCREV CUR MEDS BY ELIG CLIN: HCPCS | Performed by: PSYCHIATRY & NEUROLOGY

## 2021-05-03 PROCEDURE — 1090F PRES/ABSN URINE INCON ASSESS: CPT | Performed by: PSYCHIATRY & NEUROLOGY

## 2021-05-03 PROCEDURE — 4040F PNEUMOC VAC/ADMIN/RCVD: CPT | Performed by: PSYCHIATRY & NEUROLOGY

## 2021-05-03 PROCEDURE — G8400 PT W/DXA NO RESULTS DOC: HCPCS | Performed by: PSYCHIATRY & NEUROLOGY

## 2021-05-03 PROCEDURE — G8417 CALC BMI ABV UP PARAM F/U: HCPCS | Performed by: PSYCHIATRY & NEUROLOGY

## 2021-05-03 NOTE — PROGRESS NOTES
08/01/2017    Somnolence, daytime 08/01/2017    Restless leg syndrome 08/01/2017    Hypoxemia 08/01/2017    Obstructive sleep apnea     BiPAP (biphasic positive airway pressure) dependence     Hypochloremia 12/30/2018    CHF (congestive heart failure) ([facility] [address] 12/30/2018    Macrocytic anemia 12/30/2018    Acute kidney injury superimposed on CKD ([address]     Acute on chronic diastolic heart failure ([facility] [address] 01/07/2019    Palliative care patient 01/08/2019    Acute hypoxemic respiratory failure ([address] 01/11/2019    Chronic anemia     History of adenomatous polyp of colon 03/14/2019    Iron deficiency anemia 03/14/2019    Heme positive stool 03/14/2019    AMS (altered mental status) 09/15/2020    Acute cystitis 09/16/2020    Toxic metabolic encephalopathy 22/35/3754     Resolved Ambulatory Problems     Diagnosis Date Noted    Persistent atrial fibrillation ([facility] [address]     Obstructive sleep apnea syndrome 07/11/2017    Obstructive sleep apnea 08/01/2017    Septic shock (HCC) 12/30/2018    Hyperkalemia 98/19/9039    Metabolic acidosis 97/97/9485    ATN (acute tubular necrosis) (HCC) 01/04/2019    Elevated troponin      Past Medical History:   Diagnosis Date    KANCHAN (acute kidney injury) ([address] 12/30/2018    Arthritis     Atrial fibrillation (HCC)     Cancer (HCC)     Chronic atrial fibrillation ([address] 2/12/2017    Depression     Diabetes mellitus ([address]     History of blood transfusion     Hyperlipidemia     Hypertension     Obesity     Osteoarthritis     Pneumonia due to organism     Sinus complaint     Swelling        Past Surgical History:   Procedure Laterality Date    ADENOIDECTOMY      APPENDECTOMY      CARDIOVERSION      x 2     CHOLECYSTECTOMY      COLONOSCOPY  05/17/2013    Dr. Maricarmen Santizo COLONOSCOPY  03/22/2012    Dr Conor Zamarripa yr recall    COLONOSCOPY  06/30/2008    Dr Edward Collado, 3 yr recall    COLONOSCOPY N/A 8/2/2019    Dr Jaydon Abreu sided anastomosis Relationship status: Not on file    Intimate partner violence     Fear of current or ex partner: Not on file     Emotionally abused: Not on file     Physically abused: Not on file     Forced sexual activity: Not on file   Other Topics Concern    Not on file   Social History Narrative    Not on file       Review of Systems    Constitutional: ?Fever ? Sweats ? Chills ? Recent Injury ? Denies all unless marked   HENT:?Headache ? Head Injury ? Sore Throat ? Ear Pain ? Dizziness ? Hearing Loss ? Denies all unless marked   Spine: ? Neck pain ? Back pain ? Sciaticia ? Denies all unless marked   Cardiovascular:?Chest Pain ? Palpitations ? Heart Disease ? Denies all unless marked   Pulmonary: ? Shortness of Breath ? Cough ? Denies all unless marked   Gastrointestinal: ?Abdominal Pain ? Blood in Stool ? Diarrhea ? Constipation ? Nausea ? Vomiting ? Denies all unless marked   Genitourinary: ? Dysuria ? Frequency ? Incontinence ? Urgency ? Denies all unless marked   Musculoskeletal: ? Arthralgia ? Myalgias ? Muscle cramps ? Muscle twitches   ? Denies all unless marked   Extremities: ? Pain ? Swelling ? Denies all unless marked   Skin:? Rash ? Color Change ? Denies all unless marked   Neurological:? Visual Disturbance ? Double Vision ? Slurred Speech ? Trouble swallowing ? Vertigo ? Tingling ? Numbness ? Weakness ? Loss of Balance   ? Loss of Consciousness ? Memory Loss ? Seizures ? Denies all unless marked   Psychiatric/Behavioral:? Depression ? Anxiety ? Denies all unless marked   Sleep: ? Insomnia ? Sleep Disturbance ? Snoring ? Restless Legs ? Daytime Sleepiness ? Sleep Apnea ?  Denies all unless marked         Current Outpatient Medications   Medication Sig Dispense Refill    pantoprazole (PROTONIX) 20 MG tablet Take 20 mg by mouth daily       JANUVIA 100 MG tablet 100 mg daily       donepezil (ARICEPT) 10 MG tablet Take 1 tablet by mouth nightly 30 tablet 2    ARIPiprazole (ABILIFY) 2 MG tablet Take 1 tablet by mouth every evening 30 tablet 3    sotalol (BETAPACE) 80 MG tablet TAKE 1/2 TABLET BY MOUTH TWICE DAILY  90 tablet 3    furosemide (LASIX) 40 MG tablet Take 1 tablet by mouth for 3 to 5 days if patient develops lower leg swelling or fluid weight gain (Patient taking differently: Take 40 mg by mouth 2 times daily ) 30 tablet 0    nystatin (MYCOSTATIN) 771776 UNIT/GM powder Apply topically 4 times daily Apply topically 4 times daily.  rivaroxaban (XARELTO) 15 MG TABS tablet Take 1 tablet by mouth daily (with breakfast) (Patient taking differently: Take 15 mg by mouth nightly ) 90 tablet 3    Multiple Vitamins-Minerals (THERAPEUTIC MULTIVITAMIN-MINERALS) tablet Take 1 tablet by mouth daily 30 tablet 0    atorvastatin (LIPITOR) 40 MG tablet Take 40 mg by mouth nightly       sertraline (ZOLOFT) 100 MG tablet Take 100 mg by mouth daily        No current facility-administered medications for this visit. Outpatient Medications Marked as Taking for the 5/3/21 encounter (Office Visit) with Amanda Guzman MD   Medication Sig Dispense Refill    pantoprazole (PROTONIX) 20 MG tablet Take 20 mg by mouth daily       JANUVIA 100 MG tablet 100 mg daily       donepezil (ARICEPT) 10 MG tablet Take 1 tablet by mouth nightly 30 tablet 2    ARIPiprazole (ABILIFY) 2 MG tablet Take 1 tablet by mouth every evening 30 tablet 3    sotalol (BETAPACE) 80 MG tablet TAKE 1/2 TABLET BY MOUTH TWICE DAILY  90 tablet 3    furosemide (LASIX) 40 MG tablet Take 1 tablet by mouth for 3 to 5 days if patient develops lower leg swelling or fluid weight gain (Patient taking differently: Take 40 mg by mouth 2 times daily ) 30 tablet 0    nystatin (MYCOSTATIN) 331084 UNIT/GM powder Apply topically 4 times daily Apply topically 4 times daily.       rivaroxaban (XARELTO) 15 MG TABS tablet Take 1 tablet by mouth daily (with breakfast) (Patient taking differently: Take 15 mg by mouth nightly ) 90 tablet 3    Multiple Vitamins-Minerals (THERAPEUTIC MULTIVITAMIN-MINERALS) tablet Take 1 tablet by mouth daily 30 tablet 0    atorvastatin (LIPITOR) 40 MG tablet Take 40 mg by mouth nightly       sertraline (ZOLOFT) 100 MG tablet Take 100 mg by mouth daily          /86   Pulse 98   Ht 5' 5\" (1.651 m)   Wt 236 lb (107 kg)   BMI 39.27 kg/m²       Constitutional  well developed, well nourished. Eyes  conjunctiva normal.  Pupils react to light  Ear, nose, throat -hearing intact to finger rub No scars, masses, or lesions over external nose or ears, no atrophy of tongue  Neck-symmetric, no masses noted, no jugular vein distension. No bruits noted. Respiration- chest wall appears symmetric, good expansion,   normal effort without use of accessory muscles  Cardiovascular- RRR  Musculoskeletal  no significant wasting of muscles noted, no bony deformities, gait no gross ataxia  Extremities-no clubbing, cyanosis or edema  Skin  warm, dry, and intact. No rash, erythema, or pallor. 2+ edema in the legs  Psychiatric  mood, affect, and behavior appear normal.      Neurological exam  Awake, alert, fluent. She could not tell me the season of the year, the month or the year. She had difficulty naming her oldest grandchild and telling me the names of her grandchildren in general.  Questionable effort. She had trouble following complex commands. Cranial Nerve Exam   CN II- Visual fields grossly unremarkable. VA adequate. CN III, IV,VI- PERRLA, EOMI, No nystagmus, conjugate eye movements, no ptosis  CN VII-no facial asymmetry  CN VIII-Hearing intact   CN IX and X- Palate elevates in midline  CN XI-good shoulder shrug  CN XII-Tongue midline with no fasciculations or fibrillations    Motor Exam  V/V throughout upper and lower extremities bilaterally, no cogwheeling, normal tone      Reflexes   2+ at the knees and otherwise absent    Tremors- no tremors in hands or head noted    Gait  Very slow and with small steps.   Thoracic kyphosis noted    Coordination  Finger to nose and SAMANTHA-unremarkable    Lab Results   Component Value Date    [id number] 521 03/29/2021     Lab Results   Component Value Date    WBC 9.3 09/17/2020    HGB 10.7 (L) 09/17/2020    HCT 34.1 (L) 09/17/2020    MCV 89.5 09/17/2020     09/17/2020     Lab Results   Component Value Date     09/17/2020    K 4.5 09/17/2020     09/17/2020    CO2 24 09/17/2020    BUN 34 (H) 09/17/2020    CREATININE 1.6 (H) 09/17/2020    GLUCOSE 139 (H) 09/17/2020    CALCIUM 9.0 09/17/2020    PROT 6.2 (L) 09/17/2020    LABALBU 3.8 09/17/2020    BILITOT 0.3 09/17/2020    ALKPHOS 101 09/17/2020    AST 17 09/17/2020    ALT 7 09/17/2020    LABGLOM 31 (A) 09/17/2020    GFRAA 38 (L) 09/17/2020    GLOB 3.3 03/30/2017           Assessment    ICD-10-CM    1. Memory loss  R41.3    2. History of diabetes mellitus  Z86.39    3. 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 May have a vascular component as well. Currently responding to Aricept and Abilify along with Zoloft. No changes made in her medications today. Okay to try some melatonin for sleep. Plan    Return in about 3 months (around 8/3/2021).     (Please note that portions of this note were completed with a voice recognition program. Efforts were made to edit the dictations but occasionally words are mis-transcribed.)

## 2021-05-21 ENCOUNTER — OFFICE VISIT (OUTPATIENT)
Dept: INTERNAL MEDICINE | Age: 81
End: 2021-05-21
Payer: MEDICARE

## 2021-05-21 ENCOUNTER — TELEPHONE (OUTPATIENT)
Dept: INTERNAL MEDICINE | Age: 81
End: 2021-05-21

## 2021-05-21 VITALS
BODY MASS INDEX: 31.6 KG/M2 | SYSTOLIC BLOOD PRESSURE: 118 MMHG | DIASTOLIC BLOOD PRESSURE: 78 MMHG | WEIGHT: 196.6 LBS | OXYGEN SATURATION: 97 % | HEART RATE: 70 BPM | HEIGHT: 66 IN

## 2021-05-21 DIAGNOSIS — Z23 NEED FOR PROPHYLACTIC VACCINATION AGAINST STREPTOCOCCUS PNEUMONIAE (PNEUMOCOCCUS): ICD-10-CM

## 2021-05-21 DIAGNOSIS — E11.21 TYPE II DIABETES MELLITUS WITH NEPHROPATHY (HCC): ICD-10-CM

## 2021-05-21 DIAGNOSIS — R26.81 GAIT INSTABILITY: Primary | ICD-10-CM

## 2021-05-21 DIAGNOSIS — E55.9 VITAMIN D DEFICIENCY: ICD-10-CM

## 2021-05-21 DIAGNOSIS — Z00.00 ENCOUNTER FOR MEDICAL EXAMINATION TO ESTABLISH CARE: Primary | ICD-10-CM

## 2021-05-21 DIAGNOSIS — E78.5 HYPERLIPIDEMIA, UNSPECIFIED HYPERLIPIDEMIA TYPE: ICD-10-CM

## 2021-05-21 DIAGNOSIS — I50.32 CHRONIC DIASTOLIC CONGESTIVE HEART FAILURE (HCC): ICD-10-CM

## 2021-05-21 DIAGNOSIS — K21.9 GASTROESOPHAGEAL REFLUX DISEASE WITHOUT ESOPHAGITIS: ICD-10-CM

## 2021-05-21 DIAGNOSIS — Z86.69 HISTORY OF SLEEP APNEA: ICD-10-CM

## 2021-05-21 DIAGNOSIS — Z23 NEED FOR PROPHYLACTIC VACCINATION AND INOCULATION AGAINST VARICELLA: ICD-10-CM

## 2021-05-21 DIAGNOSIS — G47.37 CENTRAL SLEEP APNEA IN CONDITIONS CLASSIFIED ELSEWHERE: ICD-10-CM

## 2021-05-21 DIAGNOSIS — R41.3 MEMORY LOSS: ICD-10-CM

## 2021-05-21 DIAGNOSIS — Z91.81 AT HIGH RISK FOR FALLS: ICD-10-CM

## 2021-05-21 DIAGNOSIS — I48.0 PAF (PAROXYSMAL ATRIAL FIBRILLATION) (HCC): ICD-10-CM

## 2021-05-21 DIAGNOSIS — F32.89 OTHER DEPRESSION: ICD-10-CM

## 2021-05-21 DIAGNOSIS — L98.9 SKIN LESION: ICD-10-CM

## 2021-05-21 LAB
ALBUMIN SERPL-MCNC: 4.1 G/DL (ref 3.5–5.2)
ALP BLD-CCNC: 115 U/L (ref 35–104)
ALT SERPL-CCNC: 12 U/L (ref 5–33)
ANION GAP SERPL CALCULATED.3IONS-SCNC: 10 MMOL/L (ref 7–19)
AST SERPL-CCNC: 20 U/L (ref 5–32)
BASOPHILS ABSOLUTE: 0.1 K/UL (ref 0–0.2)
BASOPHILS RELATIVE PERCENT: 1.1 % (ref 0–1)
BILIRUB SERPL-MCNC: <0.2 MG/DL (ref 0.2–1.2)
BUN BLDV-MCNC: 38 MG/DL (ref 8–23)
CALCIUM SERPL-MCNC: 9.5 MG/DL (ref 8.8–10.2)
CHLORIDE BLD-SCNC: 102 MMOL/L (ref 98–111)
CHOLESTEROL, TOTAL: 153 MG/DL (ref 160–199)
CO2: 28 MMOL/L (ref 22–29)
CREAT SERPL-MCNC: 1.4 MG/DL (ref 0.5–0.9)
CREATININE URINE: 62.3 MG/DL (ref 4.2–622)
EOSINOPHILS ABSOLUTE: 0.2 K/UL (ref 0–0.6)
EOSINOPHILS RELATIVE PERCENT: 2.9 % (ref 0–5)
GFR AFRICAN AMERICAN: 44
GFR NON-AFRICAN AMERICAN: 36
GLUCOSE BLD-MCNC: 146 MG/DL (ref 74–109)
HBA1C MFR BLD: 8.3 % (ref 4–6)
HCT VFR BLD CALC: 36.7 % (ref 37–47)
HDLC SERPL-MCNC: 44 MG/DL (ref 65–121)
HEMOGLOBIN: 11.4 G/DL (ref 12–16)
IMMATURE GRANULOCYTES #: 0 K/UL
LDL CHOLESTEROL CALCULATED: 87 MG/DL
LYMPHOCYTES ABSOLUTE: 1.4 K/UL (ref 1.1–4.5)
LYMPHOCYTES RELATIVE PERCENT: 17.2 % (ref 20–40)
MCH RBC QN AUTO: 28.1 PG (ref 27–31)
MCHC RBC AUTO-ENTMCNC: 31.1 G/DL (ref 33–37)
MCV RBC AUTO: 90.4 FL (ref 81–99)
MICROALBUMIN UR-MCNC: 37.7 MG/DL (ref 0–19)
MICROALBUMIN/CREAT UR-RTO: 605.1 MG/G
MONOCYTES ABSOLUTE: 0.8 K/UL (ref 0–0.9)
MONOCYTES RELATIVE PERCENT: 9 % (ref 0–10)
NEUTROPHILS ABSOLUTE: 5.8 K/UL (ref 1.5–7.5)
NEUTROPHILS RELATIVE PERCENT: 69.6 % (ref 50–65)
PDW BLD-RTO: 14 % (ref 11.5–14.5)
PLATELET # BLD: 267 K/UL (ref 130–400)
PMV BLD AUTO: 10.7 FL (ref 9.4–12.3)
POTASSIUM SERPL-SCNC: 5.4 MMOL/L (ref 3.5–5)
RBC # BLD: 4.06 M/UL (ref 4.2–5.4)
SODIUM BLD-SCNC: 140 MMOL/L (ref 136–145)
TOTAL PROTEIN: 7.7 G/DL (ref 6.6–8.7)
TRIGL SERPL-MCNC: 109 MG/DL (ref 0–149)
TSH SERPL DL<=0.05 MIU/L-ACNC: 2.27 UIU/ML (ref 0.27–4.2)
VITAMIN B-12: 608 PG/ML (ref 211–946)
VITAMIN D 25-HYDROXY: 63.8 NG/ML
WBC # BLD: 8.4 K/UL (ref 4.8–10.8)

## 2021-05-21 PROCEDURE — 4040F PNEUMOC VAC/ADMIN/RCVD: CPT | Performed by: INTERNAL MEDICINE

## 2021-05-21 PROCEDURE — G8400 PT W/DXA NO RESULTS DOC: HCPCS | Performed by: INTERNAL MEDICINE

## 2021-05-21 PROCEDURE — 99204 OFFICE O/P NEW MOD 45 MIN: CPT | Performed by: INTERNAL MEDICINE

## 2021-05-21 PROCEDURE — G0009 ADMIN PNEUMOCOCCAL VACCINE: HCPCS | Performed by: INTERNAL MEDICINE

## 2021-05-21 PROCEDURE — G8417 CALC BMI ABV UP PARAM F/U: HCPCS | Performed by: INTERNAL MEDICINE

## 2021-05-21 PROCEDURE — 1123F ACP DISCUSS/DSCN MKR DOCD: CPT | Performed by: INTERNAL MEDICINE

## 2021-05-21 PROCEDURE — 3052F HG A1C>EQUAL 8.0%<EQUAL 9.0%: CPT | Performed by: INTERNAL MEDICINE

## 2021-05-21 PROCEDURE — 1036F TOBACCO NON-USER: CPT | Performed by: INTERNAL MEDICINE

## 2021-05-21 PROCEDURE — 1090F PRES/ABSN URINE INCON ASSESS: CPT | Performed by: INTERNAL MEDICINE

## 2021-05-21 PROCEDURE — G8427 DOCREV CUR MEDS BY ELIG CLIN: HCPCS | Performed by: INTERNAL MEDICINE

## 2021-05-21 PROCEDURE — 90732 PPSV23 VACC 2 YRS+ SUBQ/IM: CPT | Performed by: INTERNAL MEDICINE

## 2021-05-21 SDOH — ECONOMIC STABILITY: FOOD INSECURITY: WITHIN THE PAST 12 MONTHS, YOU WORRIED THAT YOUR FOOD WOULD RUN OUT BEFORE YOU GOT MONEY TO BUY MORE.: NEVER TRUE

## 2021-05-21 ASSESSMENT — PATIENT HEALTH QUESTIONNAIRE - PHQ9
SUM OF ALL RESPONSES TO PHQ QUESTIONS 1-9: 1
1. LITTLE INTEREST OR PLEASURE IN DOING THINGS: 0
SUM OF ALL RESPONSES TO PHQ QUESTIONS 1-9: 1
SUM OF ALL RESPONSES TO PHQ QUESTIONS 1-9: 1

## 2021-05-21 ASSESSMENT — ENCOUNTER SYMPTOMS
PHOTOPHOBIA: 0
SHORTNESS OF BREATH: 0
EYE ITCHING: 0
RECTAL PAIN: 0
WHEEZING: 0
COLOR CHANGE: 0
SORE THROAT: 0
CHOKING: 0
ABDOMINAL DISTENTION: 0
NAUSEA: 0
TROUBLE SWALLOWING: 0
EYE PAIN: 0
EYE REDNESS: 0
VOMITING: 0
VOICE CHANGE: 0
BLOOD IN STOOL: 0
ABDOMINAL PAIN: 0
RHINORRHEA: 0
CONSTIPATION: 0
COUGH: 0
EYE DISCHARGE: 0
CHEST TIGHTNESS: 0
DIARRHEA: 0
ANAL BLEEDING: 0
SINUS PRESSURE: 0
BACK PAIN: 1
FACIAL SWELLING: 0

## 2021-05-21 ASSESSMENT — SOCIAL DETERMINANTS OF HEALTH (SDOH): HOW HARD IS IT FOR YOU TO PAY FOR THE VERY BASICS LIKE FOOD, HOUSING, MEDICAL CARE, AND HEATING?: NOT HARD AT ALL

## 2021-05-21 NOTE — PROGRESS NOTES
Chief Complaint   Patient presents with    New Patient       HPI: Iman Soriano is a 68 y.o. female is here for the establishment of care. Her functional status is fair. She does have a history of chronic back pain secondary to scoliosis. She has not had any recent falls. She denies any concerns in regards to safety, hearing. She does see Dr. Fran Guzman for memory loss. Her daughter thinks that her memory has improved slightly since her medications were adjusted. She was seen be seeing Dr. Ion Benton for history of paroxysmal atrial fibrillation. She is a former patient of Dr. Shanell Emerson. She sees EMY Ma for history of chronic kidney disease. He would like a prescription for shingles vaccine today. She does have a history of acid reflux, that is well controlled on Protonix. She also has a history of diabetes. Her blood sugars have been averaging between 1 . She does have a history of atrial fibrillation. Her atrial fibrillation is rate controlled on Betapace. She is on Xarelto for stroke prophylaxis. Her blood pressure is well controlled. Her mood is stable on sertraline. She does have a history of hyperlipidemia and is on atorvastatin. Her daughter is with her today. She states that she has been losing a lot of weight over time. She does have a CPAP machine. However, she seems like she is well rested no longer snores at night. She has not had a CPAP titration or sleep study in quite some time. Also, the CPAP machine rubs up against her nose. She does have a skin lesion on her nose. Her daughter wants to know if she really needs to be on the CPAP. We can certainly do another sleep study to see if she really does need CPAP anymore. We can certainly refer her to dermatology for the skin lesions.     Routine screening is as follows:    Eye exam yearly  Flu vaccine up-to-date  Pap: Hysterectomy  Colonoscopy 2019  Pneumovax given today  Hold on DEXA for now  Mammogram done April 2021    Past Medical History:   Diagnosis Date    KANCHAN (acute kidney injury) ([facility] 12/30/2018    Arthritis     Atrial fibrillation (HCC)     BiPAP (biphasic positive airway pressure) dependence     15cm/11cm    Cancer (HCC)     COLON CA    CHF (congestive heart failure) ([facility] [facility] 12/30/2018    Chronic atrial fibrillation ([facility] [facility] 2/12/2017    Depression     [facility] mellitus [facility]     Essential hypertension 7/11/2017    History of blood transfusion     Hyperlipidemia     Hypertension     Mixed hyperlipidemia 7/11/2017    Obesity     Obstructive sleep apnea     AHI:  18.7;  In REM AHI:  39.3 per PSG, 11/2008; split-night PSG, 8/2017 AHI: 35.1    Osteoarthritis     Palliative care patient 01/08/2019    Pneumonia due to organism     Restless leg syndrome 8/1/2017    Sinus complaint     Swelling       Past Surgical History:   Procedure Laterality Date    ADENOIDECTOMY      APPENDECTOMY      CARDIOVERSION      x 2     CHOLECYSTECTOMY      COLONOSCOPY  05/17/2013    Dr. Harley Huffman COLONOSCOPY  03/22/2012    Dr Martin Liao yr recall    COLONOSCOPY  06/30/2008    Dr Jana Kennedy, 3 yr recall    COLONOSCOPY N/A 8/2/2019    Dr Rashid Davis sided anastomosis appeared normal and patent-prn (age)   Phoebe Conine HEMICOLECTOMY Right 1998    2 FT OF COLON REMOVED DUE TO CA    HYSTERECTOMY      [facility] COLONOSCOPY W/BIOPSY SINGLE/MULTIPLE N/A 6/6/2017    Dr Pilar Cadet colon anastomosis-Tubular AP (-) dysplasia x 2--3 yr recall    TONSILLECTOMY      TUMOR REMOVAL      stomach    UPPER GASTROINTESTINAL ENDOSCOPY  05/17/2013    Dr. Naz Jung ulcer disease-Chelo (+)    UPPER GASTROINTESTINAL ENDOSCOPY  03/28/2012    Dr Luis Hilliard antral ulceration with a visible vessel    UPPER GASTROINTESTINAL ENDOSCOPY N/A 8/2/2019    Dr Nura Matos      Social History     Socioeconomic History    Marital status:      Spouse name: None    Number of children: None    Years of education: tablet Take 1 tablet by mouth nightly 30 tablet 2    ARIPiprazole (ABILIFY) 2 MG tablet Take 1 tablet by mouth every evening 30 tablet 3    sotalol (BETAPACE) 80 MG tablet TAKE 1/2 TABLET BY MOUTH TWICE DAILY  90 tablet 3    furosemide (LASIX) 40 MG tablet Take 1 tablet by mouth for 3 to 5 days if patient develops lower leg swelling or fluid weight gain (Patient taking differently: Take 40 mg by mouth 2 times daily ) 30 tablet 0    nystatin (MYCOSTATIN) 877861 UNIT/GM powder Apply topically 4 times daily Apply topically 4 times daily.  rivaroxaban (XARELTO) 15 MG TABS tablet Take 1 tablet by mouth daily (with breakfast) (Patient taking differently: Take 15 mg by mouth nightly ) 90 tablet 3    Multiple Vitamins-Minerals (THERAPEUTIC MULTIVITAMIN-MINERALS) tablet Take 1 tablet by mouth daily 30 tablet 0    atorvastatin (LIPITOR) 40 MG tablet Take 40 mg by mouth nightly       sertraline (ZOLOFT) 100 MG tablet Take 100 mg by mouth daily        No current facility-administered medications for this visit.         Patient Active Problem List   Diagnosis    History of colon cancer    Chronic anticoagulation    [facility]    History of bradycardia    Hypoglycemia due to insulin    Type 2 diabetes mellitus without complication, with long-term current use of insulin ([facility])    Pneumonia    Hypoglycemic encephalopathy    Abnormal chest x-ray    Hypokalemia    Hypomagnesemia    Essential hypertension    Mixed hyperlipidemia    CPAP (continuous positive airway pressure) dependence    Somnolence, daytime    Restless leg syndrome    Hypoxemia    Obstructive sleep apnea    BiPAP (biphasic positive airway pressure) dependence    Hypochloremia    CHF [facility]congestive heart failure) ([facility]    Macrocytic anemia    Acute kidney injury superimposed on CKD ([facility] [address] [facility]    Acute on chronic diastolic heart failure ([facility])    Palliative care patient    Acute hypoxemic respiratory failure ([facility]    Chronic anemia    History of adenomatous polyp of colon    Iron deficiency anemia    Heme positive stool    AMS (altered mental status)    Acute cystitis    Toxic metabolic encephalopathy        Review of Systems   Constitutional: Negative for activity change, appetite change, chills, diaphoresis, fatigue, fever and unexpected weight change. HENT: Negative for congestion, ear pain, facial swelling, hearing loss, mouth sores, nosebleeds, postnasal drip, rhinorrhea, sinus pressure, sneezing, sore throat, tinnitus, trouble swallowing and voice change. Eyes: Negative for photophobia, pain, discharge, redness, itching and visual disturbance. Respiratory: Negative for cough, choking, chest tightness, shortness of breath and wheezing. Cardiovascular: Negative for chest pain, palpitations and leg swelling. Gastrointestinal: Negative for abdominal distention, abdominal pain, anal bleeding, blood in stool, constipation, diarrhea, nausea, rectal pain and vomiting. Endocrine: Negative for cold intolerance, heat intolerance, polydipsia, polyphagia and polyuria. Genitourinary: Negative for decreased urine volume, difficulty urinating, dysuria, flank pain, frequency, hematuria and urgency. Musculoskeletal: Positive for back pain. Negative for arthralgias, gait problem, joint swelling, myalgias, neck pain and neck stiffness. Skin: Negative for color change, pallor and rash. Skin lesion on nose, cyst to the right cheek, skin lesion to the left cheek   Allergic/Immunologic: Negative for environmental allergies and food allergies. Neurological: Negative for dizziness, tremors, syncope, weakness, light-headedness, numbness and headaches. Hematological: Negative for adenopathy. Does not bruise/bleed easily. Psychiatric/Behavioral: Positive for confusion and dysphoric mood.  Negative for agitation, behavioral problems, decreased concentration, hallucinations, self-injury, sleep disturbance and suicidal ideas. The patient is not nervous/anxious and is not hyperactive. /78   Pulse 70   Ht 5' 6\" (1.676 m)   Wt 196 lb 9.6 oz (89.2 kg)   SpO2 97%   BMI 31.73 kg/m²   Physical Exam  Vitals and nursing note reviewed. Constitutional:       General: She is not in acute distress. Appearance: Normal appearance. She is well-developed. She is not ill-appearing, toxic-appearing or diaphoretic. HENT:      Head: Normocephalic and atraumatic. Right Ear: Tympanic membrane, ear canal and external ear normal. There is no impacted cerumen. Left Ear: Tympanic membrane, ear canal and external ear normal. There is no impacted cerumen. Nose: Nose normal. No congestion or rhinorrhea. Mouth/Throat:      Mouth: Mucous membranes are moist.      Pharynx: Oropharynx is clear. No oropharyngeal exudate or posterior oropharyngeal erythema. Eyes:      General: No scleral icterus. Right eye: No discharge. Left eye: No discharge. Extraocular Movements: Extraocular movements intact. Conjunctiva/sclera: Conjunctivae normal.      Pupils: Pupils are equal, round, and reactive to light. Neck:      Thyroid: No thyromegaly. Vascular: No carotid bruit or JVD. Trachea: No tracheal deviation. Cardiovascular:      Rate and Rhythm: Normal rate and regular rhythm. Pulses: Normal pulses. Heart sounds: Normal heart sounds. No murmur heard. No friction rub. No gallop. Pulmonary:      Effort: Pulmonary effort is normal. No respiratory distress. Breath sounds: Normal breath sounds. No stridor. No wheezing, rhonchi or rales. Chest:      Chest wall: No tenderness. Abdominal:      General: Bowel sounds are normal. There is no distension. Palpations: Abdomen is soft. There is no mass. Tenderness: There is no abdominal tenderness. There is no right CVA tenderness, left CVA tenderness, guarding or rebound. Hernia: No hernia is present. Musculoskeletal:         General: No swelling, tenderness, deformity or signs of injury. Normal range of motion. Cervical back: Normal range of motion and neck supple. No rigidity. No muscular tenderness. Right lower leg: No edema. Left lower leg: No edema. Lymphadenopathy:      Cervical: No cervical adenopathy. Skin:     General: Skin is warm and dry. Coloration: Skin is not jaundiced or pale. Findings: No bruising, erythema, lesion or rash. Comments: Skin lesion that has not been healing to the left side of her nose. She also has a cystlike lesion to the cheek. She also has a patch to the left cheek   Neurological:      General: No focal deficit present. Mental Status: She is alert and oriented to person, place, and time. Mental status is at baseline. Cranial Nerves: No cranial nerve deficit. Motor: No weakness or abnormal muscle tone. Coordination: Coordination normal.      Gait: Gait normal.      Deep Tendon Reflexes: Reflexes are normal and symmetric. Reflexes normal.   Psychiatric:         Mood and Affect: Mood normal.         Behavior: Behavior normal.         Thought Content: Thought content normal.         Judgment: Judgment normal.         1. Encounter for medical examination to establish care    2. Need for prophylactic vaccination against Streptococcus pneumoniae (pneumococcus)    3. Need for prophylactic vaccination and inoculation against varicella    4. Type II diabetes mellitus with nephropathy ([facility] [address]    5. Vitamin D deficiency     6. History of sleep apnea    7. Central sleep apnea in conditions classified elsewhere     8. Skin lesion    9. Memory loss    10. At high risk for falls    11. Chronic diastolic congestive heart failure ([address]    12. PAF (paroxysmal atrial fibrillation) ([address]    13. Gastroesophageal reflux disease without esophagitis    14. Other depression    15.  Hyperlipidemia, unspecified hyperlipidemia type ASSESSMENT/PLAN:    68-year-old woman here for establishment of care. 1.  Establishment of care. Routine screening is as per HPI. Labs ordered today. 2.  Acid reflux: Continue Protonix as prescribed    3. Prescription for shingles vaccine given to patient    4. Skin lesions: Refer to dermatology    5. History of sleep apnea: Patient may no longer need to see if Pap machine given that she has lost weight. We will set her up for a new sleep study    6. Type  2 diabetes: Continue Januvia for now. A1c ordered. 7.  Memory loss: Continue Aricept as prescribed    8. Depression: Continue Zoloft and Abilify    9. Atrial fibrillation: Rate controlled on sotalol. Continue Xarelto for stroke prophylaxis    10  Hyperlipidemia: Continue atorvastatin    11. History of heart failure: Appears to be well compensated at this time    12. History of diabetic nephropathy: Awaiting labs    Addendum: After patient left her office, she sent a message wanting a wheelchair due to gait instability. We can certainly order the wheelchair for her. She does have a hunched back. Shanita was seen today for new patient. Diagnoses and all orders for this visit:    Encounter for medical examination to establish care    Need for prophylactic vaccination against Streptococcus pneumoniae (pneumococcus)  -     Pneumococcal polysaccharide vaccine 23-valent PPSV23    Need for prophylactic vaccination and inoculation against varicella  -     zoster recombinant adjuvanted vaccine (SHINGRIX) 50 MCG/0.5ML SUSR injection; 50 MCG IM then repeat 2-6 months. Type II diabetes mellitus with nephropathy (HCC)  -     CBC Auto Differential; Future  -     Comprehensive Metabolic Panel; Future  -     Hemoglobin A1C; Future  -     Lipid Panel; Future  -     TSH without Reflex; Future  -     Microalbumin / Creatinine Urine Ratio; Future  -     Vitamin D 25 Hydroxy;  Future  -     CBC Auto Differential; Future  -     Comprehensive D 25 Hydroxy     Standing Status:   Future     Number of Occurrences:   1     Standing Expiration Date:   5/21/2022    CBC Auto Differential     Fast 12 hours     Standing Status:   Future     Standing Expiration Date:   5/21/2022    Comprehensive Metabolic Panel     Fasting 12 hours     Standing Status:   Future     Standing Expiration Date:   5/21/2022    Lipid Panel     Standing Status:   Future     Standing Expiration Date:   5/21/2022     Order Specific Question:   Is Patient Fasting?/# of Hours     Answer:   12    TSH without Reflex     Fast 12 hours     Standing Status:   Future     Standing Expiration Date:   5/21/2022    Hemoglobin A1C     Fast 12 hours     Standing Status:   Future     Standing Expiration Date:   5/21/2022    Microalbumin / Creatinine Urine Ratio     Standing Status:   Future     Standing Expiration Date:   5/21/2022    Vitamin D 25 Hydroxy     Standing Status:   Future     Standing Expiration Date:   5/21/2022    Vitamin B12     Standing Status:   Future     Number of Occurrences:   1     Standing Expiration Date:   5/21/2022    External Referral To Dermatology     Referral Priority:   Routine     Referral Type:   Eval and Treat     Referral Reason:   Specialty Services Required     Requested Specialty:   Dermatology     Number of Visits Requested:   1    Baseline Diagnostic Sleep Study     Standing Status:   Future     Standing Expiration Date:   6/21/2021     Scheduling Instructions:      History of sleep apnea     Order Specific Question:   Adult or Pediatric     Answer:   Adult Study (>7 Years)     Order Specific Question:   Location For Sleep Study     Answer:   [place]     Order Specific Question:   Select Sleep Lab Location     Answer:   [facility] Otilio Rust MD   On the basis of positive falls risk screening, assessment and plan is as follows: home safety tips provided.

## 2021-05-21 NOTE — PROGRESS NOTES
After obtaining consent, and per orders of Dr. Jerad Villarreal, injection of Pneumovax-23 given in Left deltoid by Sonia Galvez MA. Patient instructed to remain in clinic for 20 minutes afterwards, and to report any adverse reaction to me immediately.

## 2021-05-24 ENCOUNTER — PATIENT MESSAGE (OUTPATIENT)
Dept: INTERNAL MEDICINE | Age: 81
End: 2021-05-24

## 2021-05-25 NOTE — TELEPHONE ENCOUNTER
From: Stevo Sanchez  To: Ciera Severino MD  Sent: 5/24/2021 2:31 PM CDT  Subject: Non-Urgent Medical Question    I called [facility] about the wheelchair for my mom. She said for her to get the light transport chair. It has to say transport chair on the prescription. But she said if you would just send a new one that says transport chair she will get it taking care of. Thanks so much.

## 2021-06-09 RX ORDER — TRAZODONE HYDROCHLORIDE 100 MG/1
100 TABLET ORAL NIGHTLY
Qty: 90 TABLET | Refills: 1 | Status: SHIPPED | OUTPATIENT
Start: 2021-06-09 | End: 2021-12-08

## 2021-07-06 ENCOUNTER — TELEPHONE (OUTPATIENT)
Dept: INTERNAL MEDICINE | Age: 81
End: 2021-07-06

## 2021-07-06 NOTE — TELEPHONE ENCOUNTER
Pt's daughter states her legs have been really swollen (up to her below the knee with pitting edema and her (R) lung sounds like it is full. Pt started coughing Saturday when she would eat or drink. She takes Lasix 40mg QD and QHS PRN. She was given an extra last night. She has CKD functioning at 24- 26% and CHF. Daughter states she isn't having any SOB.

## 2021-07-06 NOTE — TELEPHONE ENCOUNTER
Give her another dose of Lasix tonight.   If she is not any better by tomorrow, should bring her in to be seen

## 2021-07-09 NOTE — TELEPHONE ENCOUNTER
Requested Prescriptions     Pending Prescriptions Disp Refills    donepezil (ARICEPT) 10 MG tablet [Pharmacy Med Name: DONEPEZIL HCL 10MG TABLET] 30 tablet 2     Sig: TAKE 1 TABLET BY MOUTH EVERY NIGHT       Last Office Visit: 5/3/2021  Next Office Visit: 8/4/2021  Last Medication Refill: 3/29/21 with 2 refills

## 2021-07-12 RX ORDER — DONEPEZIL HYDROCHLORIDE 10 MG/1
TABLET, FILM COATED ORAL
Qty: 30 TABLET | Refills: 2 | Status: SHIPPED | OUTPATIENT
Start: 2021-07-12 | End: 2021-10-07

## 2021-07-16 RX ORDER — DONEPEZIL HYDROCHLORIDE 10 MG/1
TABLET, FILM COATED ORAL
Qty: 30 TABLET | Refills: 2 | Status: CANCELLED | OUTPATIENT
Start: 2021-07-16

## 2021-07-16 NOTE — TELEPHONE ENCOUNTER
Requested Prescriptions     Pending Prescriptions Disp Refills    ARIPiprazole (ABILIFY) 2 MG tablet 30 tablet 3     Sig: Take 1 tablet by mouth every evening       Last Office Visit: 5/3/2021  Next Office Visit: 8/4/2021  Last Medication Refill: 3/29/2021 3 refills

## 2021-07-19 RX ORDER — ARIPIPRAZOLE 2 MG/1
2 TABLET ORAL EVERY EVENING
Qty: 30 TABLET | Refills: 3 | Status: SHIPPED | OUTPATIENT
Start: 2021-07-19 | End: 2021-11-29

## 2021-07-22 RX ORDER — SITAGLIPTIN 100 MG/1
TABLET, FILM COATED ORAL
Qty: 30 TABLET | Refills: 5 | Status: SHIPPED | OUTPATIENT
Start: 2021-07-22 | End: 2021-07-22 | Stop reason: SDUPTHER

## 2021-07-22 NOTE — TELEPHONE ENCOUNTER
Shanita called requesting a refill of the below medication which has been pended for you:     Requested Prescriptions     Pending Prescriptions Disp Refills    JANUVIA 100 MG tablet [Pharmacy Med Name: Christy Fass 100MG TABLET] 30 tablet 5     Sig: TAKE 1 TABLET BY MOUTH EVERY DAY       Last Appointment Date: 5/21/2021  Next Appointment Date: 8/27/2021    No Known Allergies

## 2021-07-22 NOTE — TELEPHONE ENCOUNTER
Shanita called requesting a refill of the below medication which has been pended for you:     Requested Prescriptions     Pending Prescriptions Disp Refills    SITagliptin (JANUVIA) 100 MG tablet 30 tablet 5     Sig: Take 1 tablet by mouth daily       Last Appointment Date: 5/21/2021  Next Appointment Date: 8/27/2021    No Known Allergies

## 2021-08-04 ENCOUNTER — TELEPHONE (OUTPATIENT)
Dept: NEUROLOGY | Age: 81
End: 2021-08-04

## 2021-08-04 NOTE — TELEPHONE ENCOUNTER
Called and spoke with patient daughter to let her know that I had to reschedule patient appointment. Due to provider is sick. Patient daughter is aware of the appointment change.

## 2021-08-18 ENCOUNTER — PATIENT MESSAGE (OUTPATIENT)
Dept: INTERNAL MEDICINE | Age: 81
End: 2021-08-18

## 2021-08-19 RX ORDER — COLESEVELAM 180 1/1
625 TABLET ORAL 2 TIMES DAILY WITH MEALS
Qty: 60 TABLET | Refills: 5 | Status: SHIPPED | OUTPATIENT
Start: 2021-08-19

## 2021-08-27 ENCOUNTER — TELEPHONE (OUTPATIENT)
Dept: INTERNAL MEDICINE | Age: 81
End: 2021-08-27

## 2021-08-27 ENCOUNTER — OFFICE VISIT (OUTPATIENT)
Dept: INTERNAL MEDICINE | Age: 81
End: 2021-08-27
Payer: MEDICARE

## 2021-08-27 VITALS
WEIGHT: 202 LBS | HEART RATE: 101 BPM | BODY MASS INDEX: 32.47 KG/M2 | HEIGHT: 66 IN | DIASTOLIC BLOOD PRESSURE: 80 MMHG | RESPIRATION RATE: 20 BRPM | SYSTOLIC BLOOD PRESSURE: 136 MMHG | OXYGEN SATURATION: 98 %

## 2021-08-27 DIAGNOSIS — F32.89 OTHER DEPRESSION: ICD-10-CM

## 2021-08-27 DIAGNOSIS — Z00.00 ROUTINE ADULT HEALTH MAINTENANCE: Primary | ICD-10-CM

## 2021-08-27 DIAGNOSIS — I10 ESSENTIAL HYPERTENSION: ICD-10-CM

## 2021-08-27 DIAGNOSIS — D50.9 IRON DEFICIENCY ANEMIA, UNSPECIFIED IRON DEFICIENCY ANEMIA TYPE: ICD-10-CM

## 2021-08-27 DIAGNOSIS — E78.5 HYPERLIPIDEMIA, UNSPECIFIED HYPERLIPIDEMIA TYPE: ICD-10-CM

## 2021-08-27 DIAGNOSIS — I48.91 ATRIAL FIBRILLATION, UNSPECIFIED TYPE (HCC): ICD-10-CM

## 2021-08-27 DIAGNOSIS — I50.9 CONGESTIVE HEART FAILURE, UNSPECIFIED HF CHRONICITY, UNSPECIFIED HEART FAILURE TYPE (HCC): ICD-10-CM

## 2021-08-27 DIAGNOSIS — R19.7 DIARRHEA, UNSPECIFIED TYPE: ICD-10-CM

## 2021-08-27 DIAGNOSIS — N18.9 CHRONIC KIDNEY DISEASE, UNSPECIFIED CKD STAGE: ICD-10-CM

## 2021-08-27 DIAGNOSIS — N32.81 OAB (OVERACTIVE BLADDER): ICD-10-CM

## 2021-08-27 DIAGNOSIS — R41.3 MEMORY LOSS: ICD-10-CM

## 2021-08-27 DIAGNOSIS — E55.9 VITAMIN D DEFICIENCY: ICD-10-CM

## 2021-08-27 DIAGNOSIS — E11.21 TYPE II DIABETES MELLITUS WITH NEPHROPATHY (HCC): ICD-10-CM

## 2021-08-27 PROCEDURE — G0439 PPPS, SUBSEQ VISIT: HCPCS | Performed by: INTERNAL MEDICINE

## 2021-08-27 PROCEDURE — 4040F PNEUMOC VAC/ADMIN/RCVD: CPT | Performed by: INTERNAL MEDICINE

## 2021-08-27 PROCEDURE — 1123F ACP DISCUSS/DSCN MKR DOCD: CPT | Performed by: INTERNAL MEDICINE

## 2021-08-27 PROCEDURE — 3051F HG A1C>EQUAL 7.0%<8.0%: CPT | Performed by: INTERNAL MEDICINE

## 2021-08-27 RX ORDER — FERROUS SULFATE 325(65) MG
325 TABLET ORAL
Qty: 90 TABLET | Refills: 1 | Status: SHIPPED | OUTPATIENT
Start: 2021-08-27 | End: 2022-02-25

## 2021-08-27 RX ORDER — OXYBUTYNIN CHLORIDE 10 MG/1
10 TABLET, EXTENDED RELEASE ORAL DAILY
Qty: 30 TABLET | Refills: 3 | Status: SHIPPED | OUTPATIENT
Start: 2021-08-27 | End: 2022-01-10

## 2021-08-27 NOTE — PROGRESS NOTES
Chief Complaint   Patient presents with    3 Month Follow-Up    Diarrhea     Patient has had bouts of diarrhea since having colon cancer and having a foot of her colon removed in 1998. Over the past few months the diarrhea has been worse. The diarrhea is watery and the patient has no control over her bowels. HPI: Michael Hernandez is a 68 y.o. female is here for Medicare wellness exam.  Her functional status is fair. She denies any history of recent falls. She has no concerns in regards to safety, hearing. She sees Lily King for history of atrial fibrillation. Dr. Gayle is her dermatologist.  Dr. Lesa Garcia is her neurologist and he follows her for memory loss. .  She does have a history of colon cancer. She has had a colonoscopy within the last couple of years. She does have a history of chronic diarrhea. No particular foods seem to make her diarrhea worse. She has chronic diarrhea and cannot control her bowel movements. She also has difficulty with urinary incontinence. We did prescribe WelChol her last office visit. She was unable to get it filled. The medical assistant is currently working on her PA. The diarrhea has been going on for several years. However, over the last few months, she has noted worsening of the diarrhea. She has not been on any antibiotics recently. She does see a EMY Townsend for chronic kidney disease. Her blood sugars are running between 101 20. Her A1c is currently at 7.9, which is down from 8.3. However, her hemoglobin has dropped from 11.8-9.4. Her hematocrit has dropped from 36-32. Creatinine is currently 1.5, which is relatively stable from 1.4 last time we got lab work done. Her GFR remains stable at 36. She denies any history of blood in her stools. She has not had any epigastric pain. She is on blood thinners due to her history of atrial fibrillation. Her atrial fibrillation is well controlled.   She has had some lower extremity swelling from time to time. Her memory is relatively stable on her current dose of medications. She sleeps well with trazodone at night. Her acid reflux seems to be well controlled. She is tolerating her statin without difficulty. Her mood is stable on Zoloft. Again, we will try to get the [facility] PA by her insurance company. Routine screening is as follows:  Eye exam yearly  Flu vaccine up to date  Pap: hysterectomy  Colonoscopy 2019  Pneumovax today  DEXA: Hold for now  Mammogram April 2021    Past Medical History:   Diagnosis Date    KANCHAN (acute kidney injury) ([facility] 12/30/2018    Arthritis     Atrial fibrillation (HCC)     BiPAP (biphasic positive airway pressure) dependence     15cm/11cm    Cancer ([facility]     COLON CA    CHF (congestive heart failure) ([facility] 12/30/2018    Chronic atrial fibrillation ([facility] 2/12/2017    Depression     [facility]     Essential hypertension 7/11/2017    History of blood transfusion     Hyperlipidemia     Hypertension     Mixed hyperlipidemia 7/11/2017    Obesity     Obstructive sleep apnea     AHI:  18.7;  In REM AHI:  39.3 per PSG, 11/2008; split-night PSG, 8/2017 AHI: 35.1    Osteoarthritis     Palliative care patient 01/08/2019    Pneumonia due to organism     Restless leg syndrome 8/1/2017    Sinus complaint     Swelling       Past Surgical History:   Procedure Laterality Date    ADENOIDECTOMY      APPENDECTOMY      CARDIOVERSION      x 2     CHOLECYSTECTOMY      COLONOSCOPY  05/17/2013    Dr. Marylu Anglin COLONOSCOPY  03/22/2012    Dr Rodríguez Pyo yr recall    COLONOSCOPY  06/30/2008    Dr Shamar Reyna, 3 yr recall    COLONOSCOPY N/A 8/2/2019    Dr Knight Cord sided anastomosis appeared normal and patent-prn (age)   Bartolome Rudder HEMICOLECTOMY Right 1998    2 FT OF COLON REMOVED DUE TO CA    HYSTERECTOMY      ID COLONOSCOPY W/BIOPSY SINGLE/MULTIPLE N/A 6/6/2017    Dr John Paul Reis colon anastomosis-Tubular AP (-) dysplasia x 2--3 yr recall    TONSILLECTOMY      TUMOR REMOVAL      stomach    UPPER GASTROINTESTINAL ENDOSCOPY  05/17/2013    Dr. Tolbert Fus ulcer disease-Chelo (+)    UPPER GASTROINTESTINAL ENDOSCOPY  03/28/2012    Dr Juan Stauffer antral ulceration with a visible vessel    UPPER GASTROINTESTINAL ENDOSCOPY N/A 8/2/2019    Dr Berniece Bamberger      Social History     Socioeconomic History    Marital status:      Spouse name: None    Number of children: None    Years of education: None    Highest education level: None   Occupational History    None   Tobacco Use    Smoking status: Never Smoker    Smokeless tobacco: Never Used   Vaping Use    Vaping Use: Never assessed   Substance and Sexual Activity    Alcohol use: No    Drug use: No    Sexual activity: Not Currently   Other Topics Concern    None   Social History Narrative    None     Social Determinants of Health     Financial Resource Strain: Low Risk     Difficulty of Paying Living Expenses: Not hard at all   Food Insecurity: No Food Insecurity    Worried About [facility] in the Last Year: Never true    Onel of Food in the Last Year: Never true   Transportation Needs:     Lack of Transportation (Medical):      Lack of Transportation (Non-Medical):    Physical Activity:     Days of Exercise per Week:     Minutes of Exercise per Session:    Stress:     Feeling of Stress :    Social Connections:     Frequency of Communication with Friends and Family:     Frequency of Social Gatherings with Friends and Family:     Attends Orthodox Services:     Active Member of Clubs or Organizations:     Attends Club or Organization Meetings:     Marital Status:    Intimate Partner Violence:     Fear of Current or Ex-Partner:     Emotionally Abused:     Physically Abused:     Sexually Abused:       Family History   Problem Relation Age of Onset    No Known Problems Mother     No Known Problems Father     Colon Cancer Daughter     Colon Polyps Daughter     Esophageal Cancer Neg Hx     Liver Cancer Neg Hx     Rectal Cancer Neg Hx     Liver Disease Neg Hx     Stomach Cancer Neg Hx         Current Outpatient Medications   Medication Sig Dispense Refill    oxybutynin (DITROPAN XL) 10 MG extended release tablet Take 1 tablet by mouth daily 30 tablet 3    SITagliptin (JANUVIA) 100 MG tablet Take 1 tablet by mouth daily 30 tablet 5    ARIPiprazole (ABILIFY) 2 MG tablet Take 1 tablet by mouth every evening 30 tablet 3    donepezil (ARICEPT) 10 MG tablet TAKE 1 TABLET BY MOUTH EVERY NIGHT  30 tablet 2    traZODone (DESYREL) 100 MG tablet Take 1 tablet by mouth nightly 90 tablet 1    pantoprazole (PROTONIX) 20 MG tablet Take 20 mg by mouth daily       sotalol (BETAPACE) 80 MG tablet TAKE 1/2 TABLET BY MOUTH TWICE DAILY  90 tablet 3    furosemide (LASIX) 40 MG tablet Take 1 tablet by mouth for 3 to 5 days if patient develops lower leg swelling or fluid weight gain (Patient taking differently: Take 40 mg by mouth daily Daily and extra PRN) 30 tablet 0    nystatin (MYCOSTATIN) 259682 UNIT/GM powder Apply topically 4 times daily Apply topically 4 times daily.  rivaroxaban (XARELTO) 15 MG TABS tablet Take 1 tablet by mouth daily (with breakfast) (Patient taking differently: Take 15 mg by mouth nightly ) 90 tablet 3    atorvastatin (LIPITOR) 40 MG tablet Take 40 mg by mouth nightly       sertraline (ZOLOFT) 100 MG tablet Take 100 mg by mouth daily       ferrous sulfate (IRON 325) 325 (65 Fe) MG tablet Take 1 tablet by mouth daily (with breakfast) 90 tablet 1    colesevelam (WELCHOL) 625 MG tablet Take 1 tablet by mouth 2 times daily (with meals) (Patient not taking: Reported on 8/27/2021) 60 tablet 5    Multiple Vitamins-Minerals (THERAPEUTIC MULTIVITAMIN-MINERALS) tablet Take 1 tablet by mouth daily 30 tablet 0     No current facility-administered medications for this visit.         Patient Active Problem List   Diagnosis    History of colon cancer    Chronic anticoagulation    [facility]    History of bradycardia    Hypoglycemia due to insulin    Type 2 diabetes mellitus without complication, with long-term current use of insulin ([facility])    Pneumonia    Hypoglycemic encephalopathy    Abnormal chest x-ray    Hypokalemia    Hypomagnesemia    Essential hypertension    Mixed hyperlipidemia    CPAP (continuous positive airway pressure) dependence    Somnolence, daytime    Restless leg syndrome    Hypoxemia    Obstructive sleep apnea    BiPAP (biphasic positive airway pressure) dependence    Hypochloremia    CHF [facility]congestive heart failure) ([facility]    Macrocytic anemia    Acute kidney injury superimposed on CKD (HCC)    Acute on chronic diastolic heart failure ([facility]    Palliative care patient    Acute hypoxemic respiratory failure ([facility])    Chronic anemia    History of adenomatous polyp of colon    Iron deficiency anemia    Heme positive stool    AMS (altered mental status)    Acute cystitis    Toxic metabolic encephalopathy        Review of Systems   Constitutional: Negative for activity change, appetite change, chills, diaphoresis, fatigue, fever and unexpected weight change. HENT: Negative for congestion, ear pain, facial swelling, hearing loss, mouth sores, nosebleeds, postnasal drip, rhinorrhea, sinus pressure, sneezing, sore throat, tinnitus, trouble swallowing and voice change. Eyes: Negative for photophobia, pain, discharge, redness, itching and visual disturbance. Respiratory: Negative for cough, choking, chest tightness, shortness of breath and wheezing. Cardiovascular: Negative for chest pain, palpitations and leg swelling. Gastrointestinal: Positive for diarrhea. Negative for abdominal distention, abdominal pain, anal bleeding, blood in stool, constipation, nausea, rectal pain and vomiting.    Endocrine: Negative for cold intolerance, heat intolerance, polydipsia, polyphagia and polyuria. Genitourinary: Positive for frequency and urgency. Negative for decreased urine volume, difficulty urinating, dysuria, flank pain and hematuria. Musculoskeletal: Positive for back pain. Negative for arthralgias, gait problem, joint swelling, myalgias, neck pain and neck stiffness. Skin: Negative for color change, pallor and rash. Allergic/Immunologic: Negative for environmental allergies and food allergies. Neurological: Negative for dizziness, tremors, syncope, weakness, light-headedness, numbness and headaches. Hematological: Negative for adenopathy. Does not bruise/bleed easily. Psychiatric/Behavioral: Positive for confusion and dysphoric mood. Negative for agitation, behavioral problems, decreased concentration, hallucinations, self-injury, sleep disturbance and suicidal ideas. The patient is not nervous/anxious and is not hyperactive. /80 (Site: Left Upper Arm, Position: Sitting)   Pulse 101   Resp 20   Ht 5' 6\" (1.676 m)   Wt 202 lb (91.6 kg)   SpO2 98%   BMI 32.60 kg/m²   Physical Exam  Vitals and nursing note reviewed. Constitutional:       General: She is not in acute distress. Appearance: Normal appearance. She is well-developed. She is not ill-appearing, toxic-appearing or diaphoretic. HENT:      Head: Normocephalic and atraumatic. Right Ear: Tympanic membrane, ear canal and external ear normal. There is no impacted cerumen. Left Ear: Tympanic membrane, ear canal and external ear normal. There is no impacted cerumen. Nose: Nose normal. No congestion or rhinorrhea. Mouth/Throat:      Mouth: Mucous membranes are moist.      Pharynx: Oropharynx is clear. No oropharyngeal exudate or posterior oropharyngeal erythema. Eyes:      General: No scleral icterus. Right eye: No discharge. Left eye: No discharge. Extraocular Movements: Extraocular movements intact.       Conjunctiva/sclera: Conjunctivae normal. Pupils: Pupils are equal, round, and reactive to light. Neck:      Thyroid: No thyromegaly. Vascular: No carotid bruit or JVD. Trachea: No tracheal deviation. Cardiovascular:      Rate and Rhythm: Normal rate and regular rhythm. Pulses: Normal pulses. Heart sounds: Normal heart sounds. No murmur heard. No friction rub. No gallop. Pulmonary:      Effort: Pulmonary effort is normal. No respiratory distress. Breath sounds: Normal breath sounds. No stridor. No wheezing, rhonchi or rales. Chest:      Chest wall: No tenderness. Abdominal:      General: Bowel sounds are normal. There is no distension. Palpations: Abdomen is soft. There is no mass. Tenderness: There is no abdominal tenderness. There is no right CVA tenderness, left CVA tenderness, guarding or rebound. Hernia: No hernia is present. Musculoskeletal:         General: No swelling, tenderness, deformity or signs of injury. Normal range of motion. Cervical back: Normal range of motion and neck supple. No rigidity. No muscular tenderness. Right lower leg: No edema. Left lower leg: No edema. Lymphadenopathy:      Cervical: No cervical adenopathy. Skin:     General: Skin is warm and dry. Coloration: Skin is not jaundiced or pale. Findings: No bruising, erythema, lesion or rash. Neurological:      General: No focal deficit present. Mental Status: She is alert and oriented to person, place, and time. Mental status is at baseline. Cranial Nerves: No cranial nerve deficit. Motor: No weakness or abnormal muscle tone. Coordination: Coordination normal.      Gait: Gait normal.      Deep Tendon Reflexes: Reflexes are normal and symmetric. Reflexes normal.   Psychiatric:         Mood and Affect: Mood normal.         Behavior: Behavior normal.         Thought Content:  Thought content normal.         Judgment: Judgment normal.         1. Routine adult health maintenance 2. Iron deficiency anemia, unspecified iron deficiency anemia type    3. Type II diabetes mellitus with nephropathy ([facility]    4. Vitamin D deficiency     5. Diarrhea, unspecified type    6. OAB (overactive bladder)    7. Memory loss    8. Other depression    9. Atrial fibrillation, unspecified type ([facility]    10. Essential hypertension    11. Hyperlipidemia, unspecified hyperlipidemia type    12. Chronic kidney disease, unspecified CKD stage    13. Congestive heart failure, unspecified HF chronicity, unspecified heart failure type [facility])        ASSESSMENT/PLAN:    68-year-old woman here for her annual Medicare wellness exam    1. Health maintenance: Routine screening is as per HPI. Labs discussed with patient and daughter today. 2.  Diarrhea: Uncertain etiology. Seems to be longstanding in nature, but has gradually gotten worse. She could try Imodium for the diarrhea. She is also has a drop in her hemoglobin and hematocrit, which is concerning for possible GI bleeding. We will check iron, TIBC and ferritin levels. We can start her on iron supplementation, which may help with some of the diarrhea-like symptoms. We are still trying to get her WelChol prescribed. Being on iron level, will consider possibly referring her to GI for possible endoscopy/colonoscopy. 3.  Overactive bladder: Try oxybutynin    4. Type 2 diabetes: Continue Januvia as prescribed    5. Memory loss: Continue Abilify and Aricept    6. Depression: Stable on trazodone. Also helps with the insomnia. Continue sertraline as prescribed    7. Atrial fibrillation: On sotalol. She is on Xarelto for stroke prophylaxis    8  Hypertension: Continue Lasix. As needed. Lasix is probably contributing to the overactive bladder.,  Hopefully oxybutynin will help with this    9. Hyperlipidemia: Continue Lipitor as prescribed    10. Vitamin D deficiency: Check a vitamin D level with next lab draw    11.   Chronic kidney disease: Relatively stable    12. Congestive heart failure: Appears to be well compensated at this time    Shanita was seen today for 3 month follow-up and diarrhea. Diagnoses and all orders for this visit:    Routine adult health maintenance    Iron deficiency anemia, unspecified iron deficiency anemia type  -     Iron and TIBC; Future  -     Ferritin; Future    Type II diabetes mellitus with nephropathy (HCC)  -     CBC Auto Differential; Future  -     Comprehensive Metabolic Panel; Future  -     Hemoglobin A1C; Future  -     Lipid Panel; Future  -     TSH without Reflex; Future  -     Microalbumin / Creatinine Urine Ratio; Future  -     Vitamin D 25 Hydroxy; Future  -     Iron and TIBC; Future  -     Ferritin; Future    Vitamin D deficiency   -     Vitamin D 25 Hydroxy; Future    Diarrhea, unspecified type    OAB (overactive bladder)    Memory loss    Other depression    Atrial fibrillation, unspecified type ([facility]    Essential hypertension    Hyperlipidemia, unspecified hyperlipidemia type    Chronic kidney disease, unspecified CKD stage    Congestive heart failure, unspecified HF chronicity, unspecified heart failure type ([facility]    Other orders  -     oxybutynin (DITROPAN XL) 10 MG extended release tablet; Take 1 tablet by mouth daily          Return in about 3 months (around 11/27/2021) for diabetes. Orders Placed This Encounter   Procedures    Iron and TIBC     Standing Status:   Future     Number of Occurrences:   1     Standing Expiration Date:   8/27/2022     Order Specific Question:   Is Patient Fasting? Answer:   15     Order Specific Question:   No of Hours?      Answer:   15    Ferritin     Standing Status:   Future     Number of Occurrences:   1     Standing Expiration Date:   8/27/2022    CBC Auto Differential     Fast 12 hours     Standing Status:   Future     Standing Expiration Date:   8/27/2022    Comprehensive Metabolic Panel     Fasting 12 hours     Standing Status:   Future     Standing Expiration Date:   2022    Hemoglobin A1C     Fast 12 hours     Standing Status:   Future     Standing Expiration Date:   2022    Lipid Panel     Standing Status:   Future     Standing Expiration Date:   2022     Order Specific Question:   Is Patient Fasting?/# of Hours     Answer:   12    TSH without Reflex     Fast 12 hours     Standing Status:   Future     Standing Expiration Date:   2022    Microalbumin / Creatinine Urine Ratio     Standing Status:   Future     Standing Expiration Date:   2022    Vitamin D 25 Hydroxy     Standing Status:   Future     Standing Expiration Date:   2022    Iron and TIBC     Standing Status:   Future     Standing Expiration Date:   2022     Order Specific Question:   Is Patient Fasting? Answer:   15     Order Specific Question:   No of Hours? Answer:   15    Ferritin     Standing Status:   Future     Standing Expiration Date:   2022       Homa Doll MD     Medicare Annual Wellness Visit - Subsequent    Name: aYo Pringle Date: 2021   MRN: [id number] Sex: Female   Age: 68 y.o. Ethnicity: Non- / Non    : 1940 Race: White (non-)      Montez Field is here for   Chief Complaint   Patient presents with    3 Month Follow-Up    Diarrhea     Patient has had bouts of diarrhea since having colon cancer and having a foot of her colon removed in . Over the past few months the diarrhea has been worse. The diarrhea is watery and the patient has no control over her bowels. Screenings for behavioral, psychosocial and functional/safety risks, and cognitive dysfunction are all negative except as indicated below. These results, as well as other patient data from the [address] form, are documented in Flowsheets linked to this Encounter. No Known Allergies    Prior to Visit Medications    Medication Sig Taking?  Authorizing Provider   oxybutynin (DITROPAN XL) 10 MG extended release tablet Take 1 tablet by mouth daily Yes Iftikhar Stokes MD   SITagliptin (JANUVIA) 100 MG tablet Take 1 tablet by mouth daily Yes Iftikhar Stokes MD   ARIPiprazole (ABILIFY) 2 MG tablet Take 1 tablet by mouth every evening Yes Liu Ortiz MD   donepezil (ARICEPT) 10 MG tablet TAKE 1 TABLET BY MOUTH EVERY NIGHT  Yes Liu Ortiz MD   traZODone (DESYREL) 100 MG tablet Take 1 tablet by mouth nightly Yes Liu Ortiz MD   pantoprazole (PROTONIX) 20 MG tablet Take 20 mg by mouth daily  Yes Historical Provider, MD   sotalol (BETAPACE) 80 MG tablet TAKE 1/2 TABLET BY MOUTH TWICE DAILY  Yes EMY Alba   furosemide (LASIX) 40 MG tablet Take 1 tablet by mouth for 3 to 5 days if patient develops lower leg swelling or fluid weight gain  Patient taking differently: Take 40 mg by mouth daily Daily and extra PRN Yes Owen Delgadillo MD   nystatin (MYCOSTATIN) 672037 UNIT/GM powder Apply topically 4 times daily Apply topically 4 times daily.  Yes Historical Provider, MD   rivaroxaban (XARELTO) 15 MG TABS tablet Take 1 tablet by mouth daily (with breakfast)  Patient taking differently: Take 15 mg by mouth nightly  Yes EMY Alba   atorvastatin (LIPITOR) 40 MG tablet Take 40 mg by mouth nightly  Yes Historical Provider, MD   sertraline (ZOLOFT) 100 MG tablet Take 100 mg by mouth daily  Yes Historical Provider, MD   ferrous sulfate (IRON 325) 325 (65 Fe) MG tablet Take 1 tablet by mouth daily (with breakfast)  Iftikhar Stokes MD   colesevelam (WELCHOL) 625 MG tablet Take 1 tablet by mouth 2 times daily (with meals)  Patient not taking: Reported on 8/27/2021  Iftikhar Stokes MD   Multiple Vitamins-Minerals (THERAPEUTIC MULTIVITAMIN-MINERALS) tablet Take 1 tablet by mouth daily  Kadeem Boston MD       Past Medical History:   Diagnosis Date    KANCHAN (acute kidney injury) ([facility] 12/30/2018    Arthritis     Atrial fibrillation (HCC)     BiPAP (biphasic positive airway pressure) dependence     15cm/11cm    Cancer ([facility]     COLON CA    CHF (congestive heart failure) ([facility] 12/30/2018    Chronic atrial fibrillation ([facility] 2/12/2017    Depression     Diabetes mellitus ([facility]     Essential hypertension 7/11/2017    History of blood transfusion     Hyperlipidemia     Hypertension     Mixed hyperlipidemia 7/11/2017    Obesity     Obstructive sleep apnea     AHI:  18.7;  In REM AHI:  39.3 per PSG, 11/2008; split-night PSG, 8/2017 AHI: 35.1    Osteoarthritis     Palliative care patient 01/08/2019    Pneumonia due to organism     Restless leg syndrome 8/1/2017    Sinus complaint     Swelling        Past Surgical History:   Procedure Laterality Date    ADENOIDECTOMY      APPENDECTOMY      CARDIOVERSION      x 2     CHOLECYSTECTOMY      COLONOSCOPY  05/17/2013    Dr. Galdamez Ni COLONOSCOPY  03/22/2012    Dr Sabrina Duenas yr recall    COLONOSCOPY  06/30/2008    Dr James Singleton, 3 yr recall    COLONOSCOPY N/A 8/2/2019    Dr Evelin Cloud sided anastomosis appeared normal and patent-prn (age)   [facility] HEMICOLECTOMY Right 1998    2 FT OF COLON REMOVED DUE TO CA    HYSTERECTOMY      [place] COLONOSCOPY W/BIOPSY SINGLE/MULTIPLE N/A 6/6/2017    Dr Valera Hazard colon anastomosis-Tubular AP (-) dysplasia x 2--3 yr recall    TONSILLECTOMY      TUMOR REMOVAL      stomach    UPPER GASTROINTESTINAL ENDOSCOPY  05/17/2013    Dr. Monika Philippe ulcer disease-Chelo (+)    UPPER GASTROINTESTINAL ENDOSCOPY  03/28/2012    Dr Edward Melissa antral ulceration with a visible vessel    UPPER GASTROINTESTINAL ENDOSCOPY N/A 8/2/2019    Dr Royce Mcdowell       Family History   Problem Relation Age of Onset    No Known Problems Mother     No Known Problems Father     Colon Cancer Daughter     Colon Polyps Daughter     Esophageal Cancer Neg Hx     Liver Cancer Neg Hx     Rectal Cancer Neg Hx     Liver Disease Neg Hx     Stomach Cancer Neg Hx        CareTeam (Including outside providers/suppliers regularly involved in providing care):   Patient Care Team:  Tung Yeboah MD as PCP - General (Internal Medicine)  Tung Yeboah MD as PCP - [facility]  Braden Long MD as Consulting Physician (Cardiology)  Mike Nicholas as Physician Assistant ([address] [place] [address])  EMY Samano as Advanced Practice Nurse (Gastroenterology)  James Bolton MD as Consulting Physician (Neurology)    Wt Readings from Last 3 Encounters:   08/27/21 202 lb (91.6 kg)   05/21/21 196 lb 9.6 oz (89.2 kg)   05/03/21 236 lb (107 kg)     Vitals:    08/27/21 1019   BP: 136/80   Site: Left Upper Arm   Position: Sitting   Pulse: 101   Resp: 20   SpO2: 98%   Weight: 202 lb (91.6 kg)   Height: 5' 6\" (1.676 m)           The following problems were reviewed today and where indicated follow up appointments were made and/or referrals ordered.     Risk Factor Screenings with Interventions     Fall Risk:     Fall Risk Interventions:    · Home safety tips provided    Depression:     Depression Interventions:  · Relaxation techniques discussed    Anxiety:     Anxiety Interventions:  · Relaxation techniques discussed    Cognitive:     Cognitive Impairment Interventions:  · Patient declines any further evaluation/treatment for cognitive impairment    Substance Abuse:  Social History     Socioeconomic History    Marital status:      Spouse name: Not on file    Number of children: Not on file    Years of education: Not on file    Highest education level: Not on file   Occupational History    Not on file   Tobacco Use    Smoking status: Never Smoker    Smokeless tobacco: Never Used   Vaping Use    Vaping Use: Never assessed   Substance and Sexual Activity    Alcohol use: No    Drug use: No    Sexual activity: Not Currently   Other Topics Concern    Not on file   Social History Narrative    Not on file     Social Determinants of Health     Financial Resource Strain: Low Risk     Difficulty of Paying Living Expenses: Not

## 2021-08-27 NOTE — TELEPHONE ENCOUNTER
Spoke to the pts daughter she Vu and she will call Dr Dariela Iqbal to get her reset up for a follow up.

## 2021-08-27 NOTE — TELEPHONE ENCOUNTER
----- Message from Keturah Carrera MD sent at 8/27/2021  2:06 PM CDT -----  Iron levels are at the lower end of normal.  Start her on ferrous sulfate 325 mg p.o. daily. We also probably need to get her back to GI with the anemia and diarrhea.

## 2021-08-28 ASSESSMENT — ENCOUNTER SYMPTOMS
SINUS PRESSURE: 0
TROUBLE SWALLOWING: 0
FACIAL SWELLING: 0
ABDOMINAL DISTENTION: 0
BACK PAIN: 1
WHEEZING: 0
EYE PAIN: 0
EYE DISCHARGE: 0
CHEST TIGHTNESS: 0
VOMITING: 0
RHINORRHEA: 0
COLOR CHANGE: 0
BLOOD IN STOOL: 0
NAUSEA: 0
CHOKING: 0
VOICE CHANGE: 0
EYE REDNESS: 0
DIARRHEA: 1
SHORTNESS OF BREATH: 0
COUGH: 0
ANAL BLEEDING: 0
ABDOMINAL PAIN: 0
CONSTIPATION: 0
SORE THROAT: 0
PHOTOPHOBIA: 0
EYE ITCHING: 0
RECTAL PAIN: 0

## 2021-09-01 ENCOUNTER — OFFICE VISIT (OUTPATIENT)
Dept: NEUROLOGY | Age: 81
End: 2021-09-01
Payer: MEDICARE

## 2021-09-01 VITALS
HEART RATE: 71 BPM | BODY MASS INDEX: 32.47 KG/M2 | HEIGHT: 66 IN | WEIGHT: 202 LBS | SYSTOLIC BLOOD PRESSURE: 151 MMHG | DIASTOLIC BLOOD PRESSURE: 99 MMHG

## 2021-09-01 DIAGNOSIS — Z86.39 HISTORY OF DIABETES MELLITUS: ICD-10-CM

## 2021-09-01 DIAGNOSIS — Z86.79 HISTORY OF HYPERTENSION: ICD-10-CM

## 2021-09-01 DIAGNOSIS — R41.3 MEMORY LOSS: Primary | ICD-10-CM

## 2021-09-01 PROCEDURE — G8427 DOCREV CUR MEDS BY ELIG CLIN: HCPCS | Performed by: PSYCHIATRY & NEUROLOGY

## 2021-09-01 PROCEDURE — 99213 OFFICE O/P EST LOW 20 MIN: CPT | Performed by: PSYCHIATRY & NEUROLOGY

## 2021-09-01 PROCEDURE — G8417 CALC BMI ABV UP PARAM F/U: HCPCS | Performed by: PSYCHIATRY & NEUROLOGY

## 2021-09-01 PROCEDURE — 1036F TOBACCO NON-USER: CPT | Performed by: PSYCHIATRY & NEUROLOGY

## 2021-09-01 PROCEDURE — 4040F PNEUMOC VAC/ADMIN/RCVD: CPT | Performed by: PSYCHIATRY & NEUROLOGY

## 2021-09-01 PROCEDURE — 1123F ACP DISCUSS/DSCN MKR DOCD: CPT | Performed by: PSYCHIATRY & NEUROLOGY

## 2021-09-01 PROCEDURE — G8400 PT W/DXA NO RESULTS DOC: HCPCS | Performed by: PSYCHIATRY & NEUROLOGY

## 2021-09-01 PROCEDURE — 1090F PRES/ABSN URINE INCON ASSESS: CPT | Performed by: PSYCHIATRY & NEUROLOGY

## 2021-09-01 RX ORDER — MEMANTINE HYDROCHLORIDE 5 MG/1
TABLET ORAL
Qty: 120 TABLET | Refills: 0 | Status: SHIPPED | OUTPATIENT
Start: 2021-09-01 | End: 2021-09-29

## 2021-09-01 NOTE — PROGRESS NOTES
08/01/2017    Somnolence, daytime 08/01/2017    Restless leg syndrome 08/01/2017    Hypoxemia 08/01/2017    Obstructive sleep apnea     BiPAP (biphasic positive airway pressure) dependence     Hypochloremia 12/30/2018    CHF (congestive heart failure) ([address] 12/30/2018    Macrocytic anemia 12/30/2018    Acute kidney injury superimposed on CKD ([address]     Acute on chronic diastolic heart failure ([address] 01/07/2019    Palliative care patient 01/08/2019    Acute hypoxemic respiratory failure ([address] 01/11/2019    Chronic anemia     History of adenomatous polyp of colon 03/14/2019    Iron deficiency anemia 03/14/2019    Heme positive stool 03/14/2019    AMS (altered mental status) 09/15/2020    Acute cystitis 09/16/2020    Toxic metabolic encephalopathy 70/15/7669     Resolved Ambulatory Problems     Diagnosis Date Noted    Persistent atrial fibrillation ([address]     Obstructive sleep apnea syndrome 07/11/2017    Obstructive sleep apnea 08/01/2017    Septic shock (HCC) 12/30/2018    Hyperkalemia 76/91/3337    Metabolic acidosis 24/11/0457    ATN (acute tubular necrosis) (HCC) 01/04/2019    Elevated troponin      Past Medical History:   Diagnosis Date    KANCHAN (acute kidney injury) ([address] 12/30/2018    Arthritis     Atrial fibrillation (HCC)     Cancer (HCC)     Chronic atrial fibrillation ([address] 2/12/2017    Depression     Diabetes mellitus ([address]     History of blood transfusion     Hyperlipidemia     Hypertension     Obesity     Osteoarthritis     Pneumonia due to organism     Sinus complaint     Swelling        Past Surgical History:   Procedure Laterality Date    ADENOIDECTOMY      APPENDECTOMY      CARDIOVERSION      x 2     CHOLECYSTECTOMY      COLONOSCOPY  05/17/2013    Dr. Yi Saliva COLONOSCOPY  03/22/2012    Dr Aquino Larissa yr recall    COLONOSCOPY  06/30/2008    Dr Victor Hugo Hammond, 3 yr recall    COLONOSCOPY N/A 8/2/2019    Dr Olivo Zaida sided anastomosis appeared normal and patent-prn (age)   [facility] HEMICOLECTOMY Right 1998    2 FT OF COLON REMOVED DUE TO CA    HYSTERECTOMY      CA COLONOSCOPY W/BIOPSY SINGLE/MULTIPLE N/A 6/6/2017    Dr Tammie Brunner colon anastomosis-Tubular AP (-) dysplasia x 2--3 yr recall    TONSILLECTOMY      TUMOR REMOVAL      stomach    UPPER GASTROINTESTINAL ENDOSCOPY  05/17/2013    Dr. Tolbert Fus ulcer disease-Chelo (+)    UPPER GASTROINTESTINAL ENDOSCOPY  03/28/2012    Dr Juan Stauffer antral ulceration with a visible vessel    UPPER GASTROINTESTINAL ENDOSCOPY N/A 8/2/2019    Dr Berniece Bamberger       Family History   Problem Relation Age of Onset    No Known Problems Mother     No Known Problems Father     Colon Cancer Daughter     Colon Polyps Daughter     Esophageal Cancer Neg Hx     Liver Cancer Neg Hx     Rectal Cancer Neg Hx     Liver Disease Neg Hx     Stomach Cancer Neg Hx        No Known Allergies    Social History     Socioeconomic History    Marital status:      Spouse name: Not on file    Number of children: Not on file    Years of education: Not on file    Highest education level: Not on file   Occupational History    Not on file   Tobacco Use    Smoking status: Never Smoker    Smokeless tobacco: Never Used   Vaping Use    Vaping Use: Never assessed   Substance and Sexual Activity    Alcohol use: No    Drug use: No    Sexual activity: Not Currently   Other Topics Concern    Not on file   Social History Narrative    Not on file     Social Determinants of Health     Financial Resource Strain: Low Risk     Difficulty of Paying Living Expenses: Not hard at all   Food Insecurity: No Food Insecurity    Worried About Running Out of Food in the Last Year: Never true    [address] in the Last Year: Never true   Transportation Needs:     Lack of Transportation (Medical):      Lack of Transportation (Non-Medical):    Physical Activity:     Days of Exercise per Week:     Minutes of Exercise per Session:    Stress:     Feeling of Stress :    Social Connections:     Frequency of Communication with Friends and Family:     Frequency of Social Gatherings with Friends and Family:     Attends Alevism Services:     Active Member of Clubs or Organizations:     Attends Club or Organization Meetings:     Marital Status:    Intimate Partner Violence:     Fear of Current or Ex-Partner:     Emotionally Abused:     Physically Abused:     Sexually Abused:        Review of Systems    Constitutional: []? Fever []? Sweats []? Chills []? Recent Injury   [x]? Denies all unless marked  HENT:[]? Headache  []? Head Injury  []? Sore Throat  []? Ear Pain  []? Dizziness []? Hearing Loss   [x]? Denies all unless marked  Spine:  []? Neck pain  []? Back pain  []? Sciaticia  [x]? Denies all unless marked  Cardiovascular:[]? Chest Pain []? Palpitations []? Heart Disease  [x]? Denies all unless marked  Pulmonary: []? Shortness of Breath []? Cough   [x]? Denies all unless marked  Gastrointestinal:  []? Abdominal Pain  []? Blood in Stool  []? Diarrhea []? Constipation []? Nausea  []? Vomiting  [x]? Denies all unless marked  Genitourinary:  []? Dysuria []? Frequency  []? Incontinence []? Urgency   [x]? Denies all unless marked  Musculoskeletal: []? Arthralgia  []? Myalgias []? Muscle cramps  []? Muscle twitches   [x]? Denies all unless marked   Extremities:   []? Pain   []? Swelling   [x]? Denies all unless marked  Skin:[]? Rash  []? Color Change  [x]? Denies all unless marked  Neurological:[]? Visual Disturbance []? Double Vision []? Slurred Speech []? Trouble swallowing  []? Vertigo []? Tingling []? Numbness []? Weakness []? Loss of Balance   []? Loss of Consciousness []? Memory Loss []? Seizures  [x]? Denies all unless marked  Psychiatric/Behavioral:[]? Depression []? Anxiety  [x]? Denies all unless marked  Sleep: []? Insomnia []? Sleep Disturbance []? Snoring []? Restless Legs []? Daytime Sleepiness []? Sleep Apnea  [x]?  Denies all unless marked                       Current Outpatient Medications   Medication Sig Dispense Refill    memantine (NAMENDA) 5 MG tablet 1 daily for a week, then twice daily for a week, then 1 am and 2 pm for a week, then 2 am and pm 120 tablet 0    oxybutynin (DITROPAN XL) 10 MG extended release tablet Take 1 tablet by mouth daily 30 tablet 3    ferrous sulfate (IRON 325) 325 (65 Fe) MG tablet Take 1 tablet by mouth daily (with breakfast) 90 tablet 1    SITagliptin (JANUVIA) 100 MG tablet Take 1 tablet by mouth daily 30 tablet 5    ARIPiprazole (ABILIFY) 2 MG tablet Take 1 tablet by mouth every evening 30 tablet 3    donepezil (ARICEPT) 10 MG tablet TAKE 1 TABLET BY MOUTH EVERY NIGHT  30 tablet 2    traZODone (DESYREL) 100 MG tablet Take 1 tablet by mouth nightly 90 tablet 1    pantoprazole (PROTONIX) 20 MG tablet Take 20 mg by mouth daily       sotalol (BETAPACE) 80 MG tablet TAKE 1/2 TABLET BY MOUTH TWICE DAILY  90 tablet 3    furosemide (LASIX) 40 MG tablet Take 1 tablet by mouth for 3 to 5 days if patient develops lower leg swelling or fluid weight gain (Patient taking differently: Take 40 mg by mouth daily Daily and extra PRN) 30 tablet 0    nystatin (MYCOSTATIN) 393746 UNIT/GM powder Apply topically 4 times daily Apply topically 4 times daily.  rivaroxaban (XARELTO) 15 MG TABS tablet Take 1 tablet by mouth daily (with breakfast) (Patient taking differently: Take 15 mg by mouth nightly ) 90 tablet 3    Multiple Vitamins-Minerals (THERAPEUTIC MULTIVITAMIN-MINERALS) tablet Take 1 tablet by mouth daily 30 tablet 0    atorvastatin (LIPITOR) 40 MG tablet Take 40 mg by mouth nightly       sertraline (ZOLOFT) 100 MG tablet Take 100 mg by mouth daily       colesevelam (WELCHOL) 625 MG tablet Take 1 tablet by mouth 2 times daily (with meals) (Patient not taking: Reported on 8/27/2021) 60 tablet 5     No current facility-administered medications for this visit.        Outpatient Medications Marked as Taking for the 9/1/21 encounter (Office Visit) with Pablo Mathews MD   Medication Sig Dispense Refill    memantine (NAMENDA) 5 MG tablet 1 daily for a week, then twice daily for a week, then 1 am and 2 pm for a week, then 2 am and pm 120 tablet 0    oxybutynin (DITROPAN XL) 10 MG extended release tablet Take 1 tablet by mouth daily 30 tablet 3    ferrous sulfate (IRON 325) 325 (65 Fe) MG tablet Take 1 tablet by mouth daily (with breakfast) 90 tablet 1    SITagliptin (JANUVIA) 100 MG tablet Take 1 tablet by mouth daily 30 tablet 5    ARIPiprazole (ABILIFY) 2 MG tablet Take 1 tablet by mouth every evening 30 tablet 3    donepezil (ARICEPT) 10 MG tablet TAKE 1 TABLET BY MOUTH EVERY NIGHT  30 tablet 2    traZODone (DESYREL) 100 MG tablet Take 1 tablet by mouth nightly 90 tablet 1    pantoprazole (PROTONIX) 20 MG tablet Take 20 mg by mouth daily       sotalol (BETAPACE) 80 MG tablet TAKE 1/2 TABLET BY MOUTH TWICE DAILY  90 tablet 3    furosemide (LASIX) 40 MG tablet Take 1 tablet by mouth for 3 to 5 days if patient develops lower leg swelling or fluid weight gain (Patient taking differently: Take 40 mg by mouth daily Daily and extra PRN) 30 tablet 0    nystatin (MYCOSTATIN) 434618 UNIT/GM powder Apply topically 4 times daily Apply topically 4 times daily.  rivaroxaban (XARELTO) 15 MG TABS tablet Take 1 tablet by mouth daily (with breakfast) (Patient taking differently: Take 15 mg by mouth nightly ) 90 tablet 3    Multiple Vitamins-Minerals (THERAPEUTIC MULTIVITAMIN-MINERALS) tablet Take 1 tablet by mouth daily 30 tablet 0    atorvastatin (LIPITOR) 40 MG tablet Take 40 mg by mouth nightly       sertraline (ZOLOFT) 100 MG tablet Take 100 mg by mouth daily          BP (!) 151/99   Pulse 71   Ht 5' 6\" (1.676 m)   Wt 202 lb (91.6 kg)   BMI 32.60 kg/m²       Constitutional  well developed, well nourished.     Eyes  conjunctiva normal.  Pupils react to light  Ear, nose, throat -hearing intact to finger rub No scars, masses, or lesions over external nose or ears, no atrophy of tongue  Neck-symmetric, no masses noted, no jugular vein distension. No bruits noted. Respiration- chest wall appears symmetric, good expansion,   normal effort without use of accessory muscles  Cardiovascular- RRR  Musculoskeletal  no significant wasting of muscles noted, no bony deformities, gait no gross ataxia  Extremities-no clubbing, cyanosis or edema  Skin  warm, dry, and intact. No rash, erythema, or pallor. 2+ edema in the legs  Psychiatric  mood, affect, and behavior appear normal.      Neurological exam  Awake, alert, fluent. She could not tell me the season of the year, the month or the year. She had difficulty naming her oldest grandchild and telling me the names of her grandchildren in general.  Questionable effort. She had trouble following complex commands. Cranial Nerve Exam   CN II- Visual fields grossly unremarkable. VA adequate. CN III, IV,VI- PERRLA, EOMI, No nystagmus, conjugate eye movements, no ptosis  CN VII-no facial asymmetry  CN VIII-Hearing intact   CN IX and X- Palate elevates in midline  CN XI-good shoulder shrug  CN XII-Tongue midline with no fasciculations or fibrillations    Motor Exam  V/V throughout upper and lower extremities bilaterally, no cogwheeling, normal tone      Reflexes   2+ at the knees and otherwise absent    Tremors- no tremors in hands or head noted    Gait  Very slow and with small steps.   Thoracic kyphosis noted    Coordination  Finger to nose and SAMANTHA-unremarkable    Lab Results   Component Value Date    [id number] 05/21/2021     Lab Results   Component Value Date    WBC 8.6 08/20/2021    HGB 9.8 (L) 08/20/2021    HCT 32.6 (L) 08/20/2021    MCV 95.3 08/20/2021     08/20/2021     Lab Results   Component Value Date     08/20/2021    K 5.0 08/20/2021    CL 98 08/20/2021    CO2 26 08/20/2021    BUN 42 (H) 08/20/2021    CREATININE 1.5 (H) 08/20/2021    GLUCOSE 130 (H) 08/20/2021    CALCIUM 9.0 08/20/2021    PROT 7.3 08/20/2021    LABALBU 3.8 08/20/2021    BILITOT 0.4 08/20/2021    ALKPHOS 109 (H) 08/20/2021    AST 23 08/20/2021    ALT 13 08/20/2021    LABGLOM 34 (A) 08/20/2021    GFRAA 41 (L) 08/20/2021    GLOB 3.3 03/30/2017           Assessment    ICD-10-CM    1. Memory loss  R41.3    2. History of diabetes mellitus  Z86.39    3. 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 May have a vascular component as well. Currently responding to Aricept and Abilify along with Zoloft. Veronica Ulloa will be added to help with her agitation and cognition. She was warned of potential side effects. Consider Nuedexta in the future. Blood pressure and blood sugar stable  Plan    Return in about 3 months (around 12/1/2021).     (Please note that portions of this note were completed with a voice recognition program. Efforts were made to edit the dictations but occasionally words are mis-transcribed.)

## 2021-09-17 ENCOUNTER — VIRTUAL VISIT (OUTPATIENT)
Dept: GASTROENTEROLOGY | Age: 81
End: 2021-09-17
Payer: MEDICARE

## 2021-09-17 DIAGNOSIS — R19.5 LOOSE STOOLS: Primary | ICD-10-CM

## 2021-09-17 DIAGNOSIS — D50.9 IRON DEFICIENCY ANEMIA, UNSPECIFIED IRON DEFICIENCY ANEMIA TYPE: ICD-10-CM

## 2021-09-17 DIAGNOSIS — R15.2 INCONTINENCE OF FECES WITH FECAL URGENCY: ICD-10-CM

## 2021-09-17 DIAGNOSIS — R15.9 INCONTINENCE OF FECES WITH FECAL URGENCY: ICD-10-CM

## 2021-09-17 PROCEDURE — 1090F PRES/ABSN URINE INCON ASSESS: CPT | Performed by: NURSE PRACTITIONER

## 2021-09-17 PROCEDURE — 4040F PNEUMOC VAC/ADMIN/RCVD: CPT | Performed by: NURSE PRACTITIONER

## 2021-09-17 PROCEDURE — 1036F TOBACCO NON-USER: CPT | Performed by: NURSE PRACTITIONER

## 2021-09-17 PROCEDURE — 1123F ACP DISCUSS/DSCN MKR DOCD: CPT | Performed by: NURSE PRACTITIONER

## 2021-09-17 PROCEDURE — G8428 CUR MEDS NOT DOCUMENT: HCPCS | Performed by: NURSE PRACTITIONER

## 2021-09-17 PROCEDURE — G8400 PT W/DXA NO RESULTS DOC: HCPCS | Performed by: NURSE PRACTITIONER

## 2021-09-17 PROCEDURE — G8417 CALC BMI ABV UP PARAM F/U: HCPCS | Performed by: NURSE PRACTITIONER

## 2021-09-17 PROCEDURE — 99214 OFFICE O/P EST MOD 30 MIN: CPT | Performed by: NURSE PRACTITIONER

## 2021-09-17 ASSESSMENT — ENCOUNTER SYMPTOMS
ABDOMINAL DISTENTION: 0
SHORTNESS OF BREATH: 0
BLOOD IN STOOL: 0
NAUSEA: 0
COUGH: 0
CONSTIPATION: 0
ABDOMINAL PAIN: 0
VOMITING: 0
ANAL BLEEDING: 0
CHOKING: 0
DIARRHEA: 1
RECTAL PAIN: 0
TROUBLE SWALLOWING: 0

## 2021-09-17 NOTE — PROGRESS NOTES
2021    TELEHEALTH EVALUATION -- Audio/Visual (During DAGOE-95 public health emergency)    HPI:    Robinson Lawson (:  1940) has requested an audio/video evaluation for the following concern(s):  Chief Complaint   Patient presents with    Anemia       Anemia  Patient presents for evaluation of anemia. Associated signs & symptoms: none. Patient denies hematochezia and melena. She is currently taking po Iron. She has been having more diarrhea recently. This is associated with fecal urgency and incontinence. She is having watery, loose stools 3-4 times a day. This began 2-3 months ago. She recently started on Imodium daily. Last EGD  - gastritis  Last Colonoscopy 2019 - right side anastomosis normal, prn recall   Hx colon cancer     Hx dementia. Visit conducted with pt and daughter. Hx A-Fib on Xarelto daily     Current labs:   WBC (K/uL)   Date Value   2021 8.6     Hemoglobin (g/dL)   Date Value   2021 9.8 (L)     Hematocrit (%)   Date Value   2021 32.6 (L)     Platelets (K/uL)   Date Value   2021 262         Review of Systems   Constitutional: Negative for activity change, appetite change, fatigue, fever and unexpected weight change. HENT: Negative for trouble swallowing. Respiratory: Negative for cough, choking and shortness of breath. Cardiovascular: Negative for chest pain. Gastrointestinal: Positive for diarrhea (loose). Negative for abdominal distention, abdominal pain, anal bleeding, blood in stool, constipation, nausea, rectal pain and vomiting. Allergic/Immunologic: Negative for food allergies. Psychiatric/Behavioral: Positive for confusion. All other systems reviewed and are negative. Prior to Visit Medications    Medication Sig Taking?  Authorizing Provider   memantine (NAMENDA) 5 MG tablet 1 daily for a week, then twice daily for a week, then 1 am and 2 pm for a week, then 2 am and pm  Iris Purcell MD   oxybutynin (DITROPAN XL) 10 MG extended release tablet Take 1 tablet by mouth daily  Linda Arias MD   ferrous sulfate (IRON 325) 325 (65 Fe) MG tablet Take 1 tablet by mouth daily (with breakfast)  Linda Arias MD   colesevelam (WELCHOL) 625 MG tablet Take 1 tablet by mouth 2 times daily (with meals)  Patient not taking: Reported on 8/27/2021  Linda Arias MD   SITagliptin (JANUVIA) 100 MG tablet Take 1 tablet by mouth daily  Linda Arias MD   ARIPiprazole (ABILIFY) 2 MG tablet Take 1 tablet by mouth every evening  Ettie Simmonds, MD   donepezil (ARICEPT) 10 MG tablet TAKE 1 TABLET BY MOUTH EVERY NIGHT   Ettie Simmonds, MD   traZODone (DESYREL) 100 MG tablet Take 1 tablet by mouth nightly  Ettie Simmonds, MD   pantoprazole (PROTONIX) 20 MG tablet Take 20 mg by mouth daily   Historical Provider, MD   sotalol (BETAPACE) 80 MG tablet TAKE 1/2 TABLET BY MOUTH TWICE DAILY   EMY Nails   furosemide (LASIX) 40 MG tablet Take 1 tablet by mouth for 3 to 5 days if patient develops lower leg swelling or fluid weight gain  Patient taking differently: Take 40 mg by mouth daily Daily and extra PRN  Owen Delgadillo MD   nystatin (MYCOSTATIN) 108541 UNIT/GM powder Apply topically 4 times daily Apply topically 4 times daily. Historical Provider, MD   rivaroxaban (XARELTO) 15 MG TABS tablet Take 1 tablet by mouth daily (with breakfast)  Patient taking differently: Take 15 mg by mouth nightly   Jetbety Franklin, EMY   Multiple Vitamins-Minerals (THERAPEUTIC MULTIVITAMIN-MINERALS) tablet Take 1 tablet by mouth daily  Gwyn Edwards MD   atorvastatin (LIPITOR) 40 MG tablet Take 40 mg by mouth nightly   Historical Provider, MD   sertraline (ZOLOFT) 100 MG tablet Take 100 mg by mouth daily   Historical Provider, MD       Social History     Tobacco Use    Smoking status: Never Smoker    Smokeless tobacco: Never Used   Vaping Use    Vaping Use: Never assessed   Substance Use Topics    Alcohol use: No    Drug use:  No No Known Allergies,   Past Medical History:   Diagnosis Date    KANCHAN (acute kidney injury) ([facility] 12/30/2018    Arthritis     Atrial fibrillation (HCC)     BiPAP (biphasic positive airway pressure) dependence     15cm/11cm    Cancer (HCC)     COLON CA    CHF (congestive heart failure) ([facility] 12/30/2018    Chronic atrial fibrillation ([facility] 2/12/2017    Depression     Diabetes mellitus ([facility]     Essential hypertension 7/11/2017    History of blood transfusion     Hyperlipidemia     Hypertension     Mixed hyperlipidemia 7/11/2017    Obesity     Obstructive sleep apnea     AHI:  18.7;  In REM AHI:  39.3 per PSG, 11/2008; split-night PSG, 8/2017 AHI: 35.1    Osteoarthritis     Palliative care patient 01/08/2019    Pneumonia due to organism     Restless leg syndrome 8/1/2017    Sinus complaint     Swelling    ,   Past Surgical History:   Procedure Laterality Date    ADENOIDECTOMY      APPENDECTOMY      CARDIOVERSION      x 2     CHOLECYSTECTOMY      COLONOSCOPY  05/17/2013    Dr. Shawn Dash COLONOSCOPY  03/22/2012    Dr Raheem Lucasgy yr recall    COLONOSCOPY  06/30/2008    Dr Nettie Bailey, 3 yr recall    COLONOSCOPY N/A 8/2/2019    Dr Jaydon Meza sided anastomosis appeared normal and patent-prn (age)   [facility] HEMICOLECTOMY Right 1998    2 FT OF COLON REMOVED DUE TO CA    HYSTERECTOMY      [place] COLONOSCOPY W/BIOPSY SINGLE/MULTIPLE N/A 6/6/2017    Dr Judith Nuñez colon anastomosis-Tubular AP (-) dysplasia x 2--3 yr recall    TONSILLECTOMY      TUMOR REMOVAL      stomach    UPPER GASTROINTESTINAL ENDOSCOPY  05/17/2013    Dr. Liu Ford ulcer disease-Chelo (+)    UPPER GASTROINTESTINAL ENDOSCOPY  03/28/2012    Dr Carrion Chain antral ulceration with a visible vessel    UPPER GASTROINTESTINAL ENDOSCOPY N/A 8/2/2019    Dr Grace Stahl   ,   Social History     Tobacco Use    Smoking status: Never Smoker    Smokeless tobacco: Never Used   Vaping Use    Vaping Use: Never assessed   Substance Use Topics    Alcohol use: No    Drug use: No   ,   Family History   Problem Relation Age of Onset    No Known Problems Mother     No Known Problems Father     Colon Cancer Daughter     Colon Polyps Daughter     Esophageal Cancer Neg Hx     Liver Cancer Neg Hx     Rectal Cancer Neg Hx     Liver Disease Neg Hx     Stomach Cancer Neg Hx        PHYSICAL EXAMINATION:  [ INSTRUCTIONS:  \"[x]\" Indicates a positive item  \"[]\" Indicates a negative item  -- DELETE ALL ITEMS NOT EXAMINED]  Vital Signs: (As obtained by patient/caregiver or practitioner observation)    Blood pressure-  Heart rate-    Respiratory rate-    Temperature-  Pulse oximetry-     Constitutional: [x] Appears well-developed and well-nourished [x] No apparent distress      [] Abnormal-   Mental status  [x] Alert and awake  [] Oriented to person/place/time []Able to follow commands      Eyes:  EOM    [x]  Normal  [] Abnormal-  Sclera  [x]  Normal  [] Abnormal -         Discharge [x]  None visible  [] Abnormal -    HENT:   [x] Normocephalic, atraumatic. [] Abnormal   [x] Mouth/Throat: Mucous membranes are moist.     External Ears [x] Normal  [] Abnormal-     Neck: [x] No visualized mass     Pulmonary/Chest: [x] Respiratory effort normal.  [x] No visualized signs of difficulty breathing or respiratory distress        [] Abnormal-      Musculoskeletal:   [x] Normal gait with no signs of ataxia         [x] Normal range of motion of neck        [] Abnormal-       Neurological:        [x] No Facial Asymmetry (Cranial nerve 7 motor function) (limited exam to video visit)          [x] No gaze palsy        [] Abnormal-         Skin:        [x] No significant exanthematous lesions or discoloration noted on facial skin         [] Abnormal-            Psychiatric:       [x] Normal Affect [x] No Hallucinations        [] Abnormal-     Other pertinent observable physical exam findings-     ASSESSMENT/PLAN:  1. Loose stools  2.  Iron deficiency anemia, unspecified iron deficiency anemia type  3. Incontinence of feces with fecal urgency    - Check stools for infectious etiology  - Check stools for OB    Discussed EGD and/or Colonoscopy to r/o GI causes of anemia. Pt's daughter hesitant to proceed due to pt's dementia and overall health. She does not want to put her through that at this time. I feel this is reasonable. Pt states, \"I am not going to the doctor. \"  Plan to check stool studies at this time, will call with results       Return if symptoms worsen or fail to improve. Stephanie Salinas, was evaluated through a synchronous (real-time) audio-video encounter. The patient (or guardian if applicable) is aware that this is a billable service. Verbal consent to proceed has been obtained within the past 12 months. The visit was conducted pursuant to the emergency declaration under the [address] [place] [address], [address] [place] [address] authority and the [email or web address] General Act. Patient identification was verified, and a caregiver was present when appropriate. The patient was located in a state where the provider was credentialed to provide care. Total time spent on this encounter: Not billed by time    --EMY Chin NP on 9/17/2021 at 9:22 AM    An electronic signature was used to authenticate this note.

## 2021-10-07 RX ORDER — DONEPEZIL HYDROCHLORIDE 10 MG/1
10 TABLET, FILM COATED ORAL NIGHTLY
Qty: 30 TABLET | Refills: 2 | Status: SHIPPED | OUTPATIENT
Start: 2021-10-07 | End: 2022-04-01 | Stop reason: SDUPTHER

## 2021-10-07 NOTE — TELEPHONE ENCOUNTER
Requested Prescriptions     Pending Prescriptions Disp Refills    donepezil (ARICEPT) 10 MG tablet 30 tablet 2

## 2021-11-09 ENCOUNTER — TELEPHONE (OUTPATIENT)
Dept: CARDIOLOGY CLINIC | Age: 81
End: 2021-11-09

## 2021-11-12 ENCOUNTER — OFFICE VISIT (OUTPATIENT)
Dept: CARDIOLOGY CLINIC | Age: 81
End: 2021-11-12
Payer: MEDICARE

## 2021-11-12 VITALS
DIASTOLIC BLOOD PRESSURE: 74 MMHG | WEIGHT: 226 LBS | HEART RATE: 64 BPM | BODY MASS INDEX: 38.58 KG/M2 | SYSTOLIC BLOOD PRESSURE: 122 MMHG | HEIGHT: 64 IN

## 2021-11-12 DIAGNOSIS — Z87.898 HISTORY OF BRADYCARDIA: ICD-10-CM

## 2021-11-12 DIAGNOSIS — E78.2 MIXED HYPERLIPIDEMIA: ICD-10-CM

## 2021-11-12 DIAGNOSIS — I50.32 CHRONIC DIASTOLIC CONGESTIVE HEART FAILURE (HCC): ICD-10-CM

## 2021-11-12 DIAGNOSIS — I48.0 PAF (PAROXYSMAL ATRIAL FIBRILLATION) (HCC): Primary | ICD-10-CM

## 2021-11-12 DIAGNOSIS — I10 ESSENTIAL HYPERTENSION: ICD-10-CM

## 2021-11-12 PROCEDURE — G8484 FLU IMMUNIZE NO ADMIN: HCPCS | Performed by: CLINICAL NURSE SPECIALIST

## 2021-11-12 PROCEDURE — G8417 CALC BMI ABV UP PARAM F/U: HCPCS | Performed by: CLINICAL NURSE SPECIALIST

## 2021-11-12 PROCEDURE — 1090F PRES/ABSN URINE INCON ASSESS: CPT | Performed by: CLINICAL NURSE SPECIALIST

## 2021-11-12 PROCEDURE — 1123F ACP DISCUSS/DSCN MKR DOCD: CPT | Performed by: CLINICAL NURSE SPECIALIST

## 2021-11-12 PROCEDURE — 4040F PNEUMOC VAC/ADMIN/RCVD: CPT | Performed by: CLINICAL NURSE SPECIALIST

## 2021-11-12 PROCEDURE — G8427 DOCREV CUR MEDS BY ELIG CLIN: HCPCS | Performed by: CLINICAL NURSE SPECIALIST

## 2021-11-12 PROCEDURE — 1036F TOBACCO NON-USER: CPT | Performed by: CLINICAL NURSE SPECIALIST

## 2021-11-12 PROCEDURE — G8400 PT W/DXA NO RESULTS DOC: HCPCS | Performed by: CLINICAL NURSE SPECIALIST

## 2021-11-12 PROCEDURE — 99214 OFFICE O/P EST MOD 30 MIN: CPT | Performed by: CLINICAL NURSE SPECIALIST

## 2021-11-12 PROCEDURE — 93000 ELECTROCARDIOGRAM COMPLETE: CPT | Performed by: CLINICAL NURSE SPECIALIST

## 2021-11-12 RX ORDER — METOPROLOL SUCCINATE 25 MG/1
25 TABLET, EXTENDED RELEASE ORAL NIGHTLY
Qty: 90 TABLET | Refills: 3 | Status: SHIPPED | OUTPATIENT
Start: 2021-11-12 | End: 2022-08-11 | Stop reason: SDUPTHER

## 2021-11-12 NOTE — PROGRESS NOTES
accident or transient ischemic attack. Daly Dudley MD is PCP and   Follows with neurology, gastroenterology and nephrology.   Neli Dixon has the following history as recorded in [facility]:    Patient Active Problem List    Diagnosis Date Noted    Acute cystitis 09/16/2020    Toxic metabolic encephalopathy 36/49/1217    AMS (altered mental status) 09/15/2020    History of adenomatous polyp of colon 03/14/2019    Iron deficiency anemia 03/14/2019    Heme positive stool 03/14/2019    Chronic anemia     Acute hypoxemic respiratory failure ([address] 01/11/2019    Palliative care patient 01/08/2019    Acute on chronic diastolic heart failure ([address] 01/07/2019    Acute kidney injury superimposed on CKD ([address]     Hypochloremia 12/30/2018    CHF (congestive heart failure) ([address] 12/30/2018    Macrocytic anemia 12/30/2018    BiPAP (biphasic positive airway pressure) dependence     CPAP (continuous positive airway pressure) dependence 08/01/2017    Somnolence, daytime 08/01/2017    Restless leg syndrome 08/01/2017    Hypoxemia 08/01/2017    Obstructive sleep apnea     Essential hypertension 07/11/2017    Mixed hyperlipidemia 07/11/2017    Hypomagnesemia 06/02/2017    Hypoglycemia due to insulin 06/01/2017    Type 2 diabetes mellitus without complication, with long-term current use of insulin ([address] 06/01/2017    Hypoglycemic encephalopathy 06/01/2017    Abnormal chest x-ray 06/01/2017    Hypokalemia 06/01/2017    Pneumonia     Chronic anticoagulation 05/23/2017    PAF (paroxysmal atrial fibrillation) ([address] 05/23/2017    History of bradycardia 05/23/2017    History of colon cancer 05/03/2017     Past Medical History:   Diagnosis Date    KANCHAN (acute kidney injury) ([address] 12/30/2018    Arthritis     Atrial fibrillation (HCC)     BiPAP (biphasic positive airway pressure) dependence     15cm/11cm    Cancer (HCC)     COLON CA    CHF (congestive heart failure) ([address] 12/30/2018    Chronic atrial fibrillation ([facility] 2/12/2017    Depression     Diabetes mellitus ([facility]     Essential hypertension 7/11/2017    History of blood transfusion     Hyperlipidemia     Hypertension     Mixed hyperlipidemia 7/11/2017    Obesity     Obstructive sleep apnea     AHI:  18.7;  In REM AHI:  39.3 per PSG, 11/2008; split-night PSG, 8/2017 AHI: 35.1    Osteoarthritis     Palliative care patient 01/08/2019    Pneumonia due to organism     Restless leg syndrome 8/1/2017    Sinus complaint     Swelling      Past Surgical History:   Procedure Laterality Date    ADENOIDECTOMY      APPENDECTOMY      CARDIOVERSION      x 2     CHOLECYSTECTOMY      COLONOSCOPY  05/17/2013    Dr. Klein [facility] COLONOSCOPY  03/22/2012    Dr Ardon Cheese yr recall    COLONOSCOPY  06/30/2008    Dr Sandor Ramey, 3 yr recall    COLONOSCOPY N/A 8/2/2019    Dr Kiera Richards sided anastomosis appeared normal and patent-prn (age)   Rebbecca Hinders HEMICOLECTOMY Right 1998    2 FT OF COLON REMOVED DUE TO CA    HYSTERECTOMY      [place] COLONOSCOPY W/BIOPSY SINGLE/MULTIPLE N/A 6/6/2017    Dr Parmjit Wang colon anastomosis-Tubular AP (-) dysplasia x 2--3 yr recall    TONSILLECTOMY      TUMOR REMOVAL      stomach    UPPER GASTROINTESTINAL ENDOSCOPY  05/17/2013    Dr. General Martines ulcer disease-Chelo (+)    UPPER GASTROINTESTINAL ENDOSCOPY  03/28/2012    Dr Leslie Bai antral ulceration with a visible vessel    UPPER GASTROINTESTINAL ENDOSCOPY N/A 8/2/2019    Dr Wild Crawford     Family History   Problem Relation Age of Onset    No Known Problems Mother     No Known Problems Father     Colon Cancer Daughter     Colon Polyps Daughter     Esophageal Cancer Neg Hx     Liver Cancer Neg Hx     Rectal Cancer Neg Hx     Liver Disease Neg Hx     Stomach Cancer Neg Hx      Social History     Tobacco Use    Smoking status: Never Smoker    Smokeless tobacco: Never Used   Substance Use Topics    Alcohol use: No      Current Outpatient Medications   Medication Sig Dispense Refill    metoprolol succinate (TOPROL XL) 25 MG extended release tablet Take 1 tablet by mouth nightly 90 tablet 3    donepezil (ARICEPT) 10 MG tablet Take 1 tablet by mouth nightly 30 tablet 2    memantine (NAMENDA) 10 MG tablet Take 1 tablet by mouth 2 times daily 60 tablet 5    oxybutynin (DITROPAN XL) 10 MG extended release tablet Take 1 tablet by mouth daily 30 tablet 3    ferrous sulfate (IRON 325) 325 (65 Fe) MG tablet Take 1 tablet by mouth daily (with breakfast) 90 tablet 1    SITagliptin (JANUVIA) 100 MG tablet Take 1 tablet by mouth daily 30 tablet 5    ARIPiprazole (ABILIFY) 2 MG tablet Take 1 tablet by mouth every evening 30 tablet 3    traZODone (DESYREL) 100 MG tablet Take 1 tablet by mouth nightly 90 tablet 1    pantoprazole (PROTONIX) 20 MG tablet Take 20 mg by mouth daily       furosemide (LASIX) 40 MG tablet Take 1 tablet by mouth for 3 to 5 days if patient develops lower leg swelling or fluid weight gain (Patient taking differently: Take 40 mg by mouth daily Daily and extra PRN) 30 tablet 0    nystatin (MYCOSTATIN) 332488 UNIT/GM powder Apply topically 4 times daily Apply topically 4 times daily.  rivaroxaban (XARELTO) 15 MG TABS tablet Take 1 tablet by mouth daily (with breakfast) (Patient taking differently: Take 15 mg by mouth nightly ) 90 tablet 3    Multiple Vitamins-Minerals (THERAPEUTIC MULTIVITAMIN-MINERALS) tablet Take 1 tablet by mouth daily 30 tablet 0    atorvastatin (LIPITOR) 40 MG tablet Take 40 mg by mouth nightly       sertraline (ZOLOFT) 100 MG tablet Take 100 mg by mouth daily       colesevelam (WELCHOL) 625 MG tablet Take 1 tablet by mouth 2 times daily (with meals) (Patient not taking: Reported on 8/27/2021) 60 tablet 5     No current facility-administered medications for this visit. Allergies: Patient has no known allergies.     Review of Systems  Constitutional - no significant activity change, appetite change, or unexpected weight change. No fever, chills or diaphoresis. No fatigue. HEENT - no significant rhinorrhea or epistaxis. No tinnitus or significant hearing loss. Eyes - no sudden vision change or amaurosis. Respiratory - no significant wheezing, stridor, apnea or cough. No dyspnea on exertion or shortness of breath. Cardiovascular - no exertional chest pain, orthopnea or PND. No sensation of arrhythmia or slow heart rate. No claudication +leg edema. Gastrointestinal - no abdominal swelling or pain. No blood in stool. No severe constipation, diarrhea, nausea, or vomiting. Genitourinary - + urinary incontinence   No flank pain or hematuria. Musculoskeletal - no back pain, gait-uses walker, no recent falls. Skin - no color change or rash. No pallor. No new surgical incision. Neurologic - no speech difficulty, facial asymmetry or lateralizing weakness. No seizures, presyncope, syncope, or significant dizziness. Hematologic - no easy bruising or excessive bleeding. Psychiatric - no severe anxiety or insomnia.  + confusion. All other review of systems are negative. Objective  Vital Signs - /74   Pulse 64   Ht 5' 4\" (1.626 m)   Wt 226 lb (102.5 kg)   BMI 38.79 kg/m²   General - Shanita is alert, cooperative, and pleasant. Well groomed. No acute distress. Body habitus is obese. HEENT - The head is normocephalic. No circumoral cyanosis. Dentition is normal.   EYES -  No Xanthelasma, no arcus senilis, no conjunctival hemorrhages or discharge. Neck - Supple, without increased jugular venous pressures. No carotid bruits. No mass. Respiratory - Lungs are clear bilaterally. No wheezes or rales. Normal effort without use of accessory muscles. Cardiovascular - Heart has regular rhythm and rate. No murmurs, rubs or gallops. + pedal pulses and no varicosities. Abdominal -  Soft, nontender, nondistended. Bowel sounds are intact.    Extremities - No clubbing, cyanosis, or  edema. Musculoskeletal -  No clubbing . No Osler's nodes. Gait -in wheelchair. No kyphosis or scoliosis. Skin -  no statis ulcers or dermatitis. Neurological - No focal signs are identified. Oriented to person, place  Asked same question over again where they were going  Psychiatric -somewhat agitated at first but resolved with distraction      Assessment:     Diagnosis Orders   1. PAF (paroxysmal atrial fibrillation) ([facility])  EKG 12 lead   2. Essential hypertension     3. Mixed hyperlipidemia     4. History of bradycardia     5. Chronic diastolic congestive heart failure ([facility]       Data:  BP Readings from Last 3 Encounters:   11/12/21 122/74   09/01/21 (!) 151/99   08/27/21 136/80    Pulse Readings from Last 3 Encounters:   11/12/21 64   09/01/21 71   08/27/21 101        Wt Readings from Last 3 Encounters:   11/12/21 226 lb (102.5 kg)   09/01/21 202 lb (91.6 kg)   08/27/21 202 lb (91.6 kg)   EKG today shows atrial fibrillation with a rate of 64.  4 and 31 ms  Blood pressure heart rate controlled. Medical management includes sotalol 80 mg twice a day for arrhythmia. Remains anticoagulated on low-dose Xarelto due to CKD  Recurrent arrhythmia. Reviewed 2D echo that does show severe LA enlargement. With her significant advancing dementia and is asymptomatic with her arrhythmia now with good rate control would focus on rate control instead of trying to try to obtain sinus rhythm. Daughter agrees this is quite reasonable. Additionally sotalol may have interactions with some of the medicine she is taking or future need for antibiotics  I will switch her sotalol over to low-dose Toprol to help with the rate control. Her daughter will keep an eye on her blood pressure and heart rate at home and let us know if too high or too low    Takes Lasix daily and extra as needed for weight gain-daughter does weigh and does take extra Lasix usually second dose at least 4 to 5 hours prior to bedtime.   We also discussed care getting up and wandering around may be a sign of PND and to watch for that along with fluid weight gain may need extra Lasix     Reviewed recent PCP and neurology notes notes-worsening dementia   Reviewed recent labs  Cholesterol controlled, hemoglobin A1c 7.9 in August  History of iron deficiency anemia-on iron, last H/H 9.8/32.6   States taking medications as prescribed   No evidence of overt heart failure, angina   30 minutes were spent preparing, reviewing and seeing patient. All questions answered    Plan  Stop the Sotalol - will focus on rate control and not rhythm control  Start Toprol 25mg nightly   Heart rate goal is 60-80 at rest   If getting much higher or lower let us know can adjust  BP goal 100-130s  Take extra lasix if swelling significantly worse or unable to sleep flat   Follow up in 6-9 mos   Call with any questions or concerns  Follow up with Pedro Smith MD for non cardiac problems  Report any new problems  Cardiovascular Fitness-Exercise as tolerated. Fall precautions   Cardiac / Healthy Diet  Continue current medications as directed  Continue plan of treatment  It is always recommended that you bring your medications bottles with you to each visit - this is for your safety! EMY Berry    EMR dragon/transcription disclaimer: Much of this encounter note is electronic transcription/translation of spoken language to printed tach. Electronic translation of spoken language may be erroneous, or at times, nonsensical words or phrases may be inadvertently transcribed.  Although, I have reviewed the note for such errors, some may still exist.

## 2021-11-12 NOTE — PATIENT INSTRUCTIONS
Stop the Sotalol - will focus on rate control and not rhythm control  Start Toprol 25mg nightly   Heart rate goal is 60-80 at rest   If getting much higher or lower let us know can adjust  BP goal 100-130s  Take extra lasix if swelling significantly worse or unable to sleep flat   Follow up in 6-9 mos   Call with any questions or concerns  Follow up with Blayne Palafox MD for non cardiac problems  Report any new problems  Cardiovascular Fitness-Exercise as tolerated. Fall precautions   Cardiac / Healthy Diet  Continue current medications as directed  Continue plan of treatment  It is always recommended that you bring your medications bottles with you to each visit - this is for your safety!

## 2021-11-15 ENCOUNTER — PATIENT MESSAGE (OUTPATIENT)
Dept: INTERNAL MEDICINE | Age: 81
End: 2021-11-15

## 2021-11-15 DIAGNOSIS — L89.529 PRESSURE INJURY OF SKIN OF LEFT ANKLE, UNSPECIFIED INJURY STAGE: Primary | ICD-10-CM

## 2021-11-15 NOTE — TELEPHONE ENCOUNTER
Can try making the referral.  We can make the referral to wound care. However, if they want us to see her before hand, we can certainly arrange for that.

## 2021-11-19 ENCOUNTER — HOSPITAL ENCOUNTER (OUTPATIENT)
Dept: GENERAL RADIOLOGY | Age: 81
Discharge: HOME | End: 2021-11-19
Payer: MEDICARE

## 2021-11-19 ENCOUNTER — HOSPITAL ENCOUNTER (OUTPATIENT)
Dept: VASCULAR LAB | Age: 81
Discharge: HOME | End: 2021-11-19
Payer: MEDICARE

## 2021-11-19 ENCOUNTER — HOSPITAL ENCOUNTER (OUTPATIENT)
Dept: WOUND CARE | Age: 81
Discharge: HOME | End: 2021-11-19
Payer: MEDICARE

## 2021-11-19 VITALS
HEART RATE: 73 BPM | BODY MASS INDEX: 38.58 KG/M2 | RESPIRATION RATE: 20 BRPM | DIASTOLIC BLOOD PRESSURE: 104 MMHG | SYSTOLIC BLOOD PRESSURE: 158 MMHG | WEIGHT: 226 LBS | TEMPERATURE: 97.2 F | HEIGHT: 64 IN

## 2021-11-19 DIAGNOSIS — E11.621 DIABETIC ULCER OF LEFT HEEL ASSOCIATED WITH TYPE 2 DIABETES MELLITUS, WITH FAT LAYER EXPOSED (HCC): ICD-10-CM

## 2021-11-19 DIAGNOSIS — I73.9 PVD (PERIPHERAL VASCULAR DISEASE) (HCC): ICD-10-CM

## 2021-11-19 DIAGNOSIS — Z79.4 TYPE 2 DIABETES MELLITUS WITHOUT COMPLICATION, WITH LONG-TERM CURRENT USE OF INSULIN (HCC): ICD-10-CM

## 2021-11-19 DIAGNOSIS — E11.9 TYPE 2 DIABETES MELLITUS WITHOUT COMPLICATION, WITH LONG-TERM CURRENT USE OF INSULIN (HCC): ICD-10-CM

## 2021-11-19 DIAGNOSIS — E11.21 TYPE II DIABETES MELLITUS WITH NEPHROPATHY (HCC): ICD-10-CM

## 2021-11-19 DIAGNOSIS — L97.422 DIABETIC ULCER OF LEFT HEEL ASSOCIATED WITH TYPE 2 DIABETES MELLITUS, WITH FAT LAYER EXPOSED (HCC): ICD-10-CM

## 2021-11-19 DIAGNOSIS — Z79.4 TYPE 2 DIABETES MELLITUS WITHOUT COMPLICATION, WITH LONG-TERM CURRENT USE OF INSULIN (HCC): Primary | ICD-10-CM

## 2021-11-19 DIAGNOSIS — G62.9 NEUROPATHY: ICD-10-CM

## 2021-11-19 DIAGNOSIS — E55.9 VITAMIN D DEFICIENCY: ICD-10-CM

## 2021-11-19 DIAGNOSIS — E11.9 TYPE 2 DIABETES MELLITUS WITHOUT COMPLICATION, WITH LONG-TERM CURRENT USE OF INSULIN (HCC): Primary | ICD-10-CM

## 2021-11-19 LAB
ALBUMIN SERPL-MCNC: 4.1 G/DL (ref 3.5–5.2)
ALP BLD-CCNC: 111 U/L (ref 35–104)
ALT SERPL-CCNC: 15 U/L (ref 5–33)
ANION GAP SERPL CALCULATED.3IONS-SCNC: 12 MMOL/L (ref 7–19)
AST SERPL-CCNC: 18 U/L (ref 5–32)
BASOPHILS ABSOLUTE: 0.1 K/UL (ref 0–0.2)
BASOPHILS RELATIVE PERCENT: 0.9 % (ref 0–1)
BILIRUB SERPL-MCNC: 0.5 MG/DL (ref 0.2–1.2)
BUN BLDV-MCNC: 32 MG/DL (ref 8–23)
CALCIUM SERPL-MCNC: 9.5 MG/DL (ref 8.8–10.2)
CHLORIDE BLD-SCNC: 100 MMOL/L (ref 98–111)
CHOLESTEROL, TOTAL: 176 MG/DL (ref 160–199)
CO2: 27 MMOL/L (ref 22–29)
CREAT SERPL-MCNC: 1.2 MG/DL (ref 0.5–0.9)
CREATININE URINE: 32 MG/DL (ref 4.2–622)
EOSINOPHILS ABSOLUTE: 0.2 K/UL (ref 0–0.6)
EOSINOPHILS RELATIVE PERCENT: 2.8 % (ref 0–5)
FERRITIN: 45 NG/ML (ref 13–150)
GFR AFRICAN AMERICAN: 52
GFR NON-AFRICAN AMERICAN: 43
GLUCOSE BLD-MCNC: 103 MG/DL (ref 74–109)
HBA1C MFR BLD: 9.1 % (ref 4–6)
HCT VFR BLD CALC: 39.1 % (ref 37–47)
HDLC SERPL-MCNC: 60 MG/DL (ref 65–121)
HEMOGLOBIN: 12 G/DL (ref 12–16)
IMMATURE GRANULOCYTES #: 0 K/UL
IRON SATURATION: 11 % (ref 14–50)
IRON: 44 UG/DL (ref 37–145)
LDL CHOLESTEROL CALCULATED: 96 MG/DL
LYMPHOCYTES ABSOLUTE: 1.1 K/UL (ref 1.1–4.5)
LYMPHOCYTES RELATIVE PERCENT: 15.3 % (ref 20–40)
MCH RBC QN AUTO: 28.6 PG (ref 27–31)
MCHC RBC AUTO-ENTMCNC: 30.7 G/DL (ref 33–37)
MCV RBC AUTO: 93.3 FL (ref 81–99)
MICROALBUMIN UR-MCNC: 67.2 MG/DL (ref 0–19)
MICROALBUMIN/CREAT UR-RTO: 2100 MG/G
MONOCYTES ABSOLUTE: 0.6 K/UL (ref 0–0.9)
MONOCYTES RELATIVE PERCENT: 8.5 % (ref 0–10)
NEUTROPHILS ABSOLUTE: 5.4 K/UL (ref 1.5–7.5)
NEUTROPHILS RELATIVE PERCENT: 72.2 % (ref 50–65)
PDW BLD-RTO: 15.3 % (ref 11.5–14.5)
PLATELET # BLD: 221 K/UL (ref 130–400)
PMV BLD AUTO: 11.1 FL (ref 9.4–12.3)
POTASSIUM SERPL-SCNC: 4.5 MMOL/L (ref 3.5–5)
RBC # BLD: 4.19 M/UL (ref 4.2–5.4)
SODIUM BLD-SCNC: 139 MMOL/L (ref 136–145)
TOTAL IRON BINDING CAPACITY: 391 UG/DL (ref 250–400)
TOTAL PROTEIN: 7.3 G/DL (ref 6.6–8.7)
TRIGL SERPL-MCNC: 102 MG/DL (ref 0–149)
TSH SERPL DL<=0.05 MIU/L-ACNC: 2.64 UIU/ML (ref 0.27–4.2)
VITAMIN D 25-HYDROXY: 57.7 NG/ML
WBC # BLD: 7.5 K/UL (ref 4.8–10.8)

## 2021-11-19 PROCEDURE — 99215 OFFICE O/P EST HI 40 MIN: CPT | Performed by: NURSE PRACTITIONER

## 2021-11-19 PROCEDURE — 73620 X-RAY EXAM OF FOOT: CPT

## 2021-11-19 PROCEDURE — 97597 DBRDMT OPN WND 1ST 20 CM/<: CPT | Performed by: NURSE PRACTITIONER

## 2021-11-19 PROCEDURE — 97597 DBRDMT OPN WND 1ST 20 CM/<: CPT

## 2021-11-19 PROCEDURE — 93923 UPR/LXTR ART STDY 3+ LVLS: CPT

## 2021-11-19 PROCEDURE — 99214 OFFICE O/P EST MOD 30 MIN: CPT

## 2021-11-19 NOTE — HOME CARE
117 [facility]. [address] Marky Tyson [address] [phone]     Ordering Center:     [address] [place] [address],[address]  1200 [facility], CHRISTIANO [address] [id number] [phone]  WOUND CARE Dept: [address] [facility] [phone]    Patient Information:      Rosemary De Santiago Del Remedio   [phone]   : 1940  AGE: 80 y.o. GENDER: female   EPISODE DATE: 2021    Insurance:      PRIMARY INSURANCE:  Plan: MEDICARE PART A AND B  Coverage: MEDICARE  Effective Date: 2018  Group Number: [unfilled]  Subscriber Number: [id number] - (Medicare)    Payor/Plan Subscr  Sex Relation Sub. Ins. ID Effective Group Num   1. MEDICARE - ME* CHRISTINA ALFORD 1940 Female Self [id number]                                    [address]    2.  [place]ICAID [place] [id number]* CHRISTINA ALFORD 1940 Female Self [id number]                                    [address]        Patient Wound Information:      Problem List Items Addressed This Visit     Type 2 diabetes mellitus without complication, with long-term current use of insulin ([address] - Primary    Relevant Orders    VL LOWER EXTREMITY ARTERIAL SEGMENTAL PRESSURES W PPG    * (Principal) Diabetic ulcer of left heel associated with type 2 diabetes mellitus, with fat layer exposed ([address]    Relevant Orders    VL LOWER EXTREMITY ARTERIAL SEGMENTAL PRESSURES W PPG    XR FOOT LEFT (2 VIEWS)    Neuropathy    PVD (peripheral vascular disease) ([address]    Relevant Orders    VL LOWER EXTREMITY ARTERIAL SEGMENTAL PRESSURES W PPG          WOUNDS REQUIRING DRESSING SUPPLIES:     Wound 21 Heel Left; Medial wound 1- left heel wag 1 (Active)   Wound Image    21 1036   Wound Etiology Diabetic Krishnamurthy 1 21 1036   Dressing Status Old drainage noted 21 1036   Wound Cleansed Soap and water 21 1036   Wound Length (cm) 1.5 cm 21 1036 Wound Width (cm) 2.8 cm 11/19/21 1036   Wound Depth (cm) 0.1 cm 11/19/21 1036   Wound Surface Area (cm^2) 4.2 cm^2 11/19/21 1036   Wound Volume (cm^3) 0.42 cm^3 11/19/21 1036   Post-Procedure Length (cm) 1.5 cm 11/19/21 1037   Post-Procedure Width (cm) 2.8 cm 11/19/21 1037   Post-Procedure Depth (cm) 0.1 cm 11/19/21 1037   Post-Procedure Surface Area (cm^2) 4.2 cm^2 11/19/21 1037   Post-Procedure Volume (cm^3) 0.42 cm^3 11/19/21 1037   Wound Assessment Slough; Eschar moist 11/19/21 1036   Drainage Amount Small 11/19/21 1036   Drainage Description Thin 11/19/21 1036   Odor None 11/19/21 1036   Marisabel-wound Assessment Intact; Blanchable erythema;  Maceration 11/19/21 1036   Margins Defined edges 11/19/21 1036   Wound Thickness Description not for Pressure Injury Full thickness 11/19/21 1036   Number of days: 0          Supplies Requested :      WOUND #: 1   PRIMARY DRESSING:  None   Cover and Secure with: 2X2 gauze pad  Conforming roll gauze     FREQUENCY OF DRESSING CHANGES:  Other twice daily       ADDITIONAL ITEMS:  [] Gloves Small  [] Gloves Medium [x] Gloves Large [] Gloves XLarge  [] Tape 1\" [x] Tape 2\" [] Tape 3\"  [x] Medipore Tape  [x] Saline  [] Skin Prep   [] Adhesive Remover   [x] Cotton Tip Applicators   [] Other:    Patient Wound(s) Debrided: [x] Yes if yes please add date 11/19/21   [] No    Debribement Type: Excisional/Sharp and Mechanical     Patient currently being seen by Home Health: [] Yes   [x] No    Duration for needed supplies:  []15  [x]30  []60  []90 Days    Electronically signed by EMY Perez CNP on 11/19/2021 at 11:15 AM     Provider Information:      PROVIDER'S NAME: Katia QUEVEDO    NPI: [id number]

## 2021-11-22 ASSESSMENT — VISUAL ACUITY: OU: 1

## 2021-11-22 NOTE — PROGRESS NOTES
Patient Care Team:  Xiang Felix MD as PCP - General (Internal Medicine)  Xiang Felix MD as PCP - REHABILITATION [facility]  Zoraida Jay MD as Consulting Physician (Cardiology)  JAYLA Mcdonald as Physician Assistant (Physician Assistant Medical)  EMY Gil as Advanced Practice Nurse (Gastroenterology)  Lazaro Mondragon MD as Consulting Physician (Neurology)    TODAY'S DATE:  11/19/2021     HISTORY of PRESENTILLNESS HPI   Yrn Gallardo is a 80 y.o. female who presents today for wound evaluation. She reports she developed a wound on left foot. This started 4 week(s) ago. She believes this is not healing. She has been applying antibiotic ointment. She has not had  fever or chills. She has a history of diabetes mellitus.   Wound Type:diabetic  Wound Location:medial left foot  Modifying factors:diabetes, poor glucose control, chronic pressure, decreased mobility and arterial insufficiency    Patient Active Problem List   Diagnosis Code    History of colon cancer Z85.038    Chronic anticoagulation Z79.01    PAF (paroxysmal atrial fibrillation) ([facility]) I48.0    History of bradycardia Z87.898    Hypoglycemia due to insulin E16.0, T38.3X5A    Type 2 diabetes mellitus without complication, with long-term current use of insulin ([facility]) E11.9, Z79.4    Pneumonia J18.9    Hypoglycemic encephalopathy E16.2    Abnormal chest x-ray R93.89    Hypokalemia E87.6    Hypomagnesemia E83.42    Essential hypertension I10    Mixed hyperlipidemia E78.2    CPAP (continuous positive airway pressure) dependence Z99.89    Somnolence, daytime R40.0    Restless leg syndrome G25.81    Hypoxemia R09.02    Obstructive sleep apnea G47.33    BiPAP (biphasic positive airway pressure) dependence Z99.89    Hypochloremia E87.8    CHF (congestive heart failure) ([facility]) I50.9    Macrocytic anemia D53.9    Acute kidney injury superimposed on CKD ([facility]) N17.9, N18.9    Acute on chronic diastolic heart failure ([facility]) I50.33  Palliative care patient Z51.5    Acute hypoxemic respiratory failure (HCC) J96.01    Chronic anemia D64.9    History of adenomatous polyp of colon Z86.010    Iron deficiency anemia D50.9    Heme positive stool R19.5    AMS (altered mental status) R41.82    Acute cystitis N30.00    Toxic metabolic encephalopathy Y14.6    Diabetic ulcer of left heel associated with type 2 diabetes mellitus, with fat layer exposed ([facility] E11.621, E93.952    Neuropathy G62.9    PVD (peripheral vascular disease) ([facility]) I73.9       Kylah Acevedo is a 80 y.o. female with the following history reviewed and recorded in [facility]:    Current Outpatient Medications   Medication Sig Dispense Refill    collagenase (SANTYL) 250 UNIT/GM ointment Apply topically twice daily.  Wound size 1.5x2.8x0.1 30 g 1    metoprolol succinate (TOPROL XL) 25 MG extended release tablet Take 1 tablet by mouth nightly 90 tablet 3    donepezil (ARICEPT) 10 MG tablet Take 1 tablet by mouth nightly 30 tablet 2    memantine (NAMENDA) 10 MG tablet Take 1 tablet by mouth 2 times daily 60 tablet 5    oxybutynin (DITROPAN XL) 10 MG extended release tablet Take 1 tablet by mouth daily 30 tablet 3    ferrous sulfate (IRON 325) 325 (65 Fe) MG tablet Take 1 tablet by mouth daily (with breakfast) 90 tablet 1    colesevelam (WELCHOL) 625 MG tablet Take 1 tablet by mouth 2 times daily (with meals) 60 tablet 5    SITagliptin (JANUVIA) 100 MG tablet Take 1 tablet by mouth daily 30 tablet 5    ARIPiprazole (ABILIFY) 2 MG tablet Take 1 tablet by mouth every evening 30 tablet 3    traZODone (DESYREL) 100 MG tablet Take 1 tablet by mouth nightly 90 tablet 1    pantoprazole (PROTONIX) 20 MG tablet Take 20 mg by mouth daily       furosemide (LASIX) 40 MG tablet Take 1 tablet by mouth for 3 to 5 days if patient develops lower leg swelling or fluid weight gain (Patient taking differently: Take 40 mg by mouth daily Daily and extra PRN) 30 tablet 0    nystatin (MYCOSTATIN) 119177 UNIT/GM powder Apply topically 4 times daily Apply topically 4 times daily.  rivaroxaban (XARELTO) 15 MG TABS tablet Take 1 tablet by mouth daily (with breakfast) (Patient taking differently: Take 15 mg by mouth nightly ) 90 tablet 3    Multiple Vitamins-Minerals (THERAPEUTIC MULTIVITAMIN-MINERALS) tablet Take 1 tablet by mouth daily 30 tablet 0    atorvastatin (LIPITOR) 40 MG tablet Take 40 mg by mouth nightly       sertraline (ZOLOFT) 100 MG tablet Take 100 mg by mouth daily        No current facility-administered medications for this encounter. Allergies: Patient has no known allergies. Past Medical History:   Diagnosis Date    KANCHAN (acute kidney injury) ([facility] 12/30/2018    Arthritis     Atrial fibrillation (HCC)     BiPAP (biphasic positive airway pressure) dependence     15cm/11cm    Cancer (HCC)     COLON CA    [facility]est[facility] 12/30/2018    Chronic atrial fibrillation ([facility] 2/12/2017    Depression     Diabetes mellitus ([facility]     Essential hypertension 7/11/2017    History of blood transfusion     Hyperlipidemia     Hypertension     Mixed hyperlipidemia 7/11/2017    Neuropathy 11/19/2021    Obesity     Obstructive sleep apnea     AHI:  18.7;  In REM AHI:  39.3 per PSG, 11/2008; split-night PSG, 8/2017 AHI: 35.1    Osteoarthritis     Palliative care patient 01/08/2019    Pneumonia due to organism     Restless leg syndrome 8/1/2017    Sinus complaint     Swelling        Past Surgical History:   Procedure Laterality Date    ADENOIDECTOMY      APPENDECTOMY      CARDIOVERSION      x 2     CHOLECYSTECTOMY      COLONOSCOPY  05/17/2013    Dr. Sullivan Heal COLONOSCOPY  03/22/2012    Dr Reese Cool yr recall    COLONOSCOPY  06/30/2008    Dr Ching Lockwood, 3 yr recall    COLONOSCOPY N/A 8/2/2019    Dr Carrillo Roles sided anastomosis appeared normal and patent-prn (age)   Otoniel Candace HEMICOLECTOMY Right 1998    2 FT OF COLON REMOVED DUE TO CA    HYSTERECTOMY      ID COLONOSCOPY W/BIOPSY SINGLE/MULTIPLE N/A 6/6/2017    Dr Riki Baron colon anastomosis-Tubular AP (-) dysplasia x 2--3 yr recall    TONSILLECTOMY      TUMOR REMOVAL      stomach    UPPER GASTROINTESTINAL ENDOSCOPY  05/17/2013    Dr. Kb Yoder ulcer disease-Chelo (+)    UPPER GASTROINTESTINAL ENDOSCOPY  03/28/2012    Dr Johnathan Tanner antral ulceration with a visible vessel    UPPER GASTROINTESTINAL ENDOSCOPY N/A 8/2/2019    Dr Christian Farias     Family History   Problem Relation Age of Onset    No Known Problems Mother     No Known Problems Father     Colon Cancer Daughter     Colon Polyps Daughter     Esophageal Cancer Neg Hx     Liver Cancer Neg Hx     Rectal Cancer Neg Hx     Liver Disease Neg Hx     Stomach Cancer Neg Hx      Social History     Tobacco Use    Smoking status: Never Smoker    Smokeless tobacco: Never Used   Substance Use Topics    Alcohol use: No         Review of Systems    Review of Systems   Skin: Positive for wound. All other systems reviewed and are negative. All other review of systems are negative. Physical Exam    BP (!) 158/104   Pulse 73   Temp 97.2 °F (36.2 °C) (Temporal)   Resp 20   Ht 5' 4\" (1.626 m)   Wt 226 lb (102.5 kg)   BMI 38.79 kg/m²     Physical Exam  Vitals reviewed. Exam conducted with a chaperone present. Constitutional:       Appearance: Normal appearance. She is obese. HENT:      Head: Normocephalic and atraumatic. Right Ear: External ear normal.      Left Ear: External ear normal.   Eyes:      General: Lids are normal. Lids are everted, no foreign bodies appreciated. Vision grossly intact. Gaze aligned appropriately. Cardiovascular:      Rate and Rhythm: Normal rate and regular rhythm. Pulses: Normal pulses. Heart sounds: Normal heart sounds. Pulmonary:      Effort: Pulmonary effort is normal.      Breath sounds: Normal breath sounds.    Abdominal:      General: Bowel sounds are normal.   Musculoskeletal:         General: Normal range of motion. Feet:    Skin:     General: Skin is warm and dry. Capillary Refill: Capillary refill takes 2 to 3 seconds. Neurological:      Mental Status: She is alert and oriented to person, place, and time. Psychiatric:         Mood and Affect: Mood normal.         Behavior: Behavior normal.         Thought Content: Thought content normal.         Judgment: Judgment normal.             Post Debridement Measurements and Assessment:    The patientspain is   . Wound is is unchanged. Please refer to nursing measurements and assessment regarding wound pre and postdebridement. Wound 11/19/21 Heel Left; Medial wound 1- left heel wag 1 (Active)   Wound Image    11/19/21 1036   Wound Etiology Diabetic Krishnamurthy 1 11/19/21 1036   Dressing Status Old drainage noted 11/19/21 1036   Wound Cleansed Soap and water 11/19/21 1036   Dressing/Treatment Moist to dry 11/19/21 1037   Offloading for Diabetic Foot Ulcers Other (comment) 11/19/21 1037   Wound Length (cm) 1.5 cm 11/19/21 1036   Wound Width (cm) 2.8 cm 11/19/21 1036   Wound Depth (cm) 0.1 cm 11/19/21 1036   Wound Surface Area (cm^2) 4.2 cm^2 11/19/21 1036   Wound Volume (cm^3) 0.42 cm^3 11/19/21 1036   Post-Procedure Length (cm) 1.5 cm 11/19/21 1037   Post-Procedure Width (cm) 2.8 cm 11/19/21 1037   Post-Procedure Depth (cm) 0.1 cm 11/19/21 1037   Post-Procedure Surface Area (cm^2) 4.2 cm^2 11/19/21 1037   Post-Procedure Volume (cm^3) 0.42 cm^3 11/19/21 1037   Wound Assessment Slough; Eschar moist 11/19/21 1036   Drainage Amount Small 11/19/21 1036   Drainage Description Thin 11/19/21 1036   Odor None 11/19/21 1036   Marisabel-wound Assessment Intact; Blanchable erythema;  Maceration 11/19/21 1036   Margins Defined edges 11/19/21 1036   Wound Thickness Description not for Pressure Injury Full thickness 11/19/21 1036   Number of days: 3            Debridement: Selective Debridement/Non-Excisional Debridement    Using #15 blade scalpel the wound(s)/ulcer(s) was/were sharply debrided down through and including the removal of epidermis and dermis. Devitalized Tissue Debrided:  fibrin, biofilm, slough, necrotic/eschar, exudate and callus    Pre Debridement Measurements:  Are located in the Wound/Ulcer Documentation Flow Sheet    Wound/Ulcer #: 1    Post Debridement Measurements:  Wound/Ulcer Descriptions are Pre Debridement except measurements:          Percent of Wound(s)/Ulcer(s) Debrided: 100%    Total Surface Area Debrided:  4.2 sq cm     Diabetic/Pressure/Non Pressure Ulcers only:  Ulcer: Diabetic ulcer, fat layer exposed     Estimated Blood Loss:  None    Hemostasis Achieved:  not needed    Procedural Pain:  0  / 10     Post Procedural Pain:  0 / 10     Response to treatment:  Well tolerated by patient. Assessment    1. Type 2 diabetes mellitus without complication, with long-term current use of insulin ([address]    2. Diabetic ulcer of left heel associated with type 2 diabetes mellitus, with fat layer exposed ([address]    3. Neuropathy    4. PVD (peripheral vascular disease) ([address]          Plan    1. KUMAR  2. Xray  3. Lab workj    Planfor wound - Dress per physician order  Treatment:     Compression : No   Offloading : Yes   Dressing : santyl to wound bed    Discussed importance of offloading, nutrition, glucose control, arterial blood flow importance, wound care, and plan of care. Patient understanding and questions answered. I spent a total of 60 minutes face to face with the patient. Over 75% of that time was spent on counseling and care coordination. Patient was told that if symptoms worsen or new symptoms develop they are to go to the emergency department immediately. Patient was educated on diagnosis and treatment plan. All of patient's questions were answered, and the patient understands the discharge plan. Discussed appropriate home care of this wound.  Wound redressed. Patient instructions were given.   Recommend no smoking  Offloading instructions given

## 2021-11-29 ENCOUNTER — PATIENT MESSAGE (OUTPATIENT)
Dept: INTERNAL MEDICINE | Age: 81
End: 2021-11-29

## 2021-11-29 DIAGNOSIS — M54.6 ACUTE THORACIC BACK PAIN, UNSPECIFIED BACK PAIN LATERALITY: ICD-10-CM

## 2021-11-29 DIAGNOSIS — M25.559 HIP PAIN: ICD-10-CM
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
## 2021-11-29 DIAGNOSIS — S39.92XA INJURY OF COCCYX, INITIAL ENCOUNTER: ICD-10-CM

## 2021-11-29 RX ORDER — ARIPIPRAZOLE 2 MG/1
2 TABLET ORAL EVERY EVENING
Qty: 30 TABLET | Refills: 3 | Status: SHIPPED | OUTPATIENT
Start: 2021-11-29 | End: 2022-03-27 | Stop reason: SDUPTHER

## 2021-11-29 NOTE — TELEPHONE ENCOUNTER
Requested Prescriptions     Pending Prescriptions Disp Refills    ARIPiprazole (ABILIFY) 2 MG tablet [Pharmacy Med Name: ARIPIPRAZOLE 2MG TABLET] 30 tablet 3     Sig: TAKE 1 TABLET BY MOUTH EVERY EVENING       Last Office Visit: 9/1/2021  Next Office Visit: 12/3/2021  Last Medication Refill:07/09/2021 with 3 refills

## 2021-11-29 NOTE — TELEPHONE ENCOUNTER
Probably need to get her in within the next day or 2. Get an x-ray of her lower lumbar spine, sacrum and coccyx and thoracic spine.   Also, get x-rays of her bilateral hips and pelvis

## 2021-11-30 ENCOUNTER — TELEPHONE (OUTPATIENT)
Dept: INTERNAL MEDICINE | Age: 81
End: 2021-11-30

## 2021-11-30 ENCOUNTER — HOSPITAL ENCOUNTER (OUTPATIENT)
Dept: GENERAL RADIOLOGY | Age: 81
Discharge: HOME | End: 2021-11-30
Payer: MEDICARE

## 2021-11-30 DIAGNOSIS — S39.92XA INJURY OF COCCYX, INITIAL ENCOUNTER: ICD-10-CM

## 2021-11-30 DIAGNOSIS — M54.6 ACUTE THORACIC BACK PAIN, UNSPECIFIED BACK PAIN LATERALITY: ICD-10-CM

## 2021-11-30 DIAGNOSIS — M25.559 HIP PAIN: ICD-10-CM

## 2021-11-30 DIAGNOSIS — S32.691A CLOSED FRACTURE OF RIGHT ISCHIAL TUBEROSITY, INITIAL ENCOUNTER (HCC): Primary | ICD-10-CM
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
## 2021-11-30 PROCEDURE — 73521 X-RAY EXAM HIPS BI 2 VIEWS: CPT

## 2021-11-30 PROCEDURE — 72070 X-RAY EXAM THORAC SPINE 2VWS: CPT

## 2021-11-30 PROCEDURE — 72220 X-RAY EXAM SACRUM TAILBONE: CPT

## 2021-11-30 PROCEDURE — 72100 X-RAY EXAM L-S SPINE 2/3 VWS: CPT

## 2021-12-01 ENCOUNTER — TELEPHONE (OUTPATIENT)
Dept: INTERNAL MEDICINE | Age: 81
End: 2021-12-01

## 2021-12-01 DIAGNOSIS — S32.613A AVULSION FRACTURE OF ISCHIAL TUBEROSITY (HCC): Primary | ICD-10-CM

## 2021-12-01 NOTE — TELEPHONE ENCOUNTER
S/w daughter, states they were told to call back first thing this morning for urgent test results. Clinical staff unavailable.

## 2021-12-01 NOTE — TELEPHONE ENCOUNTER
----- Message from Karoline Mosquera MD sent at 11/30/2021  4:56 PM CST -----  Again, patient has a fractured ischial tuberosity.   Needs to see Ortho

## 2021-12-03 ENCOUNTER — OFFICE VISIT (OUTPATIENT)
Dept: NEUROLOGY | Age: 81
End: 2021-12-03
Payer: MEDICARE

## 2021-12-03 ENCOUNTER — HOSPITAL ENCOUNTER (OUTPATIENT)
Dept: WOUND CARE | Age: 81
Discharge: HOME | End: 2021-12-03
Payer: MEDICARE

## 2021-12-03 ENCOUNTER — OFFICE VISIT (OUTPATIENT)
Dept: INTERNAL MEDICINE | Age: 81
End: 2021-12-03
Payer: MEDICARE

## 2021-12-03 VITALS
HEART RATE: 90 BPM | DIASTOLIC BLOOD PRESSURE: 70 MMHG | RESPIRATION RATE: 20 BRPM | HEIGHT: 64 IN | WEIGHT: 204 LBS | OXYGEN SATURATION: 98 % | SYSTOLIC BLOOD PRESSURE: 138 MMHG | BODY MASS INDEX: 34.83 KG/M2

## 2021-12-03 VITALS
SYSTOLIC BLOOD PRESSURE: 147 MMHG | HEIGHT: 64 IN | HEART RATE: 100 BPM | DIASTOLIC BLOOD PRESSURE: 97 MMHG | BODY MASS INDEX: 38.58 KG/M2 | WEIGHT: 226 LBS

## 2021-12-03 VITALS
BODY MASS INDEX: 38.58 KG/M2 | HEIGHT: 64 IN | WEIGHT: 226 LBS | DIASTOLIC BLOOD PRESSURE: 97 MMHG | HEART RATE: 102 BPM | SYSTOLIC BLOOD PRESSURE: 147 MMHG | TEMPERATURE: 96 F | RESPIRATION RATE: 20 BRPM

## 2021-12-03 DIAGNOSIS — I48.91 ATRIAL FIBRILLATION, UNSPECIFIED TYPE (HCC): ICD-10-CM

## 2021-12-03 DIAGNOSIS — S32.611A AVULSION FRACTURE OF RIGHT ISCHIAL TUBEROSITY (HCC): Primary | ICD-10-CM

## 2021-12-03 DIAGNOSIS — E11.9 TYPE 2 DIABETES MELLITUS WITHOUT COMPLICATION, WITH LONG-TERM CURRENT USE OF INSULIN (HCC): ICD-10-CM

## 2021-12-03 DIAGNOSIS — I73.9 PVD (PERIPHERAL VASCULAR DISEASE) (HCC): ICD-10-CM

## 2021-12-03 DIAGNOSIS — Z86.79 HISTORY OF HYPERTENSION: ICD-10-CM

## 2021-12-03 DIAGNOSIS — F41.9 ANXIETY AND DEPRESSION: ICD-10-CM

## 2021-12-03 DIAGNOSIS — E11.21 TYPE II DIABETES MELLITUS WITH NEPHROPATHY (HCC): ICD-10-CM

## 2021-12-03 DIAGNOSIS — E66.01 SEVERE OBESITY (BMI 35.0-35.9 WITH COMORBIDITY) (HCC): ICD-10-CM

## 2021-12-03 DIAGNOSIS — I10 PRIMARY HYPERTENSION: ICD-10-CM

## 2021-12-03 DIAGNOSIS — G47.30 SLEEP APNEA, UNSPECIFIED TYPE: ICD-10-CM

## 2021-12-03 DIAGNOSIS — Z86.39 HISTORY OF DIABETES MELLITUS: ICD-10-CM

## 2021-12-03 DIAGNOSIS — K21.9 GASTROESOPHAGEAL REFLUX DISEASE WITHOUT ESOPHAGITIS: ICD-10-CM

## 2021-12-03 DIAGNOSIS — N32.81 OAB (OVERACTIVE BLADDER): ICD-10-CM

## 2021-12-03 DIAGNOSIS — R41.3 MEMORY LOSS: ICD-10-CM

## 2021-12-03 DIAGNOSIS — L97.422 DIABETIC ULCER OF LEFT HEEL ASSOCIATED WITH TYPE 2 DIABETES MELLITUS, WITH FAT LAYER EXPOSED (HCC): Primary | ICD-10-CM

## 2021-12-03 DIAGNOSIS — G47.00 INSOMNIA, UNSPECIFIED TYPE: ICD-10-CM

## 2021-12-03 DIAGNOSIS — E11.621 DIABETIC ULCER OF LEFT HEEL ASSOCIATED WITH TYPE 2 DIABETES MELLITUS, WITH FAT LAYER EXPOSED (HCC): Primary | ICD-10-CM

## 2021-12-03 DIAGNOSIS — R41.3 MEMORY LOSS: Primary | ICD-10-CM

## 2021-12-03 DIAGNOSIS — N18.32 STAGE 3B CHRONIC KIDNEY DISEASE (HCC): ICD-10-CM

## 2021-12-03 DIAGNOSIS — F32.A ANXIETY AND DEPRESSION: ICD-10-CM

## 2021-12-03 DIAGNOSIS — Z79.4 TYPE 2 DIABETES MELLITUS WITHOUT COMPLICATION, WITH LONG-TERM CURRENT USE OF INSULIN (HCC): ICD-10-CM

## 2021-12-03 DIAGNOSIS — G62.9 NEUROPATHY: ICD-10-CM

## 2021-12-03 PROCEDURE — G8427 DOCREV CUR MEDS BY ELIG CLIN: HCPCS | Performed by: PSYCHIATRY & NEUROLOGY

## 2021-12-03 PROCEDURE — 1036F TOBACCO NON-USER: CPT | Performed by: INTERNAL MEDICINE

## 2021-12-03 PROCEDURE — G8400 PT W/DXA NO RESULTS DOC: HCPCS | Performed by: INTERNAL MEDICINE

## 2021-12-03 PROCEDURE — G8427 DOCREV CUR MEDS BY ELIG CLIN: HCPCS | Performed by: INTERNAL MEDICINE

## 2021-12-03 PROCEDURE — 97597 DBRDMT OPN WND 1ST 20 CM/<: CPT | Performed by: NURSE PRACTITIONER

## 2021-12-03 PROCEDURE — 1090F PRES/ABSN URINE INCON ASSESS: CPT | Performed by: INTERNAL MEDICINE

## 2021-12-03 PROCEDURE — 1123F ACP DISCUSS/DSCN MKR DOCD: CPT | Performed by: PSYCHIATRY & NEUROLOGY

## 2021-12-03 PROCEDURE — 99214 OFFICE O/P EST MOD 30 MIN: CPT | Performed by: INTERNAL MEDICINE

## 2021-12-03 PROCEDURE — 4040F PNEUMOC VAC/ADMIN/RCVD: CPT | Performed by: PSYCHIATRY & NEUROLOGY

## 2021-12-03 PROCEDURE — 4040F PNEUMOC VAC/ADMIN/RCVD: CPT | Performed by: INTERNAL MEDICINE

## 2021-12-03 PROCEDURE — G8417 CALC BMI ABV UP PARAM F/U: HCPCS | Performed by: INTERNAL MEDICINE

## 2021-12-03 PROCEDURE — G8417 CALC BMI ABV UP PARAM F/U: HCPCS | Performed by: PSYCHIATRY & NEUROLOGY

## 2021-12-03 PROCEDURE — 99214 OFFICE O/P EST MOD 30 MIN: CPT | Performed by: PSYCHIATRY & NEUROLOGY

## 2021-12-03 PROCEDURE — 97597 DBRDMT OPN WND 1ST 20 CM/<: CPT

## 2021-12-03 PROCEDURE — 1036F TOBACCO NON-USER: CPT | Performed by: PSYCHIATRY & NEUROLOGY

## 2021-12-03 PROCEDURE — 1090F PRES/ABSN URINE INCON ASSESS: CPT | Performed by: PSYCHIATRY & NEUROLOGY

## 2021-12-03 PROCEDURE — G8482 FLU IMMUNIZE ORDER/ADMIN: HCPCS | Performed by: INTERNAL MEDICINE

## 2021-12-03 PROCEDURE — G8400 PT W/DXA NO RESULTS DOC: HCPCS | Performed by: PSYCHIATRY & NEUROLOGY

## 2021-12-03 PROCEDURE — G8484 FLU IMMUNIZE NO ADMIN: HCPCS | Performed by: PSYCHIATRY & NEUROLOGY

## 2021-12-03 PROCEDURE — 1123F ACP DISCUSS/DSCN MKR DOCD: CPT | Performed by: INTERNAL MEDICINE

## 2021-12-03 RX ORDER — TRAMADOL HYDROCHLORIDE 50 MG/1
50 TABLET ORAL EVERY 6 HOURS PRN
Qty: 12 TABLET | Refills: 0 | Status: SHIPPED | OUTPATIENT
Start: 2021-12-03 | End: 2021-12-06

## 2021-12-03 RX ORDER — LIDOCAINE HYDROCHLORIDE 20 MG/ML
JELLY TOPICAL PRN
Status: DISCONTINUED | OUTPATIENT
Start: 2021-12-03 | End: 2021-12-05 | Stop reason: HOSPADM

## 2021-12-03 NOTE — PROGRESS NOTES
Chief Complaint   Patient presents with    Follow-up     3mo follow up for memory loss. Beatrice Landau is a 80y.o. year old female who is seen for evaluation of probable dementia. She is here today with one of her daughters. The patient has had difficulty for at least a year which appears to be slowly progressive. She does not sleep well at night and she is intermittently confused. She talks to the television as if it is a real person. She paces around a lot. She has poor short-term memory. There is a strong family history of dementia. The patient may have some chronic depression as well. She takes Aricept 10 mg a day and it seems to be beneficial.  She also takes Abilify at bedtime and Zoloft. Recent B12 level was normal.  She still not sleeping well. .  She had been on gabapentin and Tegretol at 1 point for neuropathy but these have been discontinued. In addition to diabetic neuropathy she has a history of chronic kidney disease, CHF and atrial fibrillation. She had a CT scan of the head 9/20. Angelica Whitman There is diffuse atrophy particularly in the frontal and temporal lobes, in my opinion. She has been sleeping some better with trazodone and melatonin combination. Now on Namenda with no meaningful improvement. Tried Nuedexta without success. Basically doing about the same.   Active Ambulatory Problems     Diagnosis Date Noted    History of colon cancer 05/03/2017    Chronic anticoagulation 05/23/2017    PAF (paroxysmal atrial fibrillation) ([facility] 05/23/2017    History of bradycardia 05/23/2017    Hypoglycemia due to insulin 06/01/2017    Type 2 diabetes mellitus without complication, with long-term current use of insulin ([address] 06/01/2017    Pneumonia     Hypoglycemic encephalopathy 06/01/2017    Abnormal chest x-ray 06/01/2017    Hypokalemia 06/01/2017    Hypomagnesemia 06/02/2017    Essential hypertension 07/11/2017    Mixed hyperlipidemia 07/11/2017    CPAP (continuous positive airway pressure) dependence 08/01/2017    Somnolence, daytime 08/01/2017    Restless leg syndrome 08/01/2017    Hypoxemia 08/01/2017    Obstructive sleep apnea     BiPAP (biphasic positive airway pressure) dependence     Hypochloremia 12/30/2018    CHF (congestive heart failure) ([address] 12/30/2018    Macrocytic anemia 12/30/2018    Acute kidney injury superimposed on CKD ([address]     Acute on chronic diastolic heart failure ([address] 01/07/2019    Palliative care patient 01/08/2019    Acute hypoxemic respiratory failure ([address] 01/11/2019    Chronic anemia     History of adenomatous polyp of colon 03/14/2019    Iron deficiency anemia 03/14/2019    Heme positive stool 03/14/2019    AMS (altered mental status) 09/15/2020    Acute cystitis 09/16/2020    Toxic metabolic encephalopathy 41/37/7864    Diabetic ulcer of left heel associated with type 2 diabetes mellitus, with fat layer exposed ([address] 11/19/2021    Neuropathy 11/19/2021    PVD (peripheral vascular disease) ([address] 11/19/2021     Resolved Ambulatory Problems     Diagnosis Date Noted    Persistent atrial fibrillation ([address]     Obstructive sleep apnea syndrome 07/11/2017    Obstructive sleep apnea 08/01/2017    Septic shock (HCC) 12/30/2018    Hyperkalemia 57/65/8745    Metabolic acidosis 58/09/3691    ATN (acute tubular necrosis) ([address] 01/04/2019    Elevated troponin      Past Medical History:   Diagnosis Date    KANCHAN (acute kidney injury) ([address] 12/30/2018    Arthritis     Atrial fibrillation (HCC)     Cancer (HCC)     Chronic atrial fibrillation ([address] 2/12/2017    Depression     Diabetes mellitus ([address]     History of blood transfusion     Hyperlipidemia     Hypertension     Obesity     Osteoarthritis     Pneumonia due to organism     Sinus complaint     Swelling        Past Surgical History:   Procedure Laterality Date    ADENOIDECTOMY      APPENDECTOMY      CARDIOVERSION      x 2     CHOLECYSTECTOMY      COLONOSCOPY 05/17/2013    Dr. Shaye Valdez  03/22/2012    Dr Melody Allan yr recall    COLONOSCOPY  06/30/2008    Dr Smith Seen, 3 yr recall    COLONOSCOPY N/A 8/2/2019    Dr Brandon Giordano sided anastomosis appeared normal and patent-prn (age)   Lavona Euler HEMICOLECTOMY Right 1998    2 FT OF COLON REMOVED DUE TO CA    HYSTERECTOMY      AL COLONOSCOPY W/BIOPSY SINGLE/MULTIPLE N/A 6/6/2017    Dr Abram Ellsworth colon anastomosis-Tubular AP (-) dysplasia x 2--3 yr recall    TONSILLECTOMY      TUMOR REMOVAL      stomach    UPPER GASTROINTESTINAL ENDOSCOPY  05/17/2013    Dr. Stacey Kimball ulcer disease-Chelo (+)    UPPER GASTROINTESTINAL ENDOSCOPY  03/28/2012    Dr Andriy Yepez antral ulceration with a visible vessel    UPPER GASTROINTESTINAL ENDOSCOPY N/A 8/2/2019    Dr Rhea Peraza       Family History   Problem Relation Age of Onset    No Known Problems Mother     No Known Problems Father     Colon Cancer Daughter     Colon Polyps Daughter     Esophageal Cancer Neg Hx     Liver Cancer Neg Hx     Rectal Cancer Neg Hx     Liver Disease Neg Hx     Stomach Cancer Neg Hx        No Known Allergies    Social History     Socioeconomic History    Marital status:      Spouse name: Not on file    Number of children: Not on file    Years of education: Not on file    Highest education level: Not on file   Occupational History    Not on file   Tobacco Use    Smoking status: Never Smoker    Smokeless tobacco: Never Used   Vaping Use    Vaping Use: Not on file   Substance and Sexual Activity    Alcohol use: No    Drug use: No    Sexual activity: Not Currently   Other Topics Concern    Not on file   Social History Narrative    Not on file     Social Determinants of Health     Financial Resource Strain: Low Risk     Difficulty of Paying Living Expenses: Not hard at all   Food Insecurity: No Food Insecurity    Worried About [address] [employer] in the Last Year: Never true   [employer] of Food in the Last Year: Never true   Transportation Needs:     Lack of Transportation (Medical): Not on file    Lack of Transportation (Non-Medical): Not on file   Physical Activity:     Days of Exercise per Week: Not on file    Minutes of Exercise per Session: Not on file   Stress:     Feeling of Stress : Not on file   Social Connections:     Frequency of Communication with Friends and Family: Not on file    Frequency of Social Gatherings with Friends and Family: Not on file    Attends Zoroastrian Services: Not on file    Active Member of [address] [place] [address] [employer] or Organizations: Not on file    Attends Club or Organization Meetings: Not on file    Marital Status: Not on file   Intimate Partner Violence:     Fear of Current or Ex-Partner: Not on file    Emotionally Abused: Not on file    Physically Abused: Not on file    Sexually Abused: Not on file   Housing Stability:     Unable to Pay for Housing in the Last Year: Not on file    Number of [place] in the Last Year: Not on file    Unstable Housing in the Last Year: Not on file       Review of Systems      Constitutional: []? Fever []? Sweats []? Chills []? Recent Injury   [x]? Denies all unless marked  HENT:[]? Headache  []? Head Injury  []? Sore Throat  []? Ear Pain  []? Dizziness []? Hearing Loss   [x]? Denies all unless marked  Spine:  []? Neck pain  []? Back pain  []? Sciaticia  [x]? Denies all unless marked  Cardiovascular:[]? Chest Pain []? Palpitations []? Heart Disease  [x]? Denies all unless marked  Pulmonary: []? Shortness of Breath []? Cough   [x]? Denies all unless marked  Gastrointestinal:  []? Abdominal Pain  []? Blood in Stool  []? Diarrhea []? Constipation []? Nausea  []? Vomiting  [x]? Denies all unless marked  Genitourinary:  []? Dysuria []? Frequency  []? Incontinence []? Urgency   [x]? Denies all unless marked  Musculoskeletal: []? Arthralgia  []? Myalgias []? Muscle cramps  []? Muscle twitches   [x]? Denies all unless marked   Extremities:   []?  Pain 15 MG TABS tablet Take 1 tablet by mouth daily (with breakfast) (Patient taking differently: Take 15 mg by mouth nightly ) 90 tablet 3    Multiple Vitamins-Minerals (THERAPEUTIC MULTIVITAMIN-MINERALS) tablet Take 1 tablet by mouth daily 30 tablet 0    atorvastatin (LIPITOR) 40 MG tablet Take 40 mg by mouth nightly       sertraline (ZOLOFT) 100 MG tablet Take 100 mg by mouth daily        No current facility-administered medications for this visit.      Facility-Administered Medications Ordered in Other Visits   Medication Dose Route Frequency Provider Last Rate Last Admin    lidocaine (XYLOCAINE) 2 % uro-jet   Topical PRN EMY Blum - CHARAN           Outpatient Medications Marked as Taking for the 12/3/21 encounter (Office Visit) with Sepideh Foreman MD   Medication Sig Dispense Refill    ARIPiprazole (ABILIFY) 2 MG tablet TAKE 1 TABLET BY MOUTH EVERY EVENING 30 tablet 3    metoprolol succinate (TOPROL XL) 25 MG extended release tablet Take 1 tablet by mouth nightly 90 tablet 3    donepezil (ARICEPT) 10 MG tablet Take 1 tablet by mouth nightly 30 tablet 2    memantine (NAMENDA) 10 MG tablet Take 1 tablet by mouth 2 times daily 60 tablet 5    oxybutynin (DITROPAN XL) 10 MG extended release tablet Take 1 tablet by mouth daily 30 tablet 3    ferrous sulfate (IRON 325) 325 (65 Fe) MG tablet Take 1 tablet by mouth daily (with breakfast) 90 tablet 1    colesevelam (WELCHOL) 625 MG tablet Take 1 tablet by mouth 2 times daily (with meals) 60 tablet 5    SITagliptin (JANUVIA) 100 MG tablet Take 1 tablet by mouth daily 30 tablet 5    traZODone (DESYREL) 100 MG tablet Take 1 tablet by mouth nightly 90 tablet 1    pantoprazole (PROTONIX) 20 MG tablet Take 20 mg by mouth daily       furosemide (LASIX) 40 MG tablet Take 1 tablet by mouth for 3 to 5 days if patient develops lower leg swelling or fluid weight gain (Patient taking differently: Take 40 mg by mouth daily Daily and extra PRN) 30 tablet 0    nystatin (MYCOSTATIN) 694230 UNIT/GM powder Apply topically 4 times daily Apply topically 4 times daily.  rivaroxaban (XARELTO) 15 MG TABS tablet Take 1 tablet by mouth daily (with breakfast) (Patient taking differently: Take 15 mg by mouth nightly ) 90 tablet 3    Multiple Vitamins-Minerals (THERAPEUTIC MULTIVITAMIN-MINERALS) tablet Take 1 tablet by mouth daily 30 tablet 0    atorvastatin (LIPITOR) 40 MG tablet Take 40 mg by mouth nightly       sertraline (ZOLOFT) 100 MG tablet Take 100 mg by mouth daily          BP (!) 147/97   Pulse 100   Ht 5' 4\" (1.626 m)   Wt 226 lb (102.5 kg)   BMI 38.79 kg/m²       Constitutional  well developed, well nourished. Eyes  conjunctiva normal.  Pupils react to light  Ear, nose, throat -hearing intact to finger rub No scars, masses, or lesions over external nose or ears, no atrophy of tongue  Neck-symmetric, no masses noted, no jugular vein distension. No bruits noted. Respiration- chest wall appears symmetric, good expansion,   normal effort without use of accessory muscles  Cardiovascular- RRR  Musculoskeletal  no significant wasting of muscles noted, no bony deformities, gait no gross ataxia  Extremities-no clubbing, cyanosis or edema  Skin  warm, dry, and intact. No rash, erythema, or pallor. 2+ edema in the legs  Psychiatric  mood, affect, and behavior appear normal.      Neurological exam  Awake, alert, fluent. She could not tell me that Rick was around the corner. She was able to recognize her daughter. Able to follow simple commands. Cranial Nerve Exam   CN II- Visual fields grossly unremarkable. VA adequate.    CN III, IV,VI- PERRLA, EOMI, No nystagmus, conjugate eye movements, no ptosis  CN VII-no facial asymmetry  CN VIII-Hearing intact   CN IX and X- Palate elevates in midline  CN XI-good shoulder shrug  CN XII-Tongue midline with no fasciculations or fibrillations    Motor Exam  V/V throughout upper and lower extremities bilaterally, no cogwheeling, normal tone      Reflexes   2+ at the knees and otherwise absent    Tremors- no tremors in hands or head noted    Gait  Patient is in a wheelchair today    Coordination  Finger to nose and SAMANTHA-unremarkable    Lab Results   Component Value Date    [id number] 05/21/2021     Lab Results   Component Value Date    WBC 7.5 11/19/2021    HGB 12.0 11/19/2021    HCT 39.1 11/19/2021    MCV 93.3 11/19/2021     11/19/2021     Lab Results   Component Value Date     11/19/2021    K 4.5 11/19/2021     11/19/2021    CO2 27 11/19/2021    BUN 32 (H) 11/19/2021    CREATININE 1.2 (H) 11/19/2021    GLUCOSE 103 11/19/2021    CALCIUM 9.5 11/19/2021    PROT 7.3 11/19/2021    LABALBU 4.1 11/19/2021    BILITOT 0.5 11/19/2021    ALKPHOS 111 (H) 11/19/2021    AST 18 11/19/2021    ALT 15 11/19/2021    LABGLOM 43 (A) 11/19/2021    GFRAA 52 (L) 11/19/2021    GLOB 3.3 03/30/2017           Assessment    ICD-10-CM    1. Memory loss  R41.3    2. History of diabetes mellitus  Z86.39    3. 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 May have a vascular component as well. Try increasing Aricept to 20 mg a day. Continue on Namenda and other medications for now. Blood pressure and blood sugar stable  Plan  If it is difficult to get here physically they will try a video visit on their next visit. Return in about 3 months (around 3/3/2022).     (Please note that portions of this note were completed with a voice recognition program. Efforts were made to edit the dictations but occasionally words are mis-transcribed.)

## 2021-12-03 NOTE — PROGRESS NOTES
normal and patent-prn (age)   [facility] HEMICOLECTOMY Right 1998    2 FT OF COLON REMOVED DUE TO CA    HYSTERECTOMY      [place] COLONOSCOPY W/BIOPSY SINGLE/MULTIPLE N/A 6/6/2017    Dr Mickie Roque colon anastomosis-Tubular AP (-) dysplasia x 2--3 yr recall    TONSILLECTOMY      TUMOR REMOVAL      stomach    UPPER GASTROINTESTINAL ENDOSCOPY  05/17/2013    Dr. Gail Tee ulcer disease-Chelo (+)    UPPER GASTROINTESTINAL ENDOSCOPY  03/28/2012    Dr Rach Perez antral ulceration with a visible vessel    UPPER GASTROINTESTINAL ENDOSCOPY N/A 8/2/2019    Dr Shira Montelongo-Gastritis       FAMILY HISTORY    Family History   Problem Relation Age of Onset    No Known Problems Mother     No Known Problems Father     Colon Cancer Daughter     Colon Polyps Daughter     Esophageal Cancer Neg Hx     Liver Cancer Neg Hx     Rectal Cancer Neg Hx     Liver Disease Neg Hx     Stomach Cancer Neg Hx        SOCIAL HISTORY    Social History     Tobacco Use    Smoking status: Never Smoker    Smokeless tobacco: Never Used   Vaping Use    Vaping Use: Not on file   Substance Use Topics    Alcohol use: No    Drug use: No       ALLERGIES    No Known Allergies    MEDICATIONS    Current Outpatient Medications on File Prior to Encounter   Medication Sig Dispense Refill    ARIPiprazole (ABILIFY) 2 MG tablet TAKE 1 TABLET BY MOUTH EVERY EVENING 30 tablet 3    metoprolol succinate (TOPROL XL) 25 MG extended release tablet Take 1 tablet by mouth nightly 90 tablet 3    donepezil (ARICEPT) 10 MG tablet Take 1 tablet by mouth nightly 30 tablet 2    memantine (NAMENDA) 10 MG tablet Take 1 tablet by mouth 2 times daily 60 tablet 5    oxybutynin (DITROPAN XL) 10 MG extended release tablet Take 1 tablet by mouth daily 30 tablet 3    ferrous sulfate (IRON 325) 325 (65 Fe) MG tablet Take 1 tablet by mouth daily (with breakfast) 90 tablet 1    colesevelam (WELCHOL) 625 MG tablet Take 1 tablet by mouth 2 times daily (with meals) 60 tablet 5    SITagliptin (JANUVIA) 100 MG tablet Take 1 tablet by mouth daily 30 tablet 5    traZODone (DESYREL) 100 MG tablet Take 1 tablet by mouth nightly 90 tablet 1    pantoprazole (PROTONIX) 20 MG tablet Take 20 mg by mouth daily       furosemide (LASIX) 40 MG tablet Take 1 tablet by mouth for 3 to 5 days if patient develops lower leg swelling or fluid weight gain (Patient taking differently: Take 40 mg by mouth daily Daily and extra PRN) 30 tablet 0    nystatin (MYCOSTATIN) 336744 UNIT/GM powder Apply topically 4 times daily Apply topically 4 times daily.  rivaroxaban (XARELTO) 15 MG TABS tablet Take 1 tablet by mouth daily (with breakfast) (Patient taking differently: Take 15 mg by mouth nightly ) 90 tablet 3    Multiple Vitamins-Minerals (THERAPEUTIC MULTIVITAMIN-MINERALS) tablet Take 1 tablet by mouth daily 30 tablet 0    atorvastatin (LIPITOR) 40 MG tablet Take 40 mg by mouth nightly       sertraline (ZOLOFT) 100 MG tablet Take 100 mg by mouth daily        No current facility-administered medications on file prior to encounter. REVIEW OF SYSTEMS    Pertinent items are noted in HPI.     Objective:      BP (!) 147/97   Pulse 102   Temp 96 °F (35.6 °C) (Temporal)   Resp 20   Ht 5' 4\" (1.626 m)   Wt 226 lb (102.5 kg)   BMI 38.79 kg/m²     Wt Readings from Last 3 Encounters:   12/03/21 226 lb (102.5 kg)   11/19/21 226 lb (102.5 kg)   11/12/21 226 lb (102.5 kg)       PHYSICAL EXAM    General Appearance: alert and oriented to person, place and time, well developed and well- nourished, in no acute distress  Skin: warm and dry, no rash or erythema  Head: normocephalic and atraumatic  Eyes: pupils equal, round, and reactive to light, extraocular eye movements intact, conjunctivae normal  ENT: tympanic membrane, external ear and ear canal normal bilaterally, nose without deformity, nasal mucosa and turbinates normal without polyps  Neck: supple and non-tender without mass, no thyromegaly or thyroid nodules, no cervical lymphadenopathy  Pulmonary/Chest: clear to auscultation bilaterally- no wheezes, rales or rhonchi, normal air movement, no respiratory distress  Extremities: no cyanosis, clubbing or edema  Musculoskeletal: normal range of motion, no joint swelling, deformity or tenderness  Neurologic: reflexes normal and symmetric, no cranial nerve deficit, gait, coordination and speech normal      Assessment:      Patient Active Problem List   Diagnosis Code    History of colon cancer Z85.038    Chronic anticoagulation Z79.01    PAF (paroxysmal atrial fibrillation) ([facility]) I48.0    History of bradycardia Z87.898    Hypoglycemia due to insulin E16.0, T38.3X5A    Type 2 diabetes mellitus without complication, with long-term current use of insulin ([facility]) E11.9, Z79.4    Pneumonia J18.9    Hypoglycemic encephalopathy E16.2    Abnormal chest x-ray R93.89    Hypokalemia E87.6    Hypomagnesemia E83.42    Essential hypertension I10    Mixed hyperlipidemia E78.2    CPAP (continuous positive airway pressure) dependence Z99.89    Somnolence, daytime R40.0    Restless leg syndrome G25.81    Hypoxemia R09.02    Obstructive sleep apnea G47.33    BiPAP (biphasic positive airway pressure) dependence Z99.89    Hypochloremia E87.8    CHF (congestive heart failure) ([facility]) I50.9    Macrocytic anemia D53.9    Acute kidney injury superimposed on CKD ([facility]) N17.9, N18.9    Acute on chronic diastolic heart failure ([facility]) I50.33    Palliative care patient Z51.5    Acute hypoxemic respiratory failure ([facility]) J96.01    Chronic anemia D64.9    History of adenomatous polyp of colon Z86.010    Iron deficiency anemia D50.9    Heme positive stool R19.5    AMS (altered mental status) R41.82    Acute cystitis N30.00    Toxic metabolic encephalopathy Z49.4    Diabetic ulcer of left heel associated with type 2 diabetes mellitus, with fat layer exposed ([facility]) T19.060, L97.422    Neuropathy G62.9    PVD (peripheral vascular disease) ([facility] I73.9        Procedure Note  Indications:  Based on my examination of this patient's wound(s)/ulcer(s) today, debridement is required to promote healing and evaluate the wound base. Performed by: EMY Blum CNP    Consent obtained:  Yes    Time out taken:  Yes    Pain Control: Anesthetic  Anesthetic: 2% Lidocaine Gel Topical       Debridement:Non-excisional Debridement    Using curette, #15 blade scalpel and forceps the wound(s)/ulcer(s) was/were sharply debrided down through and including the removal of epidermis and dermis.         Devitalized Tissue Debrided:  fibrin, biofilm, slough, exudate and callus    Pre Debridement Measurements:  Are located in the [place]  Documentation Flow Sheet    Wound/Ulcer #: 1    Post Debridement Measurements:  Wound/Ulcer Descriptions are Pre Debridement except measurements:      Percent of Wound/Ulcer Debrided: 100%    Total Surface Area Debrided:  2.28 sq cm     Wound 11/19/21 Heel Left; Medial wound 1- left heel wag 1 (Active)   Wound Image    12/03/21 1018   Wound Etiology Diabetic Krishnamurthy 1 12/03/21 1018   Dressing Status Old drainage noted 12/03/21 1018   Wound Cleansed Soap and water 12/03/21 1018   Dressing/Treatment Moist to dry 11/19/21 1037   Offloading for Diabetic Foot Ulcers Other (comment) 11/19/21 1037   Wound Length (cm) 1.2 cm 12/03/21 1018   Wound Width (cm) 1.9 cm 12/03/21 1018   Wound Depth (cm) 0.1 cm 12/03/21 1018   Wound Surface Area (cm^2) 2.28 cm^2 12/03/21 1018   Change in Wound Size % (l*w) 45.71 12/03/21 1018   Wound Volume (cm^3) 0.228 cm^3 12/03/21 1018   Wound Healing % 46 12/03/21 1018   Post-Procedure Length (cm) 1.2 cm 12/03/21 1043   Post-Procedure Width (cm) 1.9 cm 12/03/21 1043   Post-Procedure Depth (cm) 0.1 cm 12/03/21 1043   Post-Procedure Surface Area (cm^2) 2.28 cm^2 12/03/21 1043   Post-Procedure Volume (cm^3) 0.228 cm^3 12/03/21 1043   Wound Assessment [facility] 12/03/21 1018   Drainage Amount Moderate 12/03/21 1018   Drainage Description Yellow 12/03/21 1018   Odor None 12/03/21 1018   Marisabel-wound Assessment Intact; Blanchable erythema; Maceration 12/03/21 1018   Margins Defined edges 12/03/21 1018   Wound Thickness Description not for Pressure Injury Full thickness 12/03/21 1018   Number of days: 14            Diabetic/Pressure/Non Pressure Ulcers only:  Ulcer: Diabetic ulcer, fat layer exposed      Estimated Blood Loss:  Minimal    Hemostasis Achieved:  by pressure    Procedural Pain:  7  / 10     Post Procedural Pain:  0 / 10     Response to treatment:  Well tolerated by patient., With complaints of pain. Plan:     Problem List Items Addressed This Visit     Type 2 diabetes mellitus without complication, with long-term current use of insulin ([address]    * (Principal) Diabetic ulcer of left heel associated with type 2 diabetes mellitus, with fat layer exposed ([address] - Primary    Neuropathy    PVD (peripheral vascular disease) ([address]          Treatment Note please see attached Discharge Instructions    In my professional opinion this patient would benefit from HBO Therapy: No    Written patient dismissal instructions given to patient and signed by patient or POA. Ms. Cheikh Yates is making great progress!   Electronically signed by EMY Pope CNP on 12/3/2021 at 10:44 AM

## 2021-12-03 NOTE — PROGRESS NOTES
Chief Complaint   Patient presents with    3 Month Follow-Up       HPI: Clara Castaneda is a 80 y.o. female is here for follow-up of type 2 diabetes. She did fall recently. X-rays were concerning for displaced avulsion fracture of the right ischial tuberosity. She has seen Dr. Davis Whiteside in the meantime and been told that she is not going to have any surgery. She is taking Tylenol arthritis for pain. She is agreeable to trying a little tramadol as needed for acute uncontrolled pain. Her A1c did go up from 7.9-9.1. She does need a new glucometer. She does have a history of sleep apnea. She is intolerant of her BiPAP. She denies any complaints of chest pain, chest pressure or shortness of breath. She feels that her mood stable on her current dose of Abilify. She also has some memory loss. She does take Aricept and Namenda. Her memory is relatively stable. Her blood pressure is well controlled. Occasions are helpful for her overactive bladder. Her acid reflux is fairly well controlled. She does have a history of atrial fibrillation. She is on Xarelto for stroke prophylaxis. She is tolerating her statin without side effects. Her creatinine is 1.2 with a GFR of 43    Past Medical History:   Diagnosis Date    KANCHAN (acute kidney injury) ([address] 12/30/2018    Arthritis     Atrial fibrillation (HCC)     BiPAP (biphasic positive airway pressure) dependence     15cm/11cm    Cancer (HCC)     [facility]ON [place]    CHF (congestive heart failure) ([address] 12/30/2018    Chronic atrial fibrillation ([address] 2/12/2017    Depression     Diabetes mellitus ([address]     Essential hypertension 7/11/2017    History of blood transfusion     Hyperlipidemia     Hypertension     Mixed hyperlipidemia 7/11/2017    Neuropathy 11/19/2021    Obesity     Obstructive sleep apnea     AHI:  18.7;  In REM AHI:  39.3 per PSG, 11/2008; split-night PSG, 8/2017 AHI: 35.1    Osteoarthritis     Palliative care patient 01/08/2019    Pneumonia due to organism     Restless leg syndrome 8/1/2017    Sinus complaint     Swelling       Past Surgical History:   Procedure Laterality Date    ADENOIDECTOMY      APPENDECTOMY      CARDIOVERSION      x 2     CHOLECYSTECTOMY      COLONOSCOPY  05/17/2013    Dr. Mya Spivey COLONOSCOPY  03/22/2012    Dr Peggie Blizzard yr recall    COLONOSCOPY  06/30/2008    Dr Laya Russell, 3 yr recall    COLONOSCOPY N/A 8/2/2019    Dr Loren Lloyd sided anastomosis appeared normal and patent-prn (age)   Castillo HEMICOLECTOMY Right 1998    2 FT OF COLON REMOVED DUE TO CA    HYSTERECTOMY      CA COLONOSCOPY W/BIOPSY SINGLE/MULTIPLE N/A 6/6/2017    Dr Janie Contreras colon anastomosis-Tubular AP (-) dysplasia x 2--3 yr recall    TONSILLECTOMY      TUMOR REMOVAL      stomach    UPPER GASTROINTESTINAL ENDOSCOPY  05/17/2013    Dr. Carol Kilgore ulcer disease-Chelo (+)    UPPER GASTROINTESTINAL ENDOSCOPY  03/28/2012    Dr Raulito Crane antral ulceration with a visible vessel    UPPER GASTROINTESTINAL ENDOSCOPY N/A 8/2/2019    Dr Marko Scott      Social History     Socioeconomic History    Marital status:      Spouse name: None    Number of children: None    Years of education: None    Highest education level: None   Occupational History    None   Tobacco Use    Smoking status: Never Smoker    Smokeless tobacco: Never Used   Vaping Use    Vaping Use: None   Substance and Sexual Activity    Alcohol use: No    Drug use: No    Sexual activity: Not Currently   Other Topics Concern    None   Social History Narrative    None     Social Determinants of Health     Financial Resource Strain: Low Risk     Difficulty of Paying Living Expenses: Not hard at all   Food Insecurity: No Food Insecurity    Worried About Running Out of Food in the Last Year: Never true    Onel of Food in the Last Year: Never true   Transportation Needs:     Lack of Transportation (Medical):  Not on file    Lack of Transportation (Non-Medical): Not on file   Physical Activity:     Days of Exercise per Week: Not on file    Minutes of Exercise per Session: Not on file   Stress:     Feeling of Stress : Not on file   Social Connections:     Frequency of Communication with Friends and Family: Not on file    Frequency of Social Gatherings with Friends and Family: Not on file    Attends Yazdanism Services: Not on file    Active Member of Clubs or Organizations: Not on file    Attends Club or Organization Meetings: Not on file    Marital Status: Not on file   Intimate Partner Violence:     Fear of Current or Ex-Partner: Not on file    Emotionally Abused: Not on file    Physically Abused: Not on file    Sexually Abused: Not on file   Housing Stability:     Unable to Pay for Housing in the Last Year: Not on file    Number of Places Lived in the Last Year: Not on file    Unstable Housing in the Last Year: Not on file      Family History   Problem Relation Age of Onset    No Known Problems Mother     No Known Problems Father     Colon Cancer Daughter     Colon Polyps Daughter     Esophageal Cancer Neg Hx     Liver Cancer Neg Hx     Rectal Cancer Neg Hx     Liver Disease Neg Hx     Stomach Cancer Neg Hx         Current Outpatient Medications   Medication Sig Dispense Refill    blood glucose monitor kit and supplies QD E11.9 1 kit 0    traMADol (ULTRAM) 50 MG tablet Take 1 tablet by mouth every 6 hours as needed for Pain for up to 3 days. Intended supply: 3 days.  Take lowest dose possible to manage pain 12 tablet 0    ARIPiprazole (ABILIFY) 2 MG tablet TAKE 1 TABLET BY MOUTH EVERY EVENING 30 tablet 3    metoprolol succinate (TOPROL XL) 25 MG extended release tablet Take 1 tablet by mouth nightly 90 tablet 3    donepezil (ARICEPT) 10 MG tablet Take 1 tablet by mouth nightly (Patient taking differently: Take 10 mg by mouth 2 times daily ) 30 tablet 2    memantine (NAMENDA) 10 MG tablet Take 1 tablet by mouth 2 times daily 60 tablet 5    oxybutynin (DITROPAN XL) 10 MG extended release tablet Take 1 tablet by mouth daily 30 tablet 3    ferrous sulfate (IRON 325) 325 (65 Fe) MG tablet Take 1 tablet by mouth daily (with breakfast) 90 tablet 1    colesevelam (WELCHOL) 625 MG tablet Take 1 tablet by mouth 2 times daily (with meals) 60 tablet 5    SITagliptin (JANUVIA) 100 MG tablet Take 1 tablet by mouth daily 30 tablet 5    traZODone (DESYREL) 100 MG tablet Take 1 tablet by mouth nightly 90 tablet 1    pantoprazole (PROTONIX) 20 MG tablet Take 20 mg by mouth daily       furosemide (LASIX) 40 MG tablet Take 1 tablet by mouth for 3 to 5 days if patient develops lower leg swelling or fluid weight gain (Patient taking differently: Take 40 mg by mouth daily Daily and extra PRN) 30 tablet 0    nystatin (MYCOSTATIN) 900045 UNIT/GM powder Apply topically 4 times daily Apply topically 4 times daily.  rivaroxaban (XARELTO) 15 MG TABS tablet Take 1 tablet by mouth daily (with breakfast) (Patient taking differently: Take 15 mg by mouth nightly ) 90 tablet 3    Multiple Vitamins-Minerals (THERAPEUTIC MULTIVITAMIN-MINERALS) tablet Take 1 tablet by mouth daily 30 tablet 0    atorvastatin (LIPITOR) 40 MG tablet Take 40 mg by mouth nightly       sertraline (ZOLOFT) 100 MG tablet Take 100 mg by mouth daily        No current facility-administered medications for this visit.         Patient Active Problem List   Diagnosis    History of colon cancer    Chronic anticoagulation    PAF (paroxysmal atrial fibrillation) (HCC)    History of bradycardia    Hypoglycemia due to insulin    Type 2 diabetes mellitus without complication, with long-term current use of insulin (HCC)    Pneumonia    Hypoglycemic encephalopathy    Abnormal chest x-ray    Hypokalemia    Hypomagnesemia    Essential hypertension    Mixed hyperlipidemia    CPAP (continuous positive airway pressure) dependence    Somnolence, daytime    Restless leg syndrome    Hypoxemia    Obstructive sleep apnea    BiPAP (biphasic positive airway pressure) dependence    Hypochloremia    CHF (congestive heart failure) (HCC)    Macrocytic anemia    Acute kidney injury superimposed on CKD (HCC)    Acute on chronic diastolic heart failure ([facility])    Palliative care patient    Acute hypoxemic respiratory failure ([facility])    Chronic anemia    History of adenomatous polyp of colon    Iron deficiency anemia    Heme positive stool    AMS (altered mental status)    Acute cystitis    Toxic metabolic encephalopathy    Diabetic ulcer of left heel associated with type 2 diabetes mellitus, with fat layer exposed ([facility])    Neuropathy    PVD (peripheral vascular disease) ([facility])    Avulsion fracture of right ischial tuberosity ([facility])        Review of Systems   Constitutional: Negative for activity change, appetite change, chills, diaphoresis, fatigue, fever and unexpected weight change. HENT: Negative for congestion, ear pain, facial swelling, hearing loss, mouth sores, nosebleeds, postnasal drip, rhinorrhea, sinus pressure, sneezing, sore throat, tinnitus, trouble swallowing and voice change. Eyes: Negative for photophobia, pain, discharge, redness, itching and visual disturbance. Respiratory: Negative for cough, choking, chest tightness, shortness of breath and wheezing. Cardiovascular: Negative for chest pain, palpitations and leg swelling. Gastrointestinal: Negative for abdominal distention, abdominal pain, anal bleeding, blood in stool, constipation, diarrhea, nausea, rectal pain and vomiting. Endocrine: Negative for cold intolerance, heat intolerance, polydipsia, polyphagia and polyuria. Genitourinary: Positive for frequency and urgency. Negative for decreased urine volume, difficulty urinating, dysuria, flank pain and hematuria. Musculoskeletal: Positive for back pain.  Negative for arthralgias, gait problem, joint swelling, myalgias, neck pain and neck type    6. Anxiety and depression    7. Memory loss    8. Primary hypertension    9. OAB (overactive bladder)    10. Insomnia, unspecified type    11. Gastroesophageal reflux disease without esophagitis    12. Atrial fibrillation, unspecified type [facility]        ASSESSMENT/PLAN:    53-year-old woman here for follow-up    1. Avulsion fracture right ischial tuberosity: Tramadol prescribed. Continue care as per Dr. Grey Jerome    2. Renal insufficiency: Stable    3. Type 2 diabetes: A1c poorly controlled. Continue to work on diet and exercise. New glucometer has been ordered    4. Sleep apnea: Continue BiPAP as prescribed    5. Anxiety and depression: Stable    6. Memory loss: Stable    7. Hypertension: Blood pressure well controlled    8. Overactive bladder: Continue oxybutynin    9. Insomnia: Continue trazodone    10. Acid reflux: Continue Protonix as per his    11. Atrial fibrillation: Rate controlled. Continue Xarelto for stroke prophylaxis    12. Chronic kidney disease: Stable. 13.  Obesity: Stable    Shanita was seen today for 3 month follow-up. Diagnoses and all orders for this visit:    Avulsion fracture of right ischial tuberosity (HCC)  -     traMADol (ULTRAM) 50 MG tablet; Take 1 tablet by mouth every 6 hours as needed for Pain for up to 3 days. Intended supply: 3 days. 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[facility])  -     blood glucose monitor kit and supplies; QD E11.9  -     CBC Auto Differential; Future  -     Comprehensive Metabolic Panel; Future  -     Hemoglobin A1C; Future  -     Lipid Panel; Future  -     TSH without Reflex; Future  -     Microalbumin / Creatinine Urine Ratio; Future  -     Vitamin D 25 Hydroxy; Future  -     Iron and TIBC;  Future    Severe obesity (BMI 35.0-35.9 with comorbidity) ([facility])    Stage 3b chronic kidney disease (HCC)    Sleep apnea, unspecified type    Anxiety and depression    Memory loss    Primary hypertension    OAB (overactive bladder)    Insomnia, unspecified type    Gastroesophageal reflux disease without esophagitis    Atrial fibrillation, unspecified type ([facility]          Return in about 3 months (around 3/3/2022) for diabetes. Orders Placed This Encounter   Procedures    CBC Auto Differential     Fast 12 hours     Standing Status:   Future     Standing Expiration Date:   12/3/2022    Comprehensive Metabolic Panel     Fasting 12 hours     Standing Status:   Future     Standing Expiration Date:   12/3/2022    Hemoglobin A1C     Fast 12 hours     Standing Status:   Future     Standing Expiration Date:   12/3/2022    Lipid Panel     Standing Status:   Future     Standing Expiration Date:   12/3/2022     Order Specific Question:   Is Patient Fasting?/# of Hours     Answer:   12    TSH without Reflex     Fast 12 hours     Standing Status:   Future     Standing Expiration Date:   12/3/2022    Microalbumin / Creatinine Urine Ratio     Standing Status:   Future     Standing Expiration Date:   12/3/2022    Vitamin D 25 Hydroxy     Standing Status:   Future     Standing Expiration Date:   12/3/2022    Iron and TIBC     Standing Status:   Future     Standing Expiration Date:   12/3/2022     Order Specific Question:   Is Patient Fasting? Answer:   15     Order Specific Question:   No of Hours?      Answer:   Berta Madrigal MD

## 2021-12-06 PROBLEM — S32.611A: Status: ACTIVE | Noted: 2021-12-06

## 2021-12-06 ASSESSMENT — ENCOUNTER SYMPTOMS
ABDOMINAL DISTENTION: 0
SINUS PRESSURE: 0
CHEST TIGHTNESS: 0
VOMITING: 0
DIARRHEA: 0
VOICE CHANGE: 0
WHEEZING: 0
ANAL BLEEDING: 0
CHOKING: 0
EYE REDNESS: 0
BLOOD IN STOOL: 0
CONSTIPATION: 0
TROUBLE SWALLOWING: 0
RHINORRHEA: 0
RECTAL PAIN: 0
BACK PAIN: 1
NAUSEA: 0
EYE ITCHING: 0
ABDOMINAL PAIN: 0
EYE PAIN: 0
COLOR CHANGE: 0
PHOTOPHOBIA: 0
SORE THROAT: 0
SHORTNESS OF BREATH: 0
FACIAL SWELLING: 0
COUGH: 0
EYE DISCHARGE: 0

## 2021-12-08 RX ORDER — TRAZODONE HYDROCHLORIDE 100 MG/1
100 TABLET ORAL NIGHTLY
Qty: 90 TABLET | Refills: 1 | Status: SHIPPED | OUTPATIENT
Start: 2021-12-08 | End: 2022-06-01

## 2021-12-08 NOTE — TELEPHONE ENCOUNTER
Requested Prescriptions     Pending Prescriptions Disp Refills    traZODone (DESYREL) 100 MG tablet [Pharmacy Med Name: TRAZODONE HYDROCHLORIDE 100MG TABLET] 90 tablet 1     Sig: TAKE 1 TABLET BY MOUTH NIGHTLY       Last Office Visit:  12/3/2021  Next Office Visit:  3/4/2022  Last Medication Refill:  06/09/2021    *RX updated to reflect   12/08/2021  fill date*
right wrist pain

## 2021-12-10 ENCOUNTER — PATIENT MESSAGE (OUTPATIENT)
Dept: INTERNAL MEDICINE | Age: 81
End: 2021-12-10

## 2021-12-10 NOTE — TELEPHONE ENCOUNTER
From: Edmund Infante  To: Dr. Oli Gonzalez: 12/10/2021 1:04 PM CST  Subject: Hospital bed    How would we go about renting mom a hospital bed? Im having a lot of trouble getting her in her bed and keeping her turned. I didnt know if we would have to have a doctors order or not. Thank you! Sorry for bothering yall.

## 2021-12-22 ENCOUNTER — HOSPITAL ENCOUNTER (OUTPATIENT)
Dept: WOUND CARE | Age: 81
Discharge: HOME | End: 2021-12-22
Payer: MEDICARE

## 2021-12-22 VITALS
RESPIRATION RATE: 20 BRPM | BODY MASS INDEX: 34.83 KG/M2 | DIASTOLIC BLOOD PRESSURE: 82 MMHG | SYSTOLIC BLOOD PRESSURE: 125 MMHG | TEMPERATURE: 98.1 F | WEIGHT: 204 LBS | HEIGHT: 64 IN | HEART RATE: 69 BPM

## 2021-12-22 DIAGNOSIS — Z79.4 TYPE 2 DIABETES MELLITUS WITHOUT COMPLICATION, WITH LONG-TERM CURRENT USE OF INSULIN (HCC): Chronic | ICD-10-CM

## 2021-12-22 DIAGNOSIS — E11.621 DIABETIC ULCER OF LEFT HEEL ASSOCIATED WITH TYPE 2 DIABETES MELLITUS, WITH FAT LAYER EXPOSED (HCC): Primary | Chronic | ICD-10-CM

## 2021-12-22 DIAGNOSIS — I73.9 PVD (PERIPHERAL VASCULAR DISEASE) (HCC): ICD-10-CM

## 2021-12-22 DIAGNOSIS — G62.9 NEUROPATHY: ICD-10-CM

## 2021-12-22 DIAGNOSIS — L97.422 DIABETIC ULCER OF LEFT HEEL ASSOCIATED WITH TYPE 2 DIABETES MELLITUS, WITH FAT LAYER EXPOSED (HCC): Primary | Chronic | ICD-10-CM

## 2021-12-22 DIAGNOSIS — E11.9 TYPE 2 DIABETES MELLITUS WITHOUT COMPLICATION, WITH LONG-TERM CURRENT USE OF INSULIN (HCC): Chronic | ICD-10-CM

## 2021-12-22 PROCEDURE — 97597 DBRDMT OPN WND 1ST 20 CM/<: CPT | Performed by: SURGERY

## 2021-12-22 PROCEDURE — 6370000000 HC RX 637 (ALT 250 FOR IP): Performed by: SURGERY

## 2021-12-22 PROCEDURE — 97597 DBRDMT OPN WND 1ST 20 CM/<: CPT

## 2021-12-22 RX ORDER — BETAMETHASONE DIPROPIONATE 0.05 %
OINTMENT (GRAM) TOPICAL ONCE
Status: CANCELLED | OUTPATIENT
Start: 2021-12-22 | End: 2021-12-22

## 2021-12-22 RX ORDER — GINSENG 100 MG
CAPSULE ORAL ONCE
Status: CANCELLED | OUTPATIENT
Start: 2021-12-22 | End: 2021-12-22

## 2021-12-22 RX ORDER — LIDOCAINE HYDROCHLORIDE 40 MG/ML
SOLUTION TOPICAL ONCE
Status: CANCELLED | OUTPATIENT
Start: 2021-12-22 | End: 2021-12-22

## 2021-12-22 RX ORDER — LIDOCAINE HYDROCHLORIDE 20 MG/ML
JELLY TOPICAL ONCE
Status: COMPLETED | OUTPATIENT
Start: 2021-12-22 | End: 2021-12-22

## 2021-12-22 RX ORDER — BACITRACIN, NEOMYCIN, POLYMYXIN B 400; 3.5; 5 [USP'U]/G; MG/G; [USP'U]/G
OINTMENT TOPICAL ONCE
Status: CANCELLED | OUTPATIENT
Start: 2021-12-22 | End: 2021-12-22

## 2021-12-22 RX ORDER — BACITRACIN ZINC AND POLYMYXIN B SULFATE 500; 1000 [USP'U]/G; [USP'U]/G
OINTMENT TOPICAL ONCE
Status: CANCELLED | OUTPATIENT
Start: 2021-12-22 | End: 2021-12-22

## 2021-12-22 RX ORDER — LIDOCAINE HYDROCHLORIDE 20 MG/ML
JELLY TOPICAL ONCE
Status: CANCELLED | OUTPATIENT
Start: 2021-12-22 | End: 2021-12-22

## 2021-12-22 RX ORDER — LIDOCAINE 50 MG/G
OINTMENT TOPICAL ONCE
Status: CANCELLED | OUTPATIENT
Start: 2021-12-22 | End: 2021-12-22

## 2021-12-22 RX ORDER — CLOBETASOL PROPIONATE 0.5 MG/G
OINTMENT TOPICAL ONCE
Status: CANCELLED | OUTPATIENT
Start: 2021-12-22 | End: 2021-12-22

## 2021-12-22 RX ORDER — GENTAMICIN SULFATE 1 MG/G
OINTMENT TOPICAL ONCE
Status: CANCELLED | OUTPATIENT
Start: 2021-12-22 | End: 2021-12-22

## 2021-12-22 RX ORDER — LIDOCAINE 40 MG/G
CREAM TOPICAL ONCE
Status: CANCELLED | OUTPATIENT
Start: 2021-12-22 | End: 2021-12-22

## 2021-12-22 RX ADMIN — LIDOCAINE HYDROCHLORIDE: 20 JELLY TOPICAL at 11:32

## 2021-12-22 ASSESSMENT — PAIN DESCRIPTION - PAIN TYPE: TYPE: ACUTE PAIN

## 2021-12-22 ASSESSMENT — PAIN DESCRIPTION - ONSET: ONSET: ON-GOING

## 2021-12-22 ASSESSMENT — PAIN - FUNCTIONAL ASSESSMENT: PAIN_FUNCTIONAL_ASSESSMENT: PREVENTS OR INTERFERES SOME ACTIVE ACTIVITIES AND ADLS

## 2021-12-22 ASSESSMENT — PAIN DESCRIPTION - FREQUENCY: FREQUENCY: CONTINUOUS

## 2021-12-22 ASSESSMENT — PAIN SCALES - WONG BAKER: WONGBAKER_NUMERICALRESPONSE: 6

## 2021-12-22 ASSESSMENT — PAIN DESCRIPTION - ORIENTATION: ORIENTATION: LOWER

## 2021-12-22 ASSESSMENT — PAIN DESCRIPTION - LOCATION: LOCATION: BACK

## 2021-12-22 ASSESSMENT — PAIN DESCRIPTION - DESCRIPTORS: DESCRIPTORS: PATIENT UNABLE TO DESCRIBE

## 2021-12-22 NOTE — PROGRESS NOTES
[address]   Progress Note and Procedure Note      Richard Dover  MEDICAL RECORD NUMBER:  [id number]  AGE: 80 y.o. GENDER: female  : 1940  EPISODE DATE:  2021    Subjective:     Chief Complaint   Patient presents with    Wound Check         HISTORY of PRESENT ILLNESS HPI     Richard Dover is a 80 y.o. female who presents today for wound/ulcer evaluation. Wound Context: Pt with L heel wound here for erval/treat  Wound/Ulcer Pain Timing/Severity: none  Quality of pain: N/A  Severity:  0 / 10   Modifying Factors: None  Associated Signs/Symptoms: none    Ulcer Identification:  Ulcer Type: pressure  Contributing Factors: chronic pressure and shear force    Wound: pressure        PAST MEDICAL HISTORY        Diagnosis Date    [facility]ANCHAN (acute kidney injury) ([facility] 2018    Arthritis     Atrial fibrillation (HCC)     BiPAP (biphasic positive airway pressure) dependence     15cm/11cm    Cancer (HCC)     COLON CA    CHF [facility]congestive heart failure) [facility] 2018    Chronic atrial fibrillation ([facility] 2017    Depression     Diabetes mellitus ([facility]     Essential hypertension 2017    History of blood transfusion     Hyperlipidemia     Hypertension     Mixed hyperlipidemia 2017    Neuropathy 2021    Obesity     Obstructive sleep apnea     AHI:  18.7;  In REM AHI:  39.3 per PSG, 2008; split-night PSG, 2017 AHI: 35.1    Osteoarthritis     Palliative care patient 2019    Pneumonia due to organism     Restless leg syndrome 2017    Sinus complaint     Swelling        PAST SURGICAL HISTORY    Past Surgical History:   Procedure Laterality Date    ADENOIDECTOMY      APPENDECTOMY      CARDIOVERSION      x 2     CHOLECYSTECTOMY      COLONOSCOPY  2013    Dr. Rina Mcneil  2012    Dr Thersa Bumpers yr recall    COLONOSCOPY  2008    Dr Alleen Hammans, 3 yr recall    COLONOSCOPY N/A 2019 Dr Pimentel Lucan sided anastomosis appeared normal and patent-prn (age)   Merna Ag HEMICOLECTOMY Right 1998    2 FT OF COLON REMOVED DUE TO CA    HYSTERECTOMY      [place] COLONOSCOPY W/BIOPSY SINGLE/MULTIPLE N/A 6/6/2017    Dr Janie Contreras colon anastomosis-Tubular AP (-) dysplasia x 2--3 yr recall    TONSILLECTOMY      TUMOR REMOVAL      stomach    UPPER GASTROINTESTINAL ENDOSCOPY  05/17/2013    Dr. Carol Kilgore ulcer disease-Chelo (+)    UPPER GASTROINTESTINAL ENDOSCOPY  03/28/2012    Dr Raulito Crane antral ulceration with a visible vessel    UPPER GASTROINTESTINAL ENDOSCOPY N/A 8/2/2019    Dr Kim Lockwood Montelongo-Gastritis       FAMILY HISTORY    Family History   Problem Relation Age of Onset    No Known Problems Mother     No Known Problems Father     Colon Cancer Daughter     Colon Polyps Daughter     Esophageal Cancer Neg Hx     Liver Cancer Neg Hx     Rectal Cancer Neg Hx     Liver Disease Neg Hx     Stomach Cancer Neg Hx        SOCIAL HISTORY    Social History     Tobacco Use    Smoking status: Never Smoker    Smokeless tobacco: Never Used   Vaping Use    Vaping Use: Not on file   Substance Use Topics    Alcohol use: No    Drug use: No       ALLERGIES    No Known Allergies    MEDICATIONS    Current Outpatient Medications on File Prior to Encounter   Medication Sig Dispense Refill    traZODone (DESYREL) 100 MG tablet TAKE 1 TABLET BY MOUTH NIGHTLY 90 tablet 1    blood glucose monitor kit and supplies QD E11.9 1 kit 0    ARIPiprazole (ABILIFY) 2 MG tablet TAKE 1 TABLET BY MOUTH EVERY EVENING 30 tablet 3    metoprolol succinate (TOPROL XL) 25 MG extended release tablet Take 1 tablet by mouth nightly 90 tablet 3    donepezil (ARICEPT) 10 MG tablet Take 1 tablet by mouth nightly (Patient taking differently: Take 10 mg by mouth 2 times daily ) 30 tablet 2    memantine (NAMENDA) 10 MG tablet Take 1 tablet by mouth 2 times daily 60 tablet 5    oxybutynin (DITROPAN XL) 10 MG extended release tablet Take 1 tablet by mouth daily 30 tablet 3    ferrous sulfate (IRON 325) 325 (65 Fe) MG tablet Take 1 tablet by mouth daily (with breakfast) 90 tablet 1    colesevelam (WELCHOL) 625 MG tablet Take 1 tablet by mouth 2 times daily (with meals) 60 tablet 5    SITagliptin (JANUVIA) 100 MG tablet Take 1 tablet by mouth daily 30 tablet 5    pantoprazole (PROTONIX) 20 MG tablet Take 20 mg by mouth daily       furosemide (LASIX) 40 MG tablet Take 1 tablet by mouth for 3 to 5 days if patient develops lower leg swelling or fluid weight gain (Patient taking differently: Take 40 mg by mouth daily Daily and extra PRN) 30 tablet 0    nystatin (MYCOSTATIN) 892174 UNIT/GM powder Apply topically 4 times daily Apply topically 4 times daily.  rivaroxaban (XARELTO) 15 MG TABS tablet Take 1 tablet by mouth daily (with breakfast) (Patient taking differently: Take 15 mg by mouth nightly ) 90 tablet 3    atorvastatin (LIPITOR) 40 MG tablet Take 40 mg by mouth nightly       sertraline (ZOLOFT) 100 MG tablet Take 100 mg by mouth daily       Multiple Vitamins-Minerals (THERAPEUTIC MULTIVITAMIN-MINERALS) tablet Take 1 tablet by mouth daily 30 tablet 0     No current facility-administered medications on file prior to encounter. REVIEW OF SYSTEMS    A comprehensive review of systems was negative.     Objective:      /82   Pulse 69   Temp 98.1 °F (36.7 °C) (Temporal)   Resp 20   Ht 5' 4\" (1.626 m) Comment: 5'4\"  Wt 204 lb (92.5 kg)   BMI 35.02 kg/m²     Wt Readings from Last 3 Encounters:   12/22/21 204 lb (92.5 kg)   12/03/21 226 lb (102.5 kg)   12/03/21 204 lb (92.5 kg)       PHYSICAL EXAM    General Appearance: alert and oriented to person, place and time, well developed and well- nourished, in no acute distress  Skin: warm and dry, no rash or erythema  Head: normocephalic and atraumatic  Eyes: pupils equal, round, and reactive to light, extraocular eye movements intact, conjunctivae normal  ENT: tympanic membrane, external ear and ear canal normal bilaterally, nose without deformity, nasal mucosa and turbinates normal without polyps, lips teeth and gums normal  Neck: supple and non-tender without mass, no thyromegaly or thyroid nodules, no cervical lymphadenopathy  Pulmonary/Chest: clear to auscultation bilaterally- no wheezes, rales or rhonchi, normal air movement, no respiratory distress  Cardiovascular: normal rate, regular rhythm, normal S1 and S2, no murmurs, rubs, clicks, or gallops, distal pulses intact, no carotid bruits  Abdomen: soft, non-tender, non-distended, normal bowel sounds, no masses or organomegaly  Extremities: no cyanosis, clubbing or edema  Musculoskeletal: normal range of motion, no joint swelling, deformity or tenderness  Neurologic: reflexes normal and symmetric, no cranial nerve deficit, gait, coordination and speech normal, skin sensation normal      Assessment:      Problem List Items Addressed This Visit     Type 2 diabetes mellitus without complication, with long-term current use of insulin (HCC) (Chronic)    * (Principal) Diabetic ulcer of left heel associated with type 2 diabetes mellitus, with fat layer exposed (HCC) - Primary (Chronic)    Relevant Orders    Initiate Outpatient Wound Care Protocol    Neuropathy    Relevant Orders    Initiate Outpatient Wound Care Protocol    PVD (peripheral vascular disease) ([facility]    Relevant Orders    Initiate Outpatient Wound Care Protocol           Procedure Note  Indications:  Based on my examination of this patient's wound(s)/ulcer(s) today, debridement is required to promote healing and evaluate the wound base. Performed by: Kirstie Hines MD    Consent obtained:  Yes    Time out taken:  Yes    Pain Control: Anesthetic  Anesthetic: 2% Lidocaine Gel Topical       Debridement:Non-excisional Debridement    Using curette the wound(s)/ulcer(s) was/were sharply debrided down through and including the removal of epidermis and dermis.         Devitalized Tissue Debrided:  fibrin, biofilm, slough, necrotic/eschar and exudate      Pre Debridement Measurements:  Are located in the Wound/Ulcer Documentation Flow Sheet    Wound/Ulcer #: 1    Percent of Wound(s)/Ulcer(s) Debrided: 100%    Total Surface Area Debrided:  2.64 sq cm       Diabetic/Pressure/Non Pressure Ulcers only:  Ulcer: Pressure ulcer, Stage 3             Post Debridement Measurements:    Wound/Ulcer Descriptions are Pre Debridement --EXCEPT MEASUREMENTS    Wound 11/19/21 Heel Left; Medial wound 1- left heel wag 1 (Active)   Wound Image   12/22/21 1133   Wound Etiology Diabetic Krishnamurthy 1 12/22/21 1133   Dressing Status Old drainage noted 12/22/21 1133   Wound Cleansed Soap and water 12/22/21 1133   Dressing/Treatment Hydrocolloid 12/03/21 1043   Offloading for Diabetic Foot Ulcers Other (comment) 12/22/21 1133   Wound Length (cm) 1.2 cm 12/22/21 1133   Wound Width (cm) 2.2 cm 12/22/21 1133   Wound Depth (cm) 0.3 cm 12/22/21 1133   Wound Surface Area (cm^2) 2.64 cm^2 12/22/21 1133   Change in Wound Size % (l*w) 37.14 12/22/21 1133   Wound Volume (cm^3) 0.792 cm^3 12/22/21 1133   Wound Healing % -89 12/22/21 1133   Post-Procedure Length (cm) 1.2 cm 12/22/21 1152   Post-Procedure Width (cm) 2.2 cm 12/22/21 1152   Post-Procedure Depth (cm) 0.3 cm 12/22/21 1152   Post-Procedure Surface Area (cm^2) 2.64 cm^2 12/22/21 1152   Post-Procedure Volume (cm^3) 0.792 cm^3 12/22/21 1152   Wound Assessment Pink/red 12/22/21 1133   Drainage Amount Small 12/22/21 1133   Drainage Description Serous; Yellow 12/22/21 1133   Odor None 12/22/21 1133   Marisabel-wound Assessment Hyperkeratosis (callous); Maceration 12/22/21 1133   Margins Defined edges 12/22/21 1133   Wound Thickness Description not for Pressure Injury Full thickness 12/22/21 1133   Number of days: 33             Estimated Blood Loss:  Minimal    Hemostasis Achieved:  by pressure    Procedural Pain:  0  / 10     Post Procedural Pain:  0 / 10     Response to treatment: Well tolerated by patient. Plan:     Problem List Items Addressed This Visit     Type 2 diabetes mellitus without complication, with long-term current use of insulin (HCC) (Chronic)    * (Principal) Diabetic ulcer of left heel associated with type 2 diabetes mellitus, with fat layer exposed ([facility] - Primary (Chronic)    Relevant Orders    Initiate Outpatient Wound Care Protocol    Neuropathy    Relevant Orders    Initiate Outpatient Wound Care Protocol    PVD (peripheral vascular disease) ([facility]    Relevant Orders    Initiate Outpatient Wound Care Protocol          Cont santyl and RTO 2 weeks    Treatment Note please see attached Discharge Instructions    In my professional opinion this patient would benefit from HBO Therapy: No    Written patient dismissal instructions given to patient and signed by patient or POA. Discharge [address] and Hyperbaric Oxygen Therapy   Physician Orders and Discharge Instructions  [address]  Telephone: [phone]    NAME:  Oniel Montoya OF BIRTH:  1940  MEDICAL RECORD NUMBER:  [id number]  DATE:  12/22/2021    Discharge condition: Stable    Discharge to: Home    Left via:Private automobile     Accompanied by: child    ECF/HHA: [facility]    Dressing Orders: Left heel wound:   Soap and water wash;   Santyl to wound bed nickel thick, saline moistened gauze loosely over the  wound bed, secure with dry gauze, and rolled gauze twice daily. Treatment Orders:  Protein rich diet (unless restricted by your physician); Multivitamin daily; Elevate legs above the level of your heart when  sitting 3-4 times daily for at least one hour each time, avoid standing for long periods of time. [address] [place] [address],[address] follow up visit ____________2 weeks_________________  (Please note your next appointment above and if you are unable to keep, kindly give a 24 hour notice.  Thank you.)    If you experience any of the following, [facility] during business hours:    * Increase in Pain  * Temperature over 101  * Increase in drainage from your wound  * Drainage with a foul odor  * Bleeding  * Increase in swelling  * Need for compression bandage changes due to slippage, breakthrough drainage. If you need medical attention outside of the business hours of the [address] [place] [address] please contact your PCP or go to the nearest emergency room.         Electronically signed by Michelle Lawson MD on 12/22/2021 at 11:58 AM

## 2021-12-30 RX ORDER — ARIPIPRAZOLE 2 MG/1
2 TABLET ORAL EVERY EVENING
Qty: 30 TABLET | Refills: 3 | OUTPATIENT
Start: 2021-12-30

## 2021-12-30 NOTE — TELEPHONE ENCOUNTER
Jose Moss has requested a refill on her medication. Last office visit : 12/3/2021   Next office visit : 3/4/2022   Last medication refill :Just e-scribed to [facility] on 11/29/2021 with 3 additional refills.          Requested Prescriptions     Pending Prescriptions Disp Refills    ARIPiprazole (ABILIFY) 2 MG tablet 30 tablet 3     Sig: Take 1 tablet by mouth every evening         ARIPiprazole (ABILIFY) 2 MG tablet    Date: 11/29/2021 Department: [facility] Ordering/Authorizing: Matti Fontenot MD       Outpatient Medication Detail     Disp Refills Start End    ARIPiprazole (ABILIFY) 2 MG tablet 30 tablet 3 11/29/2021     Sig - Route: TAKE 1 TABLET BY MOUTH EVERY EVENING - Oral    Sent to pharmacy as: ARIPiprazole 2 MG Oral Tablet (ABILIFY)    E-Prescribing Status: Receipt confirmed by pharmacy (11/29/2021  2:23 PM CST)

## 2022-01-06 ENCOUNTER — TELEPHONE (OUTPATIENT)
Dept: WOUND CARE | Age: 82
End: 2022-01-06

## 2022-01-10 RX ORDER — OXYBUTYNIN CHLORIDE 10 MG/1
10 TABLET, EXTENDED RELEASE ORAL DAILY
Qty: 30 TABLET | Refills: 3 | Status: ON HOLD | OUTPATIENT
Start: 2022-01-10 | End: 2022-05-13

## 2022-01-14 ENCOUNTER — HOSPITAL ENCOUNTER (OUTPATIENT)
Dept: WOUND CARE | Age: 82
Discharge: HOME | End: 2022-01-14
Payer: MEDICARE

## 2022-01-14 VITALS
SYSTOLIC BLOOD PRESSURE: 146 MMHG | HEART RATE: 76 BPM | DIASTOLIC BLOOD PRESSURE: 79 MMHG | HEIGHT: 64 IN | BODY MASS INDEX: 34.83 KG/M2 | TEMPERATURE: 95.5 F | WEIGHT: 204 LBS | RESPIRATION RATE: 20 BRPM

## 2022-01-14 DIAGNOSIS — I73.9 PVD (PERIPHERAL VASCULAR DISEASE) (HCC): ICD-10-CM

## 2022-01-14 DIAGNOSIS — E11.621 DIABETIC ULCER OF LEFT HEEL ASSOCIATED WITH TYPE 2 DIABETES MELLITUS, WITH FAT LAYER EXPOSED (HCC): ICD-10-CM

## 2022-01-14 DIAGNOSIS — Z79.4 TYPE 2 DIABETES MELLITUS WITHOUT COMPLICATION, WITH LONG-TERM CURRENT USE OF INSULIN (HCC): Primary | Chronic | ICD-10-CM

## 2022-01-14 DIAGNOSIS — L97.422 DIABETIC ULCER OF LEFT HEEL ASSOCIATED WITH TYPE 2 DIABETES MELLITUS, WITH FAT LAYER EXPOSED (HCC): ICD-10-CM

## 2022-01-14 DIAGNOSIS — G62.9 NEUROPATHY: ICD-10-CM

## 2022-01-14 DIAGNOSIS — E11.9 TYPE 2 DIABETES MELLITUS WITHOUT COMPLICATION, WITH LONG-TERM CURRENT USE OF INSULIN (HCC): Primary | Chronic | ICD-10-CM

## 2022-01-14 PROCEDURE — 97597 DBRDMT OPN WND 1ST 20 CM/<: CPT

## 2022-01-14 PROCEDURE — 97597 DBRDMT OPN WND 1ST 20 CM/<: CPT | Performed by: SURGERY

## 2022-01-14 ASSESSMENT — PAIN DESCRIPTION - LOCATION: LOCATION: LEG;BACK

## 2022-01-14 ASSESSMENT — PAIN DESCRIPTION - ONSET: ONSET: ON-GOING

## 2022-01-14 ASSESSMENT — PAIN DESCRIPTION - PAIN TYPE: TYPE: ACUTE PAIN

## 2022-01-14 ASSESSMENT — PAIN DESCRIPTION - FREQUENCY: FREQUENCY: CONTINUOUS

## 2022-01-14 ASSESSMENT — PAIN SCALES - GENERAL: PAINLEVEL_OUTOF10: 2

## 2022-01-14 ASSESSMENT — PAIN DESCRIPTION - DESCRIPTORS: DESCRIPTORS: PATIENT UNABLE TO DESCRIBE

## 2022-01-14 ASSESSMENT — PAIN DESCRIPTION - ORIENTATION: ORIENTATION: LOWER;RIGHT;LEFT

## 2022-01-14 NOTE — PROGRESS NOTES
[address]   Progress Note and Procedure Note      Ayaka Boswell  MEDICAL RECORD NUMBER:  [id number]  AGE: 80 y.o. GENDER: female  : 1940  EPISODE DATE:  2022    Subjective:     Chief Complaint   Patient presents with    Wound Check         HISTORY of PRESENT ILLNESS HPI     Ayaka Boswell is a 80 y.o. female who presents today for wound/ulcer evaluation. Wound Context: Pt with L heel wound here for eval/treat  Wound/Ulcer Pain Timing/Severity: none  Quality of pain: N/A  Severity:  0 / 10   Modifying Factors: None  Associated Signs/Symptoms: none    Ulcer Identification:  Ulcer Type: diabetic  Contributing Factors: diabetes, chronic pressure and shear force    Wound: DM        PAST MEDICAL HISTORY        Diagnosis Date    KANCHAN (acute kidney injury) ([facility] 2018    Arthritis     Atrial fibrillation (HCC)     BiPAP (biphasic positive airway pressure) dependence     15cm/11cm    Cancer (HCC)     COLON CA    [facility]congest[facility] failure) [facility] 2018    Chronic atrial fibrillation ([facility] 2017    Depression     Diabetes mellitus ([facility]     Essential hypertension 2017    History of blood transfusion     Hyperlipidemia     Hypertension     Mixed hyperlipidemia 2017    Neuropathy 2021    Obesity     Obstructive sleep apnea     AHI:  18.7;  In REM AHI:  39.3 per PSG, 2008; split-night PSG, 2017 AHI: 35.1    Osteoarthritis     Palliative care patient 2019    Pneumonia due to organism     Restless leg syndrome 2017    Sinus complaint     Swelling        PAST SURGICAL HISTORY    Past Surgical History:   Procedure Laterality Date    ADENOIDECTOMY      APPENDECTOMY      CARDIOVERSION      x 2     CHOLECYSTECTOMY      COLONOSCOPY  2013    Dr. Angie Epstein  2012    Dr Saenz Milder yr recall    COLONOSCOPY  2008    Dr Evy Soriano, 3 yr recall    COLONOSCOPY N/A 2019 Dr Thais Hernandez sided anastomosis appeared normal and patent-prn (age)   Geetha Outhouse HEMICOLECTOMY Right 1998    2 FT OF COLON REMOVED DUE TO CA    HYSTERECTOMY      [place] COLONOSCOPY W/BIOPSY SINGLE/MULTIPLE N/A 6/6/2017    Dr David Farias colon anastomosis-Tubular AP (-) dysplasia x 2--3 yr recall    TONSILLECTOMY      TUMOR REMOVAL      stomach    UPPER GASTROINTESTINAL ENDOSCOPY  05/17/2013    Dr. Ema Duffy ulcer disease-Chelo (+)    UPPER GASTROINTESTINAL ENDOSCOPY  03/28/2012    Dr Eboni Li antral ulceration with a visible vessel    UPPER GASTROINTESTINAL ENDOSCOPY N/A 8/2/2019    Dr Lola Montelongo-Gastritis       FAMILY HISTORY    Family History   Problem Relation Age of Onset    No Known Problems Mother     No Known Problems Father     Colon Cancer Daughter     Colon Polyps Daughter     Esophageal Cancer Neg Hx     Liver Cancer Neg Hx     Rectal Cancer Neg Hx     Liver Disease Neg Hx     Stomach Cancer Neg Hx        SOCIAL HISTORY    Social History     Tobacco Use    Smoking status: Never Smoker    Smokeless tobacco: Never Used   Vaping Use    Vaping Use: Not on file   Substance Use Topics    Alcohol use: No    Drug use: No       ALLERGIES    No Known Allergies    MEDICATIONS    Current Outpatient Medications on File Prior to Encounter   Medication Sig Dispense Refill    oxybutynin (DITROPAN-XL) 10 MG extended release tablet TAKE 1 TABLET BY MOUTH DAILY 30 tablet 3    rivaroxaban (XARELTO) 15 MG TABS tablet Take 1 tablet by mouth daily (with breakfast) 90 tablet 1    traZODone (DESYREL) 100 MG tablet TAKE 1 TABLET BY MOUTH NIGHTLY 90 tablet 1    blood glucose monitor kit and supplies QD E11.9 1 kit 0    ARIPiprazole (ABILIFY) 2 MG tablet TAKE 1 TABLET BY MOUTH EVERY EVENING 30 tablet 3    metoprolol succinate (TOPROL XL) 25 MG extended release tablet Take 1 tablet by mouth nightly 90 tablet 3    donepezil (ARICEPT) 10 MG tablet Take 1 tablet by mouth nightly (Patient taking differently: mucosa and turbinates normal without polyps, lips teeth and gums normal  Neck: supple and non-tender without mass, no thyromegaly or thyroid nodules, no cervical lymphadenopathy  Pulmonary/Chest: clear to auscultation bilaterally- no wheezes, rales or rhonchi, normal air movement, no respiratory distress  Cardiovascular: normal rate, regular rhythm, normal S1 and S2, no murmurs, rubs, clicks, or gallops, distal pulses intact, no carotid bruits  Abdomen: soft, non-tender, non-distended, normal bowel sounds, no masses or organomegaly  Extremities: no cyanosis, clubbing or edema  Musculoskeletal: normal range of motion, no joint swelling, deformity or tenderness  Neurologic: reflexes normal and symmetric, no cranial nerve deficit, gait, coordination and speech normal, skin sensation normal      Assessment:      Problem List Items Addressed This Visit     Type 2 diabetes mellitus without complication, with long-term current use of insulin ([facility]) - Primary (Chronic)    * (Principal) Diabetic ulcer of left heel associated with type 2 diabetes mellitus, with fat layer exposed ([facility]) (Chronic)    Neuropathy    PVD (peripheral vascular disease) ([facility]           Procedure Note  Indications:  Based on my examination of this patient's wound(s)/ulcer(s) today, debridement is required to promote healing and evaluate the wound base. Performed by: Moreno Reaves MD    Consent obtained:  Yes    Time out taken:  Yes    Pain Control: Anesthetic  Anesthetic: 2% Lidocaine Gel Topical       Debridement:Non-excisional Debridement    Using curette the wound(s)/ulcer(s) was/were sharply debrided down through and including the removal of epidermis and dermis.         Devitalized Tissue Debrided:  fibrin, biofilm, slough, necrotic/eschar, exudate and callus      Pre Debridement Measurements:  Are located in the Wound/Ulcer Documentation Flow Sheet    Wound/Ulcer #: 1    Percent of Wound(s)/Ulcer(s) Debrided: 100%    Total Surface Area Pain:  0  / 10     Post Procedural Pain:  0 / 10     Response to treatment:  Well tolerated by patient. Plan:     Problem List Items Addressed This Visit     Type 2 diabetes mellitus without complication, with long-term current use of insulin ([address] - Primary (Chronic)    * (Principal) Diabetic ulcer of left heel associated with type 2 diabetes mellitus, with fat layer exposed ([address] (Chronic)    Neuropathy    PVD (peripheral vascular disease) ([address]          Cont current care RTO 1-2 weeks    Treatment Note please see attached Discharge Instructions    In my professional opinion this patient would benefit from HBO Therapy: No    Written patient dismissal instructions given to patient and signed by patient or POA. Discharge [address] and Hyperbaric Oxygen Therapy   Physician Orders and Discharge Instructions  [address]  Telephone: [phone]    NAME:  Miquel Castellanos:  1940  MEDICAL RECORD NUMBER:  [id number]  DATE:  1/14/2022    Discharge condition: Stable    Discharge to: Home    Left via:Private automobile    Accompanied by: Daughter    ECF/HHA: [facility]     Dressing Orders: Left heel wound:   Soap and water wash;    Santyl to wound bed nickel thick, saline moistened gauze loosely over the  wound bed, secure with dry gauze, and rolled gauze twice daily.     Treatment Orders:  Protein rich diet (unless restricted by your physician); Multivitamin daily; Elevate legs above the level of your heart when  sitting 3-4 times daily for at least one hour each time, avoid standing for long periods of time. [address] [place] [address],[address] follow up visit ____________2 weeks_________________  (Please note your next appointment above and if you are unable to keep, kindly give a 24 hour notice.  Thank you.)    If you experience any of the following, please call the [address] [place] [address] during business hours:    * Increase in Pain  * Temperature over 101  * Increase in drainage from your wound  * Drainage with a foul odor  * Bleeding  * Increase in swelling  * Need for compression bandage changes due to slippage, breakthrough drainage. If you need medical attention outside of the business hours of the [address] [place] [address] Road please contact your PCP or go to the nearest emergency room.         Electronically signed by Catherine Casanova MD on 1/14/2022 at 11:36 AM

## 2022-01-14 NOTE — PROGRESS NOTES
[address] [facility]. [address] Marky Tyson [address] [phone]     Ordering Center:     [address] [place] [address],[address]  [address] [facility], [facility] [address] [address] [phone]  WOUND CARE Dept: [address] [facility] [phone]    Patient Information:      Julian Yaya  [address]   [phone]   : 1940  AGE: 80 y.o. GENDER: female   EPISODE DATE: 2022    Insurance:      PRIMARY INSURANCE:  Plan: MEDICARE PART A AND B  Coverage: MEDICARE  Effective Date: 2018  Group Number: [unfilled]  Subscriber Number: [id number] - (Medicare)    Payor/Plan Subscr  Sex Relation Sub. Ins. ID Effective Group Num   1. MEDICARE - ME* PRASANNA ALFORDKRYSTIN 1940 Female Self [id number]                                    [address]    2. [place]ICAID [place] -* [id number] [facility] 30 1940 Female Self [id number]                                    [address]        Patient Wound Information:      Problem List Items Addressed This Visit     Type 2 diabetes mellitus without complication, with long-term current use of insulin ([address] - Primary (Chronic)    * (Principal) Diabetic ulcer of left heel associated with type 2 diabetes mellitus, with fat layer exposed ([address] (Chronic)    Neuropathy    PVD (peripheral vascular disease) ([address]          WOUNDS REQUIRING DRESSING SUPPLIES:     Wound 21 Heel Left; Medial wound 1- left heel wag 1 (Active)   Wound Image   22 1116   Wound Etiology Diabetic Krishnamurthy 1 22 1116   Dressing Status Old drainage noted 22 1116   Wound Cleansed Cleansed with saline 22 1116   Dressing/Treatment Gauze dressing/dressing sponge;Moist to moist;Silicone border; Other (comment) 21 1200   Offloading for Diabetic Foot Ulcers Yes (type); Other (comment) 22 1116   Wound Length (cm) 0.8 cm 22 1116   Wound Width (cm) 1.8 cm 22 1116 Wound Depth (cm) 0.3 cm 01/14/22 1116   Wound Surface Area (cm^2) 1.44 cm^2 01/14/22 1116   Change in Wound Size % (l*w) 65.71 01/14/22 1116   Wound Volume (cm^3) 0.432 cm^3 01/14/22 1116   Wound Healing % -3 01/14/22 1116   Post-Procedure Length (cm) 0.8 cm 01/14/22 1136   Post-Procedure Width (cm) 1.8 cm 01/14/22 1136   Post-Procedure Depth (cm) 0.3 cm 01/14/22 1136   Post-Procedure Surface Area (cm^2) 1.44 cm^2 01/14/22 1136   Post-Procedure Volume (cm^3) 0.432 cm^3 01/14/22 1136   Distance Tunneling (cm) 0 cm 01/14/22 1116   Tunneling Position ___ O'Clock 0 01/14/22 1116   Undermining Starts ___ O'Clock 0 01/14/22 1116   Undermining Ends___ O'Clock 0 01/14/22 1116   Undermining Maxium Distance (cm) 0 01/14/22 1116   Wound Assessment Pink/red;Slough 01/14/22 1116   Drainage Amount Moderate 01/14/22 1116   Drainage Description Serous; Yellow 01/14/22 1116   Odor None 01/14/22 1116   Marisabel-wound Assessment Hyperkeratosis (callous) 01/14/22 1116   Margins Defined edges 01/14/22 1116   Wound Thickness Description not for Pressure Injury Full thickness 01/14/22 1116   Number of days: 56          Supplies Requested :      WOUND #: 1   PRIMARY DRESSING:  Kerrmax Bordered        FREQUENCY OF DRESSING CHANGES:  Daily                          ADDITIONAL ITEMS:  [] Gloves Small  [] Gloves Medium [] Gloves Large [] Gloves Usha Daughters  [] Tape 1\" [] Tape 2\" [] Tape 3\"  [] Medipore Tape  [] Saline  [] Skin Prep   [] Adhesive Remover   [] Cotton Tip Applicators   [] Other:    Patient Wound(s) Debrided: [x] Yes if yes please add date 1/14   [] No    Debribement Type: Excisional/Sharp    Is the patient currently on an antibiotic for their Wound(s): [] Yes if yes please add name and dose    [x] No    Patient currently being seen by Home Health: [] Yes   [x] No    Duration for needed supplies:  []15  [x]30  []60  []90 Days    Electronically signed by Lexii Herrera RN on 1/14/2022 at 11:43 AM     Provider Information:      Benjamin Raya NAME: Dr Brandon Petit: [phone]

## 2022-02-11 ENCOUNTER — HOSPITAL ENCOUNTER (OUTPATIENT)
Dept: WOUND CARE | Age: 82
Discharge: HOME | End: 2022-02-11
Payer: MEDICARE

## 2022-02-11 VITALS
HEIGHT: 64 IN | SYSTOLIC BLOOD PRESSURE: 176 MMHG | BODY MASS INDEX: 34.83 KG/M2 | DIASTOLIC BLOOD PRESSURE: 110 MMHG | RESPIRATION RATE: 20 BRPM | WEIGHT: 204 LBS | HEART RATE: 106 BPM | TEMPERATURE: 97.7 F

## 2022-02-11 DIAGNOSIS — G62.9 NEUROPATHY: ICD-10-CM

## 2022-02-11 DIAGNOSIS — E11.9 TYPE 2 DIABETES MELLITUS WITHOUT COMPLICATION, WITH LONG-TERM CURRENT USE OF INSULIN (HCC): Chronic | ICD-10-CM

## 2022-02-11 DIAGNOSIS — E11.621 DIABETIC ULCER OF LEFT HEEL ASSOCIATED WITH TYPE 2 DIABETES MELLITUS, WITH FAT LAYER EXPOSED (HCC): Primary | Chronic | ICD-10-CM

## 2022-02-11 DIAGNOSIS — Z79.4 TYPE 2 DIABETES MELLITUS WITHOUT COMPLICATION, WITH LONG-TERM CURRENT USE OF INSULIN (HCC): Chronic | ICD-10-CM

## 2022-02-11 DIAGNOSIS — I73.9 PVD (PERIPHERAL VASCULAR DISEASE) (HCC): ICD-10-CM

## 2022-02-11 DIAGNOSIS — L97.422 DIABETIC ULCER OF LEFT HEEL ASSOCIATED WITH TYPE 2 DIABETES MELLITUS, WITH FAT LAYER EXPOSED (HCC): Primary | Chronic | ICD-10-CM

## 2022-02-11 PROCEDURE — 97597 DBRDMT OPN WND 1ST 20 CM/<: CPT

## 2022-02-11 PROCEDURE — 6370000000 HC RX 637 (ALT 250 FOR IP): Performed by: SURGERY

## 2022-02-11 PROCEDURE — 97597 DBRDMT OPN WND 1ST 20 CM/<: CPT | Performed by: SURGERY

## 2022-02-11 RX ORDER — LIDOCAINE HYDROCHLORIDE 20 MG/ML
JELLY TOPICAL ONCE
Status: COMPLETED | OUTPATIENT
Start: 2022-02-11 | End: 2022-02-11

## 2022-02-11 RX ORDER — BACITRACIN ZINC AND POLYMYXIN B SULFATE 500; 1000 [USP'U]/G; [USP'U]/G
OINTMENT TOPICAL ONCE
Status: CANCELLED | OUTPATIENT
Start: 2022-02-11 | End: 2022-02-11

## 2022-02-11 RX ORDER — LIDOCAINE 50 MG/G
OINTMENT TOPICAL ONCE
Status: CANCELLED | OUTPATIENT
Start: 2022-02-11 | End: 2022-02-11

## 2022-02-11 RX ORDER — BETAMETHASONE DIPROPIONATE 0.05 %
OINTMENT (GRAM) TOPICAL ONCE
Status: CANCELLED | OUTPATIENT
Start: 2022-02-11 | End: 2022-02-11

## 2022-02-11 RX ORDER — CLOBETASOL PROPIONATE 0.5 MG/G
OINTMENT TOPICAL ONCE
Status: CANCELLED | OUTPATIENT
Start: 2022-02-11 | End: 2022-02-11

## 2022-02-11 RX ORDER — LIDOCAINE HYDROCHLORIDE 20 MG/ML
JELLY TOPICAL ONCE
Status: CANCELLED | OUTPATIENT
Start: 2022-02-11 | End: 2022-02-11

## 2022-02-11 RX ORDER — BACITRACIN, NEOMYCIN, POLYMYXIN B 400; 3.5; 5 [USP'U]/G; MG/G; [USP'U]/G
OINTMENT TOPICAL ONCE
Status: CANCELLED | OUTPATIENT
Start: 2022-02-11 | End: 2022-02-11

## 2022-02-11 RX ORDER — LIDOCAINE HYDROCHLORIDE 40 MG/ML
SOLUTION TOPICAL ONCE
Status: CANCELLED | OUTPATIENT
Start: 2022-02-11 | End: 2022-02-11

## 2022-02-11 RX ORDER — LIDOCAINE 40 MG/G
CREAM TOPICAL ONCE
Status: CANCELLED | OUTPATIENT
Start: 2022-02-11 | End: 2022-02-11

## 2022-02-11 RX ORDER — GINSENG 100 MG
CAPSULE ORAL ONCE
Status: CANCELLED | OUTPATIENT
Start: 2022-02-11 | End: 2022-02-11

## 2022-02-11 RX ORDER — GENTAMICIN SULFATE 1 MG/G
OINTMENT TOPICAL ONCE
Status: CANCELLED | OUTPATIENT
Start: 2022-02-11 | End: 2022-02-11

## 2022-02-11 RX ADMIN — LIDOCAINE HYDROCHLORIDE: 20 JELLY TOPICAL at 11:53

## 2022-02-11 NOTE — PROGRESS NOTES
[address]   Progress Note and Procedure Note      Toma Mitchell  MEDICAL RECORD NUMBER:  [id number]  AGE: 80 y.o. GENDER: female  : 1940  EPISODE DATE:  2022    Subjective:     Chief Complaint   Patient presents with    Wound Check         HISTORY of PRESENT ILLNESS HPI     Toma Mitchell is a 80 y.o. female who presents today for wound/ulcer evaluation. Wound Context: Pt with L heel wound here for eval/treat  Wound/Ulcer Pain Timing/Severity: none  Quality of pain: N/A  Severity:  0 / 10   Modifying Factors: None  Associated Signs/Symptoms: none    Ulcer Identification:  Ulcer Type: pressure  Contributing Factors: chronic pressure and shear force    Wound: pressure        PAST MEDICAL HISTORY        Diagnosis Date    [facility] 2018    Arthritis     Atrial fibrillation (HCC)     BiPAP (biphasic positive airway pressure) dependence     15cm/11cm    Cancer (HCC)     COLON [place]    CH[facility]congestive heart failure) [facility] 2018    Chronic atrial fibrillation ([facility] 2017    Depression     Diabetes mellitus ([facility]     Essential hypertension 2017    History of blood transfusion     Hyperlipidemia     Hypertension     Mixed hyperlipidemia 2017    Neuropathy 2021    Obesity     Obstructive sleep apnea     AHI:  18.7;  In REM AHI:  39.3 per PSG, 2008; split-night PSG, 2017 AHI: 35.1    Osteoarthritis     Palliative care patient 2019    Pneumonia due to organism     Restless leg syndrome 2017    Sinus complaint     Swelling        PAST SURGICAL HISTORY    Past Surgical History:   Procedure Laterality Date    ADENOIDECTOMY      APPENDECTOMY      CARDIOVERSION      x 2     CHOLECYSTECTOMY      COLONOSCOPY  2013    Dr. Janet Paniagua COLONOSCOPY  2012    Dr Daysi Cruz yr recall    COLONOSCOPY  2008    Dr Rusty Estrella, 3 yr recall    COLONOSCOPY N/A 2019     10 mg by mouth 2 times daily ) 30 tablet 2    memantine (NAMENDA) 10 MG tablet Take 1 tablet by mouth 2 times daily 60 tablet 5    ferrous sulfate (IRON 325) 325 (65 Fe) MG tablet Take 1 tablet by mouth daily (with breakfast) 90 tablet 1    colesevelam (WELCHOL) 625 MG tablet Take 1 tablet by mouth 2 times daily (with meals) 60 tablet 5    SITagliptin (JANUVIA) 100 MG tablet Take 1 tablet by mouth daily 30 tablet 5    pantoprazole (PROTONIX) 20 MG tablet Take 20 mg by mouth daily       furosemide (LASIX) 40 MG tablet Take 1 tablet by mouth for 3 to 5 days if patient develops lower leg swelling or fluid weight gain (Patient taking differently: Take 40 mg by mouth daily Daily and extra PRN) 30 tablet 0    nystatin (MYCOSTATIN) 943037 UNIT/GM powder Apply topically 4 times daily Apply topically 4 times daily.  Multiple Vitamins-Minerals (THERAPEUTIC MULTIVITAMIN-MINERALS) tablet Take 1 tablet by mouth daily 30 tablet 0    atorvastatin (LIPITOR) 40 MG tablet Take 40 mg by mouth nightly       sertraline (ZOLOFT) 100 MG tablet Take 100 mg by mouth daily        No current facility-administered medications on file prior to encounter. REVIEW OF SYSTEMS    A comprehensive review of systems was negative.     Objective:      BP (!) 176/110   Pulse 106   Temp 97.7 °F (36.5 °C) (Temporal)   Resp 20   Ht 5' 4\" (1.626 m)   Wt 204 lb (92.5 kg)   BMI 35.02 kg/m²     Wt Readings from Last 3 Encounters:   02/11/22 204 lb (92.5 kg)   01/14/22 204 lb (92.5 kg)   12/22/21 204 lb (92.5 kg)       PHYSICAL EXAM    General Appearance: alert and oriented to person, place and time, well developed and well- nourished, in no acute distress  Skin: warm and dry, no rash or erythema  Head: normocephalic and atraumatic  Eyes: pupils equal, round, and reactive to light, extraocular eye movements intact, conjunctivae normal  ENT: tympanic membrane, external ear and ear canal normal bilaterally, nose without deformity, nasal mucosa and turbinates normal without polyps, lips teeth and gums normal  Neck: supple and non-tender without mass, no thyromegaly or thyroid nodules, no cervical lymphadenopathy  Pulmonary/Chest: clear to auscultation bilaterally- no wheezes, rales or rhonchi, normal air movement, no respiratory distress  Cardiovascular: normal rate, regular rhythm, normal S1 and S2, no murmurs, rubs, clicks, or gallops, distal pulses intact, no carotid bruits  Abdomen: soft, non-tender, non-distended, normal bowel sounds, no masses or organomegaly  Extremities: no cyanosis, clubbing or edema  Musculoskeletal: normal range of motion, no joint swelling, deformity or tenderness  Neurologic: reflexes normal and symmetric, no cranial nerve deficit, gait, coordination and speech normal, skin sensation normal      Assessment:      Problem List Items Addressed This Visit     Type 2 diabetes mellitus without complication, with long-term current use of insulin (HCC) (Chronic)    * (Principal) Diabetic ulcer of left heel associated with type 2 diabetes mellitus, with fat layer exposed ([facility]) - Primary (Chronic)    Relevant Orders    Initiate Outpatient Wound Care Protocol    Neuropathy    Relevant Orders    Initiate Outpatient Wound Care Protocol    PVD (peripheral vascular disease) ([facility]    Relevant Orders    Initiate Outpatient Wound Care Protocol           Procedure Note  Indications:  Based on my examination of this patient's wound(s)/ulcer(s) today, debridement is required to promote healing and evaluate the wound base. Performed by: Tremaine Mike MD    Consent obtained:  Yes    Time out taken:  Yes    Pain Control: Anesthetic  Anesthetic: 2% Lidocaine Gel Topical       Debridement:Non-excisional Debridement    Using curette the wound(s)/ulcer(s) was/were sharply debrided down through and including the removal of epidermis and dermis.         Devitalized Tissue Debrided:  fibrin, biofilm, slough, necrotic/eschar and exudate      Pre Debridement Measurements:  Are located in the [place]  Documentation Flow Sheet    Wound/Ulcer #: 1    Percent of Wound(s)/Ulcer(s) Debrided: 100%    Total Surface Area Debrided:  0.06 sq cm       Diabetic/Pressure/Non Pressure Ulcers only:  Ulcer: Diabetic ulcer, fat layer exposed           Post Debridement Measurements:    Wound/Ulcer Descriptions are Pre Debridement --EXCEPT MEASUREMENTS    Wound 11/19/21 Heel Left; Medial wound 1- left heel wag 1 (Active)   Wound Image   02/11/22 1146   Wound Etiology Diabetic Krishnamurthy 1 02/11/22 1146   Dressing Status Old drainage noted 02/11/22 1146   Wound Cleansed Soap and water 02/11/22 1146   Dressing/Treatment Alginate with Ag 01/14/22 1136   Offloading for Diabetic Foot Ulcers Other (comment) 02/11/22 1146   Wound Length (cm) 0.6 cm 02/11/22 1146   Wound Width (cm) 1.1 cm 02/11/22 1146   Wound Depth (cm) 0.1 cm 02/11/22 1146   Wound Surface Area (cm^2) 0.66 cm^2 02/11/22 1146   Change in Wound Size % (l*w) 84.29 02/11/22 1146   Wound Volume (cm^3) 0.066 cm^3 02/11/22 1146   Wound Healing % 84 02/11/22 1146   Post-Procedure Length (cm) 0.2 cm 02/11/22 1207   Post-Procedure Width (cm) 0.3 cm 02/11/22 1207   Post-Procedure Depth (cm) 0.1 cm 02/11/22 1207   Post-Procedure Surface Area (cm^2) 0.06 cm^2 02/11/22 1207   Post-Procedure Volume (cm^3) 0.006 cm^3 02/11/22 1207   Distance Tunneling (cm) 0 cm 01/14/22 1116   Tunneling Position ___ O'Clock 0 01/14/22 1116   Undermining Starts ___ O'Clock 0 01/14/22 1116   Undermining Ends___ O'Clock 0 01/14/22 1116   Undermining Maxium Distance (cm) 0 01/14/22 1116   Wound Assessment Pink/red;Slough 02/11/22 1146   Drainage Amount Moderate 02/11/22 1146   Drainage Description Serous; Yellow 02/11/22 1146   Odor None 02/11/22 1146   Marisabel-wound Assessment Hyperkeratosis (callous) 02/11/22 1146   Margins Defined edges 02/11/22 1146   Wound Thickness Description not for Pressure Injury Full thickness 02/11/22 1146   Number of days: 84 Estimated Blood Loss:  Minimal    Hemostasis Achieved:  by pressure    Procedural Pain:  0  / 10     Post Procedural Pain:  0 / 10     Response to treatment:  Well tolerated by patient. Plan:     Problem List Items Addressed This Visit     Type 2 diabetes mellitus without complication, with long-term current use of insulin (HCC) (Chronic)    * (Principal) Diabetic ulcer of left heel associated with type 2 diabetes mellitus, with fat layer exposed ([facility] - Primary (Chronic)    Relevant Orders    Initiate Outpatient Wound Care Protocol    Neuropathy    Relevant Orders    Initiate Outpatient Wound Care Protocol    PVD (peripheral vascular disease) ([facility]    Relevant Orders    Initiate Outpatient Wound Care Protocol          Xeroform . Pt with mental status changes with alzheimer's. Has a hard time staying still . RTO 2 weeks    Treatment Note please see attached Discharge Instructions    In my professional opinion this patient would benefit from HBO Therapy: No    Written patient dismissal instructions given to patient and signed by patient or POA. Discharge [address] and Hyperbaric Oxygen Therapy   Physician Orders and Discharge Instructions  [address]  Telephone: [phone]    NAME:  Yonis Roman OF BIRTH:  1940  MEDICAL RECORD NUMBER:  [id number]  DATE:  2/11/2022    Discharge condition: Stable    Discharge to: Home    Left via:Private automobile    Accompanied by: Daughter    ECF/HHA: [facility]     Dressing Orders: Left heel wound:   Soap and water wash;    Xeroform over wound , cover with 2x2 gauze secure medipore tape  Change daily      Treatment Orders:  Protein rich diet (unless restricted by your physician); Multivitamin daily;  Elevate legs above the level of your heart when  sitting 3-4 times daily for at least one hour each time, avoid standing for long periods of time. [facility] follow up visit _____________2 weeks________________  (Please note your next appointment above and if you are unable to keep, kindly give a 24 hour notice. Thank you.)    If you experience any of the following, please call the [facility] [employer] during business hours:    * Increase in Pain  * Temperature over 101  * Increase in drainage from your wound  * Drainage with a foul odor  * Bleeding  * Increase in swelling  * Need for compression bandage changes due to slippage, breakthrough drainage. If you need medical attention outside of the business hours of the [address] please contact your PCP or go to the nearest emergency room.         Electronically signed by Vignesh Braden MD on 2/11/2022 at 12:07 PM

## 2022-02-25 RX ORDER — FERROUS SULFATE 325(65) MG
TABLET ORAL
Qty: 90 TABLET | Refills: 1 | Status: ON HOLD | OUTPATIENT
Start: 2022-02-25 | End: 2022-05-13

## 2022-03-04 ENCOUNTER — OFFICE VISIT (OUTPATIENT)
Dept: INTERNAL MEDICINE | Age: 82
End: 2022-03-04
Payer: MEDICARE

## 2022-03-04 ENCOUNTER — HOSPITAL ENCOUNTER (OUTPATIENT)
Dept: WOUND CARE | Age: 82
Discharge: HOME | End: 2022-03-04
Payer: MEDICARE

## 2022-03-04 VITALS
WEIGHT: 208 LBS | OXYGEN SATURATION: 98 % | SYSTOLIC BLOOD PRESSURE: 136 MMHG | DIASTOLIC BLOOD PRESSURE: 80 MMHG | BODY MASS INDEX: 34.66 KG/M2 | HEART RATE: 81 BPM | HEIGHT: 65 IN

## 2022-03-04 VITALS — RESPIRATION RATE: 20 BRPM | TEMPERATURE: 97.6 F | WEIGHT: 204 LBS | HEIGHT: 64 IN | BODY MASS INDEX: 34.83 KG/M2

## 2022-03-04 DIAGNOSIS — F03.91 DEMENTIA WITH BEHAVIORAL DISTURBANCE, UNSPECIFIED DEMENTIA TYPE: ICD-10-CM

## 2022-03-04 DIAGNOSIS — I50.32 CHRONIC DIASTOLIC CONGESTIVE HEART FAILURE (HCC): ICD-10-CM

## 2022-03-04 DIAGNOSIS — I73.9 PVD (PERIPHERAL VASCULAR DISEASE) (HCC): ICD-10-CM

## 2022-03-04 DIAGNOSIS — Z79.4 TYPE 2 DIABETES MELLITUS WITHOUT COMPLICATION, WITH LONG-TERM CURRENT USE OF INSULIN (HCC): Primary | ICD-10-CM

## 2022-03-04 DIAGNOSIS — E78.5 HYPERLIPIDEMIA, UNSPECIFIED HYPERLIPIDEMIA TYPE: ICD-10-CM

## 2022-03-04 DIAGNOSIS — E11.21 TYPE II DIABETES MELLITUS WITH NEPHROPATHY (HCC): ICD-10-CM

## 2022-03-04 DIAGNOSIS — L97.422 DIABETIC ULCER OF LEFT HEEL ASSOCIATED WITH TYPE 2 DIABETES MELLITUS, WITH FAT LAYER EXPOSED (HCC): Chronic | ICD-10-CM

## 2022-03-04 DIAGNOSIS — J96.01 ACUTE HYPOXEMIC RESPIRATORY FAILURE (HCC): ICD-10-CM

## 2022-03-04 DIAGNOSIS — I50.33 ACUTE ON CHRONIC DIASTOLIC HEART FAILURE (HCC): ICD-10-CM

## 2022-03-04 DIAGNOSIS — G62.9 NEUROPATHY: ICD-10-CM

## 2022-03-04 DIAGNOSIS — G47.00 INSOMNIA, UNSPECIFIED TYPE: ICD-10-CM

## 2022-03-04 DIAGNOSIS — N18.9 ACUTE KIDNEY INJURY SUPERIMPOSED ON CKD (HCC): ICD-10-CM

## 2022-03-04 DIAGNOSIS — D50.9 IRON DEFICIENCY ANEMIA, UNSPECIFIED IRON DEFICIENCY ANEMIA TYPE: ICD-10-CM

## 2022-03-04 DIAGNOSIS — K21.9 GASTROESOPHAGEAL REFLUX DISEASE WITHOUT ESOPHAGITIS: ICD-10-CM

## 2022-03-04 DIAGNOSIS — F32.A DEPRESSION, UNSPECIFIED DEPRESSION TYPE: ICD-10-CM

## 2022-03-04 DIAGNOSIS — E11.621 DIABETIC ULCER OF LEFT HEEL ASSOCIATED WITH TYPE 2 DIABETES MELLITUS, WITH FAT LAYER EXPOSED (HCC): Chronic | ICD-10-CM

## 2022-03-04 DIAGNOSIS — L97.422 DIABETIC ULCER OF LEFT HEEL ASSOCIATED WITH TYPE 2 DIABETES MELLITUS, WITH FAT LAYER EXPOSED (HCC): ICD-10-CM

## 2022-03-04 DIAGNOSIS — N17.9 ACUTE KIDNEY INJURY SUPERIMPOSED ON CKD (HCC): ICD-10-CM

## 2022-03-04 DIAGNOSIS — E11.621 DIABETIC ULCER OF LEFT HEEL ASSOCIATED WITH TYPE 2 DIABETES MELLITUS, WITH FAT LAYER EXPOSED (HCC): ICD-10-CM

## 2022-03-04 DIAGNOSIS — I48.91 ATRIAL FIBRILLATION, UNSPECIFIED TYPE (HCC): ICD-10-CM

## 2022-03-04 DIAGNOSIS — E11.9 TYPE 2 DIABETES MELLITUS WITHOUT COMPLICATION, WITH LONG-TERM CURRENT USE OF INSULIN (HCC): Primary | ICD-10-CM

## 2022-03-04 DIAGNOSIS — I48.0 PAF (PAROXYSMAL ATRIAL FIBRILLATION) (HCC): ICD-10-CM

## 2022-03-04 DIAGNOSIS — S32.611A AVULSION FRACTURE OF RIGHT ISCHIAL TUBEROSITY (HCC): ICD-10-CM

## 2022-03-04 DIAGNOSIS — N18.32 STAGE 3B CHRONIC KIDNEY DISEASE (HCC): ICD-10-CM

## 2022-03-04 DIAGNOSIS — N32.81 OAB (OVERACTIVE BLADDER): ICD-10-CM

## 2022-03-04 LAB
ALBUMIN SERPL-MCNC: 4.2 G/DL (ref 3.5–5.2)
ALP BLD-CCNC: 120 U/L (ref 35–104)
ALT SERPL-CCNC: 18 U/L (ref 5–33)
ANION GAP SERPL CALCULATED.3IONS-SCNC: 16 MMOL/L (ref 7–19)
AST SERPL-CCNC: 25 U/L (ref 5–32)
BASOPHILS ABSOLUTE: 0.1 K/UL (ref 0–0.2)
BASOPHILS RELATIVE PERCENT: 0.9 % (ref 0–1)
BILIRUB SERPL-MCNC: 0.3 MG/DL (ref 0.2–1.2)
BUN BLDV-MCNC: 49 MG/DL (ref 8–23)
CALCIUM SERPL-MCNC: 9.7 MG/DL (ref 8.8–10.2)
CHLORIDE BLD-SCNC: 102 MMOL/L (ref 98–111)
CHOLESTEROL, TOTAL: 188 MG/DL (ref 160–199)
CO2: 23 MMOL/L (ref 22–29)
CREAT SERPL-MCNC: 1.5 MG/DL (ref 0.5–0.9)
CREATININE URINE: 50.6 MG/DL (ref 4.2–622)
EOSINOPHILS ABSOLUTE: 0.2 K/UL (ref 0–0.6)
EOSINOPHILS RELATIVE PERCENT: 1.8 % (ref 0–5)
GFR AFRICAN AMERICAN: 40
GFR NON-AFRICAN AMERICAN: 33
GLUCOSE BLD-MCNC: 152 MG/DL (ref 74–109)
HBA1C MFR BLD: 8.9 % (ref 4–6)
HCT VFR BLD CALC: 41.6 % (ref 37–47)
HDLC SERPL-MCNC: 64 MG/DL (ref 65–121)
HEMOGLOBIN: 12.1 G/DL (ref 12–16)
IMMATURE GRANULOCYTES #: 0.1 K/UL
IRON SATURATION: 12 % (ref 14–50)
IRON: 45 UG/DL (ref 37–145)
LDL CHOLESTEROL CALCULATED: 101 MG/DL
LYMPHOCYTES ABSOLUTE: 1.3 K/UL (ref 1.1–4.5)
LYMPHOCYTES RELATIVE PERCENT: 12.4 % (ref 20–40)
MCH RBC QN AUTO: 27.2 PG (ref 27–31)
MCHC RBC AUTO-ENTMCNC: 29.1 G/DL (ref 33–37)
MCV RBC AUTO: 93.5 FL (ref 81–99)
MICROALBUMIN UR-MCNC: 120.9 MG/DL (ref 0–19)
MICROALBUMIN/CREAT UR-RTO: 2389.3 MG/G
MONOCYTES ABSOLUTE: 0.8 K/UL (ref 0–0.9)
MONOCYTES RELATIVE PERCENT: 7.9 % (ref 0–10)
NEUTROPHILS ABSOLUTE: 8 K/UL (ref 1.5–7.5)
NEUTROPHILS RELATIVE PERCENT: 76.5 % (ref 50–65)
PDW BLD-RTO: 15 % (ref 11.5–14.5)
PLATELET # BLD: 259 K/UL (ref 130–400)
PMV BLD AUTO: 10.4 FL (ref 9.4–12.3)
POTASSIUM SERPL-SCNC: 5 MMOL/L (ref 3.5–5)
RBC # BLD: 4.45 M/UL (ref 4.2–5.4)
SODIUM BLD-SCNC: 141 MMOL/L (ref 136–145)
TOTAL IRON BINDING CAPACITY: 365 UG/DL (ref 250–400)
TOTAL PROTEIN: 7.8 G/DL (ref 6.6–8.7)
TRIGL SERPL-MCNC: 115 MG/DL (ref 0–149)
TSH SERPL DL<=0.05 MIU/L-ACNC: 3.75 UIU/ML (ref 0.27–4.2)
VITAMIN D 25-HYDROXY: 38.8 NG/ML
WBC # BLD: 10.4 K/UL (ref 4.8–10.8)

## 2022-03-04 PROCEDURE — G8427 DOCREV CUR MEDS BY ELIG CLIN: HCPCS | Performed by: INTERNAL MEDICINE

## 2022-03-04 PROCEDURE — 99213 OFFICE O/P EST LOW 20 MIN: CPT

## 2022-03-04 PROCEDURE — G8400 PT W/DXA NO RESULTS DOC: HCPCS | Performed by: INTERNAL MEDICINE

## 2022-03-04 PROCEDURE — 4040F PNEUMOC VAC/ADMIN/RCVD: CPT | Performed by: INTERNAL MEDICINE

## 2022-03-04 PROCEDURE — 99212 OFFICE O/P EST SF 10 MIN: CPT | Performed by: SURGERY

## 2022-03-04 PROCEDURE — G8417 CALC BMI ABV UP PARAM F/U: HCPCS | Performed by: INTERNAL MEDICINE

## 2022-03-04 PROCEDURE — 1036F TOBACCO NON-USER: CPT | Performed by: INTERNAL MEDICINE

## 2022-03-04 PROCEDURE — 3052F HG A1C>EQUAL 8.0%<EQUAL 9.0%: CPT | Performed by: INTERNAL MEDICINE

## 2022-03-04 PROCEDURE — G8482 FLU IMMUNIZE ORDER/ADMIN: HCPCS | Performed by: INTERNAL MEDICINE

## 2022-03-04 PROCEDURE — 99214 OFFICE O/P EST MOD 30 MIN: CPT | Performed by: INTERNAL MEDICINE

## 2022-03-04 PROCEDURE — 1090F PRES/ABSN URINE INCON ASSESS: CPT | Performed by: INTERNAL MEDICINE

## 2022-03-04 PROCEDURE — 1123F ACP DISCUSS/DSCN MKR DOCD: CPT | Performed by: INTERNAL MEDICINE

## 2022-03-04 ASSESSMENT — PAIN DESCRIPTION - ONSET: ONSET: ON-GOING

## 2022-03-04 ASSESSMENT — PAIN DESCRIPTION - LOCATION: LOCATION: BACK

## 2022-03-04 ASSESSMENT — PAIN SCALES - WONG BAKER: WONGBAKER_NUMERICALRESPONSE: 2

## 2022-03-04 ASSESSMENT — PAIN DESCRIPTION - PAIN TYPE: TYPE: CHRONIC PAIN

## 2022-03-04 ASSESSMENT — PAIN DESCRIPTION - DESCRIPTORS: DESCRIPTORS: ACHING

## 2022-03-04 ASSESSMENT — PAIN DESCRIPTION - PROGRESSION: CLINICAL_PROGRESSION: NOT CHANGED

## 2022-03-04 ASSESSMENT — PAIN - FUNCTIONAL ASSESSMENT: PAIN_FUNCTIONAL_ASSESSMENT: PREVENTS OR INTERFERES SOME ACTIVE ACTIVITIES AND ADLS

## 2022-03-04 ASSESSMENT — PAIN DESCRIPTION - FREQUENCY: FREQUENCY: INTERMITTENT

## 2022-03-04 NOTE — PROGRESS NOTES
[address]   Progress Note and Procedure Note      Kailee Huog  MEDICAL RECORD NUMBER:  [id number]  AGE: 80 y.o. GENDER: female  : 1940  EPISODE DATE:  3/4/2022    Subjective:     Chief Complaint   Patient presents with    Wound Check         HISTORY of PRESENT ILLNESS HPI     Kailee Hugo is a 80 y.o. female who presents today for wound/ulcer evaluation. History of Wound Context: Pt with L heel wound here for eval/treat  Wound/Ulcer Pain Timing/Severity: none  Quality of pain: N/A  Severity:  0 / 10   Modifying Factors: None  Associated Signs/Symptoms: none    Ulcer Identification:  Ulcer Type: pressure  Contributing Factors: shear force    Wound: pressure        PAST MEDICAL HISTORY        Diagnosis Date    KANCHAN (acute kidney injury) ([facility] 2018    Arthritis     Atrial fibrillation (HCC)     BiPAP (biphasic positive airway pressure) dependence     15cm/11cm    Cancer (HCC)     COLON CA    CH[facility]congestive heart failure) [facility]) 2018    Chronic atrial fibrillation ([facility] 2017    Depression     Diabetes mellitus ([facility]     Essential hypertension 2017    History of blood transfusion     Hyperlipidemia     Hypertension     Mixed hyperlipidemia 2017    Neuropathy 2021    Obesity     Obstructive sleep apnea     AHI:  18.7;  In REM AHI:  39.3 per PSG, 2008; split-night PSG, 2017 AHI: 35.1    Osteoarthritis     Palliative care patient 2019    Pneumonia due to organism     Restless leg syndrome 2017    Sinus complaint     Swelling        PAST SURGICAL HISTORY    Past Surgical History:   Procedure Laterality Date    ADENOIDECTOMY      APPENDECTOMY      CARDIOVERSION      x 2     CHOLECYSTECTOMY      COLONOSCOPY  2013    Dr. Addis Allan COLONOSCOPY  2012    Dr Ashley Flaherty yr recall    COLONOSCOPY  2008    Dr Stephanie Gardner, 3 yr recall    COLONOSCOPY N/A 2019    Dr Clive Ganser Reginald-right sided anastomosis appeared normal and patent-prn (age)   Jude Montemayor HEMICOLECTOMY Right 1998    2 FT OF COLON REMOVED DUE TO CA    HYSTERECTOMY      HI COLONOSCOPY W/BIOPSY SINGLE/MULTIPLE N/A 6/6/2017    Dr Katelyn Robles colon anastomosis-Tubular AP (-) dysplasia x 2--3 yr recall    TONSILLECTOMY      TUMOR REMOVAL      stomach    UPPER GASTROINTESTINAL ENDOSCOPY  05/17/2013    Dr. Katie Crabtree ulcer disease-Chelo (+)    UPPER GASTROINTESTINAL ENDOSCOPY  03/28/2012    Dr Esther Antoine antral ulceration with a visible vessel    UPPER GASTROINTESTINAL ENDOSCOPY N/A 8/2/2019    Dr Swapnil Montelongo-Gastritis       FAMILY HISTORY    Family History   Problem Relation Age of Onset    No Known Problems Mother     No Known Problems Father     Colon Cancer Daughter     Colon Polyps Daughter     Esophageal Cancer Neg Hx     Liver Cancer Neg Hx     Rectal Cancer Neg Hx     Liver Disease Neg Hx     Stomach Cancer Neg Hx        SOCIAL HISTORY    Social History     Tobacco Use    Smoking status: Never Smoker    Smokeless tobacco: Never Used   Vaping Use    Vaping Use: Not on file   Substance Use Topics    Alcohol use: No    Drug use: No       ALLERGIES    No Known Allergies    MEDICATIONS    Current Outpatient Medications on File Prior to Encounter   Medication Sig Dispense Refill    FEROSUL 325 (65 Fe) MG tablet TAKE 1 TABLET BY MOUTH EVERY DAY WITH BREAKFAST 90 tablet 1    oxybutynin (DITROPAN-XL) 10 MG extended release tablet TAKE 1 TABLET BY MOUTH DAILY 30 tablet 3    rivaroxaban (XARELTO) 15 MG TABS tablet Take 1 tablet by mouth daily (with breakfast) 90 tablet 1    traZODone (DESYREL) 100 MG tablet TAKE 1 TABLET BY MOUTH NIGHTLY 90 tablet 1    blood glucose monitor kit and supplies QD E11.9 1 kit 0    ARIPiprazole (ABILIFY) 2 MG tablet TAKE 1 TABLET BY MOUTH EVERY EVENING 30 tablet 3    metoprolol succinate (TOPROL XL) 25 MG extended release tablet Take 1 tablet by mouth nightly 90 tablet 3    donepezil (ARICEPT) 10 MG tablet Take 1 tablet by mouth nightly (Patient taking differently: Take 10 mg by mouth 2 times daily ) 30 tablet 2    memantine (NAMENDA) 10 MG tablet Take 1 tablet by mouth 2 times daily 60 tablet 5    colesevelam (WELCHOL) 625 MG tablet Take 1 tablet by mouth 2 times daily (with meals) 60 tablet 5    SITagliptin (JANUVIA) 100 MG tablet Take 1 tablet by mouth daily 30 tablet 5    pantoprazole (PROTONIX) 20 MG tablet Take 20 mg by mouth daily       furosemide (LASIX) 40 MG tablet Take 1 tablet by mouth for 3 to 5 days if patient develops lower leg swelling or fluid weight gain (Patient taking differently: Take 40 mg by mouth daily Daily and extra PRN) 30 tablet 0    atorvastatin (LIPITOR) 40 MG tablet Take 40 mg by mouth nightly       sertraline (ZOLOFT) 100 MG tablet Take 100 mg by mouth daily       nystatin (MYCOSTATIN) 823719 UNIT/GM powder Apply topically 4 times daily Apply topically 4 times daily.  Multiple Vitamins-Minerals (THERAPEUTIC MULTIVITAMIN-MINERALS) tablet Take 1 tablet by mouth daily 30 tablet 0     No current facility-administered medications on file prior to encounter. REVIEW OF SYSTEMS    A comprehensive review of systems was negative.     Objective:      Temp 97.6 °F (36.4 °C) (Temporal)   Resp 20   Ht 5' 4\" (1.626 m)   Wt 204 lb (92.5 kg)   BMI 35.02 kg/m²     Wt Readings from Last 3 Encounters:   03/04/22 204 lb (92.5 kg)   02/11/22 204 lb (92.5 kg)   01/14/22 204 lb (92.5 kg)       PHYSICAL EXAM    General Appearance: alert and oriented to person, place and time, well developed and well- nourished, in no acute distress  Skin: warm and dry, no rash or erythema  Head: normocephalic and atraumatic  Eyes: pupils equal, round, and reactive to light, extraocular eye movements intact, conjunctivae normal  ENT: tympanic membrane, external ear and ear canal normal bilaterally, nose without deformity, nasal mucosa and turbinates normal without polyps, lips teeth and gums normal  Neck: supple and non-tender without mass, no thyromegaly or thyroid nodules, no cervical lymphadenopathy  Pulmonary/Chest: clear to auscultation bilaterally- no wheezes, rales or rhonchi, normal air movement, no respiratory distress  Cardiovascular: normal rate, regular rhythm, normal S1 and S2, no murmurs, rubs, clicks, or gallops, distal pulses intact, no carotid bruits  Abdomen: soft, non-tender, non-distended, normal bowel sounds, no masses or organomegaly  Extremities: no cyanosis, clubbing or edema  Musculoskeletal: normal range of motion, no joint swelling, deformity or tenderness  Neurologic: reflexes normal and symmetric, no cranial nerve deficit, gait, coordination and speech normal, skin sensation normal      Assessment:      Patient Active Problem List   Diagnosis Code    History of colon cancer Z85.038    Chronic anticoagulation Z79.01    PAF (paroxysmal atrial fibrillation) ([facility]) I48.0    History of bradycardia Z87.898    Hypoglycemia due to insulin E16.0, T38.3X5A    Type 2 diabetes mellitus without complication, with long-term current use of insulin ([facility]) E11.9, Z79.4    Pneumonia J18.9    Hypoglycemic encephalopathy E16.2    Abnormal chest x-ray R93.89    Hypokalemia E87.6    Hypomagnesemia E83.42    Essential hypertension I10    Mixed hyperlipidemia E78.2    CPAP (continuous positive airway pressure) dependence Z99.89    Somnolence, daytime R40.0    Restless leg syndrome G25.81    Hypoxemia R09.02    Obstructive sleep apnea G47.33    BiPAP (biphasic positive airway pressure) dependence Z99.89    Hypochloremia E87.8    CHF (congestive heart failure) ([facility]) I50.9    Macrocytic anemia D53.9    Acute kidney injury superimposed on CKD ([facility]) N17.9, N18.9    Acute on chronic diastolic heart failure ([facility]) I50.33    Palliative care patient Z51.5    Acute hypoxemic respiratory failure ([facility]) J96.01    Chronic anemia D64.9    History of adenomatous polyp of colon Z86.010    Iron deficiency anemia D50.9    Heme positive stool R19.5    AMS (altered mental status) R41.82    Acute cystitis N30.00    Toxic metabolic encephalopathy E98.8    Diabetic ulcer of left heel associated with type 2 diabetes mellitus, with fat layer exposed ([facility] E11.621, L97.422    Neuropathy G62.9    PVD (peripheral vascular disease) ([facility]) I73.9    Avulsion fracture of right ischial tuberosity ([facility]) S32.611A             Wound 11/19/21 Heel Left; Medial wound 1- left heel wag 1 (Active)   Wound Image   03/04/22 0939   Wound Etiology Diabetic Krishnamurthy 1 03/04/22 0939   Dressing Status Clean;Dry; Intact 03/04/22 0939   Wound Cleansed Soap and water 03/04/22 0939   Dressing/Treatment Xeroform;Gauze dressing/dressing sponge;Tape/Soft cloth adhesive tape 02/11/22 1212   Offloading for Diabetic Foot Ulcers Other (comment) 03/04/22 0939   Wound Length (cm) 0 cm 03/04/22 0939   Wound Width (cm) 0 cm 03/04/22 0939   Wound Depth (cm) 0 cm 03/04/22 0939   Wound Surface Area (cm^2) 0 cm^2 03/04/22 0939   Change in Wound Size % (l*w) 100 03/04/22 0939   Wound Volume (cm^3) 0 cm^3 03/04/22 0939   Wound Healing % 100 03/04/22 0939   Post-Procedure Length (cm) 0.2 cm 02/11/22 1207   Post-Procedure Width (cm) 0.3 cm 02/11/22 1207   Post-Procedure Depth (cm) 0.1 cm 02/11/22 1207   Post-Procedure Surface Area (cm^2) 0.06 cm^2 02/11/22 1207   Post-Procedure Volume (cm^3) 0.006 cm^3 02/11/22 1207   Distance Tunneling (cm) 0 cm 01/14/22 1116   Tunneling Position ___ O'Clock 0 01/14/22 1116   Undermining Starts ___ O'Clock 0 01/14/22 1116   Undermining Ends___ O'Clock 0 01/14/22 1116   Undermining Maxium Distance (cm) 0 01/14/22 1116   Wound Assessment Dry;Epithelialization 03/04/22 0939   Drainage Amount None 03/04/22 0939   Drainage Description Serous; Yellow 02/11/22 1146   Odor None 02/11/22 1146   Marisabel-wound Assessment Intact 03/04/22 0939   Margins Defined edges 02/11/22 1146   Wound Thickness Description not for Pressure Injury Full thickness 02/11/22 1146   Number of days: 104             Plan:     Problem List Items Addressed This Visit     Diabetic ulcer of left heel associated with type 2 diabetes mellitus, with fat layer exposed ([address] (Chronic)    Neuropathy    PVD (peripheral vascular disease) ([address]        Wound healed!! RTO PRN      Treatment Note please see attached Discharge Instructions    In my professional opinion this patient would benefit from HBO Therapy: No    Written patient dismissal instructions given to patient and signed by patient or POA. Discharge Instructions         [address] and [facility] Oxygen Therapy   Physician Orders and Discharge Instructions  [address]  Telephone: [phone]    NAME:  Sade Goodwin OF BIRTH:  1940  MEDICAL RECORD NUMBER:  [id number]  DATE:  3/4/2022    Discharge condition: Stable    Discharge to: Home    Left via:Private automobile    Accompanied by: Daughter     ECF/HHA: [facility]     Dressing Orders: Left heel wound:   Soap and water wash;    Xeroform over wound , cover with 2x2 gauze secure medipore tape  Change daily      Treatment Orders:  Protein rich diet (unless restricted by your physician); Multivitamin daily; Elevate legs above the level of your heart when  sitting 3-4 times daily for at least one hour each time, avoid standing for long periods of time. [facility] follow up visit _____________________________  (Please note your next appointment above and if you are unable to keep, kindly give a 24 hour notice.  Thank you.)    If you experience any of the following, please call the [facility] [address] during business hours:    * Increase in Pain  * Temperature over 101  * Increase in drainage from your wound  * Drainage with a foul odor  * Bleeding  * Increase in swelling  * Need for compression bandage changes due to slippage, breakthrough drainage. If you need medical attention outside of the business hours of the [address] [place] [address] Road please contact your PCP or go to the nearest emergency room.         Electronically signed by Dandy Cabrera MD on 3/4/2022 at 9:54 AM
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
## 2022-03-04 NOTE — PLAN OF CARE
Problem: Pain:  Goal: Pain level will decrease  Description: Pain level will decrease  3/4/2022 0956 by Kylah Gardner RN  Outcome: Completed  3/4/2022 0951 by Anil Case RN  Outcome: Ongoing  Goal: Control of acute pain  Description: Control of acute pain  3/4/2022 0956 by yKlah Gardner RN  Outcome: Completed  3/4/2022 0951 by Anil Case RN  Outcome: Ongoing  Goal: Control of chronic pain  Description: Control of chronic pain  3/4/2022 0956 by Kylah Gardner RN  Outcome: Completed  3/4/2022 0951 by Anil Case RN  Outcome: Ongoing

## 2022-03-06 ASSESSMENT — ENCOUNTER SYMPTOMS
COLOR CHANGE: 0
EYE ITCHING: 0
SORE THROAT: 0
FACIAL SWELLING: 0
VOICE CHANGE: 0
RECTAL PAIN: 0
COUGH: 0
PHOTOPHOBIA: 0
CHEST TIGHTNESS: 0
EYE REDNESS: 0
EYE PAIN: 0
BACK PAIN: 1
ANAL BLEEDING: 0
WHEEZING: 0
NAUSEA: 0
EYE DISCHARGE: 0
DIARRHEA: 0
BLOOD IN STOOL: 0
CONSTIPATION: 0
TROUBLE SWALLOWING: 0
SHORTNESS OF BREATH: 0
ABDOMINAL PAIN: 0
SINUS PRESSURE: 0
CHOKING: 0
ABDOMINAL DISTENTION: 0
RHINORRHEA: 0
VOMITING: 0

## 2022-03-21 RX ORDER — ATORVASTATIN CALCIUM 40 MG/1
40 TABLET, FILM COATED ORAL NIGHTLY
Qty: 30 TABLET | Refills: 5 | Status: SHIPPED | OUTPATIENT
Start: 2022-03-21 | End: 2022-08-11 | Stop reason: SDUPTHER

## 2022-03-21 NOTE — TELEPHONE ENCOUNTER
----- Message from Glareinastjuanito Camacho sent at 3/21/2022  4:22 PM CDT -----  Subject: Refill Request    QUESTIONS  Name of Medication? atorvastatin (LIPITOR) 40 MG tablet  Patient-reported dosage and instructions? 1 time nightly  How many days do you have left? 0  Preferred Pharmacy? [address] phone number (if available)? [phone]  ---------------------------------------------------------------------------  --------------  CALL BACK INFO  What is the best way for the office to contact you? OK to leave message on   voicemail  Preferred Call Back Phone Number?  [phone]

## 2022-03-21 NOTE — TELEPHONE ENCOUNTER
Shanita called requesting a refill of the below medication which has been pended for you:     Requested Prescriptions     Pending Prescriptions Disp Refills    atorvastatin (LIPITOR) 40 MG tablet 30 tablet 5     Sig: Take 1 tablet by mouth nightly       Last Appointment Date: 3/4/2022  Next Appointment Date: 6/17/2022    No Known Allergies

## 2022-03-28 RX ORDER — MEMANTINE HYDROCHLORIDE 10 MG/1
10 TABLET ORAL 2 TIMES DAILY
Qty: 60 TABLET | Refills: 5 | Status: SHIPPED | OUTPATIENT
Start: 2022-03-28 | End: 2022-08-11 | Stop reason: SDUPTHER

## 2022-03-28 RX ORDER — ARIPIPRAZOLE 2 MG/1
2 TABLET ORAL EVERY EVENING
Qty: 30 TABLET | Refills: 3 | Status: SHIPPED | OUTPATIENT
Start: 2022-03-28 | End: 2022-08-05

## 2022-03-28 NOTE — TELEPHONE ENCOUNTER
Requested Prescriptions     Pending Prescriptions Disp Refills    memantine (NAMENDA) 10 MG tablet 60 tablet 5     Sig: Take 1 tablet by mouth 2 times daily    ARIPiprazole (ABILIFY) 2 MG tablet 30 tablet 3     Sig: Take 1 tablet by mouth every evening       Last Office Visit: 12/3/2021  Next Office Visit: 3/31/2022  Last Medication Refill: [id number] 9/29/21 with 5 refills   Abilify- 11/29/21 with 3 refills

## 2022-04-01 ENCOUNTER — TELEMEDICINE (OUTPATIENT)
Dept: NEUROLOGY | Age: 82
End: 2022-04-01
Payer: MEDICARE

## 2022-04-01 DIAGNOSIS — R41.3 MEMORY LOSS: Primary | ICD-10-CM

## 2022-04-01 DIAGNOSIS — Z86.79 HISTORY OF HYPERTENSION: ICD-10-CM

## 2022-04-01 DIAGNOSIS — G30.9 ALZHEIMER'S DISEASE, UNSPECIFIED (CODE) (HCC): ICD-10-CM

## 2022-04-01 PROCEDURE — 1036F TOBACCO NON-USER: CPT | Performed by: PSYCHIATRY & NEUROLOGY

## 2022-04-01 PROCEDURE — G8427 DOCREV CUR MEDS BY ELIG CLIN: HCPCS | Performed by: PSYCHIATRY & NEUROLOGY

## 2022-04-01 PROCEDURE — 4040F PNEUMOC VAC/ADMIN/RCVD: CPT | Performed by: PSYCHIATRY & NEUROLOGY

## 2022-04-01 PROCEDURE — 99213 OFFICE O/P EST LOW 20 MIN: CPT | Performed by: PSYCHIATRY & NEUROLOGY

## 2022-04-01 PROCEDURE — 1123F ACP DISCUSS/DSCN MKR DOCD: CPT | Performed by: PSYCHIATRY & NEUROLOGY

## 2022-04-01 PROCEDURE — G8400 PT W/DXA NO RESULTS DOC: HCPCS | Performed by: PSYCHIATRY & NEUROLOGY

## 2022-04-01 PROCEDURE — 1090F PRES/ABSN URINE INCON ASSESS: CPT | Performed by: PSYCHIATRY & NEUROLOGY

## 2022-04-01 PROCEDURE — G8417 CALC BMI ABV UP PARAM F/U: HCPCS | Performed by: PSYCHIATRY & NEUROLOGY

## 2022-04-01 RX ORDER — DONEPEZIL HYDROCHLORIDE 10 MG/1
10 TABLET, FILM COATED ORAL 2 TIMES DAILY
Qty: 60 TABLET | Refills: 5 | Status: SHIPPED | OUTPATIENT
Start: 2022-04-01 | End: 2022-08-11 | Stop reason: SDUPTHER

## 2022-04-01 NOTE — PROGRESS NOTES
REVIEW OF SYSTEMS    Constitutional: []Fever []Sweats []Chills [] Recent Injury   [x] Denies all unless marked  HENT:[]Headache  [] Head Injury  [] Sore Throat  [] Ear Pain  [] Dizziness [] Hearing Loss   [x] Denies all unless marked  Musculoskeletal: [] Arthralgia  [] Myalgias [] Muscle cramps  [] Muscle twitches   [x] Denies all unless marked   Spine:  [] Neck pain  [] Back pain  [] Sciaticia  [x] Denies all unless marked  Neurological:[] Visual Disturbance [] Double Vision [] Slurred Speech [] Trouble swallowing  [] Vertigo [] Tingling [] Numbness [] Weakness [] Loss of Balance   [] Loss of Consciousness [x] Memory Loss [] Seizures  [] Denies all unless marked  Psychiatric/Behavioral:[] Depression [] Anxiety  [x] Denies all unless marked  Sleep: []  Insomnia [] Sleep Disturbance [] Snoring [] Restless Legs [] Daytime Sleepiness [] Sleep Apnea  [x] Denies all unless marked Yes

## 2022-04-01 NOTE — PROGRESS NOTES
[facility] Neurology Virtual Visit Note    2022    TELEHEALTH EVALUATION -- Audio/Visual (During DBYHU-98 public health emergency)      Patient:   Gennaro Dakins  MR#:    [id number]  Account Number:                         YOB: 1940  Date of Evaluation:  2022  Primary/Referring Physician:  Ye Watters MD   Consulting Physician:   Kalpesh Miranda MD      FOLLOW UP    Chief Complaint   Patient presents with    Follow-up    Memory Loss       HPI:    Gennaro Dakins (:  1940) has requested an audio/video evaluation for the following concern(s):      Gennaro Dakins is a 80y.o. year old female who is seen for evaluation of probable dementia. She is here today with one of her daughters. The patient has had difficulty for at least a year which appears to be slowly progressive. She does not sleep well at night and she is intermittently confused. She talks to the television as if it is a real person. She paces around a lot. She has poor short-term memory. There is a strong family history of dementia. The patient may have some chronic depression as well. She takes Aricept 10 mg a day and it seems to be beneficial.  She also takes Abilify at bedtime and Zoloft. Recent B12 level was normal.  She still not sleeping well. .  She had been on gabapentin and Tegretol at 1 point for neuropathy but these have been discontinued. In addition to diabetic neuropathy she has a history of chronic kidney disease, CHF and atrial fibrillation. She had a CT scan of the head . Graylon Jr There is diffuse atrophy particularly in the frontal and temporal lobes, in my opinion. She had been sleeping some better with trazodone and melatonin combination. not so much anymore. Now on Namenda with no meaningful improvement. Tried Nuedexta without success. Last seen here in the office . At that time I recommended increasing her Aricept to 20 mg a day. Unclear if it happened.     The MA has completed the ROS with the patient. I have reviewed it in its' entirety with the patient and agree with the documentation.         Constitutional: []? Fever []? Sweats []? Chills []? Recent Injury   [x]? Denies all unless marked  HENT:[]? Headache  []? Head Injury  []? Sore Throat  []? Ear Pain  []? Dizziness []? Hearing Loss   [x]? Denies all unless marked  Musculoskeletal: []? Arthralgia  []? Myalgias []? Muscle cramps  []? Muscle twitches   [x]? Denies all unless marked   Spine:  []? Neck pain  []? Back pain  []? Sciaticia  [x]? Denies all unless marked  Neurological:[]? Visual Disturbance []? Double Vision []? Slurred Speech []? Trouble swallowing  []? Vertigo []? Tingling []? Numbness []? Weakness []? Loss of Balance   []? Loss of Consciousness [x]? Memory Loss []? Seizures  []? Denies all unless marked  Psychiatric/Behavioral:[]? Depression []? Anxiety  [x]? Denies all unless marked  Sleep: []? Insomnia []? Sleep Disturbance []? Snoring []? Restless Legs []? Daytime Sleepiness []? Sleep Apnea  [x]? Denies all unless marked  Past Medical History:   Diagnosis Date    KANCHAN (acute kidney injury) ([facility] [address] 12/30/2018    Arthritis     Atrial fibrillation (HCC)     BiPAP (biphasic positive airway pressure) dependence     15cm/11cm    Cancer (HCC)     COLON CA    CHF (congestive heart failure) ([facility] [address] 12/30/2018    Chronic atrial fibrillation ([facility] [address] 2/12/2017    Depression     Diabetes mellitus ([facility] [address]     Essential hypertension 7/11/2017    History of blood transfusion     Hyperlipidemia     Hypertension     Mixed hyperlipidemia 7/11/2017    Neuropathy 11/19/2021    Obesity     Obstructive sleep apnea     AHI:  18.7;  In REM AHI:  39.3 per PSG, 11/2008; split-night PSG, 8/2017 AHI: 35.1    Osteoarthritis     Palliative care patient 01/08/2019    Pneumonia due to organism     Restless leg syndrome 8/1/2017    Sinus complaint     Swelling        Past Surgical History:   Procedure Laterality Date    ADENOIDECTOMY      APPENDECTOMY      CARDIOVERSION      x 2     CHOLECYSTECTOMY      COLONOSCOPY  05/17/2013    Dr. Pieter Shi COLONOSCOPY  03/22/2012    Dr Starkey Poll yr recall    COLONOSCOPY  06/30/2008    Dr Cyndi Prater, 3 yr recall    COLONOSCOPY N/A 8/2/2019    Dr Penelope Huertas sided anastomosis appeared normal and patent-prn (age)   [facility] HEMICOLECTOMY Right 1998    2 FT OF COLON REMOVED DUE TO CA    HYSTERECTOMY      MN COLONOSCOPY W/BIOPSY SINGLE/MULTIPLE N/A 6/6/2017    Dr Joseph Arellano colon anastomosis-Tubular AP (-) dysplasia x 2--3 yr recall    TONSILLECTOMY      TUMOR REMOVAL      stomach    UPPER GASTROINTESTINAL ENDOSCOPY  05/17/2013    Dr. Dang Crenshaw ulcer disease-Chelo (+)    UPPER GASTROINTESTINAL ENDOSCOPY  03/28/2012    Dr Giovanni Spencer antral ulceration with a visible vessel    UPPER GASTROINTESTINAL ENDOSCOPY N/A 8/2/2019    Dr Dianne Herrera       Family History   Problem Relation Age of Onset    No Known Problems Mother     No Known Problems Father     Colon Cancer Daughter     Colon Polyps Daughter     Esophageal Cancer Neg Hx     Liver Cancer Neg Hx     Rectal Cancer Neg Hx     Liver Disease Neg Hx     Stomach Cancer Neg Hx        Social History     Socioeconomic History    Marital status:      Spouse name: Not on file    Number of children: Not on file    Years of education: Not on file    Highest education level: Not on file   Occupational History    Not on file   Tobacco Use    Smoking status: Never Smoker    Smokeless tobacco: Never Used   Vaping Use    Vaping Use: Not on file   Substance and Sexual Activity    Alcohol use: No    Drug use: No    Sexual activity: Not Currently   Other Topics Concern    Not on file   Social History Narrative    Not on file     Social Determinants of Health     Financial Resource Strain: Low Risk     Difficulty of Paying Living Expenses: Not hard at all   Food Insecurity: No Food Insecurity    Worried About Running Out of Food in the Last Year: Never true    Ran Out of Food in the Last Year: Never true   Transportation Needs:     Lack of Transportation (Medical): Not on file    Lack of Transportation (Non-Medical):  Not on file   Physical Activity:     Days of Exercise per Week: Not on file    Minutes of Exercise per Session: Not on file   Stress:     Feeling of Stress : Not on file   Social Connections:     Frequency of Communication with Friends and Family: Not on file    Frequency of Social Gatherings with Friends and Family: Not on file    Attends Jain Services: Not on file    Active Member of [address] [place] [address] or Organizations: Not on file    Attends Club or Organization Meetings: Not on file    Marital Status: Not on file   Intimate Partner Violence:     Fear of Current or Ex-Partner: Not on file    Emotionally Abused: Not on file    Physically Abused: Not on file    Sexually Abused: Not on file   Housing Stability:     Unable to Pay for Housing in the Last Year: Not on file    Number of [place] in the Last Year: Not on file    Unstable Housing in the Last Year: Not on file       Current Outpatient Medications   Medication Sig Dispense Refill    donepezil (ARICEPT) 10 MG tablet Take 1 tablet by mouth 2 times daily 60 tablet 5    memantine (NAMENDA) 10 MG tablet Take 1 tablet by mouth 2 times daily 60 tablet 5    ARIPiprazole (ABILIFY) 2 MG tablet Take 1 tablet by mouth every evening 30 tablet 3    atorvastatin (LIPITOR) 40 MG tablet Take 1 tablet by mouth nightly 30 tablet 5    FEROSUL 325 (65 Fe) MG tablet TAKE 1 TABLET BY MOUTH EVERY DAY WITH BREAKFAST 90 tablet 1    oxybutynin (DITROPAN-XL) 10 MG extended release tablet TAKE 1 TABLET BY MOUTH DAILY 30 tablet 3    rivaroxaban (XARELTO) 15 MG TABS tablet Take 1 tablet by mouth daily (with breakfast) 90 tablet 1    traZODone (DESYREL) 100 MG tablet TAKE 1 TABLET BY MOUTH NIGHTLY 90 tablet 1    blood glucose monitor kit and supplies QD E11.9 1 kit 0    metoprolol succinate (TOPROL XL) 25 MG extended release tablet Take 1 tablet by mouth nightly 90 tablet 3    colesevelam (WELCHOL) 625 MG tablet Take 1 tablet by mouth 2 times daily (with meals) 60 tablet 5    SITagliptin (JANUVIA) 100 MG tablet Take 1 tablet by mouth daily 30 tablet 5    pantoprazole (PROTONIX) 20 MG tablet Take 20 mg by mouth daily       furosemide (LASIX) 40 MG tablet Take 1 tablet by mouth for 3 to 5 days if patient develops lower leg swelling or fluid weight gain (Patient taking differently: Take 40 mg by mouth daily Daily and extra PRN) 30 tablet 0    nystatin (MYCOSTATIN) 311286 UNIT/GM powder Apply topically 4 times daily Apply topically 4 times daily.  Multiple Vitamins-Minerals (THERAPEUTIC MULTIVITAMIN-MINERALS) tablet Take 1 tablet by mouth daily 30 tablet 0    sertraline (ZOLOFT) 100 MG tablet Take 50 mg by mouth daily        No current facility-administered medications for this visit. No Known Allergies        PHYSICAL EXAMINATION:    Constitutional -   General appearance: Alert in no acute distress   EYES -   Conjunctiva appears normal  ENT-    Hearing intact, no scars, masses, or lesions over external nose on visible skin  Pulmonary-   Breathing appears normal, good expansion, and normal effort without use of accessory muscles  Musculoskeletal -   No gross bony deformities  Gait as below, see gait exam in the neurologic exam  Skin -   No rash, erythema, or pallor on visible skin  Psychiatric -   Mood, affect, and behavior appear normal    Memory as below see mental status examination in the neurologic exam    NEUROLOGICAL EXAM    Mental status   [x]Awake, alert, oriented   [x]Affect attention and concentration appear appropriate  [x]Recent and remote memory appears unremarkable  [x]Speech normal without dysarthria or aphasia, comprehension and repetition intact.    COMMENTS:   Cranial Nerves [x] PER, EOMI, no nystagmus, conjugate eye movements, no ptosis  [x]Face symmetric  [x]Tongue midline   [x]Shoulder shrug normal bilaterally  COMMENTS:   Motor   [x]Antigravity x 4 extremities  []Normal bulk and tone  [x]No tremor present  COMMENTS:   Coordination [x] No clear ataxia noted  COMMENTS:    Gait                  []Normal steady gait    []Ataxic    []Spastic     []Magnetic     []Shuffling  COMMENTS:not tested today       [] OTHER:      Due to this being a TeleHealth encounter, evaluation of the following organ systems is limited: Vitals/Constitutional/EENT/Resp/CV/GI//MS/Neuro/Skin/Heme-Lymph-Imm. LABS RECORD AND IMAGING REVIEW (As below and per HPI)    Lab Results   Component Value Date    [id number] 05/21/2021     Lab Results   Component Value Date    WBC 10.4 03/04/2022    HGB 12.1 03/04/2022    HCT 41.6 03/04/2022    MCV 93.5 03/04/2022     03/04/2022     Lab Results   Component Value Date     03/04/2022    K 5.0 03/04/2022     03/04/2022    CO2 23 03/04/2022    BUN 49 (H) 03/04/2022    CREATININE 1.5 (H) 03/04/2022    GLUCOSE 152 (H) 03/04/2022    CALCIUM 9.7 03/04/2022    PROT 7.8 03/04/2022    LABALBU 4.2 03/04/2022    BILITOT 0.3 03/04/2022    ALKPHOS 120 (H) 03/04/2022    AST 25 03/04/2022    ALT 18 03/04/2022    LABGLOM 33 (A) 03/04/2022    GFRAA 40 (L) 03/04/2022    GLOB 3.3 03/30/2017       No results found. ASSESSMENT:    Montez Field is a 80y.o. year old female evaluated via Telehealth encounter for       ICD-10-CM    1. Memory loss  R41.3    2. Alzheimer's disease, unspecified  G30.9    3. History of hypertension  Z86.79    Is stable but try increasing Aricept to 20 mg a day. Do her best to get sleep. Blood pressure okay.     PLAN:    Orders Placed This Encounter   Medications    donepezil (ARICEPT) 10 MG tablet     Sig: Take 1 tablet by mouth 2 times daily     Dispense:  60 tablet     Refill:  5                   Pursuant to the emergency declaration under the [facility] [place] Act, 1135 waiver authority and the Coronavirus Preparedness and Response Supplemental Appropriations Act, this Virtual  Visit was conducted, with patient's consent, to reduce the patient's risk of exposure to COVID-19 and provide continuity of care for an established patient. Services were provided through a video synchronous discussion virtually to substitute for in-person clinic visit.

## 2022-04-25 ENCOUNTER — PATIENT MESSAGE (OUTPATIENT)
Dept: INTERNAL MEDICINE | Age: 82
End: 2022-04-25

## 2022-04-25 NOTE — TELEPHONE ENCOUNTER
From: Luis Felipe Crowley  To: Dr. Roque Ree: 4/25/2022 12:33 PM CDT  Subject: Lab work    Could someone send moms last lab work over to her kidney specialist. She has an appointment with him this Friday. Dr Dhara Bass.  Thank you

## 2022-05-12 ENCOUNTER — HOSPITAL ENCOUNTER (INPATIENT)
Age: 82
LOS: 2 days | Discharge: HOME HEALTH | DRG: 177 | End: 2022-05-14
Attending: EMERGENCY MEDICINE | Admitting: HOSPITALIST
Payer: MEDICARE

## 2022-05-12 ENCOUNTER — APPOINTMENT (OUTPATIENT)
Dept: GENERAL RADIOLOGY | Age: 82
DRG: 177 | End: 2022-05-12
Payer: MEDICARE

## 2022-05-12 DIAGNOSIS — J96.01 ACUTE HYPOXEMIC RESPIRATORY FAILURE DUE TO COVID-19 (HCC): Primary | ICD-10-CM

## 2022-05-12 DIAGNOSIS — I48.20 CHRONIC ATRIAL FIBRILLATION (HCC): ICD-10-CM

## 2022-05-12 DIAGNOSIS — G30.9 ALZHEIMER'S DISEASE, UNSPECIFIED (CODE) (HCC): ICD-10-CM

## 2022-05-12 DIAGNOSIS — U07.1 ACUTE HYPOXEMIC RESPIRATORY FAILURE DUE TO COVID-19 (HCC): Primary | ICD-10-CM

## 2022-05-12 DIAGNOSIS — E87.5 SERUM POTASSIUM ELEVATED: ICD-10-CM

## 2022-05-12 DIAGNOSIS — I50.33 ACUTE ON CHRONIC DIASTOLIC HEART FAILURE (HCC): ICD-10-CM

## 2022-05-12 DIAGNOSIS — Z79.01 CHRONIC ANTICOAGULATION: ICD-10-CM

## 2022-05-12 LAB
ADENOVIRUS BY PCR: NOT DETECTED
ALBUMIN SERPL-MCNC: 3.7 G/DL (ref 3.5–5.2)
ALLENS TEST: ABNORMAL
ALP BLD-CCNC: 100 U/L (ref 35–104)
ALT SERPL-CCNC: 13 U/L (ref 5–33)
ANION GAP SERPL CALCULATED.3IONS-SCNC: 10 MMOL/L (ref 7–19)
AST SERPL-CCNC: 18 U/L (ref 5–32)
BASE EXCESS ARTERIAL: 0.4 MMOL/L (ref -2–2)
BASOPHILS ABSOLUTE: 0.1 K/UL (ref 0–0.2)
BASOPHILS RELATIVE PERCENT: 0.9 % (ref 0–1)
BILIRUB SERPL-MCNC: <0.2 MG/DL (ref 0.2–1.2)
BORDETELLA PARAPERTUSSIS BY PCR: NOT DETECTED
BORDETELLA PERTUSSIS BY PCR: NOT DETECTED
BUN BLDV-MCNC: 32 MG/DL (ref 8–23)
C-REACTIVE PROTEIN: 1.18 MG/DL (ref 0–0.5)
CALCIUM SERPL-MCNC: 8.9 MG/DL (ref 8.8–10.2)
CARBOXYHEMOGLOBIN ARTERIAL: 2 % (ref 0–5)
CHLAMYDOPHILIA PNEUMONIAE BY PCR: NOT DETECTED
CHLORIDE BLD-SCNC: 100 MMOL/L (ref 98–111)
CO2: 27 MMOL/L (ref 22–29)
CORONAVIRUS 229E BY PCR: NOT DETECTED
CORONAVIRUS HKU1 BY PCR: NOT DETECTED
CORONAVIRUS NL63 BY PCR: NOT DETECTED
CORONAVIRUS OC43 BY PCR: NOT DETECTED
CREAT SERPL-MCNC: 1.5 MG/DL (ref 0.5–0.9)
EOSINOPHILS ABSOLUTE: 0.1 K/UL (ref 0–0.6)
EOSINOPHILS RELATIVE PERCENT: 1.2 % (ref 0–5)
FERRITIN: 116.6 NG/ML (ref 13–150)
FOLATE: >20 NG/ML (ref 4.8–37.3)
GFR AFRICAN AMERICAN: 40
GFR NON-AFRICAN AMERICAN: 33
GLUCOSE BLD-MCNC: 125 MG/DL (ref 70–99)
GLUCOSE BLD-MCNC: 135 MG/DL (ref 70–99)
GLUCOSE BLD-MCNC: 195 MG/DL (ref 74–109)
HCO3 ARTERIAL: 26 MMOL/L (ref 22–26)
HCT VFR BLD CALC: 34 % (ref 37–47)
HEMOGLOBIN, ART, EXTENDED: 11 G/DL (ref 12–16)
HEMOGLOBIN: 10.5 G/DL (ref 12–16)
HUMAN METAPNEUMOVIRUS BY PCR: NOT DETECTED
HUMAN RHINOVIRUS/ENTEROVIRUS BY PCR: NOT DETECTED
IMMATURE GRANULOCYTES #: 0.2 K/UL
INFLUENZA A BY PCR: NOT DETECTED
INFLUENZA B BY PCR: NOT DETECTED
IRON SATURATION: 7 % (ref 14–50)
IRON: 22 UG/DL (ref 37–145)
LYMPHOCYTES ABSOLUTE: 0.9 K/UL (ref 1.1–4.5)
LYMPHOCYTES RELATIVE PERCENT: 9.9 % (ref 20–40)
MAGNESIUM: 1.9 MG/DL (ref 1.6–2.4)
MCH RBC QN AUTO: 28.5 PG (ref 27–31)
MCHC RBC AUTO-ENTMCNC: 30.9 G/DL (ref 33–37)
MCV RBC AUTO: 92.1 FL (ref 81–99)
METHEMOGLOBIN ARTERIAL: 1.2 %
MONOCYTES ABSOLUTE: 1.2 K/UL (ref 0–0.9)
MONOCYTES RELATIVE PERCENT: 14.2 % (ref 0–10)
MYCOPLASMA PNEUMONIAE BY PCR: NOT DETECTED
NEUTROPHILS ABSOLUTE: 6.2 K/UL (ref 1.5–7.5)
NEUTROPHILS RELATIVE PERCENT: 71.7 % (ref 50–65)
O2 CONTENT ARTERIAL: 14.8 ML/DL
O2 SAT, ARTERIAL: 95 %
O2 THERAPY: ABNORMAL
PARAINFLUENZA VIRUS 1 BY PCR: NOT DETECTED
PARAINFLUENZA VIRUS 2 BY PCR: NOT DETECTED
PARAINFLUENZA VIRUS 3 BY PCR: NOT DETECTED
PARAINFLUENZA VIRUS 4 BY PCR: NOT DETECTED
PCO2 ARTERIAL: 45 MMHG (ref 35–45)
PDW BLD-RTO: 15.3 % (ref 11.5–14.5)
PERFORMED ON: ABNORMAL
PERFORMED ON: ABNORMAL
PH ARTERIAL: 7.37 (ref 7.35–7.45)
PLATELET # BLD: 224 K/UL (ref 130–400)
PMV BLD AUTO: 9.8 FL (ref 9.4–12.3)
PO2 ARTERIAL: 77 MMHG (ref 80–100)
POTASSIUM SERPL-SCNC: 5.6 MMOL/L (ref 3.5–5)
POTASSIUM, WHOLE BLOOD: 4.7
PRO-BNP: 5399 PG/ML (ref 0–1800)
RBC # BLD: 3.69 M/UL (ref 4.2–5.4)
REASON FOR REJECTION: NORMAL
REJECTED TEST: NORMAL
RESPIRATORY SYNCYTIAL VIRUS BY PCR: NOT DETECTED
SAMPLE SOURCE: ABNORMAL
SARS-COV-2, PCR: DETECTED
SODIUM BLD-SCNC: 137 MMOL/L (ref 136–145)
TOTAL IRON BINDING CAPACITY: 294 UG/DL (ref 250–400)
TOTAL PROTEIN: 6.1 G/DL (ref 6.6–8.7)
TROPONIN: 0.01 NG/ML (ref 0–0.03)
TSH SERPL DL<=0.05 MIU/L-ACNC: 3.29 UIU/ML (ref 0.27–4.2)
VITAMIN B-12: 683 PG/ML (ref 211–946)
VITAMIN D 25-HYDROXY: 41.7 NG/ML
WBC # BLD: 8.7 K/UL (ref 4.8–10.8)

## 2022-05-12 PROCEDURE — 1210000000 HC MED SURG R&B

## 2022-05-12 PROCEDURE — 87040 BLOOD CULTURE FOR BACTERIA: CPT

## 2022-05-12 PROCEDURE — 83550 IRON BINDING TEST: CPT

## 2022-05-12 PROCEDURE — 83880 ASSAY OF NATRIURETIC PEPTIDE: CPT

## 2022-05-12 PROCEDURE — 82803 BLOOD GASES ANY COMBINATION: CPT

## 2022-05-12 PROCEDURE — 82947 ASSAY GLUCOSE BLOOD QUANT: CPT

## 2022-05-12 PROCEDURE — 86140 C-REACTIVE PROTEIN: CPT

## 2022-05-12 PROCEDURE — 2700000000 HC OXYGEN THERAPY PER DAY

## 2022-05-12 PROCEDURE — 93005 ELECTROCARDIOGRAM TRACING: CPT

## 2022-05-12 PROCEDURE — 36415 COLL VENOUS BLD VENIPUNCTURE: CPT

## 2022-05-12 PROCEDURE — 82728 ASSAY OF FERRITIN: CPT

## 2022-05-12 PROCEDURE — 6360000002 HC RX W HCPCS: Performed by: NURSE PRACTITIONER

## 2022-05-12 PROCEDURE — 6370000000 HC RX 637 (ALT 250 FOR IP): Performed by: NURSE PRACTITIONER

## 2022-05-12 PROCEDURE — 85025 COMPLETE CBC W/AUTO DIFF WBC: CPT

## 2022-05-12 PROCEDURE — 83540 ASSAY OF IRON: CPT

## 2022-05-12 PROCEDURE — 82746 ASSAY OF FOLIC ACID SERUM: CPT

## 2022-05-12 PROCEDURE — 0202U NFCT DS 22 TRGT SARS-COV-2: CPT

## 2022-05-12 PROCEDURE — 84484 ASSAY OF TROPONIN QUANT: CPT

## 2022-05-12 PROCEDURE — 83735 ASSAY OF MAGNESIUM: CPT

## 2022-05-12 PROCEDURE — 82306 VITAMIN D 25 HYDROXY: CPT

## 2022-05-12 PROCEDURE — 82607 VITAMIN B-12: CPT

## 2022-05-12 PROCEDURE — 2580000003 HC RX 258: Performed by: NURSE PRACTITIONER

## 2022-05-12 PROCEDURE — 36600 WITHDRAWAL OF ARTERIAL BLOOD: CPT

## 2022-05-12 PROCEDURE — 71045 X-RAY EXAM CHEST 1 VIEW: CPT

## 2022-05-12 PROCEDURE — 2500000003 HC RX 250 WO HCPCS: Performed by: NURSE PRACTITIONER

## 2022-05-12 PROCEDURE — 80053 COMPREHEN METABOLIC PANEL: CPT

## 2022-05-12 PROCEDURE — 99285 EMERGENCY DEPT VISIT HI MDM: CPT

## 2022-05-12 PROCEDURE — 84443 ASSAY THYROID STIM HORMONE: CPT

## 2022-05-12 RX ORDER — TRAZODONE HYDROCHLORIDE 100 MG/1
100 TABLET ORAL NIGHTLY
Status: DISCONTINUED | OUTPATIENT
Start: 2022-05-12 | End: 2022-05-14 | Stop reason: HOSPADM

## 2022-05-12 RX ORDER — FERROUS SULFATE 325(65) MG
325 TABLET ORAL
Status: DISCONTINUED | OUTPATIENT
Start: 2022-05-13 | End: 2022-05-12

## 2022-05-12 RX ORDER — PANTOPRAZOLE SODIUM 20 MG/1
20 TABLET, DELAYED RELEASE ORAL DAILY
Status: DISCONTINUED | OUTPATIENT
Start: 2022-05-12 | End: 2022-05-14 | Stop reason: HOSPADM

## 2022-05-12 RX ORDER — BUMETANIDE 0.25 MG/ML
0.5 INJECTION, SOLUTION INTRAMUSCULAR; INTRAVENOUS 2 TIMES DAILY
Status: DISCONTINUED | OUTPATIENT
Start: 2022-05-12 | End: 2022-05-12

## 2022-05-12 RX ORDER — ONDANSETRON 4 MG/1
4 TABLET, ORALLY DISINTEGRATING ORAL EVERY 8 HOURS PRN
Status: DISCONTINUED | OUTPATIENT
Start: 2022-05-12 | End: 2022-05-14 | Stop reason: HOSPADM

## 2022-05-12 RX ORDER — ACETAMINOPHEN 325 MG/1
650 TABLET ORAL EVERY 6 HOURS PRN
Status: DISCONTINUED | OUTPATIENT
Start: 2022-05-12 | End: 2022-05-14 | Stop reason: HOSPADM

## 2022-05-12 RX ORDER — OXYBUTYNIN CHLORIDE 5 MG/1
10 TABLET, EXTENDED RELEASE ORAL DAILY
Status: DISCONTINUED | OUTPATIENT
Start: 2022-05-12 | End: 2022-05-12

## 2022-05-12 RX ORDER — MEMANTINE HYDROCHLORIDE 5 MG/1
10 TABLET ORAL 2 TIMES DAILY
Status: DISCONTINUED | OUTPATIENT
Start: 2022-05-12 | End: 2022-05-14 | Stop reason: HOSPADM

## 2022-05-12 RX ORDER — ACETAMINOPHEN 650 MG/1
650 SUPPOSITORY RECTAL EVERY 6 HOURS PRN
Status: DISCONTINUED | OUTPATIENT
Start: 2022-05-12 | End: 2022-05-14 | Stop reason: HOSPADM

## 2022-05-12 RX ORDER — METOPROLOL SUCCINATE 25 MG/1
25 TABLET, EXTENDED RELEASE ORAL NIGHTLY
Status: DISCONTINUED | OUTPATIENT
Start: 2022-05-12 | End: 2022-05-14 | Stop reason: HOSPADM

## 2022-05-12 RX ORDER — M-VIT,TX,IRON,MINS/CALC/FOLIC 27MG-0.4MG
1 TABLET ORAL DAILY
Status: DISCONTINUED | OUTPATIENT
Start: 2022-05-12 | End: 2022-05-14 | Stop reason: HOSPADM

## 2022-05-12 RX ORDER — LANOLIN ALCOHOL/MO/W.PET/CERES
10 CREAM (GRAM) TOPICAL NIGHTLY PRN
COMMUNITY
End: 2022-08-11 | Stop reason: SDUPTHER

## 2022-05-12 RX ORDER — SODIUM CHLORIDE 9 MG/ML
INJECTION, SOLUTION INTRAVENOUS PRN
Status: DISCONTINUED | OUTPATIENT
Start: 2022-05-12 | End: 2022-05-14 | Stop reason: HOSPADM

## 2022-05-12 RX ORDER — ATORVASTATIN CALCIUM 40 MG/1
40 TABLET, FILM COATED ORAL NIGHTLY
Status: DISCONTINUED | OUTPATIENT
Start: 2022-05-12 | End: 2022-05-14 | Stop reason: HOSPADM

## 2022-05-12 RX ORDER — FUROSEMIDE 10 MG/ML
40 INJECTION INTRAMUSCULAR; INTRAVENOUS 2 TIMES DAILY
Status: DISCONTINUED | OUTPATIENT
Start: 2022-05-12 | End: 2022-05-13

## 2022-05-12 RX ORDER — POLYETHYLENE GLYCOL 3350 17 G/17G
17 POWDER, FOR SOLUTION ORAL DAILY
Status: DISCONTINUED | OUTPATIENT
Start: 2022-05-12 | End: 2022-05-14 | Stop reason: HOSPADM

## 2022-05-12 RX ORDER — POLYETHYLENE GLYCOL 3350 17 G/17G
17 POWDER, FOR SOLUTION ORAL DAILY PRN
Status: DISCONTINUED | OUTPATIENT
Start: 2022-05-12 | End: 2022-05-12

## 2022-05-12 RX ORDER — SODIUM CHLORIDE 0.9 % (FLUSH) 0.9 %
5-40 SYRINGE (ML) INJECTION EVERY 12 HOURS SCHEDULED
Status: DISCONTINUED | OUTPATIENT
Start: 2022-05-12 | End: 2022-05-14 | Stop reason: HOSPADM

## 2022-05-12 RX ORDER — ONDANSETRON 2 MG/ML
4 INJECTION INTRAMUSCULAR; INTRAVENOUS EVERY 6 HOURS PRN
Status: DISCONTINUED | OUTPATIENT
Start: 2022-05-12 | End: 2022-05-14 | Stop reason: HOSPADM

## 2022-05-12 RX ORDER — FERROUS SULFATE 325(65) MG
325 TABLET ORAL 2 TIMES DAILY WITH MEALS
Status: DISCONTINUED | OUTPATIENT
Start: 2022-05-12 | End: 2022-05-14

## 2022-05-12 RX ORDER — CHOLESTYRAMINE LIGHT 4 G/5.7G
4 POWDER, FOR SUSPENSION ORAL DAILY
Status: DISCONTINUED | OUTPATIENT
Start: 2022-05-12 | End: 2022-05-13

## 2022-05-12 RX ORDER — FUROSEMIDE 10 MG/ML
40 INJECTION INTRAMUSCULAR; INTRAVENOUS ONCE
Status: DISCONTINUED | OUTPATIENT
Start: 2022-05-12 | End: 2022-05-12

## 2022-05-12 RX ORDER — DONEPEZIL HYDROCHLORIDE 5 MG/1
10 TABLET, FILM COATED ORAL 2 TIMES DAILY
Status: DISCONTINUED | OUTPATIENT
Start: 2022-05-12 | End: 2022-05-14 | Stop reason: HOSPADM

## 2022-05-12 RX ORDER — SODIUM CHLORIDE 0.9 % (FLUSH) 0.9 %
5-40 SYRINGE (ML) INJECTION PRN
Status: DISCONTINUED | OUTPATIENT
Start: 2022-05-12 | End: 2022-05-14 | Stop reason: HOSPADM

## 2022-05-12 RX ORDER — ARIPIPRAZOLE 2 MG/1
2 TABLET ORAL EVERY EVENING
Status: DISCONTINUED | OUTPATIENT
Start: 2022-05-12 | End: 2022-05-14 | Stop reason: HOSPADM

## 2022-05-12 RX ADMIN — TRAZODONE HYDROCHLORIDE 100 MG: 100 TABLET ORAL at 20:47

## 2022-05-12 RX ADMIN — FUROSEMIDE 40 MG: 10 INJECTION, SOLUTION INTRAMUSCULAR; INTRAVENOUS at 15:55

## 2022-05-12 RX ADMIN — ARIPIPRAZOLE 2 MG: 2 TABLET ORAL at 20:47

## 2022-05-12 RX ADMIN — DONEPEZIL HYDROCHLORIDE 10 MG: 5 TABLET, FILM COATED ORAL at 20:47

## 2022-05-12 RX ADMIN — ATORVASTATIN CALCIUM 40 MG: 40 TABLET, FILM COATED ORAL at 20:47

## 2022-05-12 RX ADMIN — MEMANTINE HYDROCHLORIDE 10 MG: 5 TABLET ORAL at 20:47

## 2022-05-12 RX ADMIN — MICONAZOLE NITRATE: 2 POWDER TOPICAL at 20:48

## 2022-05-12 RX ADMIN — METOPROLOL SUCCINATE 25 MG: 25 TABLET, EXTENDED RELEASE ORAL at 20:47

## 2022-05-12 RX ADMIN — SERTRALINE HYDROCHLORIDE 50 MG: 50 TABLET ORAL at 18:02

## 2022-05-12 RX ADMIN — SODIUM CHLORIDE, PRESERVATIVE FREE 10 ML: 5 INJECTION INTRAVENOUS at 20:50

## 2022-05-12 RX ADMIN — DEXAMETHASONE 6 MG: 4 TABLET ORAL at 18:02

## 2022-05-12 ASSESSMENT — PAIN SCALES - GENERAL: PAINLEVEL_OUTOF10: 0

## 2022-05-12 NOTE — H&P
[facility] Hospitalists      Hospitalist - History & Physical      PCP: Keturah Carrera MD    Date of Admission: 5/12/2022    Date of Service: 5/12/2022    Chief Complaint:  Shortness of breath, Covid postive    History Of Present Illness: The patient is a 80 y.o. female with past medical history of CKD stage IIIb, Alzheimer's, atrial fibrillation on chronic anticoagulation with Xarelto, MARILEE with BiPAP use, colon cancer, diastolic congestive heart failure, hypertension, hyperlipidemia, and restless leg syndrome who presented to [facility] ED complaining of shortness of breath with known COVID-positive. Patient has dementia and unable to provide any HPI. Daughter(Kavita) indicates patient is significantly demented at baseline. She reports up until approximately 48 hours ago the patient was able to walk with a walker. Daughter was hospitalized last night and was found to be COVID-positive. Family and caring for patient at home tested patient she was positive as well. Today family indicated they were unable to get the patient up to the bathroom as she was very weak. They also indicated she was seeming short of breath. ED work-up indicated potassium 5.6, creatinine 1.5 which is patient's baseline, CRP 1.18, proBNP 5399 and chest x-ray indicating cardiomegaly with indistinct pulmonary vascular margins likely representing early pulmonary vascular congestion. Patient was satting 90% on room air upon arrival however ED physician noted patient at 87% on room air and patient was then placed on 2 L nasal cannula. Patient admitted to the hospitalist service for acute on chronic diastolic heart failure.     Past Medical History:        Diagnosis Date    KANCHAN (acute kidney injury) ([facility] [address] 12/30/2018    Alzheimer disease ([facility] [address]     Arthritis     Atrial fibrillation (HCC)     BiPAP (biphasic positive airway pressure) dependence     15cm/11cm    Cancer (HCC)     COLON CA    CHF (congestive heart failure) ([facility] [address] 12/30/2018    Chronic atrial fibrillation ([facility] [address] 2/12/2017    Dementia ([facility] [address]     Depression     Diabetes mellitus ([facility]     Essential hypertension 7/11/2017    History of blood transfusion     Hyperlipidemia     Hypertension     Mixed hyperlipidemia 7/11/2017    Neuropathy 11/19/2021    Obesity     Obstructive sleep apnea     AHI:  18.7; In REM AHI:  39.3 per PSG, 11/2008; split-night PSG, 8/2017 AHI: 35.1    Osteoarthritis     Palliative care patient 01/08/2019    Pneumonia due to organism     Restless leg syndrome 8/1/2017    Sinus complaint     Swelling        Past Surgical History:        Procedure Laterality Date    ADENOIDECTOMY      APPENDECTOMY      CARDIOVERSION      x 2     CHOLECYSTECTOMY      COLONOSCOPY  05/17/2013    Dr. Mariajose Villalobos COLONOSCOPY  03/22/2012    Dr Indira Patterson yr recall    COLONOSCOPY  06/30/2008    Dr Saad Cruz, 3 yr recall    COLONOSCOPY N/A 8/2/2019    Dr Becki Flaherty sided anastomosis appeared normal and patent-prn (age)   Blase Liming HEMICOLECTOMY Right 1998    2 FT OF COLON REMOVED DUE TO CA    HYSTERECTOMY      NM COLONOSCOPY W/BIOPSY SINGLE/MULTIPLE N/A 6/6/2017    Dr Susan Damico colon anastomosis-Tubular AP (-) dysplasia x 2--3 yr recall    TONSILLECTOMY      TUMOR REMOVAL      stomach    UPPER GASTROINTESTINAL ENDOSCOPY  05/17/2013    Dr. Tasia Calix ulcer disease-Chelo (+)    UPPER GASTROINTESTINAL ENDOSCOPY  03/28/2012    Dr Amina Stewart antral ulceration with a visible vessel    UPPER GASTROINTESTINAL ENDOSCOPY N/A 8/2/2019    Dr Kofi Montelongo-Gastritis       Home Medications:  Prior to Admission medications    Medication Sig Start Date End Date Taking?  Authorizing Provider   donepezil (ARICEPT) 10 MG tablet Take 1 tablet by mouth 2 times daily 4/1/22   Qian Woodruff MD   memantine [facility]) 10 MG tablet Take 1 tablet by mouth 2 times daily 3/28/22   Qian Woodruff MD   ARIPiprazole (ABILIFY) 2 MG tablet Take 1 tablet by mouth every evening 3/28/22 Kelsey Tony MD   atorvastatin (LIPITOR) 40 MG tablet Take 1 tablet by mouth nightly 3/21/22   Violet Saldivar MD   FEROSUL 325 (65 Fe) MG tablet TAKE 1 TABLET BY MOUTH EVERY DAY WITH BREAKFAST 2/25/22   Violet Saldivar MD   oxybutynin (DITROPAN-XL) 10 MG extended release tablet TAKE 1 TABLET BY MOUTH DAILY 1/10/22   Violet Saldivar MD   rivaroxaban (XARELTO) 15 MG TABS tablet Take 1 tablet by mouth daily (with breakfast) 12/30/21   [facility] Maryam, EMY   traZODone (DESYREL) 100 MG tablet TAKE 1 TABLET BY MOUTH NIGHTLY 12/8/21   Kelsey Tony MD   blood glucose monitor kit and supplies QD E11.9 12/3/21   Violet Saldivar MD   metoprolol succinate (TOPROL XL) 25 MG extended release tablet Take 1 tablet by mouth nightly 11/12/21   Cristina Francisco, EMY   colesevelam (WELCHOL) 625 MG tablet Take 1 tablet by mouth 2 times daily (with meals) 8/19/21   Violet Saldivar MD   SITagliptin (JANUVIA) 100 MG tablet Take 1 tablet by mouth daily 7/22/21   Violet Saldivar MD   pantoprazole (PROTONIX) 20 MG tablet Take 20 mg by mouth daily  9/4/20   Historical Provider, MD   furosemide (LASIX) 40 MG tablet Take 1 tablet by mouth for 3 to 5 days if patient develops lower leg swelling or fluid weight gain  Patient taking differently: Take 40 mg by mouth daily Daily and extra PRN 9/17/20   Owen Delgadillo MD   nystatin (MYCOSTATIN) 780518 UNIT/GM powder Apply topically 4 times daily Apply topically 4 times daily. Historical Provider, MD   Multiple Vitamins-Minerals (THERAPEUTIC MULTIVITAMIN-MINERALS) tablet Take 1 tablet by mouth daily 1/6/19 4/1/22  Julissa Singh MD   sertraline (ZOLOFT) 100 MG tablet Take 50 mg by mouth daily     Historical Provider, MD       Allergies:    Patient has no known allergies. Social History:    The patient currently lives home with 24-hour care from family  Tobacco:   reports that she has never smoked.  She has never used smokeless tobacco.  Alcohol:   reports no history of alcohol use.  Illicit Drugs: denies    Family History:      Problem Relation Age of Onset    No Known Problems Mother     No Known Problems Father     Colon Cancer Daughter     Colon Polyps Daughter     Esophageal Cancer Neg Hx     Liver Cancer Neg Hx     Rectal Cancer Neg Hx     Liver Disease Neg Hx     Stomach Cancer Neg Hx        Review of Systems:   Review of Systems   Unable to perform ROS: Dementia        14 point review of systems is negative except as specifically addressed above. Physical Examination:  BP (!) 138/96   Pulse 87   Temp 97.8 °F (36.6 °C)   Resp 20   SpO2 90%   Physical Exam  Vitals reviewed. Constitutional:       Appearance: She is not ill-appearing. HENT:      Head: Normocephalic. Mouth/Throat:      Mouth: Mucous membranes are moist.      Pharynx: Oropharynx is clear. Eyes:      Pupils: Pupils are equal, round, and reactive to light. Cardiovascular:      Rate and Rhythm: Normal rate and regular rhythm. Pulses: Normal pulses. Heart sounds: Normal heart sounds. Pulmonary:      Effort: Pulmonary effort is normal.      Breath sounds: Rales present. Abdominal:      General: Bowel sounds are normal. There is no distension. Tenderness: There is no abdominal tenderness. There is no guarding. Musculoskeletal:         General: Normal range of motion. Right lower le+ Edema present. Left lower le+ Edema present. Skin:     General: Skin is warm. Capillary Refill: Capillary refill takes 2 to 3 seconds. Neurological:      Mental Status: She is alert. She is disoriented.           Diagnostic Data:  CBC:  Recent Labs     22  1219   WBC 8.7   HGB 10.5*   HCT 34.0*        BMP:  Recent Labs     22  1253 22  1324   NA  --  137   K 4.7 5.6*   CL  --  100   CO2  --  27   BUN  --  32*   CREATININE  --  1.5*   CALCIUM  --  8.9     Recent Labs     22  1324   AST 18   ALT 13   BILITOT <0.2   ALKPHOS 100     Coag Panel: No results for input(s): INR, PROTIME, APTT in the last 72 hours. Cardiac Enzymes:   Recent Labs     05/12/22  1324   TROPONINI 0.01     ABGs:  Lab Results   Component Value Date    PHART 7.370 05/12/2022    PO2ART 77.0 05/12/2022    AQJ5SHU 45.0 05/12/2022     Urinalysis:  Lab Results   Component Value Date    NITRU Negative 09/15/2020    WBCUA 35 09/15/2020    BACTERIA None Seen 09/15/2020    RBCUA 1 09/15/2020    BLOODU Negative 09/15/2020    SPECGRAV 1.011 09/15/2020    GLUCOSEU Negative 09/15/2020     A1C: No results for input(s): LABA1C in the last 72 hours. ABG:  Recent Labs     05/12/22  1253   PHART 7.370   TKL0EUV 45.0   PO2ART 77.0*   XOV0TNI 26.0   BEART 0.4   HGBAE 11.0*   T1EVFLFY 95.0   CARBOXHGBART 2.0       XR CHEST PORTABLE    Result Date: 5/12/2022  EXAMINATION: XR CHEST PORTABLE 5/12/2022 1:27 PM HISTORY: XR CHEST PORTABLE 5/12/2022 12:25 PM HISTORY: Short of breath COMPARISON: September 15, 2020. FINDINGS: There is a decreased inspiration and subsequent crowding of the pulmonary vasculature. The pulmonary vascular margins are indistinct suspicious for mild interstitial edema. . Cardiac silhouettes mildly enlarged. . The osseous structures and surrounding soft tissues demonstrate no acute abnormality. 1. Mild cardiomegaly with indistinct pulmonary vascular margins likely representing early pulmonary vascular congestion. . Signed by Dr Donna Cho      Assessment/Plan:  Principal Problem:    Acute on chronic diastolic (congestive) heart failure ([facility] [address]   -admit   -Echo   -Lasix 40 mg IV BID   -Strict intake and output   -daily weight   -monitor BMP and mag   -continue metoprolol   -tele    Active Problems:    Chronic anticoagulation / PAF (paroxysmal atrial fibrillation) ([facility])     -continue metoprolol and xarelto      Type 2 diabetes mellitus without complication, with long-term current use of insulin ([facility])   -Accu checks   -lantus and SSI   -hypoglycemia ptocol in place      Obstructive sleep apnea   -CPAP at night      Stage 3b chronic kidney disease ([facility] [address]   -monitor BMP   -daily weight   -avoid hypotension and nephrotoxins      Alzheimer's disease, unspecified   -continue namenda and aricept   -fall precautions   -bed alarm   -PT/OT    Covid +   -monitor CMP, CBC, and CRP   -decadron daily   -monitor need for oxygen therapy    Resolved Problems:    * No resolved hospital problems. *       Signed:  EMY Silva - CNP, 5/12/2022 3:55 PM       Attestation Statement     I have independently seen and examined this patient and agree with the asesment and plan by mid level provider                                                           [facility] MEDICINE  - PROGRESS NOTE         Objective:   Vitals: BP (!) 165/88   Pulse 93   Temp 98 °F (36.7 °C) (Temporal)   Resp 16   Ht 5' 5\" (1.651 m)   Wt 207 lb (93.9 kg)   SpO2 98%   BMI 34.45 kg/m²   General appearance: lethargic not cooperative   Skin: Skin color, texture, turgor normal.   HEENT: Head: Normocephalic, no lesions, without obvious abnormality.   Neck: no adenopathy, no carotid bruit, no JVD and supple, symmetrical, trachea midline  Lungs: wheezes bilaterally  Heart: irregularly irregular rhythm  Abdomen: soft, non-tender; bowel sounds normal; no masses,  no organomegaly  Extremities: extremities normal, atraumatic, no cyanosis or edema  Lymphatic: No significant lymph node enlargement papable  Neurologic: Mental status: lethargic, not oriented      Assessment & Plan:    COPD AE- steroids, nebs, ab  Hypoxia- Echo  Chronic afib - BB and xarelto  Hyperkalemia- IV lasix   covid positive  Dementia       Merced Quintana MD

## 2022-05-12 NOTE — PLAN OF CARE
Problem: Pain  Goal: Verbalizes/displays adequate comfort level or baseline comfort level  Outcome: Progressing     Problem: Skin/Tissue Integrity  Goal: Absence of new skin breakdown  Description: 1. Monitor for areas of redness and/or skin breakdown  2. Assess vascular access sites hourly  3. Every 4-6 hours minimum:  Change oxygen saturation probe site  4. Every 4-6 hours:  If on nasal continuous positive airway pressure, respiratory therapy assess nares and determine need for appliance change or resting period.   Outcome: Progressing     Problem: Safety - Adult  Goal: Free from fall injury  Outcome: Progressing     Problem: ABCDS Injury Assessment  Goal: Absence of physical injury  Outcome: Progressing

## 2022-05-12 NOTE — PROGRESS NOTES
Michael Hernandez arrived to room # 440. Presented with: covid positive/ CHF  Mental Status: Patient is disoriented and alert. Vitals:    05/12/22 1651   BP: (!) 165/88   Pulse: 93   Resp: 16   Temp: 98 °F (36.7 °C)   SpO2: 98%     Patient safety contract and falls prevention contract reviewed with patient Yes. Oriented Patient to room. Call light within reach. Yes.   Needs, issues or concerns expressed at this time: no.      Electronically signed by Falguni Trujillo RN on 5/12/2022 at 4:58 PM

## 2022-05-12 NOTE — PROGRESS NOTES
Blood Gas, Arterial [[id number]] (Abnormal) Collected: 05/12/22 1253     Specimen: Blood gases Updated: 05/12/22 1254      pH, Arterial 7.370      pCO2, Arterial 45.0 mmHg       pO2, Arterial 77.0 mmHg       HCO3, Arterial 26.0 mmol/L       Base Excess, Arterial 0.4 mmol/L       Hemoglobin, Art, Extended 11.0 g/dL       O2 Sat, Arterial 95.0 %       Carboxyhgb, Arterial 2.0 %       Methemoglobin, Arterial 1.2 %       O2 Content, Arterial 14.8 mL/dL       O2 Therapy Unknown      ALLENS TEST POS      Sample Source RB      Potassium, Whole Blood 4.7     AT+,RB, O2 @2 lpm, RR = 18

## 2022-05-12 NOTE — ED PROVIDER NOTES
[facility]  eMERGENCY dEPARTMENT eNCOUnter      Pt Name: Milagros Jean Baptiste  MRN: [id number]  Jdgfalice 1940  Date of evaluation: 5/12/2022  Provider: Hetal Geronimo MD    CHIEF COMPLAINT     No chief complaint on file. HISTORY OF PRESENT ILLNESS   (Location/Symptom, Timing/Onset,Context/Setting, Quality, Duration, Modifying Factors, Severity)  Note limiting factors. Milagros Jean Baptiste is a 80 y.o. female who presents to the emergency department COVID-positive shortness of breath. 81 yo female with SOB and COVID    The history is provided by the patient. NursingNotes were reviewed. REVIEW OF SYSTEMS    (2-9 systems for level 4, 10 or more for level 5)     Review of Systems   Respiratory:  Positive for shortness of breath. A complete review of systems was performed and is negative except as noted above in the HPI. PAST MEDICAL HISTORY     Past Medical History:   Diagnosis Date    KANCHAN (acute kidney injury) ([address] 12/30/2018    Alzheimer disease ([address]     Arthritis     Atrial fibrillation (HCC)     BiPAP (biphasic positive airway pressure) dependence     15cm/11cm    Cancer (HCC)     COLON CA    CHF (congestive heart failure) ([address] 12/30/2018    Chronic atrial fibrillation ([address] 2/12/2017    Dementia ([address]     Depression     Diabetes mellitus ([address]     Essential hypertension 7/11/2017    History of blood transfusion     Hyperlipidemia     Hypertension     Mixed hyperlipidemia 7/11/2017    Neuropathy 11/19/2021    Obesity     Obstructive sleep apnea     AHI:  18.7;  In REM AHI:  39.3 per PSG, 11/2008; split-night PSG, 8/2017 AHI: 35.1    Osteoarthritis     Palliative care patient 01/08/2019    Pneumonia due to organism     Restless leg syndrome 8/1/2017    Sinus complaint     Swelling          SURGICAL HISTORY       Past Surgical History:   Procedure Laterality Date    ADENOIDECTOMY      APPENDECTOMY      CARDIOVERSION      x 2     CHOLECYSTECTOMY      COLONOSCOPY  05/17/2013     Kamila-normal    COLONOSCOPY  03/22/2012    Dr Cristina Elizalde yr recall    COLONOSCOPY  06/30/2008    Dr Anastasiya Marte, 3 yr recall    COLONOSCOPY N/A 8/2/2019    Dr Barby Pagan sided anastomosis appeared normal and patent-prn (age)    HEMICOLECTOMY Right 1998    2 FT OF COLON REMOVED DUE TO CA    HYSTERECTOMY      CT COLONOSCOPY W/BIOPSY SINGLE/MULTIPLE N/A 6/6/2017    Dr Mcelroy Copping colon anastomosis-Tubular AP (-) dysplasia x 2--3 yr recall    TONSILLECTOMY      TUMOR REMOVAL      stomach    UPPER GASTROINTESTINAL ENDOSCOPY  05/17/2013    Dr. Hortensia Corona ulcer disease-Chelo (+)    UPPER GASTROINTESTINAL ENDOSCOPY  03/28/2012    Dr Katia Rivera antral ulceration with a visible vessel    UPPER GASTROINTESTINAL ENDOSCOPY N/A 8/2/2019    Dr Tomeka Montelongo-Gastritis         CURRENT MEDICATIONS       Previous Medications    ARIPIPRAZOLE (ABILIFY) 2 MG TABLET    Take 1 tablet by mouth every evening    ATORVASTATIN (LIPITOR) 40 MG TABLET    Take 1 tablet by mouth nightly    BLOOD GLUCOSE MONITOR KIT AND SUPPLIES    QD E11.9    COLESEVELAM (WELCHOL) 625 MG TABLET    Take 1 tablet by mouth 2 times daily (with meals)    DONEPEZIL (ARICEPT) 10 MG TABLET    Take 1 tablet by mouth 2 times daily    FEROSUL 325 (65 FE) MG TABLET    TAKE 1 TABLET BY MOUTH EVERY DAY WITH BREAKFAST    FUROSEMIDE (LASIX) 40 MG TABLET    Take 1 tablet by mouth for 3 to 5 days if patient develops lower leg swelling or fluid weight gain    MEMANTINE (NAMENDA) 10 MG TABLET    Take 1 tablet by mouth 2 times daily    METOPROLOL SUCCINATE (TOPROL XL) 25 MG EXTENDED RELEASE TABLET    Take 1 tablet by mouth nightly    MULTIPLE VITAMINS-MINERALS (THERAPEUTIC MULTIVITAMIN-MINERALS) TABLET    Take 1 tablet by mouth daily    NYSTATIN (MYCOSTATIN) 354044 UNIT/GM POWDER    Apply topically 4 times daily Apply topically 4 times daily.     OXYBUTYNIN (DITROPAN-XL) 10 MG EXTENDED RELEASE TABLET    TAKE 1 TABLET BY MOUTH DAILY    PANTOPRAZOLE (PROTONIX) 20 MG TABLET    Take 20 mg by mouth daily     RIVAROXABAN (XARELTO) 15 MG TABS TABLET    Take 1 tablet by mouth daily (with breakfast)    SERTRALINE (ZOLOFT) 100 MG TABLET    Take 50 mg by mouth daily     SITAGLIPTIN (JANUVIA) 100 MG TABLET    Take 1 tablet by mouth daily    TRAZODONE (DESYREL) 100 MG TABLET    TAKE 1 TABLET BY MOUTH NIGHTLY       ALLERGIES     Patient has no known allergies. FAMILY HISTORY       Family History   Problem Relation Age of Onset    No Known Problems Mother     No Known Problems Father     Colon Cancer Daughter     Colon Polyps Daughter     Esophageal Cancer Neg Hx     Liver Cancer Neg Hx     Rectal Cancer Neg Hx     Liver Disease Neg Hx     Stomach Cancer Neg Hx           SOCIAL HISTORY       Social History     Socioeconomic History    Marital status:      Spouse name: None    Number of children: None    Years of education: None    Highest education level: None   Occupational History    None   Tobacco Use    Smoking status: Never Smoker    Smokeless tobacco: Never Used   Vaping Use    Vaping Use: None   Substance and Sexual Activity    Alcohol use: No    Drug use: No    Sexual activity: Not Currently   Other Topics Concern    None   Social History Narrative    None     Social Determinants of Health     Financial Resource Strain: Low Risk     Difficulty of Paying Living Expenses: Not hard at all   Food Insecurity: No Food Insecurity    Worried About Running Out of Food in the Last Year: Never true    Ran Out of Food in the Last Year: Never true   Transportation Needs:     Lack of Transportation (Medical): Not on file    Lack of Transportation (Non-Medical):  Not on file   Physical Activity:     Days of Exercise per Week: Not on file    Minutes of Exercise per Session: Not on file   Stress:     Feeling of Stress : Not on file   Social Connections:     Frequency of Communication with Friends and Family: Not on file    Frequency of Social Gatherings with Friends and Family: Not on file Attends Muslim Services: Not on file    Active Member of Clubs or Organizations: Not on file    Attends Club or Organization Meetings: Not on file    Marital Status: Not on file   Intimate Partner Violence:     Fear of Current or Ex-Partner: Not on file    Emotionally Abused: Not on file    Physically Abused: Not on file    Sexually Abused: Not on file   Housing Stability:     Unable to Pay for Housing in the Last Year: Not on file    Number of [place] in the Last Year: Not on file    Unstable Housing in the Last Year: Not on file       SCREENINGS    [place] Coma Scale  Eye Opening: Spontaneous  Best Verbal Response: Confused  Best Motor Response: Obeys commands  [place] Coma Scale Score: 14        PHYSICAL EXAM    (up to 7 for level 4, 8 or more for level 5)     ED Triage Vitals [05/12/22 1205]   BP Temp Temp src Pulse Resp SpO2 Height Weight   (!) 138/96 97.8 °F (36.6 °C) -- 87 20 90 % -- --       Physical Exam  Eyes:      General: No scleral icterus. Conjunctiva/sclera: Conjunctivae normal.   Pulmonary:      Comments: SOB  Skin:     Coloration: Skin is not jaundiced or pale. Neurological:      General: No focal deficit present. Mental Status: She is alert. DIAGNOSTIC RESULTS     EKG: All EKG's are interpreted by the Emergency Department Physician who either signs or Co-signs this chart in the absence of a cardiologist.    Atrial fibrillation rate 100. QTc 516. No ST abnormality to indicate ischemia. RADIOLOGY:   Non-plain film images such as CT, Ultrasound and MRI are read by the radiologist. [place] Look images are visualized and preliminarily interpreted by the emergency physician with the below findings:    I have reviewed the images    Interpretation per the Radiologist below, if available at the time of this note:    XR CHEST PORTABLE   Final Result   1.  Mild cardiomegaly with indistinct pulmonary vascular margins likely   representing early pulmonary vascular congestion. .   Signed by Dr Tra Raymundo            ED BEDSIDE ULTRASOUND:   Performed by ED Physician - none    LABS:  Labs Reviewed   RESPIRATORY PANEL, MOLECULAR, WITH COVID-19 - Abnormal; Notable for the following components:       Result Value    SARS-CoV-2, PCR DETECTED (*)     All other components within normal limits    Narrative:     CALL  [facility]  [facility] tel. ,  Results called to Jaylin Gutierrez RN ER, 05/12/2022 13:41, by [address] - Abnormal; Notable for the following components:    Pro-BNP 5,399 (*)     All other components within normal limits   CBC WITH AUTO DIFFERENTIAL - Abnormal; Notable for the following components:    RBC 3.69 (*)     Hemoglobin 10.5 (*)     Hematocrit 34.0 (*)     MCHC 30.9 (*)     RDW 15.3 (*)     Neutrophils % 71.7 (*)     Lymphocytes % 9.9 (*)     Monocytes % 14.2 (*)     Lymphocytes Absolute 0.9 (*)     Monocytes Absolute 1.20 (*)     All other components within normal limits   BLOOD GAS, ARTERIAL - Abnormal; Notable for the following components:    pO2, Arterial 77.0 (*)     Hemoglobin, Art, Extended 11.0 (*)     All other components within normal limits   COMPREHENSIVE METABOLIC PANEL - Abnormal; Notable for the following components:    Potassium 5.6 (*)     Glucose 195 (*)     BUN 32 (*)     CREATININE 1.5 (*)     GFR Non- 33 (*)     GFR  40 (*)     Total Protein 6.1 (*)     All other components within normal limits   C-REACTIVE PROTEIN - Abnormal; Notable for the following components:    CRP 1.18 (*)     All other components within normal limits   SPECIMEN REJECTION   TROPONIN       All other labs were within normal range or not returned as of this dictation.     EMERGENCY DEPARTMENT COURSE and DIFFERENTIALDIAGNOSIS/MDM:   Vitals:    Vitals:    05/12/22 1205   BP: (!) 138/96   Pulse: 87   Resp: 20   Temp: 97.8 °F (36.6 °C)   SpO2: 90%       MDM  Number of Diagnoses or Management Options  Acute hypoxemic respiratory failure due to COVID-19 [facility])  Acute on chronic diastolic heart failure (HCC)  Alzheimer's disease, unspecified  Chronic anticoagulation  Chronic atrial fibrillation (HCC)  Serum potassium elevated  Diagnosis management comments: She is ABGs were done while she was on 2 L and look good. I turned her oxygen off and she does desat down to 88. I put her oxygen back on. Her x-ray suggest CHF such as her BNP. But she is positive for COVID there may be some overlap in the diagnostic studies. I  give her some Lasix and talk to the hospitalist service for admission. She does not have O2 at home. 7/28/2022 ADD. I failed to complete the chart and was re minded 3 months later to complete it. Sorry I can't recall all the details. PA          CONSULTS:  IP CONSULT TO HOSPITALIST    PROCEDURES:  Unless otherwise notedbelow, none     Procedures    FINAL IMPRESSION     1. Acute hypoxemic respiratory failure due to COVID-19 ([address]    2. Acute on chronic diastolic heart failure ([address]    3. Chronic anticoagulation    4. Chronic atrial fibrillation (HCC)    5. Alzheimer's disease, unspecified    6.  Serum potassium elevated          DISPOSITION/PLAN   DISPOSITION Decision To Admit 05/12/2022 02:53:45 PM      PATIENT REFERRED TO:  @FUP@    DISCHARGE MEDICATIONS:  New Prescriptions    No medications on file          (Please note that portions of this note were completed with a voice recognition program.  Efforts were made to edit the dictations butoccasionally words are mis-transcribed.)    Kiana Gale MD (electronically signed)  AttendingEmergency Physician          Cherri Barnett MD  05/12/22 1934 [facility] Katherine Sutherland MD  07/29/22 [id number]

## 2022-05-13 DIAGNOSIS — N32.81 OAB (OVERACTIVE BLADDER): Primary | ICD-10-CM

## 2022-05-13 DIAGNOSIS — D50.9 IRON DEFICIENCY ANEMIA, UNSPECIFIED IRON DEFICIENCY ANEMIA TYPE: ICD-10-CM

## 2022-05-13 LAB
ANION GAP SERPL CALCULATED.3IONS-SCNC: 12 MMOL/L (ref 7–19)
ANION GAP SERPL CALCULATED.3IONS-SCNC: 15 MMOL/L (ref 7–19)
BASOPHILS ABSOLUTE: 0 K/UL (ref 0–0.2)
BASOPHILS RELATIVE PERCENT: 0.5 % (ref 0–1)
BUN BLDV-MCNC: 33 MG/DL (ref 8–23)
BUN BLDV-MCNC: 42 MG/DL (ref 8–23)
CALCIUM SERPL-MCNC: 8.5 MG/DL (ref 8.8–10.2)
CALCIUM SERPL-MCNC: 8.8 MG/DL (ref 8.8–10.2)
CHLORIDE BLD-SCNC: 89 MMOL/L (ref 98–111)
CHLORIDE BLD-SCNC: 96 MMOL/L (ref 98–111)
CHOLESTEROL, TOTAL: 173 MG/DL (ref 160–199)
CO2: 25 MMOL/L (ref 22–29)
CO2: 27 MMOL/L (ref 22–29)
CREAT SERPL-MCNC: 1.6 MG/DL (ref 0.5–0.9)
CREAT SERPL-MCNC: 1.7 MG/DL (ref 0.5–0.9)
EOSINOPHILS ABSOLUTE: 0 K/UL (ref 0–0.6)
EOSINOPHILS RELATIVE PERCENT: 0 % (ref 0–5)
GFR AFRICAN AMERICAN: 35
GFR AFRICAN AMERICAN: 38
GFR NON-AFRICAN AMERICAN: 29
GFR NON-AFRICAN AMERICAN: 31
GLUCOSE BLD-MCNC: 207 MG/DL (ref 70–99)
GLUCOSE BLD-MCNC: 222 MG/DL (ref 70–99)
GLUCOSE BLD-MCNC: 225 MG/DL (ref 70–99)
GLUCOSE BLD-MCNC: 250 MG/DL (ref 74–109)
GLUCOSE BLD-MCNC: 300 MG/DL (ref 70–99)
GLUCOSE BLD-MCNC: 334 MG/DL (ref 74–109)
HCT VFR BLD CALC: 37.7 % (ref 37–47)
HDLC SERPL-MCNC: 66 MG/DL (ref 65–121)
HEMOGLOBIN: 11.4 G/DL (ref 12–16)
IMMATURE GRANULOCYTES #: 0.2 K/UL
LDL CHOLESTEROL CALCULATED: 93 MG/DL
LV EF: 65 %
LVEF MODALITY: NORMAL
LYMPHOCYTES ABSOLUTE: 0.4 K/UL (ref 1.1–4.5)
LYMPHOCYTES RELATIVE PERCENT: 5.5 % (ref 20–40)
MAGNESIUM: 1.9 MG/DL (ref 1.6–2.4)
MCH RBC QN AUTO: 28.8 PG (ref 27–31)
MCHC RBC AUTO-ENTMCNC: 30.2 G/DL (ref 33–37)
MCV RBC AUTO: 95.2 FL (ref 81–99)
MONOCYTES ABSOLUTE: 0.2 K/UL (ref 0–0.9)
MONOCYTES RELATIVE PERCENT: 2.7 % (ref 0–10)
NEUTROPHILS ABSOLUTE: 7 K/UL (ref 1.5–7.5)
NEUTROPHILS RELATIVE PERCENT: 89.4 % (ref 50–65)
PDW BLD-RTO: 15.2 % (ref 11.5–14.5)
PERFORMED ON: ABNORMAL
PLATELET # BLD: 235 K/UL (ref 130–400)
PMV BLD AUTO: 10.4 FL (ref 9.4–12.3)
POTASSIUM SERPL-SCNC: 5.1 MMOL/L (ref 3.5–5)
POTASSIUM SERPL-SCNC: 5.8 MMOL/L (ref 3.5–5)
RBC # BLD: 3.96 M/UL (ref 4.2–5.4)
SODIUM BLD-SCNC: 131 MMOL/L (ref 136–145)
SODIUM BLD-SCNC: 133 MMOL/L (ref 136–145)
TRIGL SERPL-MCNC: 71 MG/DL (ref 0–149)
WBC # BLD: 7.8 K/UL (ref 4.8–10.8)

## 2022-05-13 PROCEDURE — 93306 TTE W/DOPPLER COMPLETE: CPT

## 2022-05-13 PROCEDURE — 2580000003 HC RX 258: Performed by: NURSE PRACTITIONER

## 2022-05-13 PROCEDURE — 6360000002 HC RX W HCPCS: Performed by: NURSE PRACTITIONER

## 2022-05-13 PROCEDURE — 97165 OT EVAL LOW COMPLEX 30 MIN: CPT

## 2022-05-13 PROCEDURE — 6370000000 HC RX 637 (ALT 250 FOR IP): Performed by: NURSE PRACTITIONER

## 2022-05-13 PROCEDURE — 6360000002 HC RX W HCPCS: Performed by: HOSPITALIST

## 2022-05-13 PROCEDURE — 80061 LIPID PANEL: CPT

## 2022-05-13 PROCEDURE — 2580000003 HC RX 258: Performed by: HOSPITALIST

## 2022-05-13 PROCEDURE — 2700000000 HC OXYGEN THERAPY PER DAY

## 2022-05-13 PROCEDURE — 1210000000 HC MED SURG R&B

## 2022-05-13 PROCEDURE — 87070 CULTURE OTHR SPECIMN AEROBIC: CPT

## 2022-05-13 PROCEDURE — 97530 THERAPEUTIC ACTIVITIES: CPT

## 2022-05-13 PROCEDURE — 83735 ASSAY OF MAGNESIUM: CPT

## 2022-05-13 PROCEDURE — 6370000000 HC RX 637 (ALT 250 FOR IP): Performed by: HOSPITALIST

## 2022-05-13 PROCEDURE — 82947 ASSAY GLUCOSE BLOOD QUANT: CPT

## 2022-05-13 PROCEDURE — 36415 COLL VENOUS BLD VENIPUNCTURE: CPT

## 2022-05-13 PROCEDURE — 80048 BASIC METABOLIC PNL TOTAL CA: CPT

## 2022-05-13 PROCEDURE — 85025 COMPLETE CBC W/AUTO DIFF WBC: CPT

## 2022-05-13 PROCEDURE — 87205 SMEAR GRAM STAIN: CPT

## 2022-05-13 PROCEDURE — 97161 PT EVAL LOW COMPLEX 20 MIN: CPT

## 2022-05-13 RX ORDER — INSULIN LISPRO 100 [IU]/ML
0-12 INJECTION, SOLUTION INTRAVENOUS; SUBCUTANEOUS
Status: DISCONTINUED | OUTPATIENT
Start: 2022-05-13 | End: 2022-05-14 | Stop reason: HOSPADM

## 2022-05-13 RX ORDER — SODIUM POLYSTYRENE SULFONATE 15 G/60ML
15 SUSPENSION ORAL; RECTAL ONCE
Status: COMPLETED | OUTPATIENT
Start: 2022-05-13 | End: 2022-05-13

## 2022-05-13 RX ORDER — FERROUS SULFATE 325(65) MG
TABLET ORAL
Qty: 90 TABLET | Refills: 1 | Status: SHIPPED | OUTPATIENT
Start: 2022-05-13 | End: 2022-08-11 | Stop reason: SDUPTHER

## 2022-05-13 RX ORDER — BUMETANIDE 1 MG/1
1 TABLET ORAL DAILY
Status: DISCONTINUED | OUTPATIENT
Start: 2022-05-14 | End: 2022-05-14 | Stop reason: HOSPADM

## 2022-05-13 RX ORDER — OXYBUTYNIN CHLORIDE 10 MG/1
10 TABLET, EXTENDED RELEASE ORAL DAILY
Qty: 90 TABLET | Refills: 1 | Status: SHIPPED | OUTPATIENT
Start: 2022-05-13 | End: 2022-05-14 | Stop reason: HOSPADM

## 2022-05-13 RX ORDER — GUAIFENESIN 600 MG/1
600 TABLET, EXTENDED RELEASE ORAL 2 TIMES DAILY
Status: DISCONTINUED | OUTPATIENT
Start: 2022-05-13 | End: 2022-05-14 | Stop reason: HOSPADM

## 2022-05-13 RX ORDER — INSULIN GLARGINE 100 [IU]/ML
10 INJECTION, SOLUTION SUBCUTANEOUS NIGHTLY
Status: DISCONTINUED | OUTPATIENT
Start: 2022-05-13 | End: 2022-05-14

## 2022-05-13 RX ORDER — DOXYCYCLINE HYCLATE 100 MG/1
100 CAPSULE ORAL EVERY 12 HOURS SCHEDULED
Status: DISCONTINUED | OUTPATIENT
Start: 2022-05-13 | End: 2022-05-14 | Stop reason: HOSPADM

## 2022-05-13 RX ORDER — DEXTROSE MONOHYDRATE 50 MG/ML
100 INJECTION, SOLUTION INTRAVENOUS PRN
Status: DISCONTINUED | OUTPATIENT
Start: 2022-05-13 | End: 2022-05-14 | Stop reason: HOSPADM

## 2022-05-13 RX ORDER — METHYLPREDNISOLONE SODIUM SUCCINATE 40 MG/ML
40 INJECTION, POWDER, LYOPHILIZED, FOR SOLUTION INTRAMUSCULAR; INTRAVENOUS EVERY 8 HOURS
Status: DISCONTINUED | OUTPATIENT
Start: 2022-05-13 | End: 2022-05-14 | Stop reason: HOSPADM

## 2022-05-13 RX ORDER — INSULIN LISPRO 100 [IU]/ML
0-6 INJECTION, SOLUTION INTRAVENOUS; SUBCUTANEOUS NIGHTLY
Status: DISCONTINUED | OUTPATIENT
Start: 2022-05-13 | End: 2022-05-14 | Stop reason: HOSPADM

## 2022-05-13 RX ADMIN — WATER 1000 MG: 1 INJECTION INTRAMUSCULAR; INTRAVENOUS; SUBCUTANEOUS at 12:29

## 2022-05-13 RX ADMIN — IRON SUCROSE 200 MG: 20 INJECTION, SOLUTION INTRAVENOUS at 11:27

## 2022-05-13 RX ADMIN — ARIPIPRAZOLE 2 MG: 2 TABLET ORAL at 17:51

## 2022-05-13 RX ADMIN — RIVAROXABAN 15 MG: 15 TABLET, FILM COATED ORAL at 11:28

## 2022-05-13 RX ADMIN — INSULIN LISPRO 4 UNITS: 100 INJECTION, SOLUTION INTRAVENOUS; SUBCUTANEOUS at 12:19

## 2022-05-13 RX ADMIN — POLYETHYLENE GLYCOL (3350) 17 G: 17 POWDER, FOR SOLUTION ORAL at 11:27

## 2022-05-13 RX ADMIN — MICONAZOLE NITRATE: 2 POWDER TOPICAL at 21:20

## 2022-05-13 RX ADMIN — TRAZODONE HYDROCHLORIDE 100 MG: 100 TABLET ORAL at 21:17

## 2022-05-13 RX ADMIN — MEMANTINE HYDROCHLORIDE 10 MG: 5 TABLET ORAL at 21:17

## 2022-05-13 RX ADMIN — FUROSEMIDE 40 MG: 10 INJECTION, SOLUTION INTRAMUSCULAR; INTRAVENOUS at 17:51

## 2022-05-13 RX ADMIN — MEMANTINE HYDROCHLORIDE 10 MG: 5 TABLET ORAL at 11:28

## 2022-05-13 RX ADMIN — PANTOPRAZOLE SODIUM 20 MG: 20 TABLET, DELAYED RELEASE ORAL at 11:28

## 2022-05-13 RX ADMIN — MULTIPLE VITAMINS W/ MINERALS TAB 1 TABLET: TAB at 11:28

## 2022-05-13 RX ADMIN — GUAIFENESIN 600 MG: 600 TABLET ORAL at 12:29

## 2022-05-13 RX ADMIN — CHOLESTYRAMINE 4 G: 4 POWDER, FOR SUSPENSION ORAL at 11:27

## 2022-05-13 RX ADMIN — ATORVASTATIN CALCIUM 40 MG: 40 TABLET, FILM COATED ORAL at 21:17

## 2022-05-13 RX ADMIN — SODIUM POLYSTYRENE SULFONATE 15 G: 15 SUSPENSION ORAL; RECTAL at 11:27

## 2022-05-13 RX ADMIN — DEXAMETHASONE 6 MG: 4 TABLET ORAL at 11:28

## 2022-05-13 RX ADMIN — INSULIN GLARGINE 10 UNITS: 100 INJECTION, SOLUTION SUBCUTANEOUS at 21:53

## 2022-05-13 RX ADMIN — SODIUM CHLORIDE, PRESERVATIVE FREE 10 ML: 5 INJECTION INTRAVENOUS at 12:11

## 2022-05-13 RX ADMIN — DOXYCYCLINE HYCLATE 100 MG: 100 CAPSULE ORAL at 21:17

## 2022-05-13 RX ADMIN — FUROSEMIDE 40 MG: 10 INJECTION, SOLUTION INTRAMUSCULAR; INTRAVENOUS at 11:36

## 2022-05-13 RX ADMIN — METHYLPREDNISOLONE SODIUM SUCCINATE 40 MG: 40 INJECTION, POWDER, FOR SOLUTION INTRAMUSCULAR; INTRAVENOUS at 12:29

## 2022-05-13 RX ADMIN — INSULIN LISPRO 4 UNITS: 100 INJECTION, SOLUTION INTRAVENOUS; SUBCUTANEOUS at 21:53

## 2022-05-13 RX ADMIN — SODIUM POLYSTYRENE SULFONATE 15 G: 15 SUSPENSION ORAL; RECTAL at 15:17

## 2022-05-13 RX ADMIN — SODIUM CHLORIDE, PRESERVATIVE FREE 10 ML: 5 INJECTION INTRAVENOUS at 21:16

## 2022-05-13 RX ADMIN — DONEPEZIL HYDROCHLORIDE 10 MG: 5 TABLET, FILM COATED ORAL at 11:36

## 2022-05-13 RX ADMIN — SERTRALINE HYDROCHLORIDE 50 MG: 50 TABLET ORAL at 11:28

## 2022-05-13 RX ADMIN — METOPROLOL SUCCINATE 25 MG: 25 TABLET, EXTENDED RELEASE ORAL at 21:17

## 2022-05-13 RX ADMIN — DONEPEZIL HYDROCHLORIDE 10 MG: 5 TABLET, FILM COATED ORAL at 21:17

## 2022-05-13 RX ADMIN — Medication 325 MG: at 17:51

## 2022-05-13 RX ADMIN — Medication 325 MG: at 11:28

## 2022-05-13 RX ADMIN — INSULIN LISPRO 4 UNITS: 100 INJECTION, SOLUTION INTRAVENOUS; SUBCUTANEOUS at 17:51

## 2022-05-13 RX ADMIN — GUAIFENESIN 600 MG: 600 TABLET ORAL at 21:17

## 2022-05-13 RX ADMIN — METHYLPREDNISOLONE SODIUM SUCCINATE 40 MG: 40 INJECTION, POWDER, FOR SOLUTION INTRAMUSCULAR; INTRAVENOUS at 21:10

## 2022-05-13 ASSESSMENT — PAIN SCALES - GENERAL: PAINLEVEL_OUTOF10: 0

## 2022-05-13 NOTE — PROGRESS NOTES
Physical Therapy  Facility/Department: [facility] 4 ONCOLOGY UNIT  Physical Therapy Initial Assessment    Name: Nolan Henley  : 1940  MRN: [id number]  Date of Service: 2022    Discharge Recommendations:  Continue to assess pending progress (24 HR CARE vs FURTHER REHAB)          Patient Diagnosis(es): The primary encounter diagnosis was Acute hypoxemic respiratory failure due to COVID-19 [facility]). Diagnoses of Acute on chronic diastolic heart failure (HCC), Chronic anticoagulation, Chronic atrial fibrillation ([address] Alzheimer's disease, unspecified, and Serum potassium elevated were also pertinent to this visit. Past Medical History:  has a past medical history of KANCHAN (acute kidney injury) ([address] Alzheimer disease ([address] Arthritis, Atrial fibrillation ([address] BiPAP (biphasic positive airway pressure) dependence, Cancer ([address] CHF (congestive heart failure) ([address] Chronic atrial fibrillation ([address] Dementia ([address] Depression, Diabetes mellitus ([address] Essential hypertension, History of blood transfusion, Hyperlipidemia, Hypertension, Mixed hyperlipidemia, Neuropathy, Obesity, Obstructive sleep apnea, Osteoarthritis, Palliative care patient, Pneumonia due to organism, Restless leg syndrome, Sinus complaint, and Swelling. Past Surgical History:  has a past surgical history that includes Cholecystectomy; Appendectomy; Hysterectomy; Tonsillectomy; Adenoidectomy; tumor removal; Colonoscopy (2013); Upper gastrointestinal endoscopy (2013); Upper gastrointestinal endoscopy (2012); Colonoscopy (2012); hemicolectomy (Right, ); Colonoscopy (2008); Cardioversion; pr colonoscopy w/biopsy single/multiple (N/A, 2017); Upper gastrointestinal endoscopy (N/A, 2019); and Colonoscopy (N/A, 2019). Assessment   Body Structures, Functions, Activity Limitations Requiring Skilled Therapeutic Intervention: Decreased functional mobility ; Decreased ADL status; Decreased strength;Decreased safe awareness;Decreased cognition;Decreased balance;Decreased posture  Assessment: Pt CONFUSED OVERALL. DTR IS ALSO PATIENT IN SAME ROOM BUT DOING VERY WELL. Pt RESPONDS BEST TO DTR'S INSTRUCTION. ABLE TO STAND AND TAKE A FEW SMALL STEPS FWD THEN BACK. Requires PT Follow-Up: Yes  Activity Tolerance  Activity Tolerance: Treatment limited secondary to agitation  Activity Tolerance Comments: BUT DOES RESPOND BEST TO DTR     Plan   Plan  Plan: 1 time a day 3-6 times a week  Current Treatment Recommendations: Functional mobility training,Transfer training,Gait training,Safety education & training,Patient/Caregiver education & training  Safety Devices  Type of Devices:  (DTR PRESENT)     Restrictions  Restrictions/Precautions  Restrictions/Precautions: Fall Risk,Isolation     Subjective   Pain: NONE REPORTED  General  Patient assessed for rehabilitation services?: Yes  Diagnosis: DYSPNEA, COVID  Subjective  Subjective: Pt VERBAL BUT CONFUSED         Social/Functional History  Social/Functional History  Lives With: Daughter  Type of Home: House  Home Equipment: Walker, rolling  ADL Assistance: Needs assistance (Feeds herself with set up)  Ambulation Assistance: Independent  Transfer Assistance: Independent  Vision/Hearing       Cognition   Cognition  Cognition Comment: CAN BECOME ARGUMENTATIVE AT TIMES     Objective   Pulse: 72  Heart Rate Source: Monitor  BP: 114/60  BP Location: Left upper arm  MAP (Calculated): 78  Resp: 20  SpO2: 96 %  O2 Device: Nasal cannula     Observation/Palpation  Posture: Fair  Gross Assessment  AROM: Generally decreased, functional  Strength: Generally decreased, functional                    Bed mobility  Supine to Sit: Minimal assistance (MAX ENCOURAGEMENT FROM DTR)  Bed Mobility Comments: LEFT SITTING EOB WITH DTR PRESENT  Transfers  Sit to Stand: Minimal Assistance  Stand to sit: Minimal Assistance  Bed to Chair: Minimal assistance  Comment: FEW STEPS FWD/BACK FROM BED, HHA.  DOES NOT ALWAYS RESPOND TO DIRECTION, NEEDS MULTIPLE CUES        Balance  Sitting - Dynamic: Fair  Standing - Dynamic: Poor;+           OutComes Score                                                  AM-PAC Score             Goals  Long Term Goals  Time Frame for Long term goals : 14 DAYS  Long term goal 1: BED MOB SBA  Long term goal 2: TRANSFERS CGA  Long term goal 3: AMB 48' RW CGA       Education  Patient Education  Education Given To: Family; Patient  Education Provided: Role of Therapy;Plan of Care      Therapy Time   Individual Concurrent Group Co-treatment   Time In           Time Out           Minutes                   Alisa Callejas, PT

## 2022-05-13 NOTE — PROGRESS NOTES
Consult received. Will place Palliative care evaluation and Palliative RN/ will initiate discussions with patient and her POA in same room. We will follow along and assist as needed. Thank you for allowing us to participate in the care of this patient.     Jude Alejandro PA-C

## 2022-05-13 NOTE — PROGRESS NOTES
[facility]      [facility] Note    Patient:  Elias Callejas  YOB: 1940  Date of Service: 5/13/2022  MRN: [id number]   Acct: [de-identified]   Primary Care Physician: Dane Salinas MD  Advance Directive: DNR  Admit Date: 5/12/2022       Hospital Day: 1    Portions of this note have been copied forward, however, updated to reflect the most current clinical status of this patient. CHIEF COMPLAINT SOB, COVID +     SUBJECTIVE:  Ms. De Lujan was resting in bed this morning. Currently oriented to self only. CUMULATIVE HOSPITAL COURSE:   The patient is a 80 y.o. female with past medical history of CKD stage IIIb, Alzheimer's, atrial fibrillation on chronic anticoagulation with Xarelto, MARILEE with BiPAP use, colon cancer, diastolic congestive heart failure, hypertension, hyperlipidemia, and restless leg syndrome who presented to [facility] ED complaining of shortness of breath with known COVID-positive exposure. Patient has dementia and unable to provide any HPI. Daughter(Kavita) indicated patient is significantly demented at baseline. Reported up until approximately 48 hours ago the patient was able to walk with a walker. Daughter was hospitalized last night and was found to be COVID-positive. Family and care givers for patient at home tested patient she was positive as well. Family indicated they were unable to get the patient up to the bathroom as she was very weak. They also indicated she was seeming short of breath. ED work-up indicated potassium 5.6, creatinine 1.5 which is patient's baseline, CRP 1.18, proBNP 5399 and chest x-ray indicating cardiomegaly with indistinct pulmonary vascular margins likely representing early pulmonary vascular congestion. Patient was satting 90% on room air upon arrival however ED physician noted patient at 87% on room air and patient was then placed on 2 L nasal cannula. Patient was admitted to the hospital medicine for acute on chronic diastolic heart failure.   IV Lasix was initiated. Review of Systems   Unable to perform ROS: Dementia        Objective:   VITALS:  /60   Pulse 72   Temp 97.3 °F (36.3 °C) (Temporal)   Resp 20   Ht 5' 5\" (1.651 m)   Wt 207 lb (93.9 kg)   SpO2 96%   BMI 34.45 kg/m²   24HR INTAKE/OUTPUT:    Intake/Output Summary (Last 24 hours) at 5/13/2022 1446  Last data filed at 5/13/2022 1027  Gross per 24 hour   Intake 240 ml   Output 1300 ml   Net -1060 ml         Physical Exam  Constitutional:       General: She is not in acute distress. Appearance: Normal appearance. She is not toxic-appearing or diaphoretic. HENT:      Head: Normocephalic and atraumatic. Right Ear: External ear normal.      Left Ear: External ear normal.      Nose: Nose normal. No congestion or rhinorrhea. Mouth/Throat:      Mouth: Mucous membranes are moist.      Pharynx: Oropharynx is clear. Eyes:      General: No scleral icterus. Extraocular Movements: Extraocular movements intact. Conjunctiva/sclera: Conjunctivae normal.   Cardiovascular:      Rate and Rhythm: Normal rate and regular rhythm. Pulses: Normal pulses. Heart sounds: Normal heart sounds. No murmur heard. No friction rub. No gallop. Pulmonary:      Effort: Pulmonary effort is normal. No respiratory distress. Breath sounds: No wheezing, rhonchi or rales. Comments: Diminished breath sounds bilaterally  Abdominal:      General: Abdomen is flat. Bowel sounds are normal. There is no distension. Palpations: Abdomen is soft. Tenderness: There is no abdominal tenderness. Musculoskeletal:         General: No swelling. Normal range of motion. Cervical back: Normal range of motion and neck supple. Right lower leg: No edema. Left lower leg: No edema. Skin:     General: Skin is warm and dry. Coloration: Skin is not jaundiced. Findings: No erythema, lesion or rash. Neurological:      Mental Status: She is alert.  She is disoriented. Cranial Nerves: No cranial nerve deficit. Sensory: No sensory deficit. Motor: No weakness. Comments: dementia at baseline    Psychiatric:         Mood and Affect: Mood normal.         Behavior: Behavior normal.            Medications:      dextrose      sodium chloride        iron sucrose  200 mg IntraVENous Q24H    insulin glargine  10 Units SubCUTAneous Nightly    insulin lispro  0-12 Units SubCUTAneous TID WC    insulin lispro  0-6 Units SubCUTAneous Nightly    guaiFENesin  600 mg Oral BID    cefTRIAXone (ROCEPHIN) IV  1,000 mg IntraVENous Q24H    doxycycline hyclate  100 mg Oral 2 times per day    methylPREDNISolone  40 mg IntraVENous Q8H    sodium polystyrene  15 g Oral Once    ARIPiprazole  2 mg Oral QPM    atorvastatin  40 mg Oral Nightly    donepezil  10 mg Oral BID    memantine  10 mg Oral BID    metoprolol succinate  25 mg Oral Nightly    therapeutic multivitamin-minerals  1 tablet Oral Daily    miconazole   Topical BID    pantoprazole  20 mg Oral Daily    rivaroxaban  15 mg Oral Daily with breakfast    sertraline  50 mg Oral Daily    traZODone  100 mg Oral Nightly    sodium chloride flush  5-40 mL IntraVENous 2 times per day    furosemide  40 mg IntraVENous BID    polyethylene glycol  17 g Oral Daily    ferrous sulfate  325 mg Oral BID      glucose, dextrose bolus **OR** dextrose bolus, glucagon (rDNA), dextrose, sodium chloride flush, sodium chloride, ondansetron **OR** ondansetron, acetaminophen **OR** acetaminophen, perflutren lipid microspheres  ADULT DIET; Dysphagia - Soft and Bite Sized; 4 carb choices (60 gm/meal); Low Sodium (2 gm); Low Potassium (Less than 3000 mg/day);  Safety Tray; Safety Tray (Disposables)     Lab and other Data:     Recent Labs     05/12/22  1219 05/13/22  0322   WBC 8.7 7.8   HGB 10.5* 11.4*    235     Recent Labs     05/12/22  1253 05/12/22  1324 05/13/22  0322   NA  --  137 133*   K 4.7 5.6* 5.8*   CL  -- 100 96*   CO2  --  27 25   BUN  --  32* 33*   CREATININE  --  1.5* 1.6*   GLUCOSE  --  195* 250*     Recent Labs     05/12/22  1324   AST 18   ALT 13   BILITOT <0.2   ALKPHOS 100     Troponin T:   Recent Labs     05/12/22  1324   TROPONINI 0.01     ABG:  Recent Labs     05/12/22  1253   PHART 7.370   YYC4GVG 45.0   PO2ART 77.0*   QOG9TFF 26.0   BEART 0.4   HGBAE 11.0*   L1GGONPM 95.0   CARBOXHGBART 2.0       RAD:     XR CHEST PORTABLE  Result Date: 5/12/2022    1. Mild cardiomegaly with indistinct pulmonary vascular margins likely representing early pulmonary vascular congestion. . Signed by Dr Sara Esquivel:    Respiratory Panel, Molecular, with COVID-19 (Restricted: peds pts or suitable admitted adults) [[id number]] (Abnormal) Collected: 05/12/22 1243   Order Status: Completed Specimen: Nasopharyngeal Updated: 05/12/22 1342    Adenovirus by PCR Not Detected    Bordetella parapertussis by PCR Not Detected    Bordetella pertussis by PCR Not Detected    Chlamydophilia pneumoniae by PCR Not Detected    Coronavirus 229E by PCR Not Detected    Coronavirus HKU1 by PCR Not Detected    Coronavirus NL63 by PCR Not Detected    Coronavirus OC43 by PCR Not Detected    SARS-CoV-2, PCR DETECTED Panic      Human Metapneumovirus by PCR Not Detected    Human Rhinovirus/Enterovirus by PCR Not Detected    Influenza A by PCR Not Detected    Influenza B by PCR Not Detected    Mycoplasma pneumoniae by PCR Not Detected    Parainfluenza Virus 1 by PCR Not Detected    Parainfluenza Virus 2 by PCR Not Detected    Parainfluenza Virus 3 by PCR Not Detected    Parainfluenza Virus 4 by PCR Not Detected    Respiratory Syncytial Virus by PCR Not Detected     Blood Cultures pending     Respiratory culture pending       Assessment/Plan   Principal Problem:    Acute on chronic diastolic (congestive) heart failure (HCC)  Active Problems:    Chronic anticoagulation    PAF (paroxysmal atrial fibrillation) (HCC)    Type 2 diabetes mellitus without complication, with long-term current use of insulin ([facility]    Obstructive sleep apnea    Palliative care patient    Stage 3b chronic kidney disease ([facility]    Alzheimer's disease, unspecified  Resolved Problems:    * No resolved hospital problems. *      Principal Problem:    Acute on chronic diastolic (congestive) heart failure (HCC)-               - ECHO completed, results pending               - Continue IV Lasix               - Low-sodium diet              - Monitor I's and O's closely              - Daily weights              - Monitor on telemetry      Active Problems:   Hyperkalemia- Kayexalate, monitor labs closely     Chronic anticoagulation/ PAF (paroxysmal atrial fibrillation) ([facility])-    - Continue metoprolol and Xarelto   - Monitor on telemetry     Anemia-    - Iron 22   - Iron sat 7    - IV Venofer initiated      Type 2 diabetes mellitus without complication, with long-term current use of insulin ([facility])-    - Lantus    - SSI   - Accu-Checks    - Hypoglycemia treatment protocol in place         Obstructive sleep apnea- CPAP at night, unable to tolerate      Palliative care patient- noted      Stage 3b chronic kidney disease ([facility]-    - Monitor labs closely   - Monitor daily weights   - Avoid hypotension and nephrotoxic agents      Alzheimer's disease, unspecified- noted         Antibiotic: Rocephin and doxycycline    DVT Prophylaxis: Xarelto    GI prophylaxis: Protonix    Discharge planning: TBD      Further Orders per Clinical course/attending. Electronically signed by EMY Jack CNP on 5/13/2022 at 2:46 PM       EMR Dragon/Transcription disclaimer:   Much of this encounter note is an electronic transcription/translation of spoken language to printed text.  The electronic translation of spoken language may permit erroneous, or at times, nonsensical words or phrases to be inadvertently transcribed; although attempts have made to review the note for such errors, some may still exist.      Attestation Statement     I have independently seen and examined this patient and agree with the asesment and plan by mid level provider                                                           [facility] MEDICINE  - PROGRESS NOTE         Objective:   Vitals: /60   Pulse 72   Temp 97.3 °F (36.3 °C) (Temporal)   Resp 20   Ht 5' 5\" (1.651 m)   Wt 207 lb (93.9 kg)   SpO2 96%   BMI 34.45 kg/m²   General appearance: alert, appears stated age and cooperative  Skin: Skin color, texture, turgor normal.   HEENT: Head: Normocephalic, no lesions, without obvious abnormality.   Neck: no adenopathy, no carotid bruit, no JVD and supple, symmetrical, trachea midline  Lungs: wheezes bilaterally  Heart: regular rate and rhythm, S1, S2 normal, no murmur, click, rub or gallop  Abdomen: soft, non-tender; bowel sounds normal; no masses,  no organomegaly  Extremities: extremities normal, atraumatic, no cyanosis or edema  Lymphatic: No significant lymph node enlargement papable  Neurologic: Mental status: Alert, oriented times one       Assessment & Plan:    COPD AE- add steroids, nebs, ab  Echo - normal EF  Chronic afib - BB and xarelto  Hyperkalemia-treated with IV lasix and kayoxylate- repeat BMP  covid positive  Dementia   Iron def anemia - started on venofer    DC home vs SNF when improves    Dom Prieto MD

## 2022-05-13 NOTE — CONSULTS
Palliative Care:    Initiate for support, goals of care, and ACP. 79 y/o lady with multiple medical conditions, presents to ED with increased SOA and is COVID positive. Pt has dementia. Her daughter is in the room and provided information r/t life at home. 2 daughters care for pt at home. They have monitors in the home and check in on her every 2 hours or so. At baseline pt walks with a walker. She has BiPap at home, although daughter says pt does not like to wear mask. Currently pt is on oxygen via n/c at 2L/m. Pt will be evaluated for home oxygen. Past Medical History:        Past Medical History:   Diagnosis Date    KANCHAN (acute kidney injury) ([facility] 12/30/2018    Alzheimer disease ([facility]     Arthritis     Atrial fibrillation (HCC)     BiPAP (biphasic positive airway pressure) dependence     15cm/11cm    Cancer (HCC)     COLON CA    CHF (congestive heart failure) ([facility] 12/30/2018    Chronic atrial fibrillation ([facility] 2/12/2017    Dementia ([facility]     Depression     Diabetes mellitus ([address]     Essential hypertension 7/11/2017    History of blood transfusion     Hyperlipidemia     Hypertension     Mixed hyperlipidemia 7/11/2017    Neuropathy 11/19/2021    Obesity     Obstructive sleep apnea     AHI:  18.7; In REM AHI:  39.3 per PSG, 11/2008; split-night PSG, 8/2017 AHI: 35.1    Osteoarthritis     Palliative care patient 01/08/2019    Pneumonia due to organism     Restless leg syndrome 8/1/2017    Sinus complaint     Swelling        Advance Directives:    DNR  Daughter Katyh Parada is POA and documents are on file     Pain/Other Symptoms:    Pt denies pain at this time    Activity:  PT and OT to eval                   Plan:    Continue medical treatment and monitoring    Patient/family discussion r/t goals: Goal is to return home with family supervision. Potential pt may need a rehab stay prior to home. Palliative Care will continue to follow and support.       Electronically signed by Lidia Lam Laquita Jamel on 5/13/2022 at 10:43 AM

## 2022-05-13 NOTE — PROGRESS NOTES
Occupational Therapy  Facility/Department: [facility] ONCOLOGY UNIT  Occupational Therapy Initial Assessment    Name: Jaimie Boston  : 1940  MRN: [id number]  Date of Service: 2022    Discharge Recommendations:             Patient Diagnosis(es): The primary encounter diagnosis was Acute hypoxemic respiratory failure due to COVID-19 [facility]). Diagnoses of Acute on chronic diastolic heart failure (HCC), Chronic anticoagulation, Chronic atrial fibrillation ([address] Alzheimer's disease, unspecified, and Serum potassium elevated were also pertinent to this visit. Past Medical History:  has a past medical history of KANCHAN (acute kidney injury) ([address] Alzheimer disease ([address] Arthritis, Atrial fibrillation ([address] BiPAP (biphasic positive airway pressure) dependence, Cancer ([address] CHF (congestive heart failure) ([address] Chronic atrial fibrillation ([address] Dementia ([address] Depression, Diabetes mellitus ([address] Essential hypertension, History of blood transfusion, Hyperlipidemia, Hypertension, Mixed hyperlipidemia, Neuropathy, Obesity, Obstructive sleep apnea, Osteoarthritis, Palliative care patient, Pneumonia due to organism, Restless leg syndrome, Sinus complaint, and Swelling. Past Surgical History:  has a past surgical history that includes Cholecystectomy; Appendectomy; Hysterectomy; Tonsillectomy; Adenoidectomy; tumor removal; Colonoscopy (2013); Upper gastrointestinal endoscopy (2013); Upper gastrointestinal endoscopy (2012); Colonoscopy (2012); hemicolectomy (Right, ); Colonoscopy (2008); Cardioversion; pr colonoscopy w/biopsy single/multiple (N/A, 2017); Upper gastrointestinal endoscopy (N/A, 2019); and Colonoscopy (N/A, 2019). Treatment Diagnosis: Covid, dementia      Assessment   Assessment: Pt. 's daughter able to assist pt. with Minimal assist to stand and transfer. Pt. uncooperative with therapists due to confusion but will work well with daughter.  Pt. apparently is more clear when at home and not sick. At maximum self care level given cognitive limitations. OT eval only  Treatment Diagnosis: Covid, dementia  Prognosis: Good  Decision Making: Low Complexity  REQUIRES OT FOLLOW-UP: No  Activity Tolerance  Activity Tolerance: Treatment limited secondary to decreased cognition;Treatment limited secondary to agitation        Plan   Plan  Times per Week: OT eval only     Restrictions  Restrictions/Precautions  Restrictions/Precautions: Fall Risk    Subjective   General  Chart Reviewed: Yes  Patient assessed for rehabilitation services?: Yes  Diagnosis: Covid positive, Dementia  General Comment  Comments: Per daughter, pt. has become more confused and argumentative since being hospitalized with Covid     Social/Functional History  Social/Functional History  Lives With: Daughter  Type of Home: House  Home Equipment: Walker, rolling  ADL Assistance: Needs assistance (Feeds herself with set up)  Ambulation Assistance: Independent  Transfer Assistance: Independent       Objective   Pulse: 72  Heart Rate Source: Monitor  BP: 114/60  BP Location: Left upper arm  MAP (Calculated): 78  Resp: 20  SpO2: 96 %  O2 Device: Nasal cannula  Hearing: Within functional limits                   AROM:  (Per observation, pt. able to move BUE against gravity. Unable to follow directions for MMT)  ADL  Additional Comments: Pt. unable to follow directions from therapist and would only follow directions from daughter in the room. Pt. even argumentative with daughter and agitated at times.         Bed mobility  Supine to Sit: Minimal assistance  Transfers  Stand Step Transfers: Minimal assistance  Sit to stand: Minimal assistance  Transfer Comments: Hand held assist     Cognition  Overall Cognitive Status: Exceptions  Following Commands: Inconsistently follows commands  Attention Span: Unable to maintain attention  Memory: Decreased short term memory;Decreased long term memory  Safety Judgement: Decreased

## 2022-05-13 NOTE — CONSULTS
Nutrition Assessment     Type and Reason for Visit: Initial,Consult,Patient Education    Nutrition Assessment:  Consult for CHF education recieved. Pt with hx of Alzheimer's disease, reported significant dementia, and active COVID infection. Diet education considered not appropriate at this time. Will follow per policy. Current Nutrition Therapies:    ADULT DIET; Dysphagia - Soft and Bite Sized; 4 carb choices (60 gm/meal); Low Sodium (2 gm); Low Potassium (Less than 3000 mg/day);  Safety Tray; Safety Tray (Disposables)    Nutrition Interventions:      Nutrition Education/Counseling: Education not appropriate          Shima Patterson, MS, RD, LD  Contact: [phone]

## 2022-05-13 NOTE — ACP (ADVANCE CARE PLANNING)
Advance Care Planning     Advance Care Planning Activator (Inpatient)  Conversation Note      Date of ACP Conversation: 5/13/2022     Conversation Conducted with: Pt's daughter,  Percy Smart    ACP Activator: Nelida Halsted, RN      Health Care Decision Maker:     Current Designated Health Care Decision Maker:     Primary Decision Maker (Active): Kay Villatoro - [phone]    Supplemental (Other) Decision Maker: Danielle Stokes - [phone]      Care Preferences    Ventilation: \"If you were in your present state of health and suddenly became very ill and were unable to breathe on your own, what would your preference be about the use of a ventilator (breathing machine) if it were available to you? \"      Would the patient desire the use of ventilator (breathing machine)?:    NO          Resuscitation  \"CPR works best to restart the heart when there is a sudden event, like a heart attack, in someone who is otherwise healthy. Unfortunately, CPR does not typically restart the heart for people who have serious health conditions or who are very sick. \"    \"In the event your heart stopped as a result of an underlying serious health condition, would you want attempts to be made to restart your heart (answer \"yes\" for attempt to resuscitate) or would you prefer a natural death (answer \"no\" for do not attempt to resuscitate)? \"   NO          Conversation Outcomes:  [x] ACP discussion completed  [x] Existing advance directive reviewed with patient; no changes to patient's previously recorded wishes.

## 2022-05-13 NOTE — CARE COORDINATION
Initial CM Assessment    Initial Assessment Completed at bedside with:    []   Patient  [x]   Family/Caregiver/Guardian - dtr on the phone, in the same room as pt    []   Other:      Patient Contact Information:  Cl[address]ia [address]  [address]  [phone] (home)   Above information verified? [x]   Yes  []   No    ADLS:   Walker at baseline   Support System:   [facility] to return to current housing:   [x]   Yes  []   No     D/C Plan if not returning home[facility]     Do you have any unmet social needs that would keep you from returning home safely:  []   Yes  [x]   No              Unmet Social Needs Notes:       Had [facility] prior to admission:    []   Yes  [x]   No    Oxygen Company:    Has a cpap (noncompliant) - [facility]     Has a pulse oximetry unit at home:   [x]   Yes  []   No    Currently ACTIVE with Home Health CARE:    []   Yes  [x]   No  [x]   Interested at discharge - if [address]:      Current PCP:  Saad Osman MD  PCP verified? [x]   Yes  []   No    Have you been vaccinated for COVID-19 (SARS-CoV-2)? [x]   Yes  []   No                   If so, when? Which :  []   [employer]  []   Moderna  []   Simon Orr  []   Other:       Pharmacy:    [id number] [phone]  [address] [place] [address]  Phone: [phone] Fax: [phone]    Prefer to use Meds to Bed? []   Yes  [x]   No  Potential assistance purchasing medications?      []   Yes  [x]   No    Active with HD/PD prior to admission:           []   Yes  [x]   No  HD Center:       Financial:  Payor: Alexx López / Plan: MEDICARE PART A AND B / Product Type: *No Product type* /     Pre-Cert required for SNF:   []   Yes  [x]   No    Patient Deficits:  [x]   Yes   []   No    If yes:  [x]   Confusion/Memory  []   Visual  []   Motor/Sensory         []   Right arm         []   Right leg         []

## 2022-05-13 NOTE — PROGRESS NOTES
Physician Progress Note      Philippe Bacon  [facility] #:                  [id number]  :                       1940  ADMIT DATE:       2022 12:03 PM  [address] [place] DATE:  RESPONDING  PROVIDER #:        Romayne Erps          QUERY TEXT:    Patient admitted with CHF and COVID-19. Documentation reflects Acute   Respiratory Failure with Hypoxia is noted by the ER provider at the time of   admission. If possible, please document in the progress notes and discharge   summary if Acute Hypoxic Respiratory Failure was: The medical record reflects the following:  Risk Factors: COVID-19, CHF  Clinical Indicators: BNP: 5399, BLE Edema 1+, SPO2 90% on arrival, ABG: PO2:   77, was on home oxygen and only on 2 lpm now via N/C, ER provider notes   patient does desat down to 88% with no oxygen, Respiratory Assessment is noted   as \"pulmonary effort is normal\"  Treatment: ABG, Lasix 40mg BID IV, Metoprolol    Thank you in advance,    Ivonne Ortiz, RN-BSN, [facility]  Clinical   [facility], [facility] [email or web address]  Options provided:  -- Acute Hypoxic Respiratory Failure confirmed after study  -- Acute Hypoxic Respiratory Failure  treated and resolved  -- Acute Hypoxic Respiratory Failure ruled out after study  -- Hypoxia only  -- Other - I will add my own diagnosis  -- Disagree - Not applicable / Not valid  -- Disagree - Clinically unable to determine / Unknown  -- Refer to Clinical Documentation Reviewer    PROVIDER RESPONSE TEXT:    Acute Hypoxic Respiratory Failure confirmed after study.     Query created by: Sia Sanon on 2022 12:58 PM      Electronically signed by:  Romayne Erps 2022 3:08 PM

## 2022-05-14 VITALS
BODY MASS INDEX: 33.51 KG/M2 | DIASTOLIC BLOOD PRESSURE: 96 MMHG | SYSTOLIC BLOOD PRESSURE: 166 MMHG | TEMPERATURE: 97.3 F | HEIGHT: 65 IN | RESPIRATION RATE: 18 BRPM | HEART RATE: 98 BPM | WEIGHT: 201.13 LBS | OXYGEN SATURATION: 92 %

## 2022-05-14 LAB
ANION GAP SERPL CALCULATED.3IONS-SCNC: 14 MMOL/L (ref 7–19)
BASOPHILS ABSOLUTE: 0 K/UL (ref 0–0.2)
BASOPHILS RELATIVE PERCENT: 0 % (ref 0–1)
BUN BLDV-MCNC: 49 MG/DL (ref 8–23)
CALCIUM SERPL-MCNC: 8.4 MG/DL (ref 8.8–10.2)
CHLORIDE BLD-SCNC: 91 MMOL/L (ref 98–111)
CO2: 26 MMOL/L (ref 22–29)
CREAT SERPL-MCNC: 1.6 MG/DL (ref 0.5–0.9)
EOSINOPHILS ABSOLUTE: 0 K/UL (ref 0–0.6)
EOSINOPHILS RELATIVE PERCENT: 0 % (ref 0–5)
GFR AFRICAN AMERICAN: 38
GFR NON-AFRICAN AMERICAN: 31
GLUCOSE BLD-MCNC: 196 MG/DL (ref 70–99)
GLUCOSE BLD-MCNC: 226 MG/DL (ref 74–109)
HCT VFR BLD CALC: 33.5 % (ref 37–47)
HEMOGLOBIN: 10.5 G/DL (ref 12–16)
IMMATURE GRANULOCYTES #: 0.1 K/UL
LYMPHOCYTES ABSOLUTE: 0.5 K/UL (ref 1.1–4.5)
LYMPHOCYTES RELATIVE PERCENT: 7.8 % (ref 20–40)
MAGNESIUM: 1.7 MG/DL (ref 1.6–2.4)
MCH RBC QN AUTO: 28.8 PG (ref 27–31)
MCHC RBC AUTO-ENTMCNC: 31.3 G/DL (ref 33–37)
MCV RBC AUTO: 91.8 FL (ref 81–99)
MONOCYTES ABSOLUTE: 0.2 K/UL (ref 0–0.9)
MONOCYTES RELATIVE PERCENT: 2.7 % (ref 0–10)
NEUTROPHILS ABSOLUTE: 5.3 K/UL (ref 1.5–7.5)
NEUTROPHILS RELATIVE PERCENT: 88.5 % (ref 50–65)
PDW BLD-RTO: 15 % (ref 11.5–14.5)
PERFORMED ON: ABNORMAL
PLATELET # BLD: 246 K/UL (ref 130–400)
PMV BLD AUTO: 10.8 FL (ref 9.4–12.3)
POTASSIUM SERPL-SCNC: 4.9 MMOL/L (ref 3.5–5)
RBC # BLD: 3.65 M/UL (ref 4.2–5.4)
SODIUM BLD-SCNC: 131 MMOL/L (ref 136–145)
WBC # BLD: 6 K/UL (ref 4.8–10.8)

## 2022-05-14 PROCEDURE — 94761 N-INVAS EAR/PLS OXIMETRY MLT: CPT

## 2022-05-14 PROCEDURE — 85025 COMPLETE CBC W/AUTO DIFF WBC: CPT

## 2022-05-14 PROCEDURE — 6370000000 HC RX 637 (ALT 250 FOR IP): Performed by: NURSE PRACTITIONER

## 2022-05-14 PROCEDURE — 6370000000 HC RX 637 (ALT 250 FOR IP): Performed by: HOSPITALIST

## 2022-05-14 PROCEDURE — 36415 COLL VENOUS BLD VENIPUNCTURE: CPT

## 2022-05-14 PROCEDURE — 82947 ASSAY GLUCOSE BLOOD QUANT: CPT

## 2022-05-14 PROCEDURE — 80048 BASIC METABOLIC PNL TOTAL CA: CPT

## 2022-05-14 PROCEDURE — 6360000002 HC RX W HCPCS: Performed by: HOSPITALIST

## 2022-05-14 PROCEDURE — 83735 ASSAY OF MAGNESIUM: CPT

## 2022-05-14 RX ORDER — DOXYCYCLINE HYCLATE 100 MG/1
100 CAPSULE ORAL EVERY 12 HOURS SCHEDULED
Qty: 8 CAPSULE | Refills: 0 | Status: SHIPPED | OUTPATIENT
Start: 2022-05-14 | End: 2022-05-18

## 2022-05-14 RX ORDER — METHYLPREDNISOLONE 4 MG/1
TABLET ORAL
Qty: 1 KIT | Refills: 0 | Status: SHIPPED | OUTPATIENT
Start: 2022-05-14 | End: 2022-05-20

## 2022-05-14 RX ORDER — BUMETANIDE 1 MG/1
1 TABLET ORAL DAILY
Qty: 30 TABLET | Refills: 0 | Status: ON HOLD | OUTPATIENT
Start: 2022-05-15 | End: 2022-07-27 | Stop reason: HOSPADM

## 2022-05-14 RX ORDER — GUAIFENESIN 600 MG/1
600 TABLET, EXTENDED RELEASE ORAL 2 TIMES DAILY
Qty: 10 TABLET | Refills: 0 | Status: SHIPPED | OUTPATIENT
Start: 2022-05-14 | End: 2022-05-19

## 2022-05-14 RX ORDER — AMOXICILLIN AND CLAVULANATE POTASSIUM 875; 125 MG/1; MG/1
1 TABLET, FILM COATED ORAL EVERY 12 HOURS SCHEDULED
Status: DISCONTINUED | OUTPATIENT
Start: 2022-05-14 | End: 2022-05-14 | Stop reason: HOSPADM

## 2022-05-14 RX ORDER — INSULIN GLARGINE 100 [IU]/ML
5 INJECTION, SOLUTION SUBCUTANEOUS 2 TIMES DAILY
Status: DISCONTINUED | OUTPATIENT
Start: 2022-05-14 | End: 2022-05-14 | Stop reason: HOSPADM

## 2022-05-14 RX ORDER — AMOXICILLIN AND CLAVULANATE POTASSIUM 875; 125 MG/1; MG/1
1 TABLET, FILM COATED ORAL EVERY 12 HOURS SCHEDULED
Qty: 9 TABLET | Refills: 0 | Status: SHIPPED | OUTPATIENT
Start: 2022-05-14 | End: 2022-05-19

## 2022-05-14 RX ORDER — GUAIFENESIN 600 MG/1
600 TABLET, EXTENDED RELEASE ORAL 2 TIMES DAILY
Qty: 30 TABLET | Refills: 0 | Status: SHIPPED | OUTPATIENT
Start: 2022-05-14 | End: 2022-05-14

## 2022-05-14 RX ADMIN — RIVAROXABAN 15 MG: 15 TABLET, FILM COATED ORAL at 08:52

## 2022-05-14 RX ADMIN — BUMETANIDE 1 MG: 1 TABLET ORAL at 08:51

## 2022-05-14 RX ADMIN — PANTOPRAZOLE SODIUM 20 MG: 20 TABLET, DELAYED RELEASE ORAL at 08:51

## 2022-05-14 RX ADMIN — GUAIFENESIN 600 MG: 600 TABLET ORAL at 08:50

## 2022-05-14 RX ADMIN — METHYLPREDNISOLONE SODIUM SUCCINATE 40 MG: 40 INJECTION, POWDER, FOR SOLUTION INTRAMUSCULAR; INTRAVENOUS at 06:00

## 2022-05-14 RX ADMIN — AMOXICILLIN AND CLAVULANATE POTASSIUM 1 TABLET: 875; 125 TABLET, FILM COATED ORAL at 09:14

## 2022-05-14 RX ADMIN — MEMANTINE HYDROCHLORIDE 10 MG: 5 TABLET ORAL at 08:50

## 2022-05-14 RX ADMIN — SERTRALINE HYDROCHLORIDE 50 MG: 50 TABLET ORAL at 08:51

## 2022-05-14 RX ADMIN — DOXYCYCLINE HYCLATE 100 MG: 100 CAPSULE ORAL at 08:51

## 2022-05-14 RX ADMIN — ACETAMINOPHEN 650 MG: 325 TABLET ORAL at 00:32

## 2022-05-14 RX ADMIN — DONEPEZIL HYDROCHLORIDE 10 MG: 5 TABLET, FILM COATED ORAL at 08:51

## 2022-05-14 RX ADMIN — INSULIN LISPRO 2 UNITS: 100 INJECTION, SOLUTION INTRAVENOUS; SUBCUTANEOUS at 09:13

## 2022-05-14 RX ADMIN — INSULIN GLARGINE 5 UNITS: 100 INJECTION, SOLUTION SUBCUTANEOUS at 09:13

## 2022-05-14 RX ADMIN — POLYETHYLENE GLYCOL (3350) 17 G: 17 POWDER, FOR SOLUTION ORAL at 08:52

## 2022-05-14 RX ADMIN — MULTIPLE VITAMINS W/ MINERALS TAB 1 TABLET: TAB at 08:51

## 2022-05-14 RX ADMIN — MICONAZOLE NITRATE: 2 POWDER TOPICAL at 09:15

## 2022-05-14 ASSESSMENT — PAIN SCALES - GENERAL
PAINLEVEL_OUTOF10: 5
PAINLEVEL_OUTOF10: 0

## 2022-05-14 ASSESSMENT — PAIN DESCRIPTION - FREQUENCY: FREQUENCY: CONTINUOUS

## 2022-05-14 ASSESSMENT — PAIN - FUNCTIONAL ASSESSMENT: PAIN_FUNCTIONAL_ASSESSMENT: PREVENTS OR INTERFERES SOME ACTIVE ACTIVITIES AND ADLS

## 2022-05-14 ASSESSMENT — PAIN DESCRIPTION - ONSET: ONSET: ON-GOING

## 2022-05-14 ASSESSMENT — PAIN DESCRIPTION - DESCRIPTORS: DESCRIPTORS: ACHING

## 2022-05-14 ASSESSMENT — PAIN DESCRIPTION - LOCATION: LOCATION: LEG

## 2022-05-14 ASSESSMENT — PAIN DESCRIPTION - PAIN TYPE: TYPE: CHRONIC PAIN

## 2022-05-14 ASSESSMENT — PAIN DESCRIPTION - DIRECTION: RADIATING_TOWARDS: NO

## 2022-05-14 ASSESSMENT — PAIN DESCRIPTION - ORIENTATION: ORIENTATION: RIGHT;LEFT

## 2022-05-14 NOTE — DISCHARGE SUMMARY
[facility] Hospitalists    Discharge Summary      Tomasz Craig  :  1940  MRN:  [id number]    Admit date:  2022  Discharge date:    2022    Discharging Physician:  Dr. Pablo Lawson Directive: DNR    Consults: none    Primary Care Physician:  Perla Wing MD    Discharge Diagnoses:  Principal Problem:    Acute on chronic diastolic (congestive) heart failure (HCC)  Active Problems:    Chronic anticoagulation    PAF (paroxysmal atrial fibrillation) ([facility] [address]    Type 2 diabetes mellitus without complication, with long-term current use of insulin ([facility] [address]    Obstructive sleep apnea    Palliative care patient    Stage 3b chronic kidney disease ([facility] [address]    Alzheimer's disease, unspecified  Resolved Problems:    * No resolved hospital problems. *      Portions of this note have been copied forward, however, changed to reflect the most current clinical status of this patient. Hospital Course: The patient is a 78 y.o. female with past medical history of CKD stage IIIb, Alzheimer's, atrial fibrillation on chronic anticoagulation with Xarelto, MARILEE with BiPAP use, colon cancer, diastolic congestive heart failure, hypertension, hyperlipidemia, and restless leg syndrome who presented to [facility] ED complaining of shortness of breath with known COVID-positive exposure.  Patient has dementia and unable to provide any HPI. Daughter(Kavita) indicated patient is significantly demented at baseline. Reported up until approximately 48 hours ago the patient was able to walk with a walker. Daughter was hospitalized last night and was found to be COVID-positive. Family and care givers for patient at home tested patient she was positive as well. Family indicated they were unable to get the patient up to the bathroom as she was very weak. They also indicated she was seeming short of breath.  ED work-up indicated potassium 5.6, creatinine 1.5 which is patient's baseline, CRP 1.18, proBNP 5399 and chest x-ray indicating cardiomegaly with indistinct pulmonary vascular margins likely representing early pulmonary vascular congestion.  Patient was satting 90% on room air upon arrival however ED physician noted patient at 87% on room air and patient was then placed on 2 L nasal cannula.  Patient was admitted to the hospital medicine for acute on chronic diastolic heart failure. IV Lasix was initiated. Patient improved symptomatically. Home O2 evaluation was completed, patient does not require supplemental oxygen at this time. She is being discharged on Augmentin, Doxycycline, Bumex, and Mucinex. She has been advised to follow up with PCP. Patient is currently in stable condition to be discharged home with home health. Significant Diagnostic Studies:     Echo Complete  Result Date: 5/13/2022    Summary  Left ventricle had normal chamber size and concentric hypertrophy  Diastolic function cannot be determined accurately because of atrial  fibrillation  Preserved systolic wall motion with estimated ejection fraction of 65%  No regional wall motion abnormality  Mild tricuspid insufficiency, right ventricular systolic pressure cannot  be determined because of no measurement of IVC  Aortic sclerosis with mean gradient of 4 mm  Mild degree of mitral leaflet thickening with normal mobility, mild to  moderate mitral regurgitation  Mildly dilated left atrium  Moderately dilated right atrium   Signature   ----------------------------------------------------------------  Electronically signed by Jenna Isbell MD(Interpreting physician)  on 05/13/2022 04:16 PM       XR CHEST PORTABLE  Result Date: 5/12/2022    1. Mild cardiomegaly with indistinct pulmonary vascular margins likely representing early pulmonary vascular congestion. . Signed by Dr Fifi Yip      Pertinent Labs:   CBC:   Recent Labs     05/12/22  1219 05/13/22  0322 05/14/22  0324   WBC 8.7 7.8 6.0   HGB 10.5* 11.4* 10.5*    235 246     BMP:    Recent Labs mouth 2 times daily     FeroSul 325 (65 Fe) MG tablet  Generic drug: ferrous sulfate  TAKE 1 TABLET BY MOUTH EVERY DAY WITH BREAKFAST     melatonin 3 MG Tabs tablet     memantine 10 MG tablet  Commonly known as: NAMENDA  Take 1 tablet by mouth 2 times daily     metoprolol succinate 25 MG extended release tablet  Commonly known as: Toprol XL  Take 1 tablet by mouth nightly     nystatin 094015 UNIT/GM powder  Commonly known as: MYCOSTATIN     pantoprazole 20 MG tablet  Commonly known as: PROTONIX     rivaroxaban 15 MG Tabs tablet  Commonly known as: XARELTO  Take 1 tablet by mouth daily (with breakfast)     SITagliptin 100 MG tablet  Commonly known as: Januvia  Take 1 tablet by mouth daily     therapeutic multivitamin-minerals tablet  Take 1 tablet by mouth daily     traZODone 100 MG tablet  Commonly known as: DESYREL  TAKE 1 TABLET BY MOUTH NIGHTLY     Zoloft 100 MG tablet  Generic drug: sertraline        STOP taking these medications    furosemide 40 MG tablet  Commonly known as: LASIX     oxybutynin 10 MG extended release tablet  Commonly known as: DITROPAN-XL           Where to Get Your Medications      These medications were sent to [facility] [phone]  [address] [address]    Phone: [phone]   · FeroSul 325 (65 Fe) MG tablet     These medications were sent to [facility] #[id number] [address] [address]    Phone: [phone]   · amoxicillin-clavulanate 875-125 MG per tablet  · bumetanide 1 MG tablet  · doxycycline hyclate 100 MG capsule  · guaiFENesin 600 MG extended release tablet  · methylPREDNISolone 4 MG tablet            Discharge Instructions: Follow up with Jame Main MD in 7 days. Take medications as directed. Resume activity as tolerated. Diet: ADULT DIET; Dysphagia - Soft and Bite Sized; 4 carb choices (60 gm/meal);  Low Sodium (2 gm); Low Potassium (Less than 3000 mg/day); Safety Tray; Safety Tray (Disposables)     Disposition: Patient is medically stable and will be discharged Home with home health. Time spent on discharge 38 minutes spent in assessing patient, reviewing medications, discussion with nursing, confirming safe discharge plan and preparation of discharge summary. Signed:  Electronically signed by EMY Jack CNP on 5/14/22 at 10:26 AM CDT         EMR Dragon/Transcription disclaimer:   Much of this encounter note is an electronic transcription/translation of spoken language to printed text. The electronic translation of spoken language may permit erroneous, or at times, nonsensical words or phrases to be inadvertently transcribed; although attempts have made to review the note for such errors, some may still exist.      Attestation Statement     I have independently seen and examined this patient and agree with the asesment and plan by mid level provider                                                             Objective:   Vitals: BP (!) 166/96   Pulse 98   Temp 97.3 °F (36.3 °C) (Temporal)   Resp 18   Ht 5' 5\" (1.651 m)   Wt 201 lb 2 oz (91.2 kg)   SpO2 92% Comment: with ambulation  BMI 33.47 kg/m²   General appearance: alert, appears stated age and cooperative  Skin: Skin color, texture, turgor normal.   HEENT: Head: Normocephalic, no lesions, without obvious abnormality.   Neck: no adenopathy, no carotid bruit, no JVD and supple, symmetrical, trachea midline  Lungs: wheezes bilaterally  Heart: irregularly irregular rhythm  Abdomen: soft, non-tender; bowel sounds normal; no masses,  no organomegaly  Extremities: extremities normal, atraumatic, no cyanosis or edema  Lymphatic: No significant lymph node enlargement papable  Neurologic: Mental status: Alert, oriented, thought content appropriate      DC DX   COPD AE- steroid pack, ab  Hypoxia- resolved  Chronic afib - BB and xarelto  Hyperkalemia- resolved   Covid positive  Dementia   Iron def anemia     Cleared for DC home with [facility] for PT/OT    Valery Johnson MD

## 2022-05-14 NOTE — PLAN OF CARE
Problem: Pain  Goal: Verbalizes/displays adequate comfort level or baseline comfort level  5/14/2022 1146 by Ran Bower LPN  Outcome: Adequate for Discharge  Flowsheets (Taken 5/14/2022 0630 by Wanda Capellan LPN)  Verbalizes/displays adequate comfort level or baseline comfort level:   Encourage patient to monitor pain and request assistance   Assess pain using appropriate pain scale   Administer analgesics based on type and severity of pain and evaluate response   Implement non-pharmacological measures as appropriate and evaluate response  5/14/2022 0308 by Wanda Capellan LPN  Outcome: Progressing     Problem: Skin/Tissue Integrity  Goal: Absence of new skin breakdown  Description: 1. Monitor for areas of redness and/or skin breakdown  2. Assess vascular access sites hourly  3. Every 4-6 hours minimum:  Change oxygen saturation probe site  4. Every 4-6 hours:  If on nasal continuous positive airway pressure, respiratory therapy assess nares and determine need for appliance change or resting period. 5/14/2022 1146 by Ran Bower LPN  Outcome: Adequate for Discharge  5/14/2022 0308 by Wanda Capellan LPN  Outcome: Progressing     Problem: Safety - Adult  Goal: Free from fall injury  5/14/2022 1146 by Ran Bower LPN  Outcome: Adequate for Discharge  5/14/2022 0308 by Wanda Capellan LPN  Outcome: Progressing     Problem: ABCDS Injury Assessment  Goal: Absence of physical injury  5/14/2022 1146 by Ran Bower LPN  Outcome: Adequate for Discharge  5/14/2022 0308 by Wanda Capellan LPN  Outcome: Progressing     Problem: Confusion  Goal: Confusion, delirium, dementia, or psychosis is improved or at baseline  Description: INTERVENTIONS:  1. Assess for possible contributors to thought disturbance, including medications, impaired vision or hearing, underlying metabolic abnormalities, dehydration, psychiatric diagnoses, and notify attending LIP  2.  [place] high risk fall precautions, as indicated  3. Provide frequent short contacts to provide reality reorientation, refocusing and direction  4. Decrease environmental stimuli, including noise as appropriate  5. Monitor and intervene to maintain adequate nutrition, hydration, elimination, sleep and activity  6. If unable to ensure safety without constant attention obtain sitter and review sitter guidelines with assigned personnel  7.  Initiate Psychosocial CNS and Spiritual Care consult, as indicated  5/14/2022 1146 by Yvette Mcpherson LPN  Outcome: Adequate for Discharge  5/14/2022 0308 by Yony Fregoso LPN  Outcome: Progressing     Problem: Chronic Conditions and Co-morbidities  Goal: Patient's chronic conditions and co-morbidity symptoms are monitored and maintained or improved  5/14/2022 1146 by Yvette Mcpherson LPN  Outcome: Adequate for Discharge  Flowsheets (Taken 5/14/2022 9372 by Yony Fregoso LPN)  Care Plan - Patient's Chronic Conditions and Co-Morbidity Symptoms are Monitored and Maintained or Improved: Monitor and assess patient's chronic conditions and comorbid symptoms for stability, deterioration, or improvement  5/14/2022 0308 by Yony Fregoso LPN  Outcome: Progressing     Problem: Discharge Planning  Goal: Discharge to home or other facility with appropriate resources  5/14/2022 1146 by Yvette Mcpherson LPN  Outcome: Adequate for Discharge  Flowsheets (Taken 5/14/2022 1821 by Yony Fregoso LPN)  Discharge to home or other facility with appropriate resources: Identify barriers to discharge with patient and caregiver  5/14/2022 0308 by Yony Fregoso LPN  Outcome: Progressing

## 2022-05-14 NOTE — PLAN OF CARE
Problem: Pain  Goal: Verbalizes/displays adequate comfort level or baseline comfort level  Outcome: Progressing     Problem: Skin/Tissue Integrity  Goal: Absence of new skin breakdown  Description: 1. Monitor for areas of redness and/or skin breakdown  2. Assess vascular access sites hourly  3. Every 4-6 hours minimum:  Change oxygen saturation probe site  4. Every 4-6 hours:  If on nasal continuous positive airway pressure, respiratory therapy assess nares and determine need for appliance change or resting period. Outcome: Progressing     Problem: Safety - Adult  Goal: Free from fall injury  Outcome: Progressing     Problem: ABCDS Injury Assessment  Goal: Absence of physical injury  Outcome: Progressing     Problem: Confusion  Goal: Confusion, delirium, dementia, or psychosis is improved or at baseline  Description: INTERVENTIONS:  1. Assess for possible contributors to thought disturbance, including medications, impaired vision or hearing, underlying metabolic abnormalities, dehydration, psychiatric diagnoses, and notify attending LIP  2. [place] high risk fall precautions, as indicated  3. Provide frequent short contacts to provide reality reorientation, refocusing and direction  4. Decrease environmental stimuli, including noise as appropriate  5. Monitor and intervene to maintain adequate nutrition, hydration, elimination, sleep and activity  6. If unable to ensure safety without constant attention obtain sitter and review sitter guidelines with assigned personnel  7.  Initiate Psychosocial CNS and Spiritual Care consult, as indicated  Outcome: Progressing     Problem: Chronic Conditions and Co-morbidities  Goal: Patient's chronic conditions and co-morbidity symptoms are monitored and maintained or improved  Outcome: Progressing     Problem: Discharge Planning  Goal: Discharge to home or other facility with appropriate resources  Outcome: Progressing

## 2022-05-16 ENCOUNTER — CARE COORDINATION (OUTPATIENT)
Dept: CASE MANAGEMENT | Age: 82
End: 2022-05-16

## 2022-05-16 ENCOUNTER — TELEPHONE (OUTPATIENT)
Dept: INTERNAL MEDICINE | Age: 82
End: 2022-05-16

## 2022-05-16 DIAGNOSIS — Z79.01 CHRONIC ANTICOAGULATION: ICD-10-CM

## 2022-05-16 DIAGNOSIS — I48.0 PAF (PAROXYSMAL ATRIAL FIBRILLATION) (HCC): ICD-10-CM

## 2022-05-16 DIAGNOSIS — G47.33 OBSTRUCTIVE SLEEP APNEA: ICD-10-CM

## 2022-05-16 DIAGNOSIS — Z79.4 TYPE 2 DIABETES MELLITUS WITHOUT COMPLICATION, WITH LONG-TERM CURRENT USE OF INSULIN (HCC): ICD-10-CM

## 2022-05-16 DIAGNOSIS — G30.9 ALZHEIMER'S DISEASE, UNSPECIFIED (CODE) (HCC): ICD-10-CM

## 2022-05-16 DIAGNOSIS — E11.9 TYPE 2 DIABETES MELLITUS WITHOUT COMPLICATION, WITH LONG-TERM CURRENT USE OF INSULIN (HCC): ICD-10-CM

## 2022-05-16 DIAGNOSIS — I50.33 ACUTE ON CHRONIC DIASTOLIC CONGESTIVE HEART FAILURE (HCC): Primary | ICD-10-CM

## 2022-05-16 DIAGNOSIS — N18.32 STAGE 3B CHRONIC KIDNEY DISEASE (HCC): ICD-10-CM

## 2022-05-16 LAB
CULTURE, RESPIRATORY: ABNORMAL
CULTURE, RESPIRATORY: ABNORMAL
EKG P AXIS: NORMAL DEGREES
EKG P-R INTERVAL: NORMAL MS
EKG Q-T INTERVAL: 372 MS
EKG QRS DURATION: 82 MS
EKG QTC CALCULATION (BAZETT): 428 MS
EKG T AXIS: 20 DEGREES
GRAM STAIN RESULT: ABNORMAL
ORGANISM: ABNORMAL

## 2022-05-16 PROCEDURE — 1111F DSCHRG MED/CURRENT MED MERGE: CPT | Performed by: INTERNAL MEDICINE

## 2022-05-16 NOTE — CONSULTS
Pt with EF 65% and significant dementia. Covid + along with rest of family she lives with. Teaching not appropriate at this time with no family available and pt unable to remember. DD can not be determined accurately because of afib but in 2019 DD was Grade II.

## 2022-05-16 NOTE — TELEPHONE ENCOUNTER
Richard García received a referral from the hospital on this pt. This [facility] gave the verbal that  will follow.

## 2022-05-16 NOTE — CARE COORDINATION
Patient contacted regarding COVID-19 diagnosis. Discussed COVID-19 related testing which was available at this time. Test results were positive. Patient informed of results, if available? Patient aware. LPN Care Coordinator contacted the family by telephone to perform post discharge assessment. Call within 2 business days of discharge: Yes. Verified name and  with family as identifiers. Provided introduction to self, and explanation of the CTN/ACM role, and reason for call due to risk factors for infection and/or exposure to COVID-19. Symptoms reviewed with family who verbalized the following symptoms: pain or aching joints  cough  no new symptoms  no worsening symptoms  congestion. Due to no new or worsening symptoms encounter was not routed to provider for escalation. Discussed follow-up appointments. If no appointment was previously scheduled, appointment scheduling offered: No.  [facility] follow up appointment(s):   Future Appointments   Date Time Provider [place]   2022 11:00 AM MD SANGEETA Beckwith [facility]   2022  1:45 PM EMY Baez Cardio [facility]     Non-[facility] follow up appointment(s):     Non-face-to-face services provided:  Obtained and reviewed discharge summary and/or continuity of care documents     Advance Care Planning:   Does patient have an Advance Directive:  unable to view. Educated patient about risk for severe COVID-19 due to risk factors according to CDC guidelines. LPN CC reviewed discharge instructions, medical action plan and red flag symptoms with the family who verbalized understanding. Discussed COVID vaccination status: Yes. Education provided on COVID-19 vaccination as appropriate. Discussed exposure protocols and quarantine with CDC Guidelines. Family was given an opportunity to verbalize any questions and concerns and agrees to contact LPN CC or health care provider for questions related to their healthcare.     Reviewed and educated family on any new and changed medications related to discharge diagnosis     Was patient discharged with a pulse oximeter? No Discussed and confirmed pulse oximeter discharge instructions and when to notify provider or seek emergency care. Patients daughter Kathy Parada reports she is doing well this morning. Appetite and fluid intake is good. No problems with bladder or bowel elimination. No hhc. Patient ambulates with a walker. She has pain in her legs. Medication reconciliation and 1111f completed. Daughter will get follow up scheduled. No questions, needs or concerns at this time. Will continue to follow. LPN CC provided contact information. Plan for follow-up call in 5-7 days based on severity of symptoms and risk factors.

## 2022-05-16 NOTE — TELEPHONE ENCOUNTER
PT is being followed by the care coordinator nurse Inga Betancur LPN. Estuardo Fraser has already spoken to the pt and the pt already has an apt for follow up in the office on 5/20.

## 2022-05-17 ENCOUNTER — TELEPHONE (OUTPATIENT)
Dept: INTERNAL MEDICINE CLINIC | Age: 82
End: 2022-05-17

## 2022-05-17 LAB
BLOOD CULTURE, ROUTINE: NORMAL
CULTURE, BLOOD 2: NORMAL

## 2022-05-17 NOTE — TELEPHONE ENCOUNTER
[address] [address] nurse admitted pt today and per [place] nurse pt has slight wheeze LLL  posterior. Occasional  productive cough of thick creamy mucus. Pt is On po antibiotics. Slight amount of non pitting edema ble. [place] nurse is  planning on seeing 1x a week x 3 weeks.      Is it Lorna Bowers for a  consult for [facility] with obtaining sitters and getting EMS dnr ?

## 2022-05-18 ENCOUNTER — TELEPHONE (OUTPATIENT)
Dept: INTERNAL MEDICINE CLINIC | Age: 82
End: 2022-05-18

## 2022-05-18 NOTE — TELEPHONE ENCOUNTER
Jamari Moser worker reporting pt dgt is requesting pt to be DNR and asked for Sw to assist with EMS DNR papers  - is this ok ? Also :   PT evaluation completed; Patient currently staying at her daughters home which is a change in environment for patient who has alzheimers. Patient was unable to meaningfully participate with therapy during evaluation. Daughter states patient is much more cooperative in her own home. Daughter plans to move patient back to her own home this weekend. RECOMMENDATION:   PT reassessment in 1 week when back in familiar home environment  is this ok  ?

## 2022-05-20 ENCOUNTER — TELEMEDICINE (OUTPATIENT)
Dept: INTERNAL MEDICINE | Age: 82
End: 2022-05-20
Payer: MEDICARE

## 2022-05-20 ENCOUNTER — TELEPHONE (OUTPATIENT)
Dept: INTERNAL MEDICINE CLINIC | Age: 82
End: 2022-05-20

## 2022-05-20 DIAGNOSIS — G25.81 RLS (RESTLESS LEGS SYNDROME): ICD-10-CM

## 2022-05-20 DIAGNOSIS — I50.9 CONGESTIVE HEART FAILURE, UNSPECIFIED HF CHRONICITY, UNSPECIFIED HEART FAILURE TYPE (HCC): ICD-10-CM

## 2022-05-20 DIAGNOSIS — E11.21 TYPE II DIABETES MELLITUS WITH NEPHROPATHY (HCC): ICD-10-CM

## 2022-05-20 DIAGNOSIS — U07.1 COVID-19: Primary | ICD-10-CM

## 2022-05-20 PROBLEM — I50.22 CHRONIC SYSTOLIC (CONGESTIVE) HEART FAILURE (HCC): Status: ACTIVE | Noted: 2022-05-20

## 2022-05-20 PROCEDURE — 99496 TRANSJ CARE MGMT HIGH F2F 7D: CPT | Performed by: INTERNAL MEDICINE

## 2022-05-20 RX ORDER — ROPINIROLE 0.25 MG/1
0.5 TABLET, FILM COATED ORAL NIGHTLY
Qty: 30 TABLET | Refills: 3 | Status: SHIPPED | OUTPATIENT
Start: 2022-05-20 | End: 2022-08-04

## 2022-05-20 ASSESSMENT — ENCOUNTER SYMPTOMS
SORE THROAT: 0
ABDOMINAL DISTENTION: 0
RHINORRHEA: 0
CHEST TIGHTNESS: 0
BLOOD IN STOOL: 0
DIARRHEA: 0
PHOTOPHOBIA: 0
FACIAL SWELLING: 0
VOICE CHANGE: 0
EYE ITCHING: 0
RECTAL PAIN: 0
ABDOMINAL PAIN: 0
EYE DISCHARGE: 0
SINUS PRESSURE: 0
COUGH: 0
EYE REDNESS: 0
EYE PAIN: 0
TROUBLE SWALLOWING: 0
COLOR CHANGE: 0
SHORTNESS OF BREATH: 0
VOMITING: 0
NAUSEA: 0
WHEEZING: 0
ANAL BLEEDING: 0
BACK PAIN: 1
CONSTIPATION: 0
CHOKING: 0

## 2022-05-20 NOTE — PROGRESS NOTES
2022    TELEHEALTH EVALUATION -- Audio/Visual (During HDAOE-48 public health emergency)    HPI:    Milagros Jean Baptiste (:  1940) has requested an audio/video evaluation for the following concern(s):    Ms. Anais Herman is a 21-year-old woman who presents to the office today for follow-up after being admitted to [facility] between May 12 and May 14 with congestive heart failure exacerbation and COVID-19. States that she is doing much better. On the day of admission, she was very weak. She had not been able to walk with her walker. Her daughter have recently been hospitalized with COVID-19. He, the caregivers cannot get the patient up to the bathroom because she was very weak. She also was having increasing shortness of breath. In the emergency room, her potassium was 5.6, her creatinine was 1.5, which was at her baseline. Her BNP was elevated at 5399 and a chest x-ray showed cardiomegaly with indistinct pulmonary vascular margins likely contain early pulmonary vascular congestion. In the emergency room, her O2 saturation was 87% on room air and she was placed on O2 per nasal cannula to the hospitalist service where she was diuresed with IV Lasix. She was discharged home on Augmentin, doxycycline, Bumex, and Mucinex. She has a pulse ox has been running between 95 and 96. Her legs look good. She is not having any swelling. She denies any complaints of shortness of breath. She does complain that her legs cramp. She screams out in pain at night. Home health is coming out the first of the week. Her blood sugars have been running between 198 and 204. However, she is on steroids. When she states that her legs hurt, she complains that they feel like they are \"broke. \". Her daughter thinks that she may have restless leg syndrome. We can certainly call in some Requip to see if that will help. We will get home health also do a CBC CMP and BNP next time they come out.   Sometimes, she talks about her mom and dad. She been more depressed lately. However, her daughter does not think that she has any urinary tract symptoms. She does have a known history of dementia. Review of Systems   Constitutional: Negative for activity change, appetite change, chills, diaphoresis, fatigue, fever and unexpected weight change. HENT: Negative for congestion, ear pain, facial swelling, hearing loss, mouth sores, nosebleeds, postnasal drip, rhinorrhea, sinus pressure, sneezing, sore throat, tinnitus, trouble swallowing and voice change. Eyes: Negative for photophobia, pain, discharge, redness, itching and visual disturbance. Respiratory: Negative for cough, choking, chest tightness, shortness of breath and wheezing. Cardiovascular: Negative for chest pain, palpitations and leg swelling. Gastrointestinal: Negative for abdominal distention, abdominal pain, anal bleeding, blood in stool, constipation, diarrhea, nausea, rectal pain and vomiting. Endocrine: Negative for cold intolerance, heat intolerance, polydipsia, polyphagia and polyuria. Genitourinary: Positive for frequency and urgency. Negative for decreased urine volume, difficulty urinating, dysuria, flank pain and hematuria. Musculoskeletal: Positive for back pain. Negative for arthralgias, gait problem, joint swelling, myalgias, neck pain and neck stiffness. Right hip pain. Complains of bilateral leg pain. Skin: Negative for color change, pallor and rash. Allergic/Immunologic: Negative for environmental allergies and food allergies. Neurological: Negative for dizziness, tremors, syncope, weakness, light-headedness, numbness and headaches. Hematological: Negative for adenopathy. Does not bruise/bleed easily. Psychiatric/Behavioral: Positive for confusion and dysphoric mood. Negative for agitation, behavioral problems, decreased concentration, hallucinations, self-injury, sleep disturbance and suicidal ideas.  The patient is not nervous/anxious and is not hyperactive. Prior to Visit Medications    Medication Sig Taking? Authorizing Provider   rOPINIRole (REQUIP) 0.25 MG tablet Take 2 tablets by mouth nightly Yes Rai Kim MD   bumetanide (BUMEX) 1 MG tablet Take 1 tablet by mouth daily  [facility] CNP   methylPREDNISolone (MEDROL DOSEPACK) 4 MG tablet Take by mouth. Urszula Reyna MD   FEROSUL 325 (65 Fe) MG tablet TAKE 1 TABLET BY MOUTH EVERY DAY WITH BREAKFAST  Rai Kim MD   melatonin 3 MG TABS tablet Take 10 mg by mouth nightly as needed  Historical Provider, MD   donepezil (ARICEPT) 10 MG tablet Take 1 tablet by mouth 2 times daily  Oren Pepper MD   memantine (NAMENDA) 10 MG tablet Take 1 tablet by mouth 2 times daily  Oren Pepper MD   ARIPiprazole (ABILIFY) 2 MG tablet Take 1 tablet by mouth every evening  Oren Pepper MD   atorvastatin (LIPITOR) 40 MG tablet Take 1 tablet by mouth nightly  Rai Kim MD   rivaroxaban (XARELTO) 15 MG TABS tablet Take 1 tablet by mouth daily (with breakfast)  [facility] Asp   traZODone (DESYREL) 100 MG tablet TAKE 1 TABLET BY MOUTH NIGHTLY  Oren Pepper MD   blood glucose monitor kit and supplies QD E11.9  Rai Kim MD   metoprolol succinate (TOPROL XL) 25 MG extended release tablet Take 1 tablet by mouth nightly  [facility] Asp   colesevelam (WELCHOL) 625 MG tablet Take 1 tablet by mouth 2 times daily (with meals)  Patient not taking: Reported on 5/16/2022  Rai Kim MD   SITagliptin (JANUVIA) 100 MG tablet Take 1 tablet by mouth daily  Rai Kim MD   pantoprazole (PROTONIX) 20 MG tablet Take 20 mg by mouth daily   Historical Provider, MD   nystatin (MYCOSTATIN) 958612 UNIT/GM powder Apply topically 4 times daily Apply topically 4 times daily.   Historical Provider, MD   Multiple Vitamins-Minerals (THERAPEUTIC MULTIVITAMIN-MINERALS) tablet Take 1 tablet by mouth daily  Cintia Bah MD   sertraline (ZOLOFT) 100 MG tablet Take 50 mg by mouth at bedtime   Historical Provider, MD       Social History     Tobacco Use    Smoking status: Never Smoker    Smokeless tobacco: Never Used   Vaping Use    Vaping Use: Not on file   Substance Use Topics    Alcohol use: No    Drug use: No        No Known Allergies,   Past Medical History:   Diagnosis Date    KANCHAN (acute kidney injury) ([facility] 12/30/2018    Alzheimer disease ([facility]     Arthritis     Atrial fibrillation (HCC)     BiPAP (biphasic positive airway pressure) dependence     15cm/11cm    Cancer (HCC)     COLON CA    CHF (congestive heart failure) ([facility] 12/30/2018    Chronic atrial fibrillation ([facility] 2/12/2017    Dementia ([facility]     Depression     Diabetes mellitus ([facility]     Essential hypertension 7/11/2017    History of blood transfusion     Hyperlipidemia     Hypertension     Mixed hyperlipidemia 7/11/2017    Neuropathy 11/19/2021    Obesity     Obstructive sleep apnea     AHI:  18.7;  In REM AHI:  39.3 per PSG, 11/2008; split-night PSG, 8/2017 AHI: 35.1    Osteoarthritis     Palliative care patient 01/08/2019    Pneumonia due to organism     Restless leg syndrome 8/1/2017    Sinus complaint     Swelling    ,   Past Surgical History:   Procedure Laterality Date    ADENOIDECTOMY      APPENDECTOMY      CARDIOVERSION      x 2     CHOLECYSTECTOMY      COLONOSCOPY  05/17/2013    Dr. Abhishek Nichole COLONOSCOPY  03/22/2012    Dr Fabian Nation yr recall    COLONOSCOPY  06/30/2008    Dr Halina Chowdhury, 3 yr recall    COLONOSCOPY N/A 8/2/2019    Dr Osoiro Earthly sided anastomosis appeared normal and patent-prn (age)   Dionna Delongden HEMICOLECTOMY Right 1998    2 FT OF COLON REMOVED DUE TO CA    HYSTERECTOMY      TX COLONOSCOPY W/BIOPSY SINGLE/MULTIPLE N/A 6/6/2017    Dr Elyssa Cooper colon anastomosis-Tubular AP (-) dysplasia x 2--3 yr recall    TONSILLECTOMY      TUMOR REMOVAL      stomach    UPPER GASTROINTESTINAL ENDOSCOPY  05/17/2013 Dr. Ventura Cole ulcer disease-Chelo (+)    UPPER GASTROINTESTINAL ENDOSCOPY  03/28/2012    Dr Ayaka Cloud antral ulceration with a visible vessel    UPPER GASTROINTESTINAL ENDOSCOPY N/A 8/2/2019    Dr Fredis Hackett   ,   Social History     Tobacco Use    Smoking status: Never Smoker    Smokeless tobacco: Never Used   Vaping Use    Vaping Use: Not on file   Substance Use Topics    Alcohol use: No    Drug use: No   ,   Family History   Problem Relation Age of Onset    No Known Problems Mother     No Known Problems Father     Colon Cancer Daughter     Colon Polyps Daughter     Esophageal Cancer Neg Hx     Liver Cancer Neg Hx     Rectal Cancer Neg Hx     Liver Disease Neg Hx     Stomach Cancer Neg Hx    ,   Immunization History   Administered Date(s) Administered    COVID-19, Climmie Messier, Primary or Immunocompromised, PF, 100mcg/0.5mL 08/04/2021, 08/23/2021    Influenza Virus Vaccine 10/01/2018    Influenza, High Dose (Fluzone 65 yrs and older) 10/15/2018, 10/05/2021    Pneumococcal Conjugate 13-valent ([id number]) 10/15/2018    Pneumococcal Polysaccharide ([id number]) 05/21/2021   ,   Health Maintenance   Topic Date Due    Hepatitis C screen  Never done    Shingles vaccine (1 of 2) Never done    DEXA (modify frequency per FRAX score)  Never done    COVID-19 Vaccine (3 - Booster) 01/23/2022    DTaP/Tdap/Td vaccine (1 - Tdap) 12/03/2022 (Originally 10/28/1959)    Depression Monitoring  06/28/2022    Colorectal Cancer Screen  08/02/2022    Annual Wellness Visit (AWV)  08/28/2022    Lipids  05/13/2023    Flu vaccine  Completed    Pneumococcal 65+ years Vaccine  Completed    Hepatitis A vaccine  Aged Out    Hib vaccine  Aged Out    Meningococcal (ACWY) vaccine  Aged Out       PHYSICAL EXAMINATION:  [ INSTRUCTIONS:  \"[x]\" Indicates a positive item  \"[]\" Indicates a negative item  -- DELETE ALL ITEMS NOT EXAMINED]  Vital Signs: (As obtained by patient/caregiver or practitioner observation)    Blood pressure-  Heart rate-    Respiratory rate-    Temperature-  Pulse oximetry-     Constitutional: [x] Appears well-developed and well-nourished [x] No apparent distress      [] Abnormal-   Mental status  [x] Alert and awake  [] Oriented to person/place/time [x]Able to follow commands      Eyes:  EOM    [x]  Normal  [] Abnormal-  Sclera  [x]  Normal  [] Abnormal -         Discharge [x]  None visible  [] Abnormal -    HENT:   [x] Normocephalic, atraumatic. [] Abnormal   [x] Mouth/Throat: Mucous membranes are moist.     External Ears [x] Normal  [] Abnormal-     Neck: [x] No visualized mass     Pulmonary/Chest: [x] Respiratory effort normal.  [x] No visualized signs of difficulty breathing or respiratory distress        [] Abnormal-      Musculoskeletal:   [x] Normal gait with no signs of ataxia         [x] Normal range of motion of neck        [] Abnormal-       Neurological:        [x] No Facial Asymmetry (Cranial nerve 7 motor function) (limited exam to video visit)          [x] No gaze palsy        [] Abnormal-         Skin:        [x] No significant exanthematous lesions or discoloration noted on facial skin         [] Abnormal-            Psychiatric:       [x] Normal Affect [x] No Hallucinations        [] Abnormal-     Other pertinent observable physical exam findings-     ASSESSMENT/PLAN:    44-year-old woman here for follow-up of CHF and COVID-19      1. CHF/COVID-19: Appears to be doing much better at this time. We will check a CBC CMP and BNP to evaluate for any evaluation of heart failure. Apparently, she was hyperkalemic and had an elevated BNP while she was hospitalized. 2.  Restless leg syndrome: Try Requip. 3. Type 2 diabetes: Blood sugars are somewhat elevated. However, she remains on steroid therapy. This is a high complexity TCM visit    No follow-ups on file. Kevin Warren, was evaluated through a synchronous (real-time) audio-video encounter.  The patient (or guardian if applicable) is aware that this is a billable service, which includes applicable co-pays. This Virtual Visit was conducted with patient's (and/or legal guardian's) consent. The visit was conducted pursuant to the emergency declaration under the [facility], [address] [place] [address] authority and the [employer] Act. Patient identification was verified, and a caregiver was present when appropriate. The patient was located at home in a state where the provider was licensed to provide care. Total time spent on this encounter: Not billed by time    --Karen Wu MD on 5/20/2022 at 12:39 PM    An electronic signature was used to authenticate this note.

## 2022-05-20 NOTE — TELEPHONE ENCOUNTER
I also  have some labs that I ordered today that will need to be done next time that home health comes out

## 2022-05-20 NOTE — TELEPHONE ENCOUNTER
[address] [id number] OT eval completed on 5/17/22. Pt is at baseline for all ADLs with support and assistance provided by family   No skilled OT indicated at this time    Also  made contact with dt, Andrew Lawson, today to schedule the [facility] [place]. She stated she will not be off again until next friday, 5/27 and wants SW to make the visit then. MERI went ahead and provided the dt with some caregiver names and agencies.  MERI to enter  a new order for next week for eval      gloria

## 2022-05-23 ENCOUNTER — CARE COORDINATION (OUTPATIENT)
Dept: CASE MANAGEMENT | Age: 82
End: 2022-05-23

## 2022-05-23 NOTE — CARE COORDINATION
Era [facility] Transitions Follow Up Call    2022    Patient: Nicholas Galarza  Patient : 1940   MRN: [id number]  Reason for Admission:   Discharge Date: 22 RARS: Readmission Risk Score: 20.5 ( )         Spoke with: Zoila Gomez daughter    Care Transitions Subsequent and Final Call    Subsequent and Final Calls  Do you have any ongoing symptoms?: No  Have your medications changed?: No  Do you have any questions related to your medications?: No  Do you currently have any active services?: Yes  Are you currently active with any services?: Home Health  Do you have any needs or concerns that I can assist you with?: No  Identified Barriers: None  Care Transitions Interventions  Other Interventions: Follow Up : Spoke with patient's daughter today for follow up call. She reports patient is feeling some better, and getting stronger. She says she is breathing better and her pulse ox is reading in mid 90's, 94-95%. Patient has had her HFU, VV with PCP. Patient is being followed by [facility] at well. No problems or complaints today. Will follow up at a later time.    Future Appointments   Date Time Provider Ga Lange   2022 11:00 AM MD SANGEETA Watts   2022  1:45 PM [facility] [place]       Josi Ryan RN

## 2022-05-24 ENCOUNTER — TELEPHONE (OUTPATIENT)
Dept: INTERNAL MEDICINE | Age: 82
End: 2022-05-24

## 2022-05-24 ENCOUNTER — TELEPHONE (OUTPATIENT)
Dept: INTERNAL MEDICINE CLINIC | Age: 82
End: 2022-05-24

## 2022-05-24 LAB
ALBUMIN SERPL-MCNC: 3.4 G/DL (ref 3.5–5.2)
ALP BLD-CCNC: 88 U/L (ref 35–104)
ALT SERPL-CCNC: 16 U/L (ref 5–33)
ANION GAP SERPL CALCULATED.3IONS-SCNC: 9 MMOL/L (ref 7–19)
AST SERPL-CCNC: 18 U/L (ref 5–32)
BASOPHILS ABSOLUTE: 0.1 K/UL (ref 0–0.2)
BASOPHILS RELATIVE PERCENT: 0.5 % (ref 0–1)
BILIRUB SERPL-MCNC: <0.2 MG/DL (ref 0.2–1.2)
BUN BLDV-MCNC: 40 MG/DL (ref 8–23)
CALCIUM SERPL-MCNC: 8.5 MG/DL (ref 8.8–10.2)
CHLORIDE BLD-SCNC: 97 MMOL/L (ref 98–111)
CO2: 28 MMOL/L (ref 22–29)
CREAT SERPL-MCNC: 1.6 MG/DL (ref 0.5–0.9)
EOSINOPHILS ABSOLUTE: 0.1 K/UL (ref 0–0.6)
EOSINOPHILS RELATIVE PERCENT: 1 % (ref 0–5)
GFR AFRICAN AMERICAN: 38
GFR NON-AFRICAN AMERICAN: 31
GLUCOSE BLD-MCNC: 312 MG/DL (ref 74–109)
HCT VFR BLD CALC: 36.9 % (ref 37–47)
HEMOGLOBIN: 11.1 G/DL (ref 12–16)
IMMATURE GRANULOCYTES #: 0.3 K/UL
LYMPHOCYTES ABSOLUTE: 0.9 K/UL (ref 1.1–4.5)
LYMPHOCYTES RELATIVE PERCENT: 6.4 % (ref 20–40)
MCH RBC QN AUTO: 28.5 PG (ref 27–31)
MCHC RBC AUTO-ENTMCNC: 30.1 G/DL (ref 33–37)
MCV RBC AUTO: 94.9 FL (ref 81–99)
MONOCYTES ABSOLUTE: 1 K/UL (ref 0–0.9)
MONOCYTES RELATIVE PERCENT: 6.7 % (ref 0–10)
NEUTROPHILS ABSOLUTE: 12.2 K/UL (ref 1.5–7.5)
NEUTROPHILS RELATIVE PERCENT: 83.4 % (ref 50–65)
PDW BLD-RTO: 15.2 % (ref 11.5–14.5)
PLATELET # BLD: 219 K/UL (ref 130–400)
PMV BLD AUTO: 10.6 FL (ref 9.4–12.3)
POTASSIUM SERPL-SCNC: 5.3 MMOL/L (ref 3.5–5)
PRO-BNP: 2717 PG/ML (ref 0–1800)
RBC # BLD: 3.89 M/UL (ref 4.2–5.4)
SODIUM BLD-SCNC: 134 MMOL/L (ref 136–145)
TOTAL PROTEIN: 5.6 G/DL (ref 6.6–8.7)
WBC # BLD: 14.6 K/UL (ref 4.8–10.8)

## 2022-05-24 NOTE — TELEPHONE ENCOUNTER
[facility] is asking if pt should  Still be under full covid precautions at this point  ?   Looks like DX was 5/12

## 2022-05-24 NOTE — TELEPHONE ENCOUNTER
Daughter notified. Pt has [facility]. Will notify Cuate Rader have have CBC repeated in a week.

## 2022-05-24 NOTE — TELEPHONE ENCOUNTER
----- Message from Chato Hair MD sent at 5/24/2022 12:58 PM CDT -----  WBC a little elevated.  Repeat in a week

## 2022-05-26 ENCOUNTER — TELEPHONE (OUTPATIENT)
Dept: INTERNAL MEDICINE CLINIC | Age: 82
End: 2022-05-26

## 2022-05-26 NOTE — TELEPHONE ENCOUNTER
[facility] PT jorge completed  RECOMMENDATION:   Skilled PT medically necessary to perform PT reassessment and recommendations/education to caregivers now that patient has moved back into her own home environment. PT encouraged CG's to walk patient several times a day throughout home for mobility and strengthening and to engage patient in any activity involving movement of arms/legs that she will participate in. No further skilled PT is indicated at this time due to patient unable to meaningfully participate and benefit from therapeutic interventions.     gloria

## 2022-05-31 ENCOUNTER — CARE COORDINATION (OUTPATIENT)
Dept: CASE MANAGEMENT | Age: 82
End: 2022-05-31

## 2022-05-31 NOTE — CARE COORDINATION
Era 45 Transitions Follow Up Call    2022    Patient: Nicholas Galarza  Patient : 1940   MRN: [id number]  Reason for Admission:   Discharge Date: 22 RARS: Readmission Risk Score: 20.5 ( )         Spoke with: Nicholas Galarza daughter [facility] AN AFFILIATE OF [facility] Transitions Subsequent and Final Call    Subsequent and Final Calls  Do you have any ongoing symptoms?: No  Have your medications changed?: No  Do you have any questions related to your medications?: No  Do you currently have any active services?: Yes  Are you currently active with any services?: Home Health  Do you have any needs or concerns that I can assist you with?: No  Identified Barriers: None  Care Transitions Interventions  Other Interventions: Follow Up : Spoke with yKa Liu today for follow up call. She says patient is doing better. Says she is back in her home. PT has signed off, but skilled nursing is still going out. She is scheduled for a CBC today or tomorrow. Daughter reports good appetite, they got patient out of the house yesterday. Said at rest she reads 96% on RA and after getting her to bed and walking she was 94-95%. Patient is suppose to wear a CPAP but since getting dementia she does not compliant. Seems to be doing some better. CTN will follow up at a later time.    Future Appointments   Date Time Provider Ga Lange   2022 11:00 AM MD SANGEETA Watts   2022  1:45 PM [facility] Ryan RN

## 2022-06-01 RX ORDER — OXYBUTYNIN CHLORIDE 10 MG/1
10 TABLET, EXTENDED RELEASE ORAL DAILY
Qty: 30 TABLET | Refills: 3 | OUTPATIENT
Start: 2022-06-01

## 2022-06-01 RX ORDER — TRAZODONE HYDROCHLORIDE 100 MG/1
100 TABLET ORAL NIGHTLY
Qty: 90 TABLET | Refills: 1 | Status: SHIPPED | OUTPATIENT
Start: 2022-06-01 | End: 2022-08-11 | Stop reason: SDUPTHER

## 2022-06-01 NOTE — TELEPHONE ENCOUNTER
Requested Prescriptions     Pending Prescriptions Disp Refills    traZODone (DESYREL) 100 MG tablet [Pharmacy Med Name: TRAZODONE HYDROCHLORIDE 100MG TABLET] 90 tablet 1     Sig: TAKE 1 TABLET BY MOUTH NIGHTLY       Last Office Visit: 4/1/2022  Next Office Visit: Visit date not found  Last Medication Refill: 12/8/2021 with 1 JACKELIN

## 2022-06-02 ENCOUNTER — TELEPHONE (OUTPATIENT)
Dept: INTERNAL MEDICINE | Age: 82
End: 2022-06-02

## 2022-06-02 LAB
ALBUMIN SERPL-MCNC: 3.6 G/DL (ref 3.5–5.2)
ALP BLD-CCNC: 87 U/L (ref 35–104)
ALT SERPL-CCNC: 13 U/L (ref 5–33)
ANION GAP SERPL CALCULATED.3IONS-SCNC: 14 MMOL/L (ref 7–19)
AST SERPL-CCNC: 19 U/L (ref 5–32)
BASOPHILS ABSOLUTE: 0.1 K/UL (ref 0–0.2)
BASOPHILS RELATIVE PERCENT: 0.6 % (ref 0–1)
BILIRUB SERPL-MCNC: 0.3 MG/DL (ref 0.2–1.2)
BUN BLDV-MCNC: 44 MG/DL (ref 8–23)
CALCIUM SERPL-MCNC: 8.5 MG/DL (ref 8.8–10.2)
CHLORIDE BLD-SCNC: 99 MMOL/L (ref 98–111)
CHOLESTEROL, TOTAL: 185 MG/DL (ref 160–199)
CO2: 23 MMOL/L (ref 22–29)
CREAT SERPL-MCNC: 1.4 MG/DL (ref 0.5–0.9)
EOSINOPHILS ABSOLUTE: 0.2 K/UL (ref 0–0.6)
EOSINOPHILS RELATIVE PERCENT: 2.5 % (ref 0–5)
GFR AFRICAN AMERICAN: 44
GFR NON-AFRICAN AMERICAN: 36
GLUCOSE BLD-MCNC: 103 MG/DL (ref 74–109)
HBA1C MFR BLD: 9 % (ref 4–6)
HCT VFR BLD CALC: 33.7 % (ref 37–47)
HDLC SERPL-MCNC: 59 MG/DL (ref 65–121)
HEMOGLOBIN: 10.8 G/DL (ref 12–16)
IMMATURE GRANULOCYTES #: 0.1 K/UL
LDL CHOLESTEROL CALCULATED: 90 MG/DL
LYMPHOCYTES ABSOLUTE: 1.1 K/UL (ref 1.1–4.5)
LYMPHOCYTES RELATIVE PERCENT: 12.7 % (ref 20–40)
MCH RBC QN AUTO: 29.6 PG (ref 27–31)
MCHC RBC AUTO-ENTMCNC: 32 G/DL (ref 33–37)
MCV RBC AUTO: 92.3 FL (ref 81–99)
MONOCYTES ABSOLUTE: 0.8 K/UL (ref 0–0.9)
MONOCYTES RELATIVE PERCENT: 8.5 % (ref 0–10)
NEUTROPHILS ABSOLUTE: 6.6 K/UL (ref 1.5–7.5)
NEUTROPHILS RELATIVE PERCENT: 75 % (ref 50–65)
PDW BLD-RTO: 15.7 % (ref 11.5–14.5)
PLATELET # BLD: 206 K/UL (ref 130–400)
PMV BLD AUTO: 10.1 FL (ref 9.4–12.3)
POTASSIUM SERPL-SCNC: 5 MMOL/L (ref 3.5–5)
RBC # BLD: 3.65 M/UL (ref 4.2–5.4)
SODIUM BLD-SCNC: 136 MMOL/L (ref 136–145)
TOTAL PROTEIN: 5.7 G/DL (ref 6.6–8.7)
TRIGL SERPL-MCNC: 181 MG/DL (ref 0–149)
VITAMIN D 25-HYDROXY: 36 NG/ML
WBC # BLD: 8.8 K/UL (ref 4.8–10.8)

## 2022-06-02 NOTE — TELEPHONE ENCOUNTER
Wang with [facility] lisbeth labs today. She did not get microalbumin/creatinine urine today because the daughter had to leave and go somewhere. She wanted to know if it was ok to get it next week. It told her that was fine.

## 2022-06-07 ENCOUNTER — CARE COORDINATION (OUTPATIENT)
Dept: CASE MANAGEMENT | Age: 82
End: 2022-06-07

## 2022-06-07 NOTE — CARE COORDINATION
Era 45 Transitions Follow Up Call    2022    Patient: Milagros Jean Baptiste  Patient : 1940   MRN: [id number]  Reason for Admission:   Discharge Date: 22 RARS: Readmission Risk Score: 20.5 ( )         Spoke with: N/A    Care Transitions Subsequent and Final Call    Subsequent and Final Calls  Are you currently active with any services?: Home Health  Care Transitions Interventions  Other Interventions: Follow Up : Attempted to make contact with Shanita for CT f/u call without success. Unable to leave a message regarding intent of call and call back information. Will try again at a later time.     Future Appointments   Date Time Provider Ga Lange   2022 11:00 AM MD SANGEETA [place]   2022  1:45 PM [facility]       Taylor Flynn RN

## 2022-06-09 ENCOUNTER — TELEPHONE (OUTPATIENT)
Dept: INTERNAL MEDICINE CLINIC | Age: 82
End: 2022-06-09

## 2022-06-09 NOTE — TELEPHONE ENCOUNTER
[address] [address] would like to add one more nursing visit for next week to discharge the pt  - is that ok ?

## 2022-06-14 ENCOUNTER — CARE COORDINATION (OUTPATIENT)
Dept: CASE MANAGEMENT | Age: 82
End: 2022-06-14

## 2022-06-14 NOTE — CARE COORDINATION
Era 45 Transitions Follow Up Call    2022    Patient: Eleazar Washington  Patient : 1940   MRN: [id number]  Reason for Admission:   Discharge Date: 22 RARS: Readmission Risk Score: 20.5 ( )         Spoke with: N/A    Care Transitions Subsequent and Final Call    Subsequent and Final Calls  Are you currently active with any services?: Home Health  Care Transitions Interventions  Other Interventions: Follow Up : Attempted to make contact with Shanita for an  follow up call without success. Unable to leave a message regarding intent of call and call back information. Will discharge from CTN services at this time due to inability to make contact.     Future Appointments   Date Time Provider Ga Lange   2022 11:00 AM MD SANGEETA Rea [facility]   2022  1:45 PM [facility]       Rubia Gentile RN

## 2022-06-17 ENCOUNTER — OFFICE VISIT (OUTPATIENT)
Dept: INTERNAL MEDICINE | Age: 82
End: 2022-06-17
Payer: MEDICARE

## 2022-06-17 VITALS
SYSTOLIC BLOOD PRESSURE: 134 MMHG | OXYGEN SATURATION: 95 % | BODY MASS INDEX: 33.99 KG/M2 | WEIGHT: 204 LBS | DIASTOLIC BLOOD PRESSURE: 88 MMHG | HEART RATE: 77 BPM | HEIGHT: 65 IN

## 2022-06-17 DIAGNOSIS — F32.89 OTHER DEPRESSION: ICD-10-CM

## 2022-06-17 DIAGNOSIS — F03.90 DEMENTIA WITHOUT BEHAVIORAL DISTURBANCE, UNSPECIFIED DEMENTIA TYPE: ICD-10-CM

## 2022-06-17 DIAGNOSIS — E55.9 VITAMIN D DEFICIENCY: ICD-10-CM

## 2022-06-17 DIAGNOSIS — I48.91 ATRIAL FIBRILLATION, UNSPECIFIED TYPE (HCC): ICD-10-CM

## 2022-06-17 DIAGNOSIS — R05.9 COUGH: ICD-10-CM

## 2022-06-17 DIAGNOSIS — G25.81 RLS (RESTLESS LEGS SYNDROME): ICD-10-CM

## 2022-06-17 DIAGNOSIS — E78.5 HYPERLIPIDEMIA, UNSPECIFIED HYPERLIPIDEMIA TYPE: ICD-10-CM

## 2022-06-17 DIAGNOSIS — K21.9 GASTROESOPHAGEAL REFLUX DISEASE WITHOUT ESOPHAGITIS: ICD-10-CM

## 2022-06-17 DIAGNOSIS — D50.9 IRON DEFICIENCY ANEMIA, UNSPECIFIED IRON DEFICIENCY ANEMIA TYPE: ICD-10-CM

## 2022-06-17 DIAGNOSIS — F03.91 DEMENTIA WITH BEHAVIORAL DISTURBANCE, UNSPECIFIED DEMENTIA TYPE: ICD-10-CM

## 2022-06-17 DIAGNOSIS — G47.00 INSOMNIA, UNSPECIFIED TYPE: ICD-10-CM

## 2022-06-17 DIAGNOSIS — R09.89 CHEST CONGESTION: ICD-10-CM

## 2022-06-17 DIAGNOSIS — Z00.00 ROUTINE ADULT HEALTH MAINTENANCE: Primary | ICD-10-CM

## 2022-06-17 DIAGNOSIS — E66.9 CLASS 1 OBESITY WITH SERIOUS COMORBIDITY AND BODY MASS INDEX (BMI) OF 33.0 TO 33.9 IN ADULT, UNSPECIFIED OBESITY TYPE: ICD-10-CM

## 2022-06-17 DIAGNOSIS — I50.22 CHRONIC SYSTOLIC (CONGESTIVE) HEART FAILURE (HCC): ICD-10-CM

## 2022-06-17 DIAGNOSIS — E11.21 TYPE II DIABETES MELLITUS WITH NEPHROPATHY (HCC): ICD-10-CM

## 2022-06-17 DIAGNOSIS — I10 PRIMARY HYPERTENSION: ICD-10-CM

## 2022-06-17 PROCEDURE — 99214 OFFICE O/P EST MOD 30 MIN: CPT | Performed by: INTERNAL MEDICINE

## 2022-06-17 PROCEDURE — 1123F ACP DISCUSS/DSCN MKR DOCD: CPT | Performed by: INTERNAL MEDICINE

## 2022-06-17 PROCEDURE — G8427 DOCREV CUR MEDS BY ELIG CLIN: HCPCS | Performed by: INTERNAL MEDICINE

## 2022-06-17 PROCEDURE — G8400 PT W/DXA NO RESULTS DOC: HCPCS | Performed by: INTERNAL MEDICINE

## 2022-06-17 PROCEDURE — 3046F HEMOGLOBIN A1C LEVEL >9.0%: CPT | Performed by: INTERNAL MEDICINE

## 2022-06-17 PROCEDURE — 1090F PRES/ABSN URINE INCON ASSESS: CPT | Performed by: INTERNAL MEDICINE

## 2022-06-17 PROCEDURE — 1036F TOBACCO NON-USER: CPT | Performed by: INTERNAL MEDICINE

## 2022-06-17 PROCEDURE — G8417 CALC BMI ABV UP PARAM F/U: HCPCS | Performed by: INTERNAL MEDICINE

## 2022-06-17 RX ORDER — FUROSEMIDE 40 MG/1
TABLET ORAL
Status: ON HOLD | COMMUNITY
Start: 2022-05-16 | End: 2022-07-27 | Stop reason: HOSPADM

## 2022-06-17 RX ORDER — GLIPIZIDE 2.5 MG/1
2.5 TABLET, EXTENDED RELEASE ORAL DAILY
Qty: 30 TABLET | Refills: 3 | Status: SHIPPED | OUTPATIENT
Start: 2022-06-17 | End: 2022-08-11 | Stop reason: SDUPTHER

## 2022-06-17 RX ORDER — DOXYCYCLINE HYCLATE 100 MG
100 TABLET ORAL 2 TIMES DAILY
Qty: 20 TABLET | Refills: 0 | Status: SHIPPED | OUTPATIENT
Start: 2022-06-17 | End: 2022-06-27

## 2022-06-17 SDOH — ECONOMIC STABILITY: FOOD INSECURITY: WITHIN THE PAST 12 MONTHS, THE FOOD YOU BOUGHT JUST DIDN'T LAST AND YOU DIDN'T HAVE MONEY TO GET MORE.: NEVER TRUE

## 2022-06-17 SDOH — ECONOMIC STABILITY: FOOD INSECURITY: WITHIN THE PAST 12 MONTHS, YOU WORRIED THAT YOUR FOOD WOULD RUN OUT BEFORE YOU GOT MONEY TO BUY MORE.: NEVER TRUE

## 2022-06-17 ASSESSMENT — PATIENT HEALTH QUESTIONNAIRE - PHQ9
10. IF YOU CHECKED OFF ANY PROBLEMS, HOW DIFFICULT HAVE THESE PROBLEMS MADE IT FOR YOU TO DO YOUR WORK, TAKE CARE OF THINGS AT HOME, OR GET ALONG WITH OTHER PEOPLE: 0
7. TROUBLE CONCENTRATING ON THINGS, SUCH AS READING THE NEWSPAPER OR WATCHING TELEVISION: 3
2. FEELING DOWN, DEPRESSED OR HOPELESS: 1
SUM OF ALL RESPONSES TO PHQ QUESTIONS 1-9: 10
SUM OF ALL RESPONSES TO PHQ9 QUESTIONS 1 & 2: 2
8. MOVING OR SPEAKING SO SLOWLY THAT OTHER PEOPLE COULD HAVE NOTICED. OR THE OPPOSITE, BEING SO FIGETY OR RESTLESS THAT YOU HAVE BEEN MOVING AROUND A LOT MORE THAN USUAL: 3
1. LITTLE INTEREST OR PLEASURE IN DOING THINGS: 1
9. THOUGHTS THAT YOU WOULD BE BETTER OFF DEAD, OR OF HURTING YOURSELF: 0
3. TROUBLE FALLING OR STAYING ASLEEP: 1
SUM OF ALL RESPONSES TO PHQ QUESTIONS 1-9: 10
5. POOR APPETITE OR OVEREATING: 0
6. FEELING BAD ABOUT YOURSELF - OR THAT YOU ARE A FAILURE OR HAVE LET YOURSELF OR YOUR FAMILY DOWN: 0
4. FEELING TIRED OR HAVING LITTLE ENERGY: 1

## 2022-06-17 ASSESSMENT — SOCIAL DETERMINANTS OF HEALTH (SDOH): HOW HARD IS IT FOR YOU TO PAY FOR THE VERY BASICS LIKE FOOD, HOUSING, MEDICAL CARE, AND HEATING?: NOT HARD AT ALL

## 2022-06-17 NOTE — TELEPHONE ENCOUNTER
S/w [address], the received pt's Rx for Doxycycline but they do not have tablets in stock and want to know if pt can have capsules instead.

## 2022-06-17 NOTE — TELEPHONE ENCOUNTER
Verified with pt's daughter that pt could swallow capsules. VO given to Kadi Copeland at pharmacy to switch over.

## 2022-06-25 ASSESSMENT — ENCOUNTER SYMPTOMS
VOMITING: 0
EYE PAIN: 0
BACK PAIN: 1
RHINORRHEA: 0
RECTAL PAIN: 0
NAUSEA: 0
COLOR CHANGE: 0
EYE REDNESS: 0
EYE ITCHING: 0
VOICE CHANGE: 0
WHEEZING: 0
CHOKING: 0
DIARRHEA: 0
ANAL BLEEDING: 0
EYE DISCHARGE: 0
CHEST TIGHTNESS: 0
COUGH: 0
SORE THROAT: 0
ABDOMINAL DISTENTION: 0
TROUBLE SWALLOWING: 0
BLOOD IN STOOL: 0
ABDOMINAL PAIN: 0
SINUS PRESSURE: 0
SHORTNESS OF BREATH: 0
FACIAL SWELLING: 0
PHOTOPHOBIA: 0
CONSTIPATION: 0

## 2022-06-25 NOTE — PROGRESS NOTES
Chief Complaint   Patient presents with    Medicare AWV       HPI: Gustavo Ko is a 80 y.o. female is here for a Medicare wellness exam.  She sees Dr. Nellie Stewart for history of coronary artery disease. She sees Dr. Cathi Patel for history of dementia. Her dementia is not improving. Her daughter lives with her. Her daughter is trying to take care of her. Denies any history of recent falls. She has no concerns in regards to safety or hearing. Her A1c is currently at 9.0. We will add glipizide 2.5 mg to her regimen. She also complains that she has been coughing up some green thick sputum. She has not had any fever or chills. However, she recently recovered from COVID-19. She sleeps well with trazodone at night. Requip is helpful for restless leg syndrome. Her anemia is stable. She does have a history of depression, which is fairly well controlled with Abilify. She is tolerating her statin without side effects. She has not been checking her blood sugar. She admits to not working on diet and exercise. Again, she was recently hospitalized for COVID-19. She did receive a number of steroid medications while she was hospitalized. She has not had any difficulty with her atrial fibrillation. She is on Xarelto for stroke prophylaxis. Her acid reflux is well controlled. Routine screening is as follows:  Eye exam yearly  Flu vaccine up to date  Pap declined  Mammogram: Will think about it.  Last one was done in April 2021  DEXA declined  Pneumovax up to date  Colonoscopy 2019    Past Medical History:   Diagnosis Date    KANCHAN [facility]acute kidney injury) ([facility] 12/30/2018    Alzheimer disease ([facility]     Arthritis     Atrial fibrillation (HCC)     BiPAP (biphasic positive airway pressure) dependence     15cm/11cm    Cancer (HCC)     COLON CA    CHF (congestive heart failure) ([facility] 12/30/2018    Chronic atrial fibrillation ([facility] 2/12/2017    Dementia ([facility]     Depression     Diabetes mellitus ([facility]     Essential hypertension 7/11/2017    History of blood transfusion     Hyperlipidemia     Hypertension     Mixed hyperlipidemia 7/11/2017    Neuropathy 11/19/2021    Obesity     Obstructive sleep apnea     AHI:  18.7;  In REM AHI:  39.3 per PSG, 11/2008; split-night PSG, 8/2017 AHI: 35.1    Osteoarthritis     Palliative care patient 01/08/2019    Pneumonia due to organism     Restless leg syndrome 8/1/2017    Sinus complaint     Swelling       Past Surgical History:   Procedure Laterality Date    ADENOIDECTOMY      APPENDECTOMY      CARDIOVERSION      x 2     CHOLECYSTECTOMY      COLONOSCOPY  05/17/2013    Dr. Pardeep Woo COLONOSCOPY  03/22/2012    Dr Viona Lundborg yr recall    COLONOSCOPY  06/30/2008    Dr Joseluis Braxton, 3 yr recall    COLONOSCOPY N/A 8/2/2019    Dr Ivonne Miller sided anastomosis appeared normal and patent-prn (age)   Bernestine Ward HEMICOLECTOMY Right 1998    2 FT OF COLON REMOVED DUE TO CA    HYSTERECTOMY (CERVIX STATUS UNKNOWN)      TX COLONOSCOPY W/BIOPSY SINGLE/MULTIPLE N/A 6/6/2017    Dr Yara Berrios colon anastomosis-Tubular AP (-) dysplasia x 2--3 yr recall    TONSILLECTOMY      TUMOR REMOVAL      stomach    UPPER GASTROINTESTINAL ENDOSCOPY  05/17/2013    Dr. Daralene Bosworth ulcer disease-Chelo (+)    UPPER GASTROINTESTINAL ENDOSCOPY  03/28/2012    Dr Art Garcia antral ulceration with a visible vessel    UPPER GASTROINTESTINAL ENDOSCOPY N/A 8/2/2019    Dr Mimi Alaniz      Social History     Socioeconomic History    Marital status:      Spouse name: None    Number of children: None    Years of education: None    Highest education level: None   Occupational History    None   Tobacco Use    Smoking status: Never Smoker    Smokeless tobacco: Never Used   Vaping Use    Vaping Use: None   Substance and Sexual Activity    Alcohol use: No    Drug use: No    Sexual activity: Not Currently   Other Topics Concern    None   Social History Narrative  None     Social Determinants of Health     Financial Resource Strain: Low Risk     Difficulty of Paying Living Expenses: Not hard at all   Food Insecurity: No Food Insecurity    Worried About Running Out of Food in the Last Year: Never true    Onel of Food in the Last Year: Never true   Transportation Needs:     Lack of Transportation (Medical): Not on file    Lack of Transportation (Non-Medical):  Not on file   Physical Activity: Insufficiently Active    Days of Exercise per Week: 3 days    Minutes of Exercise per Session: 10 min   Stress:     Feeling of Stress : Not on file   Social Connections:     Frequency of Communication with Friends and Family: Not on file    Frequency of Social Gatherings with Friends and Family: Not on file    Attends Yarsani Services: Not on file    Active Member of Clubs or Organizations: Not on file    Attends Club or Organization Meetings: Not on file    Marital Status: Not on file   Intimate Partner Violence:     Fear of Current or Ex-Partner: Not on file    Emotionally Abused: Not on file    Physically Abused: Not on file    Sexually Abused: Not on file   Housing Stability:     Unable to Pay for Housing in the Last Year: Not on file    Number of Places Lived in the Last Year: Not on file    Unstable Housing in the Last Year: Not on file      Family History   Problem Relation Age of Onset    No Known Problems Mother     No Known Problems Father     Colon Cancer Daughter     Colon Polyps Daughter     Esophageal Cancer Neg Hx     Liver Cancer Neg Hx     Rectal Cancer Neg Hx     Liver Disease Neg Hx     Stomach Cancer Neg Hx         Current Outpatient Medications   Medication Sig Dispense Refill    furosemide (LASIX) 40 MG tablet       glipiZIDE (GLUCOTROL XL) 2.5 MG extended release tablet Take 1 tablet by mouth daily 30 tablet 3    doxycycline hyclate (VIBRA-TABS) 100 MG tablet Take 1 tablet by mouth 2 times daily for 10 days 20 tablet 0    traZODone (DESYREL) 100 MG tablet TAKE 1 TABLET BY MOUTH NIGHTLY 90 tablet 1    rOPINIRole (REQUIP) 0.25 MG tablet Take 2 tablets by mouth nightly 30 tablet 3    FEROSUL 325 (65 Fe) MG tablet TAKE 1 TABLET BY MOUTH EVERY DAY WITH BREAKFAST 90 tablet 1    melatonin 3 MG TABS tablet Take 10 mg by mouth nightly as needed      donepezil (ARICEPT) 10 MG tablet Take 1 tablet by mouth 2 times daily 60 tablet 5    memantine (NAMENDA) 10 MG tablet Take 1 tablet by mouth 2 times daily 60 tablet 5    ARIPiprazole (ABILIFY) 2 MG tablet Take 1 tablet by mouth every evening 30 tablet 3    atorvastatin (LIPITOR) 40 MG tablet Take 1 tablet by mouth nightly 30 tablet 5    rivaroxaban (XARELTO) 15 MG TABS tablet Take 1 tablet by mouth daily (with breakfast) 90 tablet 1    blood glucose monitor kit and supplies QD E11.9 1 kit 0    metoprolol succinate (TOPROL XL) 25 MG extended release tablet Take 1 tablet by mouth nightly 90 tablet 3    SITagliptin (JANUVIA) 100 MG tablet Take 1 tablet by mouth daily 30 tablet 5    pantoprazole (PROTONIX) 20 MG tablet Take 20 mg by mouth daily       nystatin (MYCOSTATIN) 144097 UNIT/GM powder Apply topically 4 times daily Apply topically 4 times daily.  Multiple Vitamins-Minerals (THERAPEUTIC MULTIVITAMIN-MINERALS) tablet Take 1 tablet by mouth daily 30 tablet 0    sertraline (ZOLOFT) 100 MG tablet Take 50 mg by mouth at bedtime       bumetanide (BUMEX) 1 MG tablet Take 1 tablet by mouth daily (Patient not taking: Reported on 6/17/2022) 30 tablet 0    colesevelam (WELCHOL) 625 MG tablet Take 1 tablet by mouth 2 times daily (with meals) (Patient not taking: Reported on 5/16/2022) 60 tablet 5     No current facility-administered medications for this visit.         Patient Active Problem List   Diagnosis    History of colon cancer    Chronic anticoagulation    PAF (paroxysmal atrial fibrillation) (HCC)    History of bradycardia    Hypoglycemia due to insulin    Type 2 diabetes mellitus without complication, with long-term current use of insulin ([facility])    Pneumonia    Hypoglycemic encephalopathy    Abnormal chest x-ray    Hypokalemia    Hypomagnesemia    Essential hypertension    Mixed hyperlipidemia    CPAP (continuous positive airway pressure) dependence    Somnolence, daytime    Restless leg syndrome    Hypoxemia    Obstructive sleep apnea    BiPAP (biphasic positive airway pressure) dependence    Hypochloremia    CHF (congestive heart failure) ([facility])    Macrocytic anemia    Acute kidney injury superimposed on CKD (HCC)    Acute on chronic diastolic heart failure ([facility])    Palliative care patient    Acute hypoxemic respiratory failure ([facility])    Chronic anemia    History of adenomatous polyp of colon    Iron deficiency anemia    Heme positive stool    AMS (altered mental status)    Acute cystitis    Toxic metabolic encephalopathy    Diabetic ulcer of left heel associated with type 2 diabetes mellitus, with fat layer exposed ([facility])    Neuropathy    PVD (peripheral vascular disease) ([facility])    Avulsion fracture of right ischial tuberosity ([facility])    Stage 3b chronic kidney disease ([facility])    Alzheimer's disease, unspecified    Acute on chronic diastolic (congestive) heart failure ([facility])    Chronic systolic (congestive) heart failure    Dementia with behavioral disturbance, unspecified dementia type ([facility])        Review of Systems   Constitutional: Negative for activity change, appetite change, chills, diaphoresis, fatigue, fever and unexpected weight change. HENT: Negative for congestion, ear pain, facial swelling, hearing loss, mouth sores, nosebleeds, postnasal drip, rhinorrhea, sinus pressure, sneezing, sore throat, tinnitus, trouble swallowing and voice change. Eyes: Negative for photophobia, pain, discharge, redness, itching and visual disturbance. Respiratory: Negative for cough, choking, chest tightness, shortness of breath and wheezing. Cardiovascular: Negative for chest pain, palpitations and leg swelling. Gastrointestinal: Negative for abdominal distention, abdominal pain, anal bleeding, blood in stool, constipation, diarrhea, nausea, rectal pain and vomiting. Endocrine: Negative for cold intolerance, heat intolerance, polydipsia, polyphagia and polyuria. Genitourinary: Positive for frequency and urgency. Negative for decreased urine volume, difficulty urinating, dysuria, flank pain and hematuria. Musculoskeletal: Positive for back pain. Negative for arthralgias, gait problem, joint swelling, myalgias, neck pain and neck stiffness. Right hip pain. Skin: Negative for color change, pallor and rash. Allergic/Immunologic: Negative for environmental allergies and food allergies. Neurological: Negative for dizziness, tremors, syncope, weakness, light-headedness, numbness and headaches. Hematological: Negative for adenopathy. Does not bruise/bleed easily. Psychiatric/Behavioral: Positive for confusion and dysphoric mood. Negative for agitation, behavioral problems, decreased concentration, hallucinations, self-injury, sleep disturbance and suicidal ideas. The patient is not nervous/anxious and is not hyperactive. /88   Pulse 77   Ht 5' 5\" (1.651 m)   Wt 204 lb (92.5 kg)   SpO2 95%   BMI 33.95 kg/m²   Physical Exam  Vitals and nursing note reviewed. Constitutional:       General: She is not in acute distress. Appearance: Normal appearance. She is well-developed. She is not ill-appearing, toxic-appearing or diaphoretic. HENT:      Head: Normocephalic and atraumatic. Right Ear: Tympanic membrane, ear canal and external ear normal. There is no impacted cerumen. Left Ear: Tympanic membrane, ear canal and external ear normal. There is no impacted cerumen. Nose: Nose normal. No congestion or rhinorrhea.       Mouth/Throat:      Mouth: Mucous membranes are moist.      Pharynx: Oropharynx is clear. No oropharyngeal exudate or posterior oropharyngeal erythema. Eyes:      General: No scleral icterus. Right eye: No discharge. Left eye: No discharge. Extraocular Movements: Extraocular movements intact. Conjunctiva/sclera: Conjunctivae normal.      Pupils: Pupils are equal, round, and reactive to light. Neck:      Thyroid: No thyromegaly. Vascular: No carotid bruit or JVD. Trachea: No tracheal deviation. Cardiovascular:      Rate and Rhythm: Normal rate and regular rhythm. Pulses: Normal pulses. Heart sounds: Normal heart sounds. No murmur heard. No friction rub. No gallop. Pulmonary:      Effort: Pulmonary effort is normal. No respiratory distress. Breath sounds: Normal breath sounds. No stridor. No wheezing, rhonchi or rales. Chest:      Chest wall: No tenderness. Abdominal:      General: Bowel sounds are normal. There is no distension. Palpations: Abdomen is soft. There is no mass. Tenderness: There is no abdominal tenderness. There is no right CVA tenderness, left CVA tenderness, guarding or rebound. Hernia: No hernia is present. Musculoskeletal:         General: Tenderness present. No swelling, deformity or signs of injury. Normal range of motion. Cervical back: Normal range of motion and neck supple. No rigidity. No muscular tenderness. Right lower leg: No edema. Left lower leg: No edema. Comments: There is some pain on palpation the right hip. Lymphadenopathy:      Cervical: No cervical adenopathy. Skin:     General: Skin is warm and dry. Coloration: Skin is not jaundiced or pale. Findings: No bruising, erythema, lesion or rash. Neurological:      General: No focal deficit present. Mental Status: She is alert and oriented to person, place, and time. Mental status is at baseline. Cranial Nerves: No cranial nerve deficit. Motor: No weakness or abnormal muscle tone. Coordination: Coordination normal.      Gait: Gait normal.      Deep Tendon Reflexes: Reflexes are normal and symmetric. Reflexes normal.   Psychiatric:         Mood and Affect: Mood normal.         Behavior: Behavior normal.         Thought Content: Thought content normal.         Judgment: Judgment normal.         1. Routine adult health maintenance    2. Dementia with behavioral disturbance, unspecified dementia type ([facility]    3. Type II diabetes mellitus with nephropathy ([facility]    4. Vitamin D deficiency     5. Chronic systolic (congestive) heart failure    6. Primary hypertension    7. Cough    8. Chest congestion    9. Class 1 obesity with serious comorbidity and body mass index (BMI) of 33.0 to 33.9 in adult, unspecified obesity type    10. Insomnia, unspecified type    11. RLS (restless legs syndrome)    12. Iron deficiency anemia, unspecified iron deficiency anemia type    13. Dementia without behavioral disturbance, unspecified dementia type ([facility]    14. Other depression    15. Hyperlipidemia, unspecified hyperlipidemia type    16. [facility] fibrillation, unspecified type [facility]    17. Gastroesophageal reflux disease without esophagitis        ASSESSMENT/PLAN:    82-year-old woman here for follow-up    1. Health maintenance: Routine screening is as per HPI. Labs discussed with patient today    2. Hypertension: Blood pressure well controlled on Lasix, metoprolol. 3.  Cough, chest congestion: Doxycycline prescribed    4. Obesity: Stable    5. Congestive heart failure: Stable. Continue Lasix. Patient does not show any signs of volume overload today    6. Insomnia: Continue trazodone as prescribed    7. Restless leg syndrome: Continue Requip    8. Iron deficiency anemia: Continue ferrous sulfate    9. Dementia: Continue Aricept and Namenda    10. Depression: Continue Abilify and Zoloft    11. Hyperlipidemia: Continue Lipitor as prescribed    12. Atrial fibrillation: Rate controlled on metoprolol. Continue Xarelto for stroke prophylaxis    13. Type 2 diabetes: Continue Januvia. Patient has been on steroids recently. Her A1c is elevated. We will add glipizide 2.5 mg p.o. to her regimen. 13.  Acid reflux: Continue Protonix as prescribed    14. Diabetic nephropathy: Stable    15. Vitamin D deficiency: Check a vitamin D level with next lab draw      Shanita was seen today for medicare awv. Diagnoses and all orders for this visit:    Routine adult health maintenance    Dementia with behavioral disturbance, unspecified dementia type ([facility]    Type II diabetes mellitus with nephropathy ([facility]  -     CBC with Auto Differential; Future  -     Comprehensive Metabolic Panel; Future  -     Hemoglobin A1C; Future  -     Lipid Panel; Future  -     TSH; Future  -     Microalbumin / Creatinine Urine Ratio; Future  -     Vitamin D 25 Hydroxy; Future    Vitamin D deficiency   -     Vitamin D 25 Hydroxy; Future    Chronic systolic (congestive) heart failure    Primary hypertension    Cough    Chest congestion    Class 1 obesity with serious comorbidity and body mass index (BMI) of 33.0 to 33.9 in adult, unspecified obesity type    Insomnia, unspecified type    RLS (restless legs syndrome)    Iron deficiency anemia, unspecified iron deficiency anemia type    Dementia without behavioral disturbance, unspecified dementia type ([facility]    Other depression    Hyperlipidemia, unspecified hyperlipidemia type    Atrial fibrillation, unspecified type ([facility]    Gastroesophageal reflux disease without esophagitis    Other orders  -     glipiZIDE (GLUCOTROL XL) 2.5 MG extended release tablet; Take 1 tablet by mouth daily  -     doxycycline hyclate (VIBRA-TABS) 100 MG tablet; Take 1 tablet by mouth 2 times daily for 10 days          Return in about 3 months (around 9/17/2022).      Orders Placed This Encounter   Procedures    CBC with Auto Differential     Fast 12 hours     Standing Status:   Future     Standing Expiration Date: 6/17/2023    Comprehensive Metabolic Panel     Fasting 12 hours     Standing Status:   Future     Standing Expiration Date:   6/17/2023    Hemoglobin A1C     Fast 12 hours     Standing Status:   Future     Standing Expiration Date:   6/17/2023    Lipid Panel     Standing Status:   Future     Standing Expiration Date:   6/17/2023     Order Specific Question:   Is Patient Fasting?/# of Hours     Answer:   12    TSH     Fast 12 hours     Standing Status:   Future     Standing Expiration Date:   6/17/2023    Microalbumin / Creatinine Urine Ratio     Standing Status:   Future     Standing Expiration Date:   6/17/2023    Vitamin D 25 Hydroxy     Standing Status:   Future     Standing Expiration Date:   6/17/2023       Karen Wu MD

## 2022-07-05 RX ORDER — RIVAROXABAN 15 MG/1
TABLET, FILM COATED ORAL
Qty: 90 TABLET | Refills: 1 | Status: SHIPPED | OUTPATIENT
Start: 2022-07-05 | End: 2022-08-11 | Stop reason: SDUPTHER

## 2022-07-24 ENCOUNTER — APPOINTMENT (OUTPATIENT)
Dept: GENERAL RADIOLOGY | Age: 82
DRG: 535 | End: 2022-07-24
Payer: MEDICARE

## 2022-07-24 ENCOUNTER — APPOINTMENT (OUTPATIENT)
Dept: CT IMAGING | Age: 82
DRG: 535 | End: 2022-07-24
Payer: MEDICARE

## 2022-07-24 ENCOUNTER — HOSPITAL ENCOUNTER (INPATIENT)
Age: 82
LOS: 3 days | Discharge: HOME HEALTH | DRG: 535 | End: 2022-07-27
Attending: EMERGENCY MEDICINE | Admitting: HOSPITALIST
Payer: MEDICARE

## 2022-07-24 DIAGNOSIS — N17.9 ACUTE KIDNEY INJURY SUPERIMPOSED ON CKD (HCC): ICD-10-CM

## 2022-07-24 DIAGNOSIS — N18.9 ACUTE KIDNEY INJURY SUPERIMPOSED ON CKD (HCC): ICD-10-CM

## 2022-07-24 DIAGNOSIS — S32.611A AVULSION FRACTURE OF RIGHT ISCHIAL TUBEROSITY (HCC): ICD-10-CM

## 2022-07-24 DIAGNOSIS — R46.89 AGGRESSIVE BEHAVIOR: Primary | ICD-10-CM
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
## 2022-07-24 DIAGNOSIS — S32.402A CLOSED DISPLACED FRACTURE OF LEFT ACETABULUM, UNSPECIFIED PORTION OF ACETABULUM, INITIAL ENCOUNTER (HCC): ICD-10-CM

## 2022-07-24 DIAGNOSIS — S32.472A: ICD-10-CM

## 2022-07-24 DIAGNOSIS — S32.592A CLOSED FRACTURE OF LEFT INFERIOR PUBIC RAMUS, INITIAL ENCOUNTER (HCC): ICD-10-CM

## 2022-07-24 DIAGNOSIS — S32.302A CLOSED FRACTURE OF LEFT ILIAC WING, INITIAL ENCOUNTER (HCC): ICD-10-CM

## 2022-07-24 LAB
ALBUMIN SERPL-MCNC: 3.9 G/DL (ref 3.5–5.2)
ALP BLD-CCNC: 102 U/L (ref 35–104)
ALT SERPL-CCNC: 19 U/L (ref 5–33)
ANION GAP SERPL CALCULATED.3IONS-SCNC: 12 MMOL/L (ref 7–19)
AST SERPL-CCNC: 24 U/L (ref 5–32)
BACTERIA: ABNORMAL /HPF
BILIRUB SERPL-MCNC: 0.4 MG/DL (ref 0.2–1.2)
BILIRUBIN URINE: NEGATIVE
BLOOD, URINE: ABNORMAL
BUN BLDV-MCNC: 37 MG/DL (ref 8–23)
CALCIUM SERPL-MCNC: 8.8 MG/DL (ref 8.8–10.2)
CHLORIDE BLD-SCNC: 102 MMOL/L (ref 98–111)
CLARITY: ABNORMAL
CO2: 24 MMOL/L (ref 22–29)
COLOR: YELLOW
CREAT SERPL-MCNC: 1.5 MG/DL (ref 0.5–0.9)
EPITHELIAL CELLS, UA: ABNORMAL /HPF
GFR AFRICAN AMERICAN: 40
GFR NON-AFRICAN AMERICAN: 33
GLUCOSE BLD-MCNC: 102 MG/DL (ref 70–99)
GLUCOSE BLD-MCNC: 152 MG/DL (ref 74–109)
GLUCOSE BLD-MCNC: 69 MG/DL (ref 70–99)
GLUCOSE URINE: NEGATIVE MG/DL
HBA1C MFR BLD: 7.6 % (ref 4–6)
HCT VFR BLD CALC: 40.4 % (ref 37–47)
HEMOGLOBIN: 12.2 G/DL (ref 12–16)
INR BLD: 1.27 (ref 0.88–1.18)
KETONES, URINE: NEGATIVE MG/DL
LEUKOCYTE ESTERASE, URINE: ABNORMAL
MCH RBC QN AUTO: 29.5 PG (ref 27–31)
MCHC RBC AUTO-ENTMCNC: 30.2 G/DL (ref 33–37)
MCV RBC AUTO: 97.6 FL (ref 81–99)
NITRITE, URINE: NEGATIVE
PDW BLD-RTO: 14.8 % (ref 11.5–14.5)
PERFORMED ON: ABNORMAL
PERFORMED ON: ABNORMAL
PH UA: 5.5 (ref 5–8)
PLATELET # BLD: 216 K/UL (ref 130–400)
PMV BLD AUTO: 9.9 FL (ref 9.4–12.3)
POTASSIUM REFLEX MAGNESIUM: 4.9 MMOL/L (ref 3.5–5)
PROTEIN UA: 100 MG/DL
PROTHROMBIN TIME: 15.9 SEC (ref 12–14.6)
RBC # BLD: 4.14 M/UL (ref 4.2–5.4)
RBC UA: ABNORMAL /HPF (ref 0–2)
SARS-COV-2, NAAT: NOT DETECTED
SODIUM BLD-SCNC: 138 MMOL/L (ref 136–145)
SPECIFIC GRAVITY UA: 1.01 (ref 1–1.03)
TOTAL CK: 48 U/L (ref 26–192)
TOTAL PROTEIN: 6.5 G/DL (ref 6.6–8.7)
TROPONIN: 0.02 NG/ML (ref 0–0.03)
UROBILINOGEN, URINE: 0.2 E.U./DL
WBC # BLD: 11.6 K/UL (ref 4.8–10.8)
WBC UA: ABNORMAL /HPF (ref 0–5)
YEAST: PRESENT /HPF

## 2022-07-24 PROCEDURE — 70450 CT HEAD/BRAIN W/O DYE: CPT | Performed by: RADIOLOGY

## 2022-07-24 PROCEDURE — 73590 X-RAY EXAM OF LOWER LEG: CPT | Performed by: RADIOLOGY

## 2022-07-24 PROCEDURE — 73620 X-RAY EXAM OF FOOT: CPT | Performed by: RADIOLOGY

## 2022-07-24 PROCEDURE — 73620 X-RAY EXAM OF FOOT: CPT

## 2022-07-24 PROCEDURE — 87086 URINE CULTURE/COLONY COUNT: CPT

## 2022-07-24 PROCEDURE — 1210000000 HC MED SURG R&B

## 2022-07-24 PROCEDURE — 6360000002 HC RX W HCPCS: Performed by: NURSE PRACTITIONER

## 2022-07-24 PROCEDURE — 6370000000 HC RX 637 (ALT 250 FOR IP): Performed by: NURSE PRACTITIONER

## 2022-07-24 PROCEDURE — 82550 ASSAY OF CK (CPK): CPT

## 2022-07-24 PROCEDURE — 73552 X-RAY EXAM OF FEMUR 2/>: CPT | Performed by: RADIOLOGY

## 2022-07-24 PROCEDURE — 71045 X-RAY EXAM CHEST 1 VIEW: CPT

## 2022-07-24 PROCEDURE — 82947 ASSAY GLUCOSE BLOOD QUANT: CPT

## 2022-07-24 PROCEDURE — 93005 ELECTROCARDIOGRAM TRACING: CPT | Performed by: EMERGENCY MEDICINE

## 2022-07-24 PROCEDURE — 36415 COLL VENOUS BLD VENIPUNCTURE: CPT

## 2022-07-24 PROCEDURE — 84484 ASSAY OF TROPONIN QUANT: CPT

## 2022-07-24 PROCEDURE — 73552 X-RAY EXAM OF FEMUR 2/>: CPT

## 2022-07-24 PROCEDURE — 80053 COMPREHEN METABOLIC PANEL: CPT

## 2022-07-24 PROCEDURE — 72192 CT PELVIS W/O DYE: CPT | Performed by: RADIOLOGY

## 2022-07-24 PROCEDURE — 2700000000 HC OXYGEN THERAPY PER DAY

## 2022-07-24 PROCEDURE — 72125 CT NECK SPINE W/O DYE: CPT | Performed by: RADIOLOGY

## 2022-07-24 PROCEDURE — 2580000003 HC RX 258: Performed by: NURSE PRACTITIONER

## 2022-07-24 PROCEDURE — 87186 SC STD MICRODIL/AGAR DIL: CPT

## 2022-07-24 PROCEDURE — 85610 PROTHROMBIN TIME: CPT

## 2022-07-24 PROCEDURE — 72125 CT NECK SPINE W/O DYE: CPT

## 2022-07-24 PROCEDURE — 72192 CT PELVIS W/O DYE: CPT

## 2022-07-24 PROCEDURE — 81001 URINALYSIS AUTO W/SCOPE: CPT

## 2022-07-24 PROCEDURE — 73502 X-RAY EXAM HIP UNI 2-3 VIEWS: CPT | Performed by: RADIOLOGY

## 2022-07-24 PROCEDURE — 73590 X-RAY EXAM OF LOWER LEG: CPT

## 2022-07-24 PROCEDURE — 83036 HEMOGLOBIN GLYCOSYLATED A1C: CPT

## 2022-07-24 PROCEDURE — 87635 SARS-COV-2 COVID-19 AMP PRB: CPT

## 2022-07-24 PROCEDURE — 85027 COMPLETE CBC AUTOMATED: CPT

## 2022-07-24 PROCEDURE — 70450 CT HEAD/BRAIN W/O DYE: CPT

## 2022-07-24 PROCEDURE — 99285 EMERGENCY DEPT VISIT HI MDM: CPT

## 2022-07-24 PROCEDURE — 71045 X-RAY EXAM CHEST 1 VIEW: CPT | Performed by: RADIOLOGY

## 2022-07-24 PROCEDURE — 73502 X-RAY EXAM HIP UNI 2-3 VIEWS: CPT

## 2022-07-24 RX ORDER — METOPROLOL SUCCINATE 25 MG/1
25 TABLET, EXTENDED RELEASE ORAL NIGHTLY
Status: DISCONTINUED | OUTPATIENT
Start: 2022-07-24 | End: 2022-07-27 | Stop reason: HOSPADM

## 2022-07-24 RX ORDER — CHOLECALCIFEROL (VITAMIN D3) 125 MCG
10 CAPSULE ORAL NIGHTLY PRN
Status: DISCONTINUED | OUTPATIENT
Start: 2022-07-24 | End: 2022-07-24 | Stop reason: SDUPTHER

## 2022-07-24 RX ORDER — POLYETHYLENE GLYCOL 3350 17 G/17G
17 POWDER, FOR SOLUTION ORAL DAILY PRN
Status: DISCONTINUED | OUTPATIENT
Start: 2022-07-24 | End: 2022-07-27 | Stop reason: HOSPADM

## 2022-07-24 RX ORDER — FUROSEMIDE 40 MG/1
40 TABLET ORAL DAILY
Status: DISCONTINUED | OUTPATIENT
Start: 2022-07-24 | End: 2022-07-27 | Stop reason: HOSPADM

## 2022-07-24 RX ORDER — INSULIN LISPRO 100 [IU]/ML
0-4 INJECTION, SOLUTION INTRAVENOUS; SUBCUTANEOUS NIGHTLY
Status: DISCONTINUED | OUTPATIENT
Start: 2022-07-24 | End: 2022-07-27 | Stop reason: HOSPADM

## 2022-07-24 RX ORDER — DONEPEZIL HYDROCHLORIDE 5 MG/1
10 TABLET, FILM COATED ORAL 2 TIMES DAILY
Status: DISCONTINUED | OUTPATIENT
Start: 2022-07-24 | End: 2022-07-27 | Stop reason: HOSPADM

## 2022-07-24 RX ORDER — ONDANSETRON 2 MG/ML
4 INJECTION INTRAMUSCULAR; INTRAVENOUS EVERY 6 HOURS PRN
Status: DISCONTINUED | OUTPATIENT
Start: 2022-07-24 | End: 2022-07-27 | Stop reason: HOSPADM

## 2022-07-24 RX ORDER — ENOXAPARIN SODIUM 100 MG/ML
40 INJECTION SUBCUTANEOUS EVERY 24 HOURS
Status: DISCONTINUED | OUTPATIENT
Start: 2022-07-24 | End: 2022-07-25

## 2022-07-24 RX ORDER — PANTOPRAZOLE SODIUM 20 MG/1
20 TABLET, DELAYED RELEASE ORAL DAILY
Status: DISCONTINUED | OUTPATIENT
Start: 2022-07-24 | End: 2022-07-27 | Stop reason: HOSPADM

## 2022-07-24 RX ORDER — INSULIN LISPRO 100 [IU]/ML
0-4 INJECTION, SOLUTION INTRAVENOUS; SUBCUTANEOUS
Status: DISCONTINUED | OUTPATIENT
Start: 2022-07-24 | End: 2022-07-27 | Stop reason: HOSPADM

## 2022-07-24 RX ORDER — M-VIT,TX,IRON,MINS/CALC/FOLIC 27MG-0.4MG
1 TABLET ORAL DAILY
Status: DISCONTINUED | OUTPATIENT
Start: 2022-07-24 | End: 2022-07-27 | Stop reason: HOSPADM

## 2022-07-24 RX ORDER — ARIPIPRAZOLE 2 MG/1
2 TABLET ORAL EVERY EVENING
Status: DISCONTINUED | OUTPATIENT
Start: 2022-07-24 | End: 2022-07-27 | Stop reason: HOSPADM

## 2022-07-24 RX ORDER — ATORVASTATIN CALCIUM 40 MG/1
40 TABLET, FILM COATED ORAL NIGHTLY
Status: DISCONTINUED | OUTPATIENT
Start: 2022-07-24 | End: 2022-07-27 | Stop reason: HOSPADM

## 2022-07-24 RX ORDER — DEXTROSE MONOHYDRATE 100 MG/ML
INJECTION, SOLUTION INTRAVENOUS CONTINUOUS PRN
Status: DISCONTINUED | OUTPATIENT
Start: 2022-07-24 | End: 2022-07-27 | Stop reason: HOSPADM

## 2022-07-24 RX ORDER — SODIUM CHLORIDE 9 MG/ML
INJECTION, SOLUTION INTRAVENOUS PRN
Status: DISCONTINUED | OUTPATIENT
Start: 2022-07-24 | End: 2022-07-27 | Stop reason: HOSPADM

## 2022-07-24 RX ORDER — ROPINIROLE 0.5 MG/1
0.5 TABLET, FILM COATED ORAL NIGHTLY
Status: DISCONTINUED | OUTPATIENT
Start: 2022-07-24 | End: 2022-07-27 | Stop reason: HOSPADM

## 2022-07-24 RX ORDER — MELATONIN 10 MG
10 CAPSULE ORAL NIGHTLY PRN
Status: DISCONTINUED | OUTPATIENT
Start: 2022-07-24 | End: 2022-07-27 | Stop reason: HOSPADM

## 2022-07-24 RX ORDER — SODIUM CHLORIDE 0.9 % (FLUSH) 0.9 %
5-40 SYRINGE (ML) INJECTION PRN
Status: DISCONTINUED | OUTPATIENT
Start: 2022-07-24 | End: 2022-07-27 | Stop reason: HOSPADM

## 2022-07-24 RX ORDER — SODIUM CHLORIDE 0.9 % (FLUSH) 0.9 %
5-40 SYRINGE (ML) INJECTION EVERY 12 HOURS SCHEDULED
Status: DISCONTINUED | OUTPATIENT
Start: 2022-07-24 | End: 2022-07-27 | Stop reason: HOSPADM

## 2022-07-24 RX ORDER — ONDANSETRON 4 MG/1
4 TABLET, ORALLY DISINTEGRATING ORAL EVERY 8 HOURS PRN
Status: DISCONTINUED | OUTPATIENT
Start: 2022-07-24 | End: 2022-07-27 | Stop reason: HOSPADM

## 2022-07-24 RX ORDER — MORPHINE SULFATE 2 MG/ML
1 INJECTION, SOLUTION INTRAMUSCULAR; INTRAVENOUS
Status: DISCONTINUED | OUTPATIENT
Start: 2022-07-24 | End: 2022-07-25

## 2022-07-24 RX ORDER — MEMANTINE HYDROCHLORIDE 5 MG/1
10 TABLET ORAL 2 TIMES DAILY
Status: DISCONTINUED | OUTPATIENT
Start: 2022-07-24 | End: 2022-07-27 | Stop reason: HOSPADM

## 2022-07-24 RX ORDER — TRAZODONE HYDROCHLORIDE 100 MG/1
100 TABLET ORAL NIGHTLY
Status: DISCONTINUED | OUTPATIENT
Start: 2022-07-24 | End: 2022-07-27 | Stop reason: HOSPADM

## 2022-07-24 RX ORDER — FERROUS SULFATE 325(65) MG
325 TABLET ORAL
Status: DISCONTINUED | OUTPATIENT
Start: 2022-07-25 | End: 2022-07-27 | Stop reason: HOSPADM

## 2022-07-24 RX ADMIN — SERTRALINE HYDROCHLORIDE 50 MG: 50 TABLET ORAL at 19:44

## 2022-07-24 RX ADMIN — ARIPIPRAZOLE 2 MG: 2 TABLET ORAL at 20:30

## 2022-07-24 RX ADMIN — SODIUM CHLORIDE, PRESERVATIVE FREE 10 ML: 5 INJECTION INTRAVENOUS at 19:45

## 2022-07-24 RX ADMIN — MEMANTINE 10 MG: 5 TABLET ORAL at 19:44

## 2022-07-24 RX ADMIN — DONEPEZIL HYDROCHLORIDE 10 MG: 5 TABLET, FILM COATED ORAL at 19:44

## 2022-07-24 RX ADMIN — ATORVASTATIN CALCIUM 40 MG: 40 TABLET, FILM COATED ORAL at 19:44

## 2022-07-24 RX ADMIN — FUROSEMIDE 40 MG: 40 TABLET ORAL at 19:44

## 2022-07-24 RX ADMIN — METOPROLOL SUCCINATE 25 MG: 25 TABLET, EXTENDED RELEASE ORAL at 19:44

## 2022-07-24 RX ADMIN — TRAZODONE HYDROCHLORIDE 100 MG: 100 TABLET ORAL at 19:44

## 2022-07-24 RX ADMIN — PANTOPRAZOLE SODIUM 20 MG: 20 TABLET, DELAYED RELEASE ORAL at 19:44

## 2022-07-24 RX ADMIN — ENOXAPARIN SODIUM 40 MG: 100 INJECTION SUBCUTANEOUS at 19:45

## 2022-07-24 RX ADMIN — MULTIPLE VITAMINS W/ MINERALS TAB 1 TABLET: TAB at 19:44

## 2022-07-24 RX ADMIN — ROPINIROLE HYDROCHLORIDE 0.5 MG: 0.5 TABLET, FILM COATED ORAL at 19:44

## 2022-07-24 RX ADMIN — MORPHINE SULFATE 1 MG: 2 INJECTION, SOLUTION INTRAMUSCULAR; INTRAVENOUS at 17:03

## 2022-07-24 RX ADMIN — MORPHINE SULFATE 1 MG: 2 INJECTION, SOLUTION INTRAMUSCULAR; INTRAVENOUS at 23:34

## 2022-07-24 ASSESSMENT — PAIN DESCRIPTION - ORIENTATION: ORIENTATION: LEFT

## 2022-07-24 ASSESSMENT — PAIN DESCRIPTION - DESCRIPTORS: DESCRIPTORS: DISCOMFORT

## 2022-07-24 ASSESSMENT — PAIN SCALES - GENERAL
PAINLEVEL_OUTOF10: 9
PAINLEVEL_OUTOF10: 10

## 2022-07-24 ASSESSMENT — PAIN DESCRIPTION - LOCATION: LOCATION: HIP

## 2022-07-24 NOTE — LETTER
Shirley Mcdonough  at [facility]  [phone] phone  [phone] fax  [phone] CELL ([address])        Shirley Mcdonough  at [facility]  [phone] phone  [phone] fax  [phone] CELL ([address])

## 2022-07-24 NOTE — ED PROVIDER NOTES
[address] [facility] EMERGENCY DEPT  eMERGENCY dEPARTMENT eNCOUnter      Pt Name: Eileen Kapoor  MRN: [id number]  [place] 1940  Date of evaluation: 7/24/2022  Provider: Becky Astorga MD    [address] [place] [address]       Chief Complaint   Patient presents with    Hip Pain     Right side fell last night. Shortened and rotated         HISTORY OF PRESENT ILLNESS   (Location/Symptom, Timing/Onset,Context/Setting, Quality, Duration, Modifying Factors, Severity)  Note limiting factors. Eileen Kapoor is a 80 y.o. female who presents to the emergency department due to concern for possible right hip fracture. Patient provides no history. She is agitated and aggressive. Brought by EMS. Report is that she fell last night. Right leg looks shortened and rotated concerning for hip fracture. Could not really thoroughly examine her leg because anytime I tried to examine patient she started swinging at me. She denies pain of any kind. She is confused. Exam is obviously limited due to her being extremely uncooperative and aggressive. I am told by EMS that family is on their way here. HPI    NursingNotes were reviewed. REVIEW OF SYSTEMS    (2-9 systems for level 4, 10 or more for level 5)     Review of Systems   Unable to perform ROS: Mental status change     A complete review of systems was performed and is negative except as noted above in the HPI.        PAST MEDICAL HISTORY     Past Medical History:   Diagnosis Date    KANCHAN (acute kidney injury) ([address] 12/30/2018    Alzheimer disease ([address]     Arthritis     Atrial fibrillation (HCC)     BiPAP (biphasic positive airway pressure) dependence     15cm/11cm    Cancer (HCC)     COLON CA    CHF (congestive heart failure) ([address] 12/30/2018    Chronic atrial fibrillation ([address] 2/12/2017    Dementia ([address]     Depression     Diabetes mellitus ([address]     Essential hypertension 7/11/2017    History of blood transfusion     Hyperlipidemia     Hypertension     Mixed hyperlipidemia 7/11/2017 Neuropathy 11/19/2021    Obesity     Obstructive sleep apnea     AHI:  18.7;  In REM AHI:  39.3 per PSG, 11/2008; split-night PSG, 8/2017 AHI: 35.1    Osteoarthritis     Palliative care patient 01/08/2019    Pneumonia due to organism     Restless leg syndrome 8/1/2017    Sinus complaint     Swelling          SURGICAL HISTORY       Past Surgical History:   Procedure Laterality Date    ADENOIDECTOMY      APPENDECTOMY      CARDIOVERSION      x 2     CHOLECYSTECTOMY      COLONOSCOPY  05/17/2013    Dr. Jose Clark    COLONOSCOPY  03/22/2012    Dr Kayden Mitchell yr recall    COLONOSCOPY  06/30/2008    Dr Jose Clark, 3 yr recall    COLONOSCOPY N/A 8/2/2019    Dr Barrett Members sided anastomosis appeared normal and patent-prn (age)    HEMICOLECTOMY Right 1998    2 FT OF COLON REMOVED DUE TO CA    HYSTERECTOMY (CERVIX STATUS UNKNOWN)      PA COLONOSCOPY W/BIOPSY SINGLE/MULTIPLE N/A 6/6/2017    Dr Viraj Escobar colon anastomosis-Tubular AP (-) dysplasia x 2--3 yr recall    TONSILLECTOMY      TUMOR REMOVAL      stomach    UPPER GASTROINTESTINAL ENDOSCOPY  05/17/2013    Dr. Jensen Lima ulcer disease-Chelo (+)    UPPER GASTROINTESTINAL ENDOSCOPY  03/28/2012    Dr Anahy Valdivia antral ulceration with a visible vessel    UPPER GASTROINTESTINAL ENDOSCOPY N/A 8/2/2019    Dr Alida Montelongo-Gastritis         CURRENT MEDICATIONS       Previous Medications    ARIPIPRAZOLE (ABILIFY) 2 MG TABLET    Take 1 tablet by mouth every evening    ATORVASTATIN (LIPITOR) 40 MG TABLET    Take 1 tablet by mouth nightly    BLOOD GLUCOSE MONITOR KIT AND SUPPLIES    QD E11.9    BUMETANIDE (BUMEX) 1 MG TABLET    Take 1 tablet by mouth daily    COLESEVELAM (WELCHOL) 625 MG TABLET    Take 1 tablet by mouth 2 times daily (with meals)    DONEPEZIL (ARICEPT) 10 MG TABLET    Take 1 tablet by mouth 2 times daily    FEROSUL 325 (65 FE) MG TABLET    TAKE 1 TABLET BY MOUTH EVERY DAY WITH BREAKFAST    FUROSEMIDE (LASIX) 40 MG TABLET        GLIPIZIDE (GLUCOTROL XL) 2.5 MG EXTENDED RELEASE TABLET    Take 1 tablet by mouth daily    MELATONIN 3 MG TABS TABLET    Take 10 mg by mouth nightly as needed    MEMANTINE (NAMENDA) 10 MG TABLET    Take 1 tablet by mouth 2 times daily    METOPROLOL SUCCINATE (TOPROL XL) 25 MG EXTENDED RELEASE TABLET    Take 1 tablet by mouth nightly    MULTIPLE VITAMINS-MINERALS (THERAPEUTIC MULTIVITAMIN-MINERALS) TABLET    Take 1 tablet by mouth daily    NYSTATIN (MYCOSTATIN) 279682 UNIT/GM POWDER    Apply topically 4 times daily Apply topically 4 times daily. PANTOPRAZOLE (PROTONIX) 20 MG TABLET    Take 20 mg by mouth daily     ROPINIROLE (REQUIP) 0.25 MG TABLET    Take 2 tablets by mouth nightly    SERTRALINE (ZOLOFT) 100 MG TABLET    Take 50 mg by mouth at bedtime     SITAGLIPTIN (JANUVIA) 100 MG TABLET    Take 1 tablet by mouth daily    TRAZODONE (DESYREL) 100 MG TABLET    TAKE 1 TABLET BY MOUTH NIGHTLY    XARELTO 15 MG TABS TABLET    TAKE 1 TABLET BY MOUTH DAILY (WITH BREAKFAST)       ALLERGIES     Patient has no known allergies.     FAMILY HISTORY       Family History   Problem Relation Age of Onset    No Known Problems Mother     No Known Problems Father     Colon Cancer Daughter     Colon Polyps Daughter     Esophageal Cancer Neg Hx     Liver Cancer Neg Hx     Rectal Cancer Neg Hx     Liver Disease Neg Hx     Stomach Cancer Neg Hx           SOCIAL HISTORY       Social History     Socioeconomic History    Marital status:      Spouse name: None    Number of children: None    Years of education: None    Highest education level: None   Tobacco Use    Smoking status: Never    Smokeless tobacco: Never   Substance and Sexual Activity    Alcohol use: No    Drug use: No    Sexual activity: Not Currently     Social Determinants of Health     Financial Resource Strain: Low Risk     Difficulty of Paying Living Expenses: Not hard at all   Food Insecurity: No Food Insecurity    Worried About Running Out of Food in the Last Year: Never true    Ran Out of Food in the Last Year: Never true   Physical Activity: Insufficiently Active    Days of Exercise per Week: 3 days    Minutes of Exercise per Session: 10 min       SCREENINGS    [place] Coma Scale  Eye Opening: Spontaneous  Best Verbal Response: Confused  Best Motor Response: Obeys commands  [place] Coma Scale Score: 14        PHYSICAL EXAM    (up to 7 for level 4, 8 or more for level 5)     ED Triage Vitals   BP Temp Temp src Heart Rate Resp SpO2 Height Weight   07/24/22 0956 07/24/22 0956 -- 07/24/22 0956 07/24/22 0956 07/24/22 1000 07/24/22 0956 07/24/22 0956   130/74 98.2 °F (36.8 °C)  87 20 90 % 5' 5\" (1.651 m) 204 lb (92.5 kg)       Physical Exam  Vitals reviewed. Constitutional:       General: She is not in acute distress. Appearance: She is well-developed. HENT:      Head: Normocephalic and atraumatic. Ears:      Comments: Patient would not allow me to examine her ears     Mouth/Throat:      Mouth: Mucous membranes are moist.   Eyes:      General: No scleral icterus. Pupils: Pupils are equal, round, and reactive to light. Comments: Limited exam due to uncooperative behavior   Neck:      Vascular: No JVD. Cardiovascular:      Rate and Rhythm: Normal rate and regular rhythm. Pulses: Normal pulses. Heart sounds: Normal heart sounds. Pulmonary:      Effort: Pulmonary effort is normal. No respiratory distress. Breath sounds: Normal breath sounds. Chest:      Chest wall: No tenderness. Abdominal:      General: There is no distension. Palpations: Abdomen is soft. Tenderness: There is no abdominal tenderness. There is no guarding or rebound. Musculoskeletal:         General: No tenderness. Cervical back: Normal range of motion and neck supple. No tenderness. Comments: No C, T or L-spine tenderness or step-off. Denies pain to the arms. Tried to examine legs and patient started trying to hit me.   Patient wearing socks and not let me remove her socks.  Right leg looks shortened and rotated. Limited exam but concern for right hip fracture. Skin:     General: Skin is warm and dry. Capillary Refill: Capillary refill takes less than 2 seconds. Neurological:      Mental Status: She is alert. Mental status is at baseline. She is confused. Comments: Limited exam but no obvious focal deficit    Psychiatric:         Mood and Affect: Affect is labile and angry. Behavior: Behavior is uncooperative, aggressive and combative. DIAGNOSTIC RESULTS     EKG: All EKG's are interpreted by the Emergency Department Physician who either signs or Co-signs this chart in the absence of a cardiologist.    Atrial fibrillation. Normal QT. No signs of acute ischemia. RADIOLOGY:   Non-plain film images such as CT, Ultrasound and MRI are read by the radiologist. Elodia Nick images are visualized and preliminarily interpreted by the emergency physician with the below findings:        Interpretation per the Radiologist below, if available at the time of this note:    XR FEMUR LEFT (MIN 2 VIEWS)   Final Result   Superior and inferior left pubic rami fractures, described above. Degenerative changes. Recommendation:    Follow up recommended. Electronically Signed by Lilia Kurtz MD at 24-Jul-2022 03:19:15 PM               XR TIBIA FIBULA LEFT (2 VIEWS)   Final Result   No acute fracture demonstrated. Degenerative changes at the knee and ankle joints. Diffuse soft tissue swelling. Recommendation:   Follow up as clinically indicated. Electronically Signed by Lilia Kurtz MD at 24-Jul-2022 03:20:24 PM               XR FEMUR RIGHT (MIN 2 VIEWS)   Final Result   Chronic appearing avulsion fracture of the ischial tuberosity. Correlate clinically with history. Degenerative changes at the hip and knee joints. Recommendation:    Follow up as clinically indicated.                 Electronically Signed by Lilia Kurtz MD at 24-Jul-2022 03:20:42 PM               XR TIBIA FIBULA RIGHT (2 VIEWS)   Final Result   Diffuse soft tissue swelling without fracture. Degenerative changes at the hip and knee joints. Recommendation:    Follow up as clinically indicated. Electronically Signed by Alison Pisano MD at 24-Jul-2022 03:20:59 PM               XR FOOT RIGHT (2 VIEWS)   Final Result   Degenerative changes without acute fracture. Hammertoe deformities. Diffuse soft tissue swelling. Calcaneal spurring. Recommendation:    Follow up as clinically indicated. Note: Direct correlation with point tenderness and the X-ray images is recommended and does significantly increase the sensitivity of radiographic evaluation. Electronically Signed by Alison Pisano MD at 24-Jul-2022 03:19:47 PM               XR FOOT LEFT (2 VIEWS)   Final Result   Mild degenerative changes. Hammertoe deformities. Diffuse soft tissue swelling. No acute fracture identified. Calcaneal spurring. Recommendation:    Follow up as clinically indicated. Note: Direct correlation with point tenderness and the X-ray images is recommended and does significantly increase the sensitivity of radiographic evaluation. Electronically Signed by Alison Pisano MD at 24-Jul-2022 03:20:10 PM               CT PELVIS WO CONTRAST Additional Contrast? None   Final Result   1. Fractures, left anterior acetabulum, anteromedial left iliac wing and left inferior pubic ramus described above. 2.  Old right inferior pubic ramus fracture. 3.  Other nonacute findings above. RECOMMENDATION:   Follow up as clinically indicated. All CT scans at this facility utilize dose modulation, iterative reconstruction, and/or weight based dosing when appropriate to reduce radiation dose to as low as reasonably achievable. Electronically Signed by Alison Pisano MD at 24-Jul-2022 01:32:17 PM               CT CERVICAL SPINE WO CONTRAST   Final Result   1.   No acute fracture, subluxation or prevertebral swelling. 2.  Mild-moderate mid and lower cervical spondylosis. 3.  Other nonacute findings above. Recommendation: Follow up as clinically indicated. All CT scans at this facility utilize dose modulation, iterative reconstruction, and/or weight based dosing when appropriate to reduce radiation dose to as low as reasonably achievable. Electronically Signed by Cynthia Miller MD at 24-Jul-2022 01:26:26 PM               CT HEAD WO CONTRAST   Final Result   1. Atrophy with nonspecific but presumed microvascular changes in the periventricular white matter. 2.  No acute significant abnormality is seen. Recommendation: Follow up as clinically indicated. All CT scans at this facility utilize dose modulation, iterative reconstruction, and/or weight based dosing when appropriate to reduce radiation dose to as low as reasonably achievable. Electronically Signed by Cynthia Miller MD at 24-Jul-2022 01:33:47 PM               XR HIP 2-3 VW W PELVIS RIGHT   Final Result   Fractures, left medial acetabulum and inferior pubic ramus. Old fracture, right inferior pubic ramus. Recommendation:    Follow up as clinically indicated. Electronically Signed by Cynthia Miller MD at 24-Jul-2022 01:37:07 PM               XR CHEST PORTABLE   Final Result   Moderate cardiomegaly and vascular congestion without significant effusion. Recommendation: Follow up recommended.    Electronically Signed by Cynthia Miller MD at 24-Jul-2022 01:37:29 PM                     ED BEDSIDE ULTRASOUND:   Performed by ED Physician - none    LABS:  Labs Reviewed   CBC - Abnormal; Notable for the following components:       Result Value    WBC 11.6 (*)     RBC 4.14 (*)     MCHC 30.2 (*)     RDW 14.8 (*)     All other components within normal limits   COMPREHENSIVE METABOLIC PANEL W/ REFLEX TO MG FOR LOW K - Abnormal; Notable for the following components:    Glucose 152 (*)     BUN 37 (*)     Creatinine 1.5 (*)     GFR Non- 33 (*)     GFR  40 (*)     Total Protein 6.5 (*)     All other components within normal limits   PROTIME-INR - Abnormal; Notable for the following components:    Protime 15.9 (*)     INR 1.27 (*)     All other components within normal limits   COVID-19, RAPID   TROPONIN   CK   URINALYSIS WITH REFLEX TO CULTURE       All other labs were within normal range or not returned as of this dictation. EMERGENCY DEPARTMENT COURSE and DIFFERENTIALDIAGNOSIS/MDM:   Vitals:    Vitals:    07/24/22 0956 07/24/22 1000   BP: 130/74    Pulse: 87    Resp: 20    Temp: 98.2 °F (36.8 °C)    SpO2:  90%   Weight: 204 lb (92.5 kg)    Height: 5' 5\" (1.651 m)        MDM  Daughter here now patient is a little more cooperative but not much. Says she hurts in her legs but denies pain upon palpation anywhere. Denies pain anywhere outside of her legs. Given limited exam will try to basically x-ray everything from the pelvis down. Patient's case discussed with on-call orthopedist, Dr. Irasema Bustos, who will see in consult. Recommended admission to the hospitalist.  [place] and urine still pending. Call placed to hospitalist for admission. Patient's case discussed with Aubrey Longo with hospitalist service who is agreeable plan of care and admission. CONSULTS:  IP CONSULT TO PALLIATIVE CARE  IP CONSULT TO SOCIAL WORK    PROCEDURES:  Unless otherwise notedbelow, none     Procedures    FINAL IMPRESSION     1. Aggressive behavior    2. Fall, initial encounter    3.  Closed displaced fracture of left acetabulum, unspecified portion of acetabulum, initial encounter ([address]    4. Closed fracture of left iliac wing, initial encounter ([address]    5. Closed fracture of left inferior pubic ramus, initial encounter ([facility] [address]          DISPOSITION/PLAN   DISPOSITION Admitted 07/24/2022 02:34:16 PM      PATIENT REFERRED TO:  @FUP@    DISCHARGE MEDICATIONS:  New Prescriptions    No medications on file          (Please note that portions of this note were completed with a voice recognition program.  Efforts were made to edit the dictations butoccasionally words are mis-transcribed.)    Jason Ramos MD (electronically signed)  AttendingEmergency Physician          Jason Ramos MD  07/24/22 1216

## 2022-07-24 NOTE — H&P
[facility] Hospitalists      Hospitalist - History & Physical      PCP: Blayne Palafox MD    Date of Admission: 7/24/2022    Date of Service: 7/24/2022    Chief Complaint:  right hip pain, fall    History Of Present Illness: The patient is a 80 y.o. female with a significant history of Alzheimer's disease, atrial fibrillation on chronic anticoagulation with Xarelto, CHF, diabetes, hypertension, hyperlipidemia, and restless leg syndrome who presented to [address] [place] [address] ED via EMS complaining of right leg pain after fall. Patient has significant dementia and cannot provide history. Daughter at bedside indicates patient fell last night while getting up to go to the bathroom. Daughter indicates patient has 24-hour care at home by family and is normally assisted to the restroom, however patient got up without assistance. Daughter indicates patient fell directly on her bottom. Family assisted patient up and she was able to ambulate last night. This morning patient had difficulty ambulating and was complaining of right hip pain. Patient was then brought to the ED via EMS. ED work-up included x-ray of right tib-fib with diffuse soft tissue swelling without fracture, left tib-fib no acute fracture, left foot with hammertoe deformity and soft tissue swelling, right femur with a chronic appearing avulsion fracture of the ischial tuberosity, right foot degenerative changes without acute fracture, left femur superior and inferior left pubic rami fractures, right pelvis with fractures of the left medial acetabulum and inferior pubic rami, old fracture of the right inferior pubic rami. Chest x-ray indicated moderate cardiomegaly and vascular congestion without significant effusion. CT head indicated atrophy with nonspecific but presumed microvascular changes. CT pelvis without contrast indicated fractures of the left anterior acetabulum, anterior medial left iliac wing, and left inferior pubic rami.   Patient admitted to the hospitalist service for left anterior acetabulum and anterior medial left iliac wing and left inferior pubic rami fractures. Orthopedic surgery consulted from ED. Past Medical History:        Diagnosis Date    KANCHAN (acute kidney injury) ([facility] 12/30/2018    Alzheimer disease ([facility]     Arthritis     Atrial fibrillation (HCC)     BiPAP (biphasic positive airway pressure) dependence     15cm/11cm    Cancer (HCC)     COLON CA    CHF (congestive heart failure) ([facility] 12/30/2018    Chronic atrial fibrillation ([facility] 2/12/2017    Dementia ([facility]     Depression     Diabetes mellitus ([facility]     Essential hypertension 7/11/2017    History of blood transfusion     Hyperlipidemia     Hypertension     Mixed hyperlipidemia 7/11/2017    Neuropathy 11/19/2021    Obesity     Obstructive sleep apnea     AHI:  18.7;  In REM AHI:  39.3 per PSG, 11/2008; split-night PSG, 8/2017 AHI: 35.1    Osteoarthritis     Palliative care patient 01/08/2019    Pneumonia due to organism     Restless leg syndrome 8/1/2017    Sinus complaint     Swelling        Past Surgical History:        Procedure Laterality Date    ADENOIDECTOMY      APPENDECTOMY      CARDIOVERSION      x 2     CHOLECYSTECTOMY      COLONOSCOPY  05/17/2013    Dr. Jovi Weiss    COLONOSCOPY  03/22/2012    Dr Reva Lal yr recall    COLONOSCOPY  06/30/2008    Dr Jovi Weiss, 3 yr recall    COLONOSCOPY N/A 8/2/2019    Dr Sepideh Corrales sided anastomosis appeared normal and patent-prn (age)    HEMICOLECTOMY Right 1998    2 FT OF COLON REMOVED DUE TO CA    HYSTERECTOMY (CERVIX STATUS UNKNOWN)      [place] COLONOSCOPY W/BIOPSY SINGLE/MULTIPLE N/A 6/6/2017    Dr Hastings Prim colon anastomosis-Tubular AP (-) dysplasia x 2--3 yr recall    TONSILLECTOMY      TUMOR REMOVAL      stomach    UPPER GASTROINTESTINAL ENDOSCOPY  05/17/2013    Dr. Joaquim Shields ulcer disease-Chelo (+)    UPPER GASTROINTESTINAL ENDOSCOPY  03/28/2012    Dr Ko Finger antral ulceration with a visible vessel UPPER GASTROINTESTINAL ENDOSCOPY N/A 8/2/2019    Dr Vincenzo Montelongo-Gastritis       Home Medications:  Prior to Admission medications    Medication Sig Start Date End Date Taking?  Authorizing Provider   XARELTO 15 MG TABS tablet TAKE 1 TABLET BY MOUTH DAILY (WITH BREAKFAST) 7/5/22   Kelsie Phelps MD   furosemide (LASIX) 40 MG tablet  5/16/22   Historical Provider, MD   glipiZIDE (GLUCOTROL XL) 2.5 MG extended release tablet Take 1 tablet by mouth daily 6/17/22   Kelsie Phelps MD   traZODone (DESYREL) 100 MG tablet TAKE 1 TABLET BY MOUTH NIGHTLY 6/1/22   Rodolfo Kidd MD   rOPINIRole (REQUIP) 0.25 MG tablet Take 2 tablets by mouth nightly 5/20/22   Kelsie Phelps MD   bumetanide (BUMEX) 1 MG tablet Take 1 tablet by mouth daily  Patient not taking: Reported on 6/17/2022 5/15/22   EMY Adame - CNP   FEROSUL 325 (65 Fe) MG tablet TAKE 1 TABLET BY MOUTH EVERY DAY WITH BREAKFAST 5/13/22   Kelsie Phelps MD   melatonin 3 MG TABS tablet Take 10 mg by mouth nightly as needed    Historical Provider, MD   donepezil (ARICEPT) 10 MG tablet Take 1 tablet by mouth 2 times daily 4/1/22   Rodolfo Kidd MD   memantine [facility]) 10 MG tablet Take 1 tablet by mouth 2 times daily 3/28/22   Rodolfo Kidd MD   ARIPiprazole (ABILIFY) 2 MG tablet Take 1 tablet by mouth every evening 3/28/22   Rodolfo Kidd MD   atorvastatin (LIPITOR) 40 MG tablet Take 1 tablet by mouth nightly 3/21/22   Kelsie Phelps MD   blood glucose monitor kit and supplies QD E11.9 12/3/21   Kelsie Phelps MD   metoprolol succinate (TOPROL XL) 25 MG extended release tablet Take 1 tablet by mouth nightly 11/12/21   EMY Turner   colesevelam (WELCHOL) 625 MG tablet Take 1 tablet by mouth 2 times daily (with meals)  Patient not taking: Reported on 5/16/2022 8/19/21   Kelsie Phelps MD   SITagliptin (JANUVIA) 100 MG tablet Take 1 tablet by mouth daily 7/22/21   Kelsie Phelps MD   pantoprazole (PROTONIX) 20 MG tablet Take 20 mg by mouth daily  9/4/20   Historical Provider, MD   nystatin (MYCOSTATIN) 382803 UNIT/GM powder Apply topically 4 times daily Apply topically 4 times daily. Historical Provider, MD   Multiple Vitamins-Minerals (THERAPEUTIC MULTIVITAMIN-MINERALS) tablet Take 1 tablet by mouth daily 1/6/19 6/17/22  Lindsey Murdock MD   sertraline (ZOLOFT) 100 MG tablet Take 50 mg by mouth at bedtime     Historical Provider, MD       Allergies:    Patient has no known allergies. Social History:    The patient currently lives at home with family  Tobacco:   reports that she has never smoked. She has never used smokeless tobacco.  Alcohol:   reports no history of alcohol use. Illicit Drugs: denies    Family History:      Problem Relation Age of Onset    No Known Problems Mother     No Known Problems Father     Colon Cancer Daughter     Colon Polyps Daughter     Esophageal Cancer Neg Hx     Liver Cancer Neg Hx     Rectal Cancer Neg Hx     Liver Disease Neg Hx     Stomach Cancer Neg Hx        Review of Systems:   Review of Systems   Unable to perform ROS: Dementia      14 point review of systems is negative except as specifically addressed above. Physical Examination:  /74   Pulse 87   Temp 98.2 °F (36.8 °C)   Resp 20   Ht 5' 5\" (1.651 m)   Wt 204 lb (92.5 kg)   SpO2 90%   BMI 33.95 kg/m²   Physical Exam  Vitals reviewed. Constitutional:       Appearance: She is obese. Cardiovascular:      Rate and Rhythm: Normal rate. Rhythm irregular. Pulses: Normal pulses. Heart sounds: Normal heart sounds. Pulmonary:      Effort: Pulmonary effort is normal.      Breath sounds: Normal breath sounds. Abdominal:      General: Abdomen is flat. Bowel sounds are normal.      Palpations: Abdomen is soft. Musculoskeletal:      Comments: Lateral lower extremities are warm and dry. Patient is moving feet but will not allow provider to complete full assessment   Neurological:      Mental Status: She is alert.  She is disoriented. Psychiatric:         Attention and Perception: She perceives visual hallucinations. Mood and Affect: Affect is angry. Behavior: Behavior is agitated. Comments: During assessment patient is calling for a dog and telling it to come lay next to her        Diagnostic Data:  CBC:  Recent Labs     07/24/22  1024   WBC 11.6*   HGB 12.2   HCT 40.4        BMP:  Recent Labs     07/24/22  1024      K 4.9      CO2 24   BUN 37*   CREATININE 1.5*   CALCIUM 8.8     Recent Labs     07/24/22  1024   AST 24   ALT 19   BILITOT 0.4   ALKPHOS 102     Coag Panel:   Recent Labs     07/24/22  1024   INR 1.27*   PROTIME 15.9*     Cardiac Enzymes:   Recent Labs     07/24/22  1024   CKTOTAL 48   TROPONINI 0.02     ABGs:  Lab Results   Component Value Date/Time    PHART 7.370 05/12/2022 12:53 PM    PO2ART 77.0 05/12/2022 12:53 PM    XKV8ZIV 45.0 05/12/2022 12:53 PM     Urinalysis:  Lab Results   Component Value Date/Time    NITRU Negative 09/15/2020 04:50 PM    WBCUA 35 09/15/2020 04:50 PM    BACTERIA None Seen 09/15/2020 04:50 PM    RBCUA 1 09/15/2020 04:50 PM    BLOODU Negative 09/15/2020 04:50 PM    SPECGRAV 1.011 09/15/2020 04:50 PM    GLUCOSEU Negative 09/15/2020 04:50 PM     A1C: No results for input(s): LABA1C in the last 72 hours. ABG:No results for input(s): PHART, KWL6LNB, PO2ART, JYF4FIQ, BEART, HGBAE, O4REXUJD, CARBOXHGBART in the last 72 hours. XR FEMUR LEFT (MIN 2 VIEWS)    Result Date: 7/24/2022  NO PRIOR REPORT AVAILABLE Exam: RADIOGRAPHS OF THE LEFT FEMUR Clinical data:Fall. Leg pain. Technique: Four views of the left femur. Prior studies: No prior studies submitted. Findings:Minor displacement of a superior left pubic ramus fracture at the junction of the acetabulum. Nondisplaced fracture at the junction of the inferior left pubic ramus and issue him. Mild degenerative changes of the acetabulum. Moderate degenerative changes at the knee.   No osseous lesion or foreign body. Superior and inferior left pubic rami fractures, described above. Degenerative changes. Recommendation: Follow up recommended. Electronically Signed by Griselda Chaves MD at 24-Jul-2022 03:19:15 PM             XR FEMUR RIGHT (MIN 2 VIEWS)    Result Date: 7/24/2022  NO PRIOR REPORT AVAILABLE Exam: X-RAYS OF THE RIGHT FEMUR Clinical data:Fall. Leg pain. Technique: Four views of the right femur. Prior studies: No prior studies submitted. Findings: There are degenerative changes at the hip and knee joints. There is an avulsion fracture of the ischial tuberosity which has a chronic appearance. No osseous lesion or foreign body. Chronic appearing avulsion fracture of the ischial tuberosity. Correlate clinically with history. Degenerative changes at the hip and knee joints. Recommendation: Follow up as clinically indicated. Electronically Signed by Griselda Chaves MD at 24-Jul-2022 03:20:42 PM             XR TIBIA FIBULA LEFT (2 VIEWS)    Result Date: 7/24/2022  NO PRIOR REPORT AVAILABLE Exam: X-RAYS OF THE LEFT TIBIA AND FIBULA Clinical data:Fall. Leg pain. Technique:Multiple views of the left tibia and fibula. Prior studies: No prior studies submitted. Findings: There are degenerative changes at the knee and ankle joints. There is diffuse soft tissue swelling. There are vascular calcifications. No acute fracture or dislocation. No osseous lesion or foreign body noted. No acute fracture demonstrated. Degenerative changes at the knee and ankle joints. Diffuse soft tissue swelling. Recommendation: Follow up as clinically indicated. Electronically Signed by Griselda Chaves MD at 24-Jul-2022 03:20:24 PM             XR TIBIA FIBULA RIGHT (2 VIEWS)    Result Date: 7/24/2022  NO PRIOR REPORT AVAILABLE Exam: X-RAYS OF THE RIGHT TIBIA AND FIBULA Clinical data:Fall. Leg pain. Technique: Four views of the right tibia and fibula. Prior studies:No prior studies submitted.  Findings: There are degenerative changes at the hip and knee joints. There is diffuse soft tissue swelling. Vascular calcifications. No acute fracture or dislocation. No osseous lesion or foreign body. Diffuse soft tissue swelling without fracture. Degenerative changes at the hip and knee joints. Recommendation: Follow up as clinically indicated. Electronically Signed by Zoe Bowen MD at 24-Jul-2022 03:20:59 PM             XR FOOT LEFT (2 VIEWS)    Result Date: 7/24/2022  NO PRIOR REPORT AVAILABLE Exam: X-RAYS OF THE LEFT FOOT Clinical data:Fall. Leg pain. Technique: Two views of the left foot. Prior studies: No prior studies submitted. Findings: There is diffuse soft tissue swelling. Moderate sized plantar spur. No acute fracture. No joint dislocation. Hammertoe deformities. Mild first MTP joint degenerative changes and mild degenerative changes in the midfoot. Calcaneal spurring. Mild degenerative changes. Hammertoe deformities. Diffuse soft tissue swelling. No acute fracture identified. Calcaneal spurring. Recommendation: Follow up as clinically indicated. Note: Direct correlation with point tenderness and the X-ray images is recommended and does significantly increase the sensitivity of radiographic evaluation. Electronically Signed by Zoe Bowen MD at 24-Jul-2022 03:20:10 PM             XR FOOT RIGHT (2 VIEWS)    Result Date: 7/24/2022  NO PRIOR REPORT AVAILABLE Exam:X-RAYS OF RIGHT FOOT Clinical data:Fall, leg pain. Technique: Two views of the right foot. Prior studies: No prior studies submitted. Findings: Diffuse soft tissue swelling. Vascular calcifications. Moderate sized plantar spur. Hammertoe deformities. No discernible fracture or dislocation. Mild diffuse degenerative changes of the interphalangeal joints and within the midfoot. Calcaneal spurring. Degenerative changes without acute fracture. Hammertoe deformities. Diffuse soft tissue swelling.   Calcaneal in the coronal and sagittal planes. Reformatted/MPR images were performed. Radiation dose: CTDIvol =65.49 mGy, DLP =1379 mGy x cm. Priorstudies: No prior studies submitted. Findings: There isgrossly unremarkablealignment without acute fracture or subluxation. Bone mineralization is grossly unremarkable. Vertebral body heights are maintained. Posterior elements are intact. Inter-vertebral disc spaces: Mild-moderate mid and lower cervical spondylosis. Atheromatous vascular calcification. Small left thyroid nodule. Skull base and craniocervical junction are intact. Lung apices are clear. 1.  No acute fracture, subluxation or prevertebral swelling. 2.  Mild-moderate mid and lower cervical spondylosis. 3.  Other nonacute findings above. Recommendation: Follow up as clinically indicated. All CT scans at this facility utilize dose modulation, iterative reconstruction, and/or weight based dosing when appropriate to reduce radiation dose to as low as reasonably achievable. Electronically Signed by Susan Vivas MD at 24-Jul-2022 01:26:26 PM             CT PELVIS WO CONTRAST Additional Contrast? None    Result Date: 7/24/2022  NO PRIOR REPORT AVAILABLE EXAM: CT OF THE PELVIS WITHOUT CONTRAST CLINICAL DATA: Fall. TECHNIQUE: Axial, coronal and sagittal CT scan of the pelvis was performed without the administration of contrast.Reformatted/MPR images were performed. Lack of intravenous contrast limits evaluation. Radiation Dose:  CTDIvol = 41.54 mGy   DLP = 1626 mGy  x cm. PRIOR STUDIES: Radiographs of the pelvis done on the same date. CT scan of the abdomen and pelvis dated 12/30/18 images. FINDINGS: Fractures are seen involving the left anterior acetabulum and junction of superior pubic ramus, anteromedial aspect of left iliac wing at the level of the SI joint, as well as the left inferior pubic ramus. Old right inferior pubic ramus fracture. The uterus is unremarkable on this current CT examination.  No evidence of adnexal mass. CT scan of the pelvis demonstrates no evidence of pelvic mass or pelvic sidewall lymphadenopathy. No significant fluid is present within the cul-de-sac. Lower lumbar spondylosis with bilateral L5 pars defects and convex left scoliosis. The urinary bladder is limited in evaluation however partially distended. Atheromatous vascular calcifications. 1.  Fractures, left anterior acetabulum, anteromedial left iliac wing and left inferior pubic ramus described above. 2.  Old right inferior pubic ramus fracture. 3.  Other nonacute findings above. RECOMMENDATION: Follow up as clinically indicated. All CT scans at this facility utilize dose modulation, iterative reconstruction, and/or weight based dosing when appropriate to reduce radiation dose to as low as reasonably achievable. Electronically Signed by Jennifer Allen MD at 24-Jul-2022 01:32:17 PM             XR CHEST PORTABLE    Result Date: 7/24/2022  NO PRIOR REPORT AVAILABLE Exam: Chest x-ray Clinical data: Pain. Technique: AP frontal view of the chest is submitted. Limitations: None. Prior studies: No prior studies submitted. Findings: Moderate cardiomegaly. Vascular congestion. No pneumothorax or significant pleural effusion. Midline trachea. Regional bones are intact. Degenerative changes of the shoulders and spine. Moderate cardiomegaly and vascular congestion without significant effusion. Recommendation: Follow up recommended. Electronically Signed by Jennifer Allen MD at 24-Jul-2022 01:37:29 PM             XR HIP 2-3 VW W PELVIS RIGHT    Result Date: 7/24/2022  NO PRIOR REPORT AVAILABLE Exam: X-RAYS OF THE RIGHT HIP Clinical data: Fall, hip pain. Technique: Two views of the right hip with single view of the pelvis. Prior studies: No prior studies submitted. Findings: Fractures involving left medial acetabulum at the junction of superior pubic ramus as well as left inferior pubic ramus. Old fracture, right inferior pubic ramus.  Bone mineral density is preserved. Lower lumbar spondylosis. Fractures, left medial acetabulum and inferior pubic ramus. Old fracture, right inferior pubic ramus. Recommendation: Follow up as clinically indicated. Electronically Signed by Katrin Stewart MD at 24-Jul-2022 01:37:07 PM               Assessment/Plan:  Principal Problem:    Closed displaced fracture of medial wall of left acetabulum, initial encounter ([facility] / fall/ left inferior pubic rami fracture   -admit   -pain control   -bedrest until assessed by ortho   -ortho consulted from ED   -pain control   -neurovascular checks    Active Problems:    Chronic systolic (congestive) heart failure   -continue home lasix      Dementia with behavioral disturbance, unspecified dementia type ([facility])   -Continue Abilify   -Bed alarm   -Fall precautions   -neuro checks       Chronic anticoagulation / PAF (paroxysmal atrial fibrillation) ([facility])    -hold xarelto until assessed by ortho      Type 2 diabetes mellitus without complication, with long-term current use of insulin ([facility])   -accu checks   -ssi   -hypoglycemia protocol in place      Stage 3b chronic kidney disease ([facility])   -monitor BMP   -avoid hypotension and nephrotoxins    Resolved Problems:    * No resolved hospital problems.  *       Signed:  EMY Patino - CNP, 7/24/2022 3:03 PM + dyspnea + cough

## 2022-07-25 LAB
ANION GAP SERPL CALCULATED.3IONS-SCNC: 10 MMOL/L (ref 7–19)
BASOPHILS ABSOLUTE: 0.1 K/UL (ref 0–0.2)
BASOPHILS RELATIVE PERCENT: 0.8 % (ref 0–1)
BUN BLDV-MCNC: 32 MG/DL (ref 8–23)
CALCIUM SERPL-MCNC: 8.6 MG/DL (ref 8.8–10.2)
CHLORIDE BLD-SCNC: 102 MMOL/L (ref 98–111)
CO2: 28 MMOL/L (ref 22–29)
CREAT SERPL-MCNC: 1.4 MG/DL (ref 0.5–0.9)
EKG P AXIS: NORMAL DEGREES
EKG P-R INTERVAL: NORMAL MS
EKG Q-T INTERVAL: 374 MS
EKG QRS DURATION: 84 MS
EKG QTC CALCULATION (BAZETT): 416 MS
EKG T AXIS: 37 DEGREES
EOSINOPHILS ABSOLUTE: 0.2 K/UL (ref 0–0.6)
EOSINOPHILS RELATIVE PERCENT: 2.5 % (ref 0–5)
GFR AFRICAN AMERICAN: 44
GFR NON-AFRICAN AMERICAN: 36
GLUCOSE BLD-MCNC: 109 MG/DL (ref 74–109)
GLUCOSE BLD-MCNC: 113 MG/DL (ref 70–99)
GLUCOSE BLD-MCNC: 175 MG/DL (ref 70–99)
GLUCOSE BLD-MCNC: 265 MG/DL (ref 70–99)
GLUCOSE BLD-MCNC: 80 MG/DL (ref 70–99)
HCT VFR BLD CALC: 34.8 % (ref 37–47)
HEMOGLOBIN: 10.6 G/DL (ref 12–16)
IMMATURE GRANULOCYTES #: 0 K/UL
LYMPHOCYTES ABSOLUTE: 1 K/UL (ref 1.1–4.5)
LYMPHOCYTES RELATIVE PERCENT: 10.3 % (ref 20–40)
MCH RBC QN AUTO: 29.5 PG (ref 27–31)
MCHC RBC AUTO-ENTMCNC: 30.5 G/DL (ref 33–37)
MCV RBC AUTO: 96.9 FL (ref 81–99)
MONOCYTES ABSOLUTE: 1.2 K/UL (ref 0–0.9)
MONOCYTES RELATIVE PERCENT: 12.1 % (ref 0–10)
NEUTROPHILS ABSOLUTE: 7 K/UL (ref 1.5–7.5)
NEUTROPHILS RELATIVE PERCENT: 74 % (ref 50–65)
PDW BLD-RTO: 14.8 % (ref 11.5–14.5)
PERFORMED ON: ABNORMAL
PERFORMED ON: NORMAL
PLATELET # BLD: 209 K/UL (ref 130–400)
PMV BLD AUTO: 10 FL (ref 9.4–12.3)
POTASSIUM REFLEX MAGNESIUM: 4.2 MMOL/L (ref 3.5–5)
RBC # BLD: 3.59 M/UL (ref 4.2–5.4)
SODIUM BLD-SCNC: 140 MMOL/L (ref 136–145)
WBC # BLD: 9.5 K/UL (ref 4.8–10.8)

## 2022-07-25 PROCEDURE — 6370000000 HC RX 637 (ALT 250 FOR IP): Performed by: NURSE PRACTITIONER

## 2022-07-25 PROCEDURE — 6360000002 HC RX W HCPCS: Performed by: HOSPITALIST

## 2022-07-25 PROCEDURE — 99222 1ST HOSP IP/OBS MODERATE 55: CPT | Performed by: PHYSICIAN ASSISTANT

## 2022-07-25 PROCEDURE — 1210000000 HC MED SURG R&B

## 2022-07-25 PROCEDURE — 2580000003 HC RX 258: Performed by: HOSPITALIST

## 2022-07-25 PROCEDURE — 80048 BASIC METABOLIC PNL TOTAL CA: CPT

## 2022-07-25 PROCEDURE — 2580000003 HC RX 258: Performed by: NURSE PRACTITIONER

## 2022-07-25 PROCEDURE — 97530 THERAPEUTIC ACTIVITIES: CPT

## 2022-07-25 PROCEDURE — 36415 COLL VENOUS BLD VENIPUNCTURE: CPT

## 2022-07-25 PROCEDURE — 82947 ASSAY GLUCOSE BLOOD QUANT: CPT

## 2022-07-25 PROCEDURE — 93010 ELECTROCARDIOGRAM REPORT: CPT | Performed by: INTERNAL MEDICINE

## 2022-07-25 PROCEDURE — 97165 OT EVAL LOW COMPLEX 30 MIN: CPT

## 2022-07-25 PROCEDURE — 97161 PT EVAL LOW COMPLEX 20 MIN: CPT

## 2022-07-25 PROCEDURE — 6370000000 HC RX 637 (ALT 250 FOR IP): Performed by: HOSPITALIST

## 2022-07-25 PROCEDURE — 6360000002 HC RX W HCPCS: Performed by: NURSE PRACTITIONER

## 2022-07-25 PROCEDURE — 97535 SELF CARE MNGMENT TRAINING: CPT

## 2022-07-25 PROCEDURE — 2500000003 HC RX 250 WO HCPCS: Performed by: HOSPITALIST

## 2022-07-25 PROCEDURE — 85025 COMPLETE CBC W/AUTO DIFF WBC: CPT

## 2022-07-25 PROCEDURE — 2700000000 HC OXYGEN THERAPY PER DAY

## 2022-07-25 RX ORDER — HYDROCODONE BITARTRATE AND ACETAMINOPHEN 5; 325 MG/1; MG/1
1 TABLET ORAL EVERY 6 HOURS PRN
Status: DISCONTINUED | OUTPATIENT
Start: 2022-07-25 | End: 2022-07-27 | Stop reason: HOSPADM

## 2022-07-25 RX ADMIN — SERTRALINE HYDROCHLORIDE 50 MG: 50 TABLET ORAL at 22:16

## 2022-07-25 RX ADMIN — WATER 1000 MG: 1 INJECTION INTRAMUSCULAR; INTRAVENOUS; SUBCUTANEOUS at 11:13

## 2022-07-25 RX ADMIN — MICONAZOLE NITRATE: 2 POWDER TOPICAL at 23:27

## 2022-07-25 RX ADMIN — ROPINIROLE HYDROCHLORIDE 0.5 MG: 0.5 TABLET, FILM COATED ORAL at 22:15

## 2022-07-25 RX ADMIN — DONEPEZIL HYDROCHLORIDE 10 MG: 5 TABLET, FILM COATED ORAL at 22:15

## 2022-07-25 RX ADMIN — DONEPEZIL HYDROCHLORIDE 10 MG: 5 TABLET, FILM COATED ORAL at 09:56

## 2022-07-25 RX ADMIN — ATORVASTATIN CALCIUM 40 MG: 40 TABLET, FILM COATED ORAL at 22:16

## 2022-07-25 RX ADMIN — PANTOPRAZOLE SODIUM 20 MG: 20 TABLET, DELAYED RELEASE ORAL at 09:56

## 2022-07-25 RX ADMIN — FERROUS SULFATE TAB 325 MG (65 MG ELEMENTAL FE) 325 MG: 325 (65 FE) TAB at 09:56

## 2022-07-25 RX ADMIN — TRAZODONE HYDROCHLORIDE 100 MG: 100 TABLET ORAL at 22:16

## 2022-07-25 RX ADMIN — MEMANTINE 10 MG: 5 TABLET ORAL at 22:15

## 2022-07-25 RX ADMIN — FUROSEMIDE 40 MG: 40 TABLET ORAL at 09:56

## 2022-07-25 RX ADMIN — SODIUM CHLORIDE, PRESERVATIVE FREE 10 ML: 5 INJECTION INTRAVENOUS at 23:27

## 2022-07-25 RX ADMIN — METOPROLOL SUCCINATE 25 MG: 25 TABLET, EXTENDED RELEASE ORAL at 22:16

## 2022-07-25 RX ADMIN — ARIPIPRAZOLE 2 MG: 2 TABLET ORAL at 17:50

## 2022-07-25 RX ADMIN — MEMANTINE 10 MG: 5 TABLET ORAL at 09:56

## 2022-07-25 RX ADMIN — HYDROCODONE BITARTRATE AND ACETAMINOPHEN 1 TABLET: 5; 325 TABLET ORAL at 11:13

## 2022-07-25 RX ADMIN — Medication 10 MG: at 01:12

## 2022-07-25 RX ADMIN — MORPHINE SULFATE 1 MG: 2 INJECTION, SOLUTION INTRAMUSCULAR; INTRAVENOUS at 02:33

## 2022-07-25 RX ADMIN — MULTIPLE VITAMINS W/ MINERALS TAB 1 TABLET: TAB at 09:56

## 2022-07-25 RX ADMIN — RIVAROXABAN 15 MG: 15 TABLET, FILM COATED ORAL at 17:50

## 2022-07-25 ASSESSMENT — PAIN DESCRIPTION - LOCATION: LOCATION: HIP

## 2022-07-25 ASSESSMENT — PAIN DESCRIPTION - ORIENTATION: ORIENTATION: LEFT

## 2022-07-25 ASSESSMENT — PAIN SCALES - GENERAL: PAINLEVEL_OUTOF10: 9

## 2022-07-25 NOTE — PROGRESS NOTES
Occupational Therapy  Facility/Department: [facility] Initial Assessment    Name: Lucinda Nguyen  : 1940  MRN: [id number]  Date of Service: 2022    Discharge Recommendations:             Patient Diagnosis(es): The primary encounter diagnosis was Aggressive behavior. Diagnoses of Fall, initial encounter, Closed displaced fracture of left acetabulum, unspecified portion of acetabulum, initial encounter [facility]), Closed fracture of left iliac wing, initial encounter ([facility] and Closed fracture of left inferior pubic ramus, initial encounter [facility]) were also pertinent to this visit. Past Medical History:  has a past medical history of KANCHAN (acute kidney injury) ([facility] Alzheimer disease ([address] Arthritis, Atrial fibrillation ([address] BiPAP (biphasic positive airway pressure) dependence, Cancer ([address] CHF (congestive heart failure) ([address] Chronic atrial fibrillation ([address] Dementia ([address] Depression, Diabetes mellitus ([address] Essential hypertension, History of blood transfusion, Hyperlipidemia, Hypertension, Mixed hyperlipidemia, Neuropathy, Obesity, Obstructive sleep apnea, Osteoarthritis, Palliative care patient, Pneumonia due to organism, Restless leg syndrome, Sinus complaint, and Swelling. Past Surgical History:  has a past surgical history that includes Cholecystectomy; Appendectomy; Hysterectomy; Tonsillectomy; Adenoidectomy; tumor removal; Colonoscopy (2013); Upper gastrointestinal endoscopy (2013); Upper gastrointestinal endoscopy (2012); Colonoscopy (2012); hemicolectomy (Right, ); Colonoscopy (2008); Cardioversion; pr colonoscopy w/biopsy single/multiple (N/A, 2017); Upper gastrointestinal endoscopy (N/A, 2019); and Colonoscopy (N/A, 2019). Treatment Diagnosis: Pelvic fx      Assessment   Performance deficits / Impairments: Decreased cognition;Decreased safe awareness;Decreased ADL status; Decreased balance;Decreased functional mobility   Assessment: Pt. tends to be argumentative but will follow daughter's verbal direction  Treatment Diagnosis: Pelvic fx  Prognosis: Good  Decision Making: Low Complexity  REQUIRES OT FOLLOW-UP: Yes  Activity Tolerance  Activity Tolerance: Treatment limited secondary to decreased cognition        Plan   Plan  Times per Week: 3-5x/week  Times per Day: Daily     Restrictions       Subjective   General  Chart Reviewed: Yes  Patient assessed for rehabilitation services?: Yes  Family / Caregiver Present: Yes (Pt.'s daughter)  Diagnosis: Pelvic fx. General Comment  Comments: Pt. has hx of dementia and daughter is primary caregiver at home     Social/Functional History  Social/Functional History  Lives With: Daughter  ADL Assistance: Needs assistance  Ambulation Assistance: Needs assistance  Transfer Assistance: Needs assistance  Additional Comments: Assist needed with self care tasks and tfers due to decreased cognition       Objective   Heart Rate: 79  Heart Rate Source: Monitor  BP: 112/80  BP Location: Right upper arm  Patient Position: Lying left side  MAP (Calculated): 90.67  Resp: 18  SpO2: 96 %  O2 Device: Nasal cannula                      AROM:  (Unable to follow directions consistently)  ADL  Feeding: Moderate assistance  Grooming: Dependent/Total  UE Bathing: Dependent/Total  LE Bathing: Dependent/Total  UE Dressing: Dependent/Total  LE Dressing: Dependent/Total  Toileting: Dependent/Total           Transfers  Stand Step Transfers: Moderate assistance  Sit to stand:  Moderate assistance  Transfer Comments: Pt. leaning backwards during standing  Hearing  Hearing: Within functional limits  Cognition  Overall Cognitive Status: Exceptions  Following Commands: Inconsistently follows commands  Attention Span: Unable to maintain attention  Insights: Not aware of deficits  Orientation  Overall Orientation Status: Impaired  Orientation Level: Oriented to person                                           G-Code OutComes Score                                                  AM-PAC Score             Tinneti Score       Goals  Short Term Goals  Time Frame for Short term goals: 1 week  Short Term Goal 1: Shanell with [facility] tfers  Long Term Goals  Long Term Goal 1: Return to PLOF       Therapy Time   Individual Concurrent Group Co-treatment   Time In           Time Out           Minutes                   Sharee Cohen, OT

## 2022-07-25 NOTE — PROGRESS NOTES
Physical Therapy  Facility/Department: [facility] SURG SERVICES  Physical Therapy Initial Assessment    Name: Mynor Robles  : 1940  MRN: [id number]  Date of Service: 2022    Discharge Recommendations:  Continue to assess pending progress (FAMILY PLANS TO TAKE Pt HOME)          Patient Diagnosis(es): The primary encounter diagnosis was Aggressive behavior. Diagnoses of Fall, initial encounter, Closed displaced fracture of left acetabulum, unspecified portion of acetabulum, initial encounter [facility]), Closed fracture of left iliac wing, initial encounter ([facility] [address] and Closed fracture of left inferior pubic ramus, initial encounter [facility]) were also pertinent to this visit. Past Medical History:  has a past medical history of KANCHAN (acute kidney injury) ([facility] [address] Alzheimer disease ([address] Arthritis, Atrial fibrillation ([address] BiPAP (biphasic positive airway pressure) dependence, Cancer ([facility] [address] CHF (congestive heart failure) ([address] Chronic atrial fibrillation ([address] Dementia ([address] Depression, Diabetes mellitus ([address] Essential hypertension, History of blood transfusion, Hyperlipidemia, Hypertension, Mixed hyperlipidemia, Neuropathy, Obesity, Obstructive sleep apnea, Osteoarthritis, Palliative care patient, Pneumonia due to organism, Restless leg syndrome, Sinus complaint, and Swelling. Past Surgical History:  has a past surgical history that includes Cholecystectomy; Appendectomy; Hysterectomy; Tonsillectomy; Adenoidectomy; tumor removal; Colonoscopy (2013); Upper gastrointestinal endoscopy (2013); Upper gastrointestinal endoscopy (2012); Colonoscopy (2012); hemicolectomy (Right, ); Colonoscopy (2008); Cardioversion; pr colonoscopy w/biopsy single/multiple (N/A, 2017); Upper gastrointestinal endoscopy (N/A, 2019); and Colonoscopy (N/A, 2019).     Assessment   Body Structures, Functions, Activity Limitations Requiring Skilled Therapeutic Intervention: Decreased functional mobility ;Decreased ADL status; Decreased ROM; Decreased strength;Decreased safe awareness;Decreased cognition;Decreased balance;Decreased posture; Increased pain  Assessment: Pt REQUIRING ASSIST FOR ALL ASPECTS OF MOBILITY. ABLE TO TRANSFER TO BSC AND BACK TO BED WITH MAX VC's. Requires PT Follow-Up: Yes  Activity Tolerance  Activity Tolerance: Treatment limited secondary to decreased cognition     Plan   Plan  Plan: 3-5 times per week  Current Treatment Recommendations: Functional mobility training, Transfer training, Balance training, Patient/Caregiver education & training, Safety education & training  Safety Devices  Type of Devices: Bed alarm in place (DTR PRESENT)     Restrictions  Restrictions/Precautions  Restrictions/Precautions: Fall Risk     Subjective   Pain: YELLS IN PAIN RE: HER \"LEGS\" WITH CERTAIN MOVEMENTS  General  Patient assessed for rehabilitation services?: Yes  Diagnosis: PELVIC FX, DEMENTIA  Subjective  Subjective: Pt CONFUSED, AGITATED AT TIMES. DTR PRESENT TO TALK TO Pt         Social/Functional History  Social/Functional History  Lives With: Daughter  ADL Assistance: Needs assistance  Ambulation Assistance: Needs assistance  Transfer Assistance: Needs assistance  Additional Comments: Assist needed with self care tasks and tfers due to decreased cognition  Vision/Hearing       Cognition         Objective   Heart Rate: 82  Heart Rate Source: Monitor  BP: 112/80  BP Location: Right upper arm  Patient Position: Lying left side  MAP (Calculated): 90.67  Resp: 18  SpO2: 97 %  O2 Device: Nasal cannula     Observation/Palpation  Posture: Poor  Gross Assessment  AROM: Generally decreased, functional  Strength: Generally decreased, functional                    Bed mobility  Supine to Sit: Maximum assistance;Dependent/Total  Sit to Supine: Maximum assistance;Dependent/Total  Bed Mobility Comments: Pt DOES ASSIST IN MOVEMENT AT TIMES BUT NOT CONSISTENT OR ALWAYS ON COMMAND  Transfers  Sit to Stand:  Moderate

## 2022-07-25 NOTE — CONSULTS
Palliative Care Consult Note    7/25/2022 2:44 PM  Subjective:  Admit Date: 7/24/2022  PCP: Cam Sherman MD    Date of Service: 7/25/2022    Reason for Consultation:  Goals of Care, Code Status, Family Support     History Obtained From: EMR/Patient and their Family    History Of Present Illness: The patient is a 80 y.o. female with PMH Alzheimer's dementia, suspected diastolic dysfunction, CAD, chronic atrial fibrillation, DM II, HTN, HLD, MARILEE,Colon cancer who presented to [facility] ED on 07/24/2022 after a fall and development of hip pain in the setting of dementia with worsening confusion. Left femur x ray report concerning for superior/inferior left pubic rami fractures. Right femur x ray report concerning for chronic appearing avulsion fracture of ischial tuberosity. CT pelvis report concerning for fractures of left anterior acetabulum, anteromedial left iliac wing, left inferior pubic ramus w/ old right inferior pubic ramus. CT head/cervical spine reported as unremarkable. Patient was admitted to Hospitalist service for fracture of medial wall of left acetabulum and fx's of left superior/inferior pubic rami. Orthopedic surgery was consulted who recommended non-operative management. Palliative care has been consulted for goals of care and family support. Past Medical History:        Diagnosis Date    KANCHAN (acute kidney injury) ([address] 12/30/2018    Alzheimer disease ([address]     Arthritis     Atrial fibrillation (HCC)     BiPAP (biphasic positive airway pressure) dependence     15cm/11cm    Cancer (HCC)     COLON CA    CHF (congestive heart failure) ([address] 12/30/2018    Chronic atrial fibrillation ([address] 2/12/2017    Dementia ([address]     Depression     Diabetes mellitus ([address]     Essential hypertension 7/11/2017    History of blood transfusion     Hyperlipidemia     Hypertension     Mixed hyperlipidemia 7/11/2017    Neuropathy 11/19/2021    Obesity     Obstructive sleep apnea     AHI:  18.7;  In REM AHI:  39.3 per PSG, 11/2008; split-night PSG, 8/2017 AHI: 35.1    Osteoarthritis     Palliative care patient 01/08/2019    Pneumonia due to organism     Restless leg syndrome 8/1/2017    Sinus complaint     Swelling      Past Surgical History:        Procedure Laterality Date    ADENOIDECTOMY      APPENDECTOMY      CARDIOVERSION      x 2     CHOLECYSTECTOMY      COLONOSCOPY  05/17/2013    Dr. Carlitos Granados    COLONOSCOPY  03/22/2012    Dr Nimisha Wagoner yr recall    COLONOSCOPY  06/30/2008    Dr Carlitos Granados, 3 yr recall    COLONOSCOPY N/A 8/2/2019    Dr Zach Bentley sided anastomosis appeared normal and patent-prn (age)    HEMICOLECTOMY Right 1998    2 FT OF COLON REMOVED DUE TO CA    HYSTERECTOMY (CERVIX STATUS UNKNOWN)      UT COLONOSCOPY W/BIOPSY SINGLE/MULTIPLE N/A 6/6/2017    Dr Ellen Cao colon anastomosis-Tubular AP (-) dysplasia x 2--3 yr recall    TONSILLECTOMY      TUMOR REMOVAL      stomach    UPPER GASTROINTESTINAL ENDOSCOPY  05/17/2013    Dr. Jc Rueda ulcer disease-Chelo (+)    UPPER GASTROINTESTINAL ENDOSCOPY  03/28/2012    Dr Madiha Marks antral ulceration with a visible vessel    UPPER GASTROINTESTINAL ENDOSCOPY N/A 8/2/2019    Dr Joni Montelongo-Gastritis       Home Medications:  Prior to Admission medications    Medication Sig Start Date End Date Taking?  Authorizing Provider   XARELTO 15 MG TABS tablet TAKE 1 TABLET BY MOUTH DAILY (WITH BREAKFAST) 7/5/22   Crishtian Alba MD   furosemide (LASIX) 40 MG tablet  5/16/22   Historical Provider, MD   glipiZIDE (GLUCOTROL XL) 2.5 MG extended release tablet Take 1 tablet by mouth daily 6/17/22   Cristhian Alba MD   traZODone (DESYREL) 100 MG tablet TAKE 1 TABLET BY MOUTH NIGHTLY 6/1/22   Zamzam Hilliard MD   rOPINIRole (REQUIP) 0.25 MG tablet Take 2 tablets by mouth nightly 5/20/22   Cristhian Alba MD   bumetanide (BUMEX) 1 MG tablet Take 1 tablet by mouth daily  Patient not taking: Reported on 6/17/2022 5/15/22   EMY Colon - CNP   Hyacinth Alexandra Daughter     Colon Polyps Daughter     Esophageal Cancer Neg Hx     Liver Cancer Neg Hx     Rectal Cancer Neg Hx     Liver Disease Neg Hx     Stomach Cancer Neg Hx        Review of Systems:   Unable to obtain 2/2 advanced dementia    Physical Examination:  /80   Pulse 79   Temp (!) 96.6 °F (35.9 °C) (Temporal)   Resp 18   Ht 5' 5\" (1.651 m)   Wt 204 lb (92.5 kg)   SpO2 96%   BMI 33.95 kg/m²   General appearance: 81 yo female, ill appearing, NC O2 in place, NAD  Head: Normocephalic, without obvious abnormality, atraumatic  Eyes: conjunctivae/corneas clear. PERRL, EOM's intact. Ears: normal external ears and nose  Neck: no JVD, supple, symmetrical, trachea midline   Lungs: clear to auscultation bilaterally,no rales or wheezes   Heart: regular rate and rhythm, S1, S2 normal, no murmur  Abdomen:soft, non-tender; non-distended, normal bowel sounds   Extremities:No lower extremity edema,  No erythema, no tenderness to palpation  Skin: Warm, dry  Neurologic: Oriented to self only, follows simple commands with not without difficulty, generalized weakness  Psychiatric: disoriented     Diagnostic Data:  CBC:  Recent Labs     07/24/22  1024 07/25/22  0219   WBC 11.6* 9.5   HGB 12.2 10.6*   HCT 40.4 34.8*    209     BMP:  Recent Labs     07/24/22  1024 07/25/22  0219    140   K 4.9 4.2    102   CO2 24 28   BUN 37* 32*   CREATININE 1.5* 1.4*   CALCIUM 8.8 8.6*     Recent Labs     07/24/22  1024   AST 24   ALT 19   BILITOT 0.4   ALKPHOS 102       XR FEMUR LEFT (MIN 2 VIEWS)    Result Date: 7/24/2022  NO PRIOR REPORT AVAILABLE Exam: RADIOGRAPHS OF THE LEFT FEMUR Clinical data:Fall. Leg pain. Technique: Four views of the left femur. Prior studies: No prior studies submitted. Findings:Minor displacement of a superior left pubic ramus fracture at the junction of the acetabulum. Nondisplaced fracture at the junction of the inferior left pubic ramus and issue him.   Mild degenerative changes of the acetabulum. Moderate degenerative changes at the knee. No osseous lesion or foreign body. Superior and inferior left pubic rami fractures, described above. Degenerative changes. Recommendation: Follow up recommended. Electronically Signed by Osvaldo Bryan MD at 24-Jul-2022 03:19:15 PM             XR FEMUR RIGHT (MIN 2 VIEWS)    Result Date: 7/24/2022  NO PRIOR REPORT AVAILABLE Exam: X-RAYS OF THE RIGHT FEMUR Clinical data:Fall. Leg pain. Technique: Four views of the right femur. Prior studies: No prior studies submitted. Findings: There are degenerative changes at the hip and knee joints. There is an avulsion fracture of the ischial tuberosity which has a chronic appearance. No osseous lesion or foreign body. Chronic appearing avulsion fracture of the ischial tuberosity. Correlate clinically with history. Degenerative changes at the hip and knee joints. Recommendation: Follow up as clinically indicated. Electronically Signed by Osvaldo Bryan MD at 24-Jul-2022 03:20:42 PM             XR TIBIA FIBULA LEFT (2 VIEWS)    Result Date: 7/24/2022  NO PRIOR REPORT AVAILABLE Exam: X-RAYS OF THE LEFT TIBIA AND FIBULA Clinical data:Fall. Leg pain. Technique:Multiple views of the left tibia and fibula. Prior studies: No prior studies submitted. Findings: There are degenerative changes at the knee and ankle joints. There is diffuse soft tissue swelling. There are vascular calcifications. No acute fracture or dislocation. No osseous lesion or foreign body noted. No acute fracture demonstrated. Degenerative changes at the knee and ankle joints. Diffuse soft tissue swelling. Recommendation: Follow up as clinically indicated. Electronically Signed by Osvaldo Bryan MD at 24-Jul-2022 03:20:24 PM             XR TIBIA FIBULA RIGHT (2 VIEWS)    Result Date: 7/24/2022  NO PRIOR REPORT AVAILABLE Exam: X-RAYS OF THE RIGHT TIBIA AND FIBULA Clinical data:Fall. Leg pain.  Technique: Four views of acute fracture. Hammertoe deformities. Diffuse soft tissue swelling. Calcaneal spurring. Recommendation: Follow up as clinically indicated. Note: Direct correlation with point tenderness and the X-ray images is recommended and does significantly increase the sensitivity of radiographic evaluation. Electronically Signed by Lilia Kurtz MD at 24-Jul-2022 03:19:47 PM             CT HEAD WO CONTRAST    Result Date: 7/24/2022  NO PRIOR REPORT AVAILABLE Exam: CT OF THE BRAIN WITHOUT CONTRAST Clinical data: Fall. Technique: Contiguous axial images are obtained from the skull base to vertex without intravenous contrast. Reformatted/MPR images were performed. Radiation dose: CTDIvol = 65.49 mGy, DLP = 1379 mGy x cm. Prior studies: CT of the brain dated 09/15/2020 image. Findings: Ill-defined low-attenuation in the periventricular white matter is nonspecific but likely on a microvascular basis. Atrophic changes. Cavum septum pellucidum et vergae is noted incidentally. No acute intracranial abnormality is present. No evidence of acute cortical infarction, hemorrhage, mass or mass effect. No hydrocephalus or abnormal extra-axial fluid collections are present. The posterior fossa is unremarkable. The skull base and calvarium are intact. The included portions of the paranasal sinuses and mastoid air cells are clear. 1.  Atrophy with nonspecific but presumed microvascular changes in the periventricular white matter. 2.  No acute significant abnormality is seen. Recommendation: Follow up as clinically indicated. All CT scans at this facility utilize dose modulation, iterative reconstruction, and/or weight based dosing when appropriate to reduce radiation dose to as low as reasonably achievable. Electronically Signed by Lilia Kurtz MD at 24-Jul-2022 01:33:47 PM             CT CERVICAL SPINE WO CONTRAST    Result Date: 7/24/2022  NO PRIOR REPORT AVAILABLE Exam: CT OF THE CERVICAL SPINE WITHOUT CONTRAST Clinical data: Fall. Technique: Contiguous axial imaging of the cervical spine. Reconstructed imaging in the coronal and sagittal planes. Reformatted/MPR images were performed. Radiation dose: CTDIvol =65.49 mGy, DLP =1379 mGy x cm. Priorstudies: No prior studies submitted. Findings: There isgrossly unremarkablealignment without acute fracture or subluxation. Bone mineralization is grossly unremarkable. Vertebral body heights are maintained. Posterior elements are intact. Inter-vertebral disc spaces: Mild-moderate mid and lower cervical spondylosis. Atheromatous vascular calcification. Small left thyroid nodule. Skull base and craniocervical junction are intact. Lung apices are clear. 1.  No acute fracture, subluxation or prevertebral swelling. 2.  Mild-moderate mid and lower cervical spondylosis. 3.  Other nonacute findings above. Recommendation: Follow up as clinically indicated. All CT scans at this facility utilize dose modulation, iterative reconstruction, and/or weight based dosing when appropriate to reduce radiation dose to as low as reasonably achievable. Electronically Signed by Dayana Gutierrez MD at 24-Jul-2022 01:26:26 PM             CT PELVIS WO CONTRAST Additional Contrast? None    Result Date: 7/24/2022  NO PRIOR REPORT AVAILABLE EXAM: CT OF THE PELVIS WITHOUT CONTRAST CLINICAL DATA: Fall. TECHNIQUE: Axial, coronal and sagittal CT scan of the pelvis was performed without the administration of contrast.Reformatted/MPR images were performed. Lack of intravenous contrast limits evaluation. Radiation Dose:  CTDIvol = 41.54 mGy   DLP = 1626 mGy  x cm. PRIOR STUDIES: Radiographs of the pelvis done on the same date. CT scan of the abdomen and pelvis dated 12/30/18 images. FINDINGS: Fractures are seen involving the left anterior acetabulum and junction of superior pubic ramus, anteromedial aspect of left iliac wing at the level of the SI joint, as well as the left inferior pubic ramus. Old right inferior pubic ramus fracture. The uterus is unremarkable on this current CT examination. No evidence of adnexal mass. CT scan of the pelvis demonstrates no evidence of pelvic mass or pelvic sidewall lymphadenopathy. No significant fluid is present within the cul-de-sac. Lower lumbar spondylosis with bilateral L5 pars defects and convex left scoliosis. The urinary bladder is limited in evaluation however partially distended. Atheromatous vascular calcifications. 1.  Fractures, left anterior acetabulum, anteromedial left iliac wing and left inferior pubic ramus described above. 2.  Old right inferior pubic ramus fracture. 3.  Other nonacute findings above. RECOMMENDATION: Follow up as clinically indicated. All CT scans at this facility utilize dose modulation, iterative reconstruction, and/or weight based dosing when appropriate to reduce radiation dose to as low as reasonably achievable. Electronically Signed by Fay Cogan MD at 24-Jul-2022 01:32:17 PM             XR CHEST PORTABLE    Result Date: 7/24/2022  NO PRIOR REPORT AVAILABLE Exam: Chest x-ray Clinical data: Pain. Technique: AP frontal view of the chest is submitted. Limitations: None. Prior studies: No prior studies submitted. Findings: Moderate cardiomegaly. Vascular congestion. No pneumothorax or significant pleural effusion. Midline trachea. Regional bones are intact. Degenerative changes of the shoulders and spine. Moderate cardiomegaly and vascular congestion without significant effusion. Recommendation: Follow up recommended. Electronically Signed by Fay Cogan MD at 24-Jul-2022 01:37:29 PM             XR HIP 2-3 VW W PELVIS RIGHT    Result Date: 7/24/2022  NO PRIOR REPORT AVAILABLE Exam: X-RAYS OF THE RIGHT HIP Clinical data: Fall, hip pain. Technique: Two views of the right hip with single view of the pelvis. Prior studies: No prior studies submitted.  Findings: Fractures involving left medial acetabulum at the junction of superior pubic ramus as well as left inferior pubic ramus. Old fracture, right inferior pubic ramus. Bone mineral density is preserved. Lower lumbar spondylosis. Fractures, left medial acetabulum and inferior pubic ramus. Old fracture, right inferior pubic ramus. Recommendation: Follow up as clinically indicated. Electronically Signed by Rosalba Ibarra MD at 24-Jul-2022 01:37:07 PM               Palliative Performance Scale:  30-40%    Palliative Review of Advance Directives:     Surrogate Decision [facility]    Durable Power of :Yes Edi Chadwick    Advanced Directives/Living Larry Scriver: No    Out of hospital medical orders in place to reflect resuscitation status (MOLST/POLST): Yes-on file    Information Sharing:  Patient's awareness of illness:  [] Terminal [] Life-Threatening [] Serious [] Non life-threatening [] Not serious   [x] Not discussed    Family awareness of illness:   [] Terminal [] Life-Threatening [x] Serious [] Nonlife-threatening [] Not serious   [] Not discussed    Assessment/Plan:  Principal Problem:    Closed displaced fracture of medial wall of left acetabulum, initial encounter ([facility] [address]  Active Problems:    Chronic systolic (congestive) heart failure    Dementia with behavioral disturbance, unspecified dementia type (HCC)    Chronic anticoagulation    PAF (paroxysmal atrial fibrillation) ([facility] [address]    Type 2 diabetes mellitus without complication, with long-term current use of insulin ([facility] [address]    Palliative care patient    Stage 3b chronic kidney disease ([facility] [address]  Resolved Problems:    * No resolved hospital problems. *     Visit Summary:  Chart reviewed, patient discussed with consulting service and nursing staff. Reviewed health issues, work up and treatment plan as well as factors that lead to hospitalization. I saw Ms. Edwin Hui at her bedside with her daughter, Terra Lal, present. The patient has advanced dementia and is unable to fully participate in conversation.  I introduced myself, the role of Palliative care and the reason for consultation. Was able to witness patient attempt to sit on bedside commode. It took strong encouragement from her daughter with assistance from PT/OT x 2 person assist. Luis Ac states prior to the fall, patient was able to push herself up to stand from commode and ambulate short distances. Currently, patient is unable to sit up on her own. Luis Ac and her sister care for the patient at home. Luis Ac is hopeful her mother can return there but states she knows she cannot transfer her on her own. She has considered  SNF for continuation of PT/OT in hopes the patient will eventually return to her baseline functionality. Luis Ac also states she does not want the patient in pain and knows that her mother will have to be forced to participate in therapy as patient will not do it on her own. We discussed potential need for hospice at home if patient becomes unable to tolerate therapy. Luis Ac is considering all options at this time to determine how best to care for her mother upon discharge. Opportunity for questions and emotional support provided. Recommendations:     Palliative Care-C TBD based on hospital course. Patient's daughter hopeful that patient can participate with therapy and return home. She is considering a SNF vs sitter services w/ HH and has also considered need for transition to comfort only if therapy becomes intolerable Code status: DNR-on file, ACP completed  Alzheimer's dementia-advanced, patient can feed herself but only when family sit with her and remind her to eat. No dysphagia per family  Closed fracture left acetabulum/left inferior and superior pubic rami-conservative mgmt per ortho, PT/OT, pain control  Pain 2/2 above-feel pain is controlled at present  UTI-Rocephin    Thank you for consulting palliative care and allowing us to participate in the care of the patient.       CounselingTopics: Goals of care, Code Status, Disease process education, pt/family support    Time Spent Counseling > 50%: YES                                   Total Time Spent with patient/family counseling, workup/treatment review, counseling and placement of orders/preparation of this note: 62 minutes    Electronically signed by Scott Blanco PA-C on 7/25/2022 at 2:44 PM    (Please note that portions of this note were completed with a voice recognition program.  Efforts were made to edit the dictations but occasionally words are mis-transcribed.)

## 2022-07-25 NOTE — PROGRESS NOTES
Progress Note  Date:2022       Room:0536/536-01  Patient Jane Acevedo     YOB: 1940     Age:81 y.o. Subjective    Subjective: 80 y.o. female with a significant history of Alzheimer's disease, atrial fibrillation on chronic anticoagulation with Xarelto, CHF, diabetes, hypertension, hyperlipidemia, and restless leg syndrome who presented to [address] [place] [address] ED via EMS complaining of right leg pain after fall. x-ray of right tib-fib with diffuse soft tissue swelling without fracture, left tib-fib no acute fracture, left foot with hammertoe deformity and soft tissue swelling, right femur with a chronic appearing avulsion fracture of the ischial tuberosity, right foot degenerative changes without acute fracture, left femur superior and inferior left pubic rami fractures, right pelvis with fractures of the left medial acetabulum and inferior pubic rami, old fracture of the right inferior pubic rami. CT head indicated atrophy with nonspecific but presumed microvascular changes. CT pelvis without contrast indicated fractures of the left anterior acetabulum, anterior medial left iliac wing, and left inferior pubic rami. Seen in house by ortho team, [facility] conservative management. Urinalysis positive and initiated on ceftriaxone. Awaiting therapy eval.    Seen in house this morning with daughter present. No acute overnight noted. Review of Systems: Unable to obtain from patient due to dementia. Objective         Vitals Last 24 Hours:  TEMPERATURE:  Temp  Av.4 °F (36.3 °C)  Min: 96.6 °F (35.9 °C)  Max: 98 °F (36.7 °C)  RESPIRATIONS RANGE: Resp  Av.4  Min: 18  Max: 20  PULSE OXIMETRY RANGE: SpO2  Av.8 %  Min: 91 %  Max: 96 %  PULSE RANGE: Pulse  Av.6  Min: 77  Max: 114  BLOOD PRESSURE RANGE: Systolic (22GJK), BEENA:040 , Min:103 , QBR:696   ; Diastolic (00NQP), EDC:92, Min:57, Max:112    I/O (24Hr):     Intake/Output Summary (Last 24 hours) at 2022 1356  Last data filed at 7/25/2022 1314  Gross per 24 hour   Intake 980 ml   Output 2100 ml   Net -1120 ml       Physical Examination:  General: Well-developed, no acute distress lying comfortably in bed. HEENT: Atraumatic normocephalic, range of motion normal   Cardiac: Normal S1-S2 with irregular rhythm  Respiratory: clear To auscultation bilaterally, no rhonchi or rales, no wheezing  Abdomen: Soft, positive bowel sounds in all quadrants, no distention, nontender to palpation  Extremities: no tenderness, no edema, moves all extremities  Psych: unable to fully assess      Labs/Imaging/Diagnostics    Labs:  CBC:  Recent Labs     07/24/22  1024 07/25/22  0219   WBC 11.6* 9.5   RBC 4.14* 3.59*   HGB 12.2 10.6*   HCT 40.4 34.8*   MCV 97.6 96.9   RDW 14.8* 14.8*    209     CHEMISTRIES:  Recent Labs     07/24/22  1024 07/25/22  0219    140   K 4.9 4.2    102   CO2 24 28   BUN 37* 32*   CREATININE 1.5* 1.4*   GLUCOSE 152* 109     PT/INR:  Recent Labs     07/24/22  1024   PROTIME 15.9*   INR 1.27*     APTT:No results for input(s): APTT in the last 72 hours. LIVER PROFILE:  Recent Labs     07/24/22  1024   AST 24   ALT 19   BILITOT 0.4   ALKPHOS 102       Imaging Last 24 Hours:  XR FEMUR LEFT (MIN 2 VIEWS)    Result Date: 7/24/2022  NO PRIOR REPORT AVAILABLE Exam: RADIOGRAPHS OF THE LEFT FEMUR Clinical data:Fall. Leg pain. Technique: Four views of the left femur. Prior studies: No prior studies submitted. Findings:Minor displacement of a superior left pubic ramus fracture at the junction of the acetabulum. Nondisplaced fracture at the junction of the inferior left pubic ramus and issue him. Mild degenerative changes of the acetabulum. Moderate degenerative changes at the knee. No osseous lesion or foreign body. Superior and inferior left pubic rami fractures, described above. Degenerative changes. Recommendation: Follow up recommended.           Electronically Signed by Fay Cogan MD at 24-Jul-2022 03:19:15 PM XR FEMUR RIGHT (MIN 2 VIEWS)    Result Date: 7/24/2022  NO PRIOR REPORT AVAILABLE Exam: X-RAYS OF THE RIGHT FEMUR Clinical data:Fall. Leg pain. Technique: Four views of the right femur. Prior studies: No prior studies submitted. Findings: There are degenerative changes at the hip and knee joints. There is an avulsion fracture of the ischial tuberosity which has a chronic appearance. No osseous lesion or foreign body. Chronic appearing avulsion fracture of the ischial tuberosity. Correlate clinically with history. Degenerative changes at the hip and knee joints. Recommendation: Follow up as clinically indicated. Electronically Signed by Jennifer Allen MD at 24-Jul-2022 03:20:42 PM             XR TIBIA FIBULA LEFT (2 VIEWS)    Result Date: 7/24/2022  NO PRIOR REPORT AVAILABLE Exam: X-RAYS OF THE LEFT TIBIA AND FIBULA Clinical data:Fall. Leg pain. Technique:Multiple views of the left tibia and fibula. Prior studies: No prior studies submitted. Findings: There are degenerative changes at the knee and ankle joints. There is diffuse soft tissue swelling. There are vascular calcifications. No acute fracture or dislocation. No osseous lesion or foreign body noted. No acute fracture demonstrated. Degenerative changes at the knee and ankle joints. Diffuse soft tissue swelling. Recommendation: Follow up as clinically indicated. Electronically Signed by Jennifer Allen MD at 24-Jul-2022 03:20:24 PM             XR TIBIA FIBULA RIGHT (2 VIEWS)    Result Date: 7/24/2022  NO PRIOR REPORT AVAILABLE Exam: X-RAYS OF THE RIGHT TIBIA AND FIBULA Clinical data:Fall. Leg pain. Technique: Four views of the right tibia and fibula. Prior studies:No prior studies submitted. Findings: There are degenerative changes at the hip and knee joints. There is diffuse soft tissue swelling. Vascular calcifications. No acute fracture or dislocation. No osseous lesion or foreign body.     Diffuse soft tissue swelling without fracture. Degenerative changes at the hip and knee joints. Recommendation: Follow up as clinically indicated. Electronically Signed by Alison Pisano MD at 24-Jul-2022 03:20:59 PM             XR FOOT LEFT (2 VIEWS)    Result Date: 7/24/2022  NO PRIOR REPORT AVAILABLE Exam: X-RAYS OF THE LEFT FOOT Clinical data:Fall. Leg pain. Technique: Two views of the left foot. Prior studies: No prior studies submitted. Findings: There is diffuse soft tissue swelling. Moderate sized plantar spur. No acute fracture. No joint dislocation. Hammertoe deformities. Mild first MTP joint degenerative changes and mild degenerative changes in the midfoot. Calcaneal spurring. Mild degenerative changes. Hammertoe deformities. Diffuse soft tissue swelling. No acute fracture identified. Calcaneal spurring. Recommendation: Follow up as clinically indicated. Note: Direct correlation with point tenderness and the X-ray images is recommended and does significantly increase the sensitivity of radiographic evaluation. Electronically Signed by Alison Pisano MD at 24-Jul-2022 03:20:10 PM             XR FOOT RIGHT (2 VIEWS)    Result Date: 7/24/2022  NO PRIOR REPORT AVAILABLE Exam:X-RAYS OF RIGHT FOOT Clinical data:Fall, leg pain. Technique: Two views of the right foot. Prior studies: No prior studies submitted. Findings: Diffuse soft tissue swelling. Vascular calcifications. Moderate sized plantar spur. Hammertoe deformities. No discernible fracture or dislocation. Mild diffuse degenerative changes of the interphalangeal joints and within the midfoot. Calcaneal spurring. Degenerative changes without acute fracture. Hammertoe deformities. Diffuse soft tissue swelling. Calcaneal spurring. Recommendation: Follow up as clinically indicated. Note: Direct correlation with point tenderness and the X-ray images is recommended and does significantly increase the sensitivity of radiographic evaluation. Electronically Signed by Radha Lara MD at 24-Jul-2022 03:19:47 PM             CT HEAD WO CONTRAST    Result Date: 7/24/2022  NO PRIOR REPORT AVAILABLE Exam: CT OF THE BRAIN WITHOUT CONTRAST Clinical data: Fall. Technique: Contiguous axial images are obtained from the skull base to vertex without intravenous contrast. Reformatted/MPR images were performed. Radiation dose: CTDIvol = 65.49 mGy, DLP = 1379 mGy x cm. Prior studies: CT of the brain dated 09/15/2020 image. Findings: Ill-defined low-attenuation in the periventricular white matter is nonspecific but likely on a microvascular basis. Atrophic changes. Cavum septum pellucidum et vergae is noted incidentally. No acute intracranial abnormality is present. No evidence of acute cortical infarction, hemorrhage, mass or mass effect. No hydrocephalus or abnormal extra-axial fluid collections are present. The posterior fossa is unremarkable. The skull base and calvarium are intact. The included portions of the paranasal sinuses and mastoid air cells are clear. 1.  Atrophy with nonspecific but presumed microvascular changes in the periventricular white matter. 2.  No acute significant abnormality is seen. Recommendation: Follow up as clinically indicated. All CT scans at this facility utilize dose modulation, iterative reconstruction, and/or weight based dosing when appropriate to reduce radiation dose to as low as reasonably achievable. Electronically Signed by Radha Lara MD at 24-Jul-2022 01:33:47 PM             CT CERVICAL SPINE WO CONTRAST    Result Date: 7/24/2022  NO PRIOR REPORT AVAILABLE Exam: CT OF THE CERVICAL SPINE WITHOUT CONTRAST Clinical data: Fall. Technique: Contiguous axial imaging of the cervical spine. Reconstructed imaging in the coronal and sagittal planes. Reformatted/MPR images were performed. Radiation dose: CTDIvol =65.49 mGy, DLP =1379 mGy x cm. Priorstudies: No prior studies submitted. Findings:  There isgrossly unremarkablealignment without acute fracture or subluxation. Bone mineralization is grossly unremarkable. Vertebral body heights are maintained. Posterior elements are intact. Inter-vertebral disc spaces: Mild-moderate mid and lower cervical spondylosis. Atheromatous vascular calcification. Small left thyroid nodule. Skull base and craniocervical junction are intact. Lung apices are clear. 1.  No acute fracture, subluxation or prevertebral swelling. 2.  Mild-moderate mid and lower cervical spondylosis. 3.  Other nonacute findings above. Recommendation: Follow up as clinically indicated. All CT scans at this facility utilize dose modulation, iterative reconstruction, and/or weight based dosing when appropriate to reduce radiation dose to as low as reasonably achievable. Electronically Signed by Katrin Stewart MD at 24-Jul-2022 01:26:26 PM             CT PELVIS WO CONTRAST Additional Contrast? None    Result Date: 7/24/2022  NO PRIOR REPORT AVAILABLE EXAM: CT OF THE PELVIS WITHOUT CONTRAST CLINICAL DATA: Fall. TECHNIQUE: Axial, coronal and sagittal CT scan of the pelvis was performed without the administration of contrast.Reformatted/MPR images were performed. Lack of intravenous contrast limits evaluation. Radiation Dose:  CTDIvol = 41.54 mGy   DLP = 1626 mGy  x cm. PRIOR STUDIES: Radiographs of the pelvis done on the same date. CT scan of the abdomen and pelvis dated 12/30/18 images. FINDINGS: Fractures are seen involving the left anterior acetabulum and junction of superior pubic ramus, anteromedial aspect of left iliac wing at the level of the SI joint, as well as the left inferior pubic ramus. Old right inferior pubic ramus fracture. The uterus is unremarkable on this current CT examination. No evidence of adnexal mass. CT scan of the pelvis demonstrates no evidence of pelvic mass or pelvic sidewall lymphadenopathy. No significant fluid is present within the cul-de-sac.  Lower lumbar spondylosis with bilateral L5 pars defects and convex left scoliosis. The urinary bladder is limited in evaluation however partially distended. Atheromatous vascular calcifications. 1.  Fractures, left anterior acetabulum, anteromedial left iliac wing and left inferior pubic ramus described above. 2.  Old right inferior pubic ramus fracture. 3.  Other nonacute findings above. RECOMMENDATION: Follow up as clinically indicated. All CT scans at this facility utilize dose modulation, iterative reconstruction, and/or weight based dosing when appropriate to reduce radiation dose to as low as reasonably achievable. Electronically Signed by Kiran Pham MD at 24-Jul-2022 01:32:17 PM             XR CHEST PORTABLE    Result Date: 7/24/2022  NO PRIOR REPORT AVAILABLE Exam: Chest x-ray Clinical data: Pain. Technique: AP frontal view of the chest is submitted. Limitations: None. Prior studies: No prior studies submitted. Findings: Moderate cardiomegaly. Vascular congestion. No pneumothorax or significant pleural effusion. Midline trachea. Regional bones are intact. Degenerative changes of the shoulders and spine. Moderate cardiomegaly and vascular congestion without significant effusion. Recommendation: Follow up recommended. Electronically Signed by Kiran Pham MD at 24-Jul-2022 01:37:29 PM             XR HIP 2-3 VW W PELVIS RIGHT    Result Date: 7/24/2022  NO PRIOR REPORT AVAILABLE Exam: X-RAYS OF THE RIGHT HIP Clinical data: Fall, hip pain. Technique: Two views of the right hip with single view of the pelvis. Prior studies: No prior studies submitted. Findings: Fractures involving left medial acetabulum at the junction of superior pubic ramus as well as left inferior pubic ramus. Old fracture, right inferior pubic ramus. Bone mineral density is preserved. Lower lumbar spondylosis. Fractures, left medial acetabulum and inferior pubic ramus. Old fracture, right inferior pubic ramus. Recommendation: Follow up as clinically indicated.  Electronically Signed by Kiran Pham MD at 24-Jul-2022 01:37:07 PM             Assessment//Plan           Hospital Problems             Last Modified POA    * (Principal) Closed displaced fracture of medial wall of left acetabulum, initial encounter ([facility] [address] 7/24/2022 Yes    Chronic systolic (congestive) heart failure 7/24/2022 Yes    Dementia with behavioral disturbance, unspecified dementia type ([address] 7/24/2022 Yes    Chronic anticoagulation 7/24/2022 Yes    Overview Signed 5/23/2017 12:11 PM by EMY Lock     Xarelto 20 mg daily         PAF (paroxysmal atrial fibrillation) ([facility] [address] 7/24/2022 Yes    Overview Addendum 1/10/2019  9:57 PM by Perla Mast MD     3/21/17 Echo mild MR, mild-mod TR RVSP 46 mmhg, normal LVFX EF 55-60%  6/2/17 DCCV Successful on Betapace 80 BID          Type 2 diabetes mellitus without complication, with long-term current use of insulin ([facility] [address] 7/24/2022 Yes    Palliative care patient 7/25/2022 Yes    Stage 3b chronic kidney disease ([address] 7/24/2022 Yes     Assessment & Plan      Close displaced fracture of left acetabulum status post fall prior to admission  And inferior pubic rami fracture  Chronic systolic heart failure  Dementia with behavioral disturbance  Paroxysmal atrial fibrillation with chronic anticoagulation  Type 2 diabetes  CKD stage III  UTI      Plan:  Evaluated by orthopedic team and conservative management recommended. Pain management  PT OT evaluation  Ceftriaxone for UTI  Continue other chronic medication as currently ordered. Electronically signed by   Meron Mccarthy MD   Internal Medicine Hospitalist  On 7/25/2022  At 2:09 PM    EMR Dragon/Transcription disclaimer:   Much of this encounter note is an electronic transcription/translation of spoken language to printed text.  The electronic translation of spoken language may permit erroneous, or at times, nonsensical words or phrases to be inadvertently transcribed; although attempts have made to review the note for such errors, some may still exist.

## 2022-07-25 NOTE — ACP (ADVANCE CARE PLANNING)
Advance Care Planning     Advance Care Planning (ACP) Physician/NP/PA (Provider) Carl Pencil      Date of ACP Conversation: 07/25/2022    Conversation Conducted with:    Healthcare Decision Maker: Named in Advance Directive or Healthcare Power of  (name) [address] Decision Maker:    Current Designated Health Care Decision Maker:    Primary Decision Maker (Active): Kay Villatoro - [phone]    Supplemental (Other) Decision Maker: [facility] [phone]  Care Preferences:    Ventilation: \"If you were in your present state of health and suddenly became very ill and were unable to breathe on your own, what would your preference be about the use of a ventilator (breathing machine) if it was available to you? \"    No    Resuscitation:  \"CPR works best to restart the heart when there is a sudden event, like a heart attack, in someone who is otherwise healthy. Unfortunately, CPR does not typically restart the heart for people who have serious health conditions or who are very sick. \"    \"In the event your heart stopped as a result of an underlying serious health condition, would you want attempts to be made to restart your heart (answer \"yes\" for attempt to resuscitate) or would you prefer a natural death (answer \"no\" for do not attempt to resuscitate)? \"   No    Length of Voluntary ACP Conversation in minutes:  15 minutes w/ initial consult time    Jose Gillette PA-C

## 2022-07-25 NOTE — CONSULTS
[facility] Surgery  Inpatient Consultation    Samantha Davies (1940)  7/25/2022    Reason for Consult:   Left hip pain. Insufficiency fracture. Left ilium fracture. Requesting Physician: Dr. Puja Burnham: Left hip pain    History Obtained From: Chart    HISTORY OF PRESENT ILLNESS:                 This is a 81-year-old female who presented to the emergency department with complaints of right hip and upper leg pain. Has a known history of dementia, presented agitated and aggressive. Presented to the emergency room by EMS. Emergency room visit was limited due to confusion and agitation. Past Medical History:    Past Medical History:   Diagnosis Date    KANCHAN (acute kidney injury) ([address] 12/30/2018    Alzheimer disease ([address]     Arthritis     Atrial fibrillation (HCC)     BiPAP (biphasic positive airway pressure) dependence     15cm/11cm    Cancer (HCC)     COLON CA    CHF (congestive heart failure) ([address] 12/30/2018    Chronic atrial fibrillation ([address] 2/12/2017    Dementia ([facility]     Depression     Diabetes mellitus ([address]     Essential hypertension 7/11/2017    History of blood transfusion     Hyperlipidemia     Hypertension     Mixed hyperlipidemia 7/11/2017    Neuropathy 11/19/2021    Obesity     Obstructive sleep apnea     AHI:  18.7;  In REM AHI:  39.3 per PSG, 11/2008; split-night PSG, 8/2017 AHI: 35.1    Osteoarthritis     Palliative care patient 01/08/2019    Pneumonia due to organism     Restless leg syndrome 8/1/2017    Sinus complaint     Swelling        Past Surgical History:    Past Surgical History:   Procedure Laterality Date    ADENOIDECTOMY      APPENDECTOMY      CARDIOVERSION      x 2     CHOLECYSTECTOMY      COLONOSCOPY  05/17/2013    Dr. Geetha Coats    COLONOSCOPY  03/22/2012    Dr Verdugo Borders yr recall    COLONOSCOPY  06/30/2008    Dr Geetha Coats, 3 yr recall    COLONOSCOPY N/A 8/2/2019    Dr Reji Hernández sided anastomosis appeared normal and patent-prn (age)    HEMICOLECTOMY Right 1998    2 FT OF COLON REMOVED DUE TO CA    HYSTERECTOMY (CERVIX STATUS UNKNOWN)      VA COLONOSCOPY W/BIOPSY SINGLE/MULTIPLE N/A 6/6/2017    Dr Janie Contreras colon anastomosis-Tubular AP (-) dysplasia x 2--3 yr recall    TONSILLECTOMY      TUMOR REMOVAL      stomach    UPPER GASTROINTESTINAL ENDOSCOPY  05/17/2013    Dr. Carol Kilgore ulcer disease-Chelo (+)    UPPER GASTROINTESTINAL ENDOSCOPY  03/28/2012    Dr Raulito Crane antral ulceration with a visible vessel    UPPER GASTROINTESTINAL ENDOSCOPY N/A 8/2/2019    Dr Kim Lockwood Montelongo-Gastritis       Current Medications:     Current Facility-Administered Medications:     ARIPiprazole (ABILIFY) tablet 2 mg, 2 mg, Oral, QPM, Aydin Backers, APRN - CNP, 2 mg at 07/24/22 2030    atorvastatin (LIPITOR) tablet 40 mg, 40 mg, Oral, Nightly, Aydin Backers, APRN - CNP, 40 mg at 07/24/22 1944    donepezil (ARICEPT) tablet 10 mg, 10 mg, Oral, BID, Aydin Backers, APRN - CNP, 10 mg at 07/24/22 1944    ferrous sulfate (IRON 325) tablet 325 mg, 325 mg, Oral, Daily with breakfast, Aydin Backers, APRN - CNP    furosemide (LASIX) tablet 40 mg, 40 mg, Oral, Daily, Luh Quincy, APRN - CNP, 40 mg at 07/24/22 1944    memantine (NAMENDA) tablet 10 mg, 10 mg, Oral, BID, Aydin Backers, APRN - CNP, 10 mg at 07/24/22 1944    metoprolol succinate (TOPROL XL) extended release tablet 25 mg, 25 mg, Oral, Nightly, Luh Quincy, APRN - CNP, 25 mg at 07/24/22 1944    therapeutic multivitamin-minerals 1 tablet, 1 tablet, Oral, Daily, Aydin Backers, APRN - CNP, 1 tablet at 07/24/22 1944    pantoprazole (PROTONIX) tablet 20 mg, 20 mg, Oral, Daily, Luh Quincy, APRN - CNP, 20 mg at 07/24/22 1944    rOPINIRole (REQUIP) tablet 0.5 mg, 0.5 mg, Oral, Nightly, Luh Quincy, APRN - CNP, 0.5 mg at 07/24/22 1944    sertraline (ZOLOFT) tablet 50 mg, 50 mg, Oral, Nightly, EMY Pool CNP, 50 mg at 07/24/22 1944    traZODone (DESYREL) tablet 100 mg, 100 mg, Oral, Nightly, Peri Later, APRN - CNP, 100 mg at 07/24/22 1944    glucose chewable tablet 16 g, 4 tablet, Oral, PRN, Peri Later, APRN - CNP    dextrose bolus 10% 125 mL, 125 mL, IntraVENous, PRN **OR** dextrose bolus 10% 250 mL, 250 mL, IntraVENous, PRN, Peri Later, APRN - CNP    glucagon (rDNA) injection 1 mg, 1 mg, SubCUTAneous, PRN, Peri Later, APRN - CNP    dextrose 10 % infusion, , IntraVENous, Continuous PRN, Peri Later, APRN - CNP    sodium chloride flush 0.9 % injection 5-40 mL, 5-40 mL, IntraVENous, 2 times per day, Peri Later, APRN - CNP, 10 mL at 07/24/22 1945    sodium chloride flush 0.9 % injection 5-40 mL, 5-40 mL, IntraVENous, PRN, Luh Mathew, APRN - CNP    0.9 % sodium chloride infusion, , IntraVENous, PRN, Peri Later, APRN - CNP    ondansetron (ZOFRAN-ODT) disintegrating tablet 4 mg, 4 mg, Oral, Q8H PRN **OR** ondansetron (ZOFRAN) injection 4 mg, 4 mg, IntraVENous, Q6H PRN, Peri Later, APRN - CNP    polyethylene glycol (GLYCOLAX) packet 17 g, 17 g, Oral, Daily PRN, Peri Later, APRN - CNP    enoxaparin (LOVENOX) injection 40 mg, 40 mg, SubCUTAneous, Q24H, Peri Later, APRN - CNP, 40 mg at 07/24/22 1945    insulin lispro (HUMALOG) injection vial 0-4 Units, 0-4 Units, SubCUTAneous, TID WC, Luh Mathew, APRN - CNP    insulin lispro (HUMALOG) injection vial 0-4 Units, 0-4 Units, SubCUTAneous, Nightly, Luh Mathew, APRN - CNP    morphine (PF) injection 1 mg, 1 mg, IntraVENous, Q2H PRN, Peri Later, APRN - CNP, 1 mg at 07/25/22 0233    melatonin capsule 10 mg, 10 mg, Oral, Nightly PRN, Peri Shen, APRN - CNP, 10 mg at 07/25/22 0112    Allergies:  Patient has no known allergies.     Social History:   Social History     Socioeconomic History    Marital status:      Spouse name: None    Number of children: None    Years of education: None    Highest education level: None   Tobacco Use    Smoking status: Never    Smokeless tobacco: Never   Substance and Sexual Activity    Alcohol use: No    Drug use: No    Sexual activity: Not Currently     Social Determinants of Health     Financial Resource Strain: Low Risk     Difficulty of Paying Living Expenses: Not hard at all   Food Insecurity: No Food Insecurity    Worried About Running Out of Food in the Last Year: Never true    Ran Out of Food in the Last Year: Never true   Physical Activity: Insufficiently Active    Days of Exercise per Week: 3 days    Minutes of Exercise per Session: 10 min       Family History:   Family History   Problem Relation Age of Onset    No Known Problems Mother     No Known Problems Father     Colon Cancer Daughter     Colon Polyps Daughter     Esophageal Cancer Neg Hx     Liver Cancer Neg Hx     Rectal Cancer Neg Hx     Liver Disease Neg Hx     Stomach Cancer Neg Hx        REVIEW OF SYSTEMS:    Unable to obtain due to dementia. PHYSICAL EXAM:        General :    Confused and combative. Head:    Normocephalic, without obvious abnormality, atraumatic   Eyes:            Vision intact, EOM intact, no icterus or drainage         Lymph:   Soft, non-tender, trachea midline       Pulmonary:     No rib crepitus, no clear chest trauma, no accessory usage, no audible wheeze    Cardiovascular:    Regular rate, regular rhythm, grossly non-displaced PMI       Abdomen:     Grossly non tender, non-distended   Rectal/:     Deferred   Neurologic:   Unable to assess. Muskuloskeletal:   Extremities are warm and dry to touch. Unable to     assess motor strength   Skin:  warm, dry, no cyanosis, turgor good. Psychiatric:  A/ox3, mood appropriate, affect appropriate       DATA:        Radiology:   1. Fractures, left anterior acetabulum, anteromedial left iliac wing and left inferior pubic ramus described above. 2.  Old right inferior pubic ramus fracture.        IMPRESSION:  79 y/o F with L pubic root fracture, L inferior pubic rami fracture, L anterior ilium fracture at SI joint- LC mechanism, significant medical history for dementia, stage IIIb chronic kidney disease, atrial fibrillation, congestive heart failure, diabetes mellitus, hypertension, hyperlipidemia, sleep apnea    RECOMMENDATIONS:    1.  Pelvic ring injury: Nonoperative management, weightbearing as tolerated, follow-up outpatient as needed. Please call with additional questions or concerns, will follow peripherally.             Electronically signed by JAYLA Carranza7:21 AM7/25/2022

## 2022-07-26 LAB
ANION GAP SERPL CALCULATED.3IONS-SCNC: 11 MMOL/L (ref 7–19)
BASOPHILS ABSOLUTE: 0 K/UL (ref 0–0.2)
BASOPHILS RELATIVE PERCENT: 0.4 % (ref 0–1)
BUN BLDV-MCNC: 47 MG/DL (ref 8–23)
CALCIUM SERPL-MCNC: 8.1 MG/DL (ref 8.8–10.2)
CHLORIDE BLD-SCNC: 101 MMOL/L (ref 98–111)
CO2: 27 MMOL/L (ref 22–29)
CREAT SERPL-MCNC: 2.7 MG/DL (ref 0.5–0.9)
EOSINOPHILS ABSOLUTE: 0.2 K/UL (ref 0–0.6)
EOSINOPHILS RELATIVE PERCENT: 2.4 % (ref 0–5)
GFR AFRICAN AMERICAN: 21
GFR NON-AFRICAN AMERICAN: 17
GLUCOSE BLD-MCNC: 104 MG/DL (ref 70–99)
GLUCOSE BLD-MCNC: 155 MG/DL (ref 74–109)
GLUCOSE BLD-MCNC: 187 MG/DL (ref 70–99)
GLUCOSE BLD-MCNC: 203 MG/DL (ref 70–99)
GLUCOSE BLD-MCNC: 305 MG/DL (ref 70–99)
HCT VFR BLD CALC: 33.8 % (ref 37–47)
HEMOGLOBIN: 10.1 G/DL (ref 12–16)
IMMATURE GRANULOCYTES #: 0.1 K/UL
LYMPHOCYTES ABSOLUTE: 0.7 K/UL (ref 1.1–4.5)
LYMPHOCYTES RELATIVE PERCENT: 7.2 % (ref 20–40)
MCH RBC QN AUTO: 29.8 PG (ref 27–31)
MCHC RBC AUTO-ENTMCNC: 29.9 G/DL (ref 33–37)
MCV RBC AUTO: 99.7 FL (ref 81–99)
MONOCYTES ABSOLUTE: 1.1 K/UL (ref 0–0.9)
MONOCYTES RELATIVE PERCENT: 11.9 % (ref 0–10)
NEUTROPHILS ABSOLUTE: 7.3 K/UL (ref 1.5–7.5)
NEUTROPHILS RELATIVE PERCENT: 77.5 % (ref 50–65)
PDW BLD-RTO: 15 % (ref 11.5–14.5)
PERFORMED ON: ABNORMAL
PLATELET # BLD: 201 K/UL (ref 130–400)
PMV BLD AUTO: 9.8 FL (ref 9.4–12.3)
POTASSIUM REFLEX MAGNESIUM: 5.3 MMOL/L (ref 3.5–5)
RBC # BLD: 3.39 M/UL (ref 4.2–5.4)
SODIUM BLD-SCNC: 139 MMOL/L (ref 136–145)
WBC # BLD: 9.4 K/UL (ref 4.8–10.8)

## 2022-07-26 PROCEDURE — 36415 COLL VENOUS BLD VENIPUNCTURE: CPT

## 2022-07-26 PROCEDURE — 2580000003 HC RX 258: Performed by: NURSE PRACTITIONER

## 2022-07-26 PROCEDURE — 97530 THERAPEUTIC ACTIVITIES: CPT

## 2022-07-26 PROCEDURE — 99233 SBSQ HOSP IP/OBS HIGH 50: CPT | Performed by: PHYSICIAN ASSISTANT

## 2022-07-26 PROCEDURE — 6370000000 HC RX 637 (ALT 250 FOR IP): Performed by: NURSE PRACTITIONER

## 2022-07-26 PROCEDURE — 85025 COMPLETE CBC W/AUTO DIFF WBC: CPT

## 2022-07-26 PROCEDURE — 6360000002 HC RX W HCPCS: Performed by: HOSPITALIST

## 2022-07-26 PROCEDURE — 2700000000 HC OXYGEN THERAPY PER DAY

## 2022-07-26 PROCEDURE — 1210000000 HC MED SURG R&B

## 2022-07-26 PROCEDURE — 2580000003 HC RX 258: Performed by: HOSPITALIST

## 2022-07-26 PROCEDURE — 6370000000 HC RX 637 (ALT 250 FOR IP): Performed by: HOSPITALIST

## 2022-07-26 PROCEDURE — 80048 BASIC METABOLIC PNL TOTAL CA: CPT

## 2022-07-26 PROCEDURE — 82947 ASSAY GLUCOSE BLOOD QUANT: CPT

## 2022-07-26 PROCEDURE — 97535 SELF CARE MNGMENT TRAINING: CPT

## 2022-07-26 RX ADMIN — POLYETHYLENE GLYCOL 3350 17 G: 17 POWDER, FOR SOLUTION ORAL at 09:55

## 2022-07-26 RX ADMIN — FUROSEMIDE 40 MG: 40 TABLET ORAL at 09:54

## 2022-07-26 RX ADMIN — MICONAZOLE NITRATE: 2 POWDER TOPICAL at 21:41

## 2022-07-26 RX ADMIN — INSULIN LISPRO 4 UNITS: 100 INJECTION, SOLUTION INTRAVENOUS; SUBCUTANEOUS at 21:21

## 2022-07-26 RX ADMIN — MULTIPLE VITAMINS W/ MINERALS TAB 1 TABLET: TAB at 09:54

## 2022-07-26 RX ADMIN — DONEPEZIL HYDROCHLORIDE 10 MG: 5 TABLET, FILM COATED ORAL at 21:22

## 2022-07-26 RX ADMIN — RIVAROXABAN 15 MG: 15 TABLET, FILM COATED ORAL at 17:32

## 2022-07-26 RX ADMIN — METOPROLOL SUCCINATE 25 MG: 25 TABLET, EXTENDED RELEASE ORAL at 21:22

## 2022-07-26 RX ADMIN — PANTOPRAZOLE SODIUM 20 MG: 20 TABLET, DELAYED RELEASE ORAL at 09:55

## 2022-07-26 RX ADMIN — SERTRALINE HYDROCHLORIDE 50 MG: 50 TABLET ORAL at 21:22

## 2022-07-26 RX ADMIN — SODIUM CHLORIDE, PRESERVATIVE FREE 10 ML: 5 INJECTION INTRAVENOUS at 21:41

## 2022-07-26 RX ADMIN — ROPINIROLE HYDROCHLORIDE 0.5 MG: 0.5 TABLET, FILM COATED ORAL at 21:22

## 2022-07-26 RX ADMIN — MEMANTINE 10 MG: 5 TABLET ORAL at 21:22

## 2022-07-26 RX ADMIN — ATORVASTATIN CALCIUM 40 MG: 40 TABLET, FILM COATED ORAL at 21:22

## 2022-07-26 RX ADMIN — MEMANTINE 10 MG: 5 TABLET ORAL at 09:55

## 2022-07-26 RX ADMIN — HYDROCODONE BITARTRATE AND ACETAMINOPHEN 1 TABLET: 5; 325 TABLET ORAL at 10:03

## 2022-07-26 RX ADMIN — WATER 1000 MG: 1 INJECTION INTRAMUSCULAR; INTRAVENOUS; SUBCUTANEOUS at 09:55

## 2022-07-26 RX ADMIN — TRAZODONE HYDROCHLORIDE 100 MG: 100 TABLET ORAL at 21:22

## 2022-07-26 RX ADMIN — INSULIN LISPRO 1 UNITS: 100 INJECTION, SOLUTION INTRAVENOUS; SUBCUTANEOUS at 17:42

## 2022-07-26 RX ADMIN — FERROUS SULFATE TAB 325 MG (65 MG ELEMENTAL FE) 325 MG: 325 (65 FE) TAB at 09:55

## 2022-07-26 RX ADMIN — ARIPIPRAZOLE 2 MG: 2 TABLET ORAL at 17:32

## 2022-07-26 RX ADMIN — DONEPEZIL HYDROCHLORIDE 10 MG: 5 TABLET, FILM COATED ORAL at 09:54

## 2022-07-26 NOTE — PROGRESS NOTES
Occupational Therapy  Facility/Department: [facility] 5 SURG SERVICES  Daily Treatment Note  NAME: Neli Dixon  : 1940  MRN: [id number]    Date of Service: 2022    Discharge Recommendations:          Assessment   Performance deficits / Impairments: Decreased cognition;Decreased safe awareness;Decreased ADL status; Decreased balance;Decreased functional mobility   Assessment: Pt stated that she wanted to get out of bed, but when attempted, pt was resistive. . Pt stated that she needed to have a BM and transfered from bed to [facility] with max to dependent x2, pt was dependent of 2+ to transfer back to bed. Pt continues to benefit from skilled OT services. Treatment Diagnosis: Pelvic fx  Prognosis: Fair  Activity Tolerance  Activity Tolerance: Treatment limited secondary to decreased cognition         Patient Diagnosis(es): The primary encounter diagnosis was Aggressive behavior. Diagnoses of Fall, initial encounter, Closed displaced fracture of left acetabulum, unspecified portion of acetabulum, initial encounter [facility]), Closed fracture of left iliac wing, [facility] and Closed fracture of left inferior pubic ramus, initial encounter [facility]) were also pertinent to this visit. has a past medical history of KANCHAN (acute kidney injury) ([facility] Alzheimer disease ([address] Arthritis, Atrial fibrillation ([address] BiPAP (biphasic positive airway pressure) dependence, Cancer ([address] CHF (congestive heart failure) ([address] Chronic atrial fibrillation ([address] Dementia ([address] Depression, Diabetes mellitus ([address] Essential hypertension, History of blood transfusion, Hyperlipidemia, Hypertension, Mixed hyperlipidemia, Neuropathy, Obesity, Obstructive sleep apnea, Osteoarthritis, Palliative care patient, Pneumonia due to organism, Restless leg syndrome, Sinus complaint, and Swelling.   has a past surgical history that includes Cholecystectomy; Appendectomy; Hysterectomy; Tonsillectomy;  Adenoidectomy; tumor removal; Colonoscopy (05/17/2013); Upper gastrointestinal endoscopy (05/17/2013); Upper gastrointestinal endoscopy (03/28/2012); Colonoscopy (03/22/2012); hemicolectomy (Right, 1998); Colonoscopy (06/30/2008); Cardioversion; pr colonoscopy w/biopsy single/multiple (N/A, 6/6/2017); Upper gastrointestinal endoscopy (N/A, 8/2/2019); and Colonoscopy (N/A, 8/2/2019). Restrictions  Restrictions/Precautions  Restrictions/Precautions: Fall Risk  Subjective   General  Chart Reviewed: Yes  Patient assessed for rehabilitation services?: Yes  Response to previous treatment: Patient with no complaints from previous session  Family / Caregiver Present: Yes (Dtr)  Diagnosis: Pelvic fx.   General Comment  Comments: Pt. has hx of dementia and daughter is primary caregiver at home      Orientation     Objective                                                                               Plan   Plan  Times per Week: 3-5x/week  Times per Day: Daily  Goals  Short Term Goals  Time Frame for Short term goals: 1 week  Short Term Goal 1: Shanell with BSC tfers  Long Term Goals  Long Term Goal 1: Return to PLOF       Therapy Time   Individual Concurrent Group Co-treatment   Time In           Time Out           Minutes              DONYA Mejia  Electronically signed by DONYA Mejia on 7/26/2022 at 3:20 PM

## 2022-07-26 NOTE — CARE COORDINATION
Spoke with patient regarding MD orders for [facility] services. Patient agreeable and has chosen [facility]. Referral Faxed. [address] [place] [address] [phone]. [phone]. Please notify [facility] when patient discharges and fax DC Summary,  DC med list and any new [facility] orders. The Patient and/or patient representative was provided with a choice of provider and agrees   with the discharge plan. [x] Yes [] No    Freedom of choice list was provided with basic dialogue that supports the patient's individualized plan of care/goals, treatment preferences and shares the quality data associated with the providers.  [x] Yes [] No  Electronically signed by Roxanne Burgos on 7/26/2022 at 1:02 PM

## 2022-07-26 NOTE — PROGRESS NOTES
Physical Therapy  Name: Kailash Sanders  MRN:  [id number]  Date of service:  7/26/2022 07/26/22 1006   Restrictions/Precautions   Restrictions/Precautions Fall Risk   Subjective   Subjective Pt ready to get off BSC. Bed Mobility   Sit to Supine Dependent/Total   Transfers   Sit to Stand Maximum Assistance;Dependent/Total   Stand to sit Dependent/Total   Stand Pivot Transfers Dependent/Total   Short Term Goals   Time Frame for Short term goals 14 DAYS   Short term goal 1 BED MOB MIN ASSIST   Short term goal 2 TRANSFERS MIN ASSIST   Conditions Requiring Skilled Therapeutic Intervention   Body Structures, Functions, Activity Limitations Requiring Skilled Therapeutic Intervention Decreased functional mobility ; Decreased ADL status; Decreased ROM; Decreased strength;Decreased safe awareness;Decreased cognition;Decreased balance;Decreased posture; Increased pain   Assessment Pt required total assist to TF BTB from [facility]. Plan to work with pt on bed mobility and STS for future tx's until pt progresses enough to actively assist with TFs. Until that point, pt will require mechanical lift for any OOB activities.    Activity Tolerance   Activity Tolerance Treatment limited secondary to decreased cognition   PT Plan of Care   Tuesday X   Safety Devices   Type of Devices Bed alarm in place;Call light within reach;Gait belt;Left in bed         Electronically signed by Agustin Thomas PTA on 7/26/2022 at 10:10 AM

## 2022-07-26 NOTE — PROGRESS NOTES
Physical Therapy  Name: Yrn Gallardo  MRN:  [id number]  Date of service:  7/26/2022 07/26/22 1449   Restrictions/Precautions   Restrictions/Precautions Fall Risk   Subjective   Subjective Informed by nursing that pt would like to \"get up and walk. \" Upon arrival to pt room, pt confused and intermittently agitated at attempts to mobilize, but dtr able to convince pt to get up to the [facility]. Bed Mobility   Supine to Sit Maximal assistance;Dependent/Total  (x2)   Transfers   Sit to Stand Maximum Assistance;2 Person Assistance   Stand to sit Maximum Assistance;Dependent/Total;2 Person Assistance   Stand Pivot Transfers Maximum Assistance;Dependent/Total;2 Person Assistance   Comment Pt unsafe and unable to follow cues/directions. Stood and began pivot to [facility] but stopped midway. Pt began to try to sit on [facility] armrest- therapist phyiscally lifted pt's LLE to move towards center of [facility] and assisted pt into seated position. Short Term Goals   Time Frame for Short term goals 14 DAYS   Short term goal 1 BED MOB MIN ASSIST   Short term goal 2 TRANSFERS MIN ASSIST   Conditions Requiring Skilled Therapeutic Intervention   Body Structures, Functions, Activity Limitations Requiring Skilled Therapeutic Intervention Decreased functional mobility ; Decreased ADL status; Decreased ROM; Decreased strength;Decreased safe awareness;Decreased cognition;Decreased balance;Decreased posture; Increased pain   Assessment Pt cont to require max-total assist for all aspects of mobility secondary to decreased cognition. Pt would be unsafe to d/c home at this time, recommend SNF placement. Pt left on [facility] with daughter per daughter's request, RN will notifiy therapist when pt ready to TF BTB.    Activity Tolerance   Activity Tolerance Treatment limited secondary to decreased cognition   PT Plan of Care   Tuesday X   Safety Devices   Type of Devices Gait belt         Electronically signed by Sumi Fatima PTA on 7/26/2022 at 9:45 AM

## 2022-07-26 NOTE — CARE COORDINATION
SW will follow up with Pt/family after lunch once they are able to discuss appropriate DC planning options.    Electronically signed by Krystin Estevez on 7/26/2022 at 10:45 AM

## 2022-07-26 NOTE — PROGRESS NOTES
Progress Note  Date:2022       Room:0536/536-01  Patient Jody Jewell     YOB: 1940     Age:81 y.o. Subjective    Subjective: 80 y.o. female with a significant history of Alzheimer's disease, atrial fibrillation on chronic anticoagulation with Xarelto, CHF, diabetes, hypertension, hyperlipidemia, and restless leg syndrome who presented to [address] [place] [address] ED via EMS complaining of right leg pain after fall. x-ray of right tib-fib with diffuse soft tissue swelling without fracture, left tib-fib no acute fracture, left foot with hammertoe deformity and soft tissue swelling, right femur with a chronic appearing avulsion fracture of the ischial tuberosity, right foot degenerative changes without acute fracture, left femur superior and inferior left pubic rami fractures, right pelvis with fractures of the left medial acetabulum and inferior pubic rami, old fracture of the right inferior pubic rami. CT head indicated atrophy with nonspecific but presumed microvascular changes. CT pelvis without contrast indicated fractures of the left anterior acetabulum, anterior medial left iliac wing, and left inferior pubic rami. Seen in house by ortho team, recc conservative management. Urinalysis positive and initiated on ceftriaxone. Seen by therapy in-house, was very difficult assessment as patient needed 3 people for movement and a total maximum assist.  Discussion was held with family was present in room and they have decided to take patient home on discharge.  currently taking arrangements for DME's to be delivered to patient home. Plan for tentative discharge home tomorrow. Review of Systems: Unable to obtain from patient due to dementia.       Objective         Vitals Last 24 Hours:  TEMPERATURE:  Temp  Av.6 °F (36.4 °C)  Min: 97 °F (36.1 °C)  Max: 98.1 °F (36.7 °C)  RESPIRATIONS RANGE: Resp  Av  Min: 16  Max: 20  PULSE OXIMETRY RANGE: SpO2  Av.6 %  Min: 92 %  Max: pubic ramus and issue him. Mild degenerative changes of the acetabulum. Moderate degenerative changes at the knee. No osseous lesion or foreign body. Superior and inferior left pubic rami fractures, described above. Degenerative changes. Recommendation: Follow up recommended. Electronically Signed by María Amato MD at 24-Jul-2022 03:19:15 PM             XR FEMUR RIGHT (MIN 2 VIEWS)    Result Date: 7/24/2022  NO PRIOR REPORT AVAILABLE Exam: X-RAYS OF THE RIGHT FEMUR Clinical data:Fall. Leg pain. Technique: Four views of the right femur. Prior studies: No prior studies submitted. Findings: There are degenerative changes at the hip and knee joints. There is an avulsion fracture of the ischial tuberosity which has a chronic appearance. No osseous lesion or foreign body. Chronic appearing avulsion fracture of the ischial tuberosity. Correlate clinically with history. Degenerative changes at the hip and knee joints. Recommendation: Follow up as clinically indicated. Electronically Signed by María Amato MD at 24-Jul-2022 03:20:42 PM             XR TIBIA FIBULA LEFT (2 VIEWS)    Result Date: 7/24/2022  NO PRIOR REPORT AVAILABLE Exam: X-RAYS OF THE LEFT TIBIA AND FIBULA Clinical data:Fall. Leg pain. Technique:Multiple views of the left tibia and fibula. Prior studies: No prior studies submitted. Findings: There are degenerative changes at the knee and ankle joints. There is diffuse soft tissue swelling. There are vascular calcifications. No acute fracture or dislocation. No osseous lesion or foreign body noted. No acute fracture demonstrated. Degenerative changes at the knee and ankle joints. Diffuse soft tissue swelling. Recommendation: Follow up as clinically indicated.           Electronically Signed by María Amato MD at 24-Jul-2022 03:20:24 PM             XR TIBIA FIBULA RIGHT (2 VIEWS)    Result Date: 7/24/2022  NO PRIOR REPORT AVAILABLE Exam: X-RAYS OF THE RIGHT TIBIA AND FIBULA Clinical data:Fall. Leg pain. Technique: Four views of the right tibia and fibula. Prior studies:No prior studies submitted. Findings: There are degenerative changes at the hip and knee joints. There is diffuse soft tissue swelling. Vascular calcifications. No acute fracture or dislocation. No osseous lesion or foreign body. Diffuse soft tissue swelling without fracture. Degenerative changes at the hip and knee joints. Recommendation: Follow up as clinically indicated. Electronically Signed by Davie Freitas MD at 24-Jul-2022 03:20:59 PM             XR FOOT LEFT (2 VIEWS)    Result Date: 7/24/2022  NO PRIOR REPORT AVAILABLE Exam: X-RAYS OF THE LEFT FOOT Clinical data:Fall. Leg pain. Technique: Two views of the left foot. Prior studies: No prior studies submitted. Findings: There is diffuse soft tissue swelling. Moderate sized plantar spur. No acute fracture. No joint dislocation. Hammertoe deformities. Mild first MTP joint degenerative changes and mild degenerative changes in the midfoot. Calcaneal spurring. Mild degenerative changes. Hammertoe deformities. Diffuse soft tissue swelling. No acute fracture identified. Calcaneal spurring. Recommendation: Follow up as clinically indicated. Note: Direct correlation with point tenderness and the X-ray images is recommended and does significantly increase the sensitivity of radiographic evaluation. Electronically Signed by Davie Freitas MD at 24-Jul-2022 03:20:10 PM             XR FOOT RIGHT (2 VIEWS)    Result Date: 7/24/2022  NO PRIOR REPORT AVAILABLE Exam:X-RAYS OF RIGHT FOOT Clinical data:Fall, leg pain. Technique: Two views of the right foot. Prior studies: No prior studies submitted. Findings: Diffuse soft tissue swelling. Vascular calcifications. Moderate sized plantar spur. Hammertoe deformities. No discernible fracture or dislocation.   Mild diffuse degenerative changes of the interphalangeal joints and within the midfoot. Calcaneal spurring. Degenerative changes without acute fracture. Hammertoe deformities. Diffuse soft tissue swelling. Calcaneal spurring. Recommendation: Follow up as clinically indicated. Note: Direct correlation with point tenderness and the X-ray images is recommended and does significantly increase the sensitivity of radiographic evaluation. Electronically Signed by Radha Lara MD at 24-Jul-2022 03:19:47 PM             CT HEAD WO CONTRAST    Result Date: 7/24/2022  NO PRIOR REPORT AVAILABLE Exam: CT OF THE BRAIN WITHOUT CONTRAST Clinical data: Fall. Technique: Contiguous axial images are obtained from the skull base to vertex without intravenous contrast. Reformatted/MPR images were performed. Radiation dose: CTDIvol = 65.49 mGy, DLP = 1379 mGy x cm. Prior studies: CT of the brain dated 09/15/2020 image. Findings: Ill-defined low-attenuation in the periventricular white matter is nonspecific but likely on a microvascular basis. Atrophic changes. Cavum septum pellucidum et vergae is noted incidentally. No acute intracranial abnormality is present. No evidence of acute cortical infarction, hemorrhage, mass or mass effect. No hydrocephalus or abnormal extra-axial fluid collections are present. The posterior fossa is unremarkable. The skull base and calvarium are intact. The included portions of the paranasal sinuses and mastoid air cells are clear. 1.  Atrophy with nonspecific but presumed microvascular changes in the periventricular white matter. 2.  No acute significant abnormality is seen. Recommendation: Follow up as clinically indicated. All CT scans at this facility utilize dose modulation, iterative reconstruction, and/or weight based dosing when appropriate to reduce radiation dose to as low as reasonably achievable.  Electronically Signed by Radha Lara MD at 24-Jul-2022 01:33:47 PM             CT CERVICAL SPINE WO CONTRAST    Result Date: 7/24/2022  NO PRIOR REPORT AVAILABLE Exam: CT OF THE CERVICAL SPINE WITHOUT CONTRAST Clinical data: Fall. Technique: Contiguous axial imaging of the cervical spine. Reconstructed imaging in the coronal and sagittal planes. Reformatted/MPR images were performed. Radiation dose: CTDIvol =65.49 mGy, DLP =1379 mGy x cm. Priorstudies: No prior studies submitted. Findings: There isgrossly unremarkablealignment without acute fracture or subluxation. Bone mineralization is grossly unremarkable. Vertebral body heights are maintained. Posterior elements are intact. Inter-vertebral disc spaces: Mild-moderate mid and lower cervical spondylosis. Atheromatous vascular calcification. Small left thyroid nodule. Skull base and craniocervical junction are intact. Lung apices are clear. 1.  No acute fracture, subluxation or prevertebral swelling. 2.  Mild-moderate mid and lower cervical spondylosis. 3.  Other nonacute findings above. Recommendation: Follow up as clinically indicated. All CT scans at this facility utilize dose modulation, iterative reconstruction, and/or weight based dosing when appropriate to reduce radiation dose to as low as reasonably achievable. Electronically Signed by Noe Cote MD at 24-Jul-2022 01:26:26 PM             CT PELVIS WO CONTRAST Additional Contrast? None    Result Date: 7/24/2022  NO PRIOR REPORT AVAILABLE EXAM: CT OF THE PELVIS WITHOUT CONTRAST CLINICAL DATA: Fall. TECHNIQUE: Axial, coronal and sagittal CT scan of the pelvis was performed without the administration of contrast.Reformatted/MPR images were performed. Lack of intravenous contrast limits evaluation. Radiation Dose:  CTDIvol = 41.54 mGy   DLP = 1626 mGy  x cm. PRIOR STUDIES: Radiographs of the pelvis done on the same date. CT scan of the abdomen and pelvis dated 12/30/18 images.  FINDINGS: Fractures are seen involving the left anterior acetabulum and junction of superior pubic ramus, anteromedial aspect of left iliac wing at the level of the SI joint, as well as the left inferior pubic ramus. Old right inferior pubic ramus fracture. The uterus is unremarkable on this current CT examination. No evidence of adnexal mass. CT scan of the pelvis demonstrates no evidence of pelvic mass or pelvic sidewall lymphadenopathy. No significant fluid is present within the cul-de-sac. Lower lumbar spondylosis with bilateral L5 pars defects and convex left scoliosis. The urinary bladder is limited in evaluation however partially distended. Atheromatous vascular calcifications. 1.  Fractures, left anterior acetabulum, anteromedial left iliac wing and left inferior pubic ramus described above. 2.  Old right inferior pubic ramus fracture. 3.  Other nonacute findings above. RECOMMENDATION: Follow up as clinically indicated. All CT scans at this facility utilize dose modulation, iterative reconstruction, and/or weight based dosing when appropriate to reduce radiation dose to as low as reasonably achievable. Electronically Signed by Radha Lara MD at 24-Jul-2022 01:32:17 PM             XR CHEST PORTABLE    Result Date: 7/24/2022  NO PRIOR REPORT AVAILABLE Exam: Chest x-ray Clinical data: Pain. Technique: AP frontal view of the chest is submitted. Limitations: None. Prior studies: No prior studies submitted. Findings: Moderate cardiomegaly. Vascular congestion. No pneumothorax or significant pleural effusion. Midline trachea. Regional bones are intact. Degenerative changes of the shoulders and spine. Moderate cardiomegaly and vascular congestion without significant effusion. Recommendation: Follow up recommended. Electronically Signed by Radha Lara MD at 24-Jul-2022 01:37:29 PM             XR HIP 2-3 VW W PELVIS RIGHT    Result Date: 7/24/2022  NO PRIOR REPORT AVAILABLE Exam: X-RAYS OF THE RIGHT HIP Clinical data: Fall, hip pain. Technique: Two views of the right hip with single view of the pelvis. Prior studies: No prior studies submitted.  Findings: Fractures involving left medial acetabulum at the junction of superior pubic ramus as well as left inferior pubic ramus. Old fracture, right inferior pubic ramus. Bone mineral density is preserved. Lower lumbar spondylosis. Fractures, left medial acetabulum and inferior pubic ramus. Old fracture, right inferior pubic ramus. Recommendation: Follow up as clinically indicated. Electronically Signed by Susie Shabazz MD at 24-Jul-2022 01:37:07 PM             Assessment//Plan           Hospital Problems             Last Modified POA    * (Principal) Closed displaced fracture of medial wall of left acetabulum, initial encounter ([facility] [address] 7/24/2022 Yes    Chronic systolic (congestive) heart failure 7/24/2022 Yes    Dementia with behavioral disturbance, unspecified dementia type ([facility] [address] 7/24/2022 Yes    Chronic anticoagulation 7/24/2022 Yes    Overview Signed 5/23/2017 12:11 PM by EMY Zamora     Xarelto 20 mg daily         PAF (paroxysmal atrial fibrillation) ([facility] [address] 7/24/2022 Yes    Overview Addendum 1/10/2019  9:57 PM by Maxine Dunlap MD     3/21/17 Echo mild MR, mild-mod TR RVSP 46 mmhg, normal LVFX EF 55-60%  6/2/17 DCCV Successful on Betapace 80 BID          Type 2 diabetes mellitus without complication, with long-term current use of insulin ([facility] [address] 7/24/2022 Yes    Palliative care patient 7/25/2022 Yes    Stage 3b chronic kidney disease ([facility] [address] 7/24/2022 Yes   Assessment & Plan      Close displaced fracture of left acetabulum status post fall prior to admission  And inferior pubic rami fracture  Chronic systolic heart failure  Dementia with behavioral disturbance  Paroxysmal atrial fibrillation with chronic anticoagulation  Type 2 diabetes  CKD stage III  UTI      Plan:  Evaluated by orthopedic team and conservative management recommended. Pain management  PT OT following  Ceftriaxone for UTI  Continue other chronic medication as currently ordered. Disposition: Home tomorrow after confirmation of DME equipment delivered.     Electronically signed by   Ernestine Harman MD   Internal Medicine Hospitalist  On 7/26/2022  At 3:17 PM    EMR Dragon/Transcription disclaimer:   Much of this encounter note is an electronic transcription/translation of spoken language to printed text.  The electronic translation of spoken language may permit erroneous, or at times, nonsensical words or phrases to be inadvertently transcribed; although attempts have made to review the note for such errors, some may still exist.

## 2022-07-26 NOTE — PROGRESS NOTES
Palliative Care Progress Note  7/26/2022 12:47 PM    Patient:  Ching Martinez  YOB: 1940  Primary Care Physician: Clarita Russell MD  Advance Directive: DNR  Admit Date: 7/24/2022       Hospital Day: 2  Portions of this note have been copied forward, however, changed to reflect the most current clinical status of this patient. CHIEF COMPLAINT/REASON FOR CONSULTATION Goals of care, code status, family support    SUBJECTIVE:  Ms. Ty Michael has no new complaints. She was seen this AM and was requested to get out of bed. Review of Systems:   14 point review of systems is negative except as specifically addressed above. Objective:   VITALS:  /67   Pulse 78   Temp 97 °F (36.1 °C) (Temporal)   Resp 18   Ht 5' 5\" (1.651 m)   Wt 204 lb (92.5 kg)   SpO2 95%   BMI 33.95 kg/m²   24HR INTAKE/OUTPUT:    Intake/Output Summary (Last 24 hours) at 7/26/2022 1247  Last data filed at 7/26/2022 1007  Gross per 24 hour   Intake 2280 ml   Output 600 ml   Net 1680 ml     General appearance: 81 yo female, no acute distress, resting comfortably in bed  Head: Normocephalic, without obvious abnormality, atraumatic  Eyes: conjunctivae/corneas clear. PERRL, EOM's intact.    Ears: normal external ears and nose  Neck: no JVD, supple, symmetrical, trachea midline   Lungs: clear to auscultation bilaterally,no rales or wheezes   Heart: regular rate and rhythm, S1, S2 normal, no murmur  Abdomen:soft, non-tender; non-distended, bowel sounds present    Extremities:No lower extremity edema,  No erythema, no tenderness to palpation  Skin: warm, dry  Neurologic: awake, oriented to self only, follows simple commands  Psychiatric: confused    Medications:      dextrose      sodium chloride        rivaroxaban  15 mg Oral Daily    cefTRIAXone (ROCEPHIN) IV  1,000 mg IntraVENous Q24H    miconazole   Topical BID    ARIPiprazole  2 mg Oral QPM    atorvastatin  40 mg Oral Nightly    donepezil  10 mg Oral BID    ferrous sulfate  325 mg Oral Daily with breakfast    furosemide  40 mg Oral Daily    memantine  10 mg Oral BID    metoprolol succinate  25 mg Oral Nightly    therapeutic multivitamin-minerals  1 tablet Oral Daily    pantoprazole  20 mg Oral Daily    rOPINIRole  0.5 mg Oral Nightly    sertraline  50 mg Oral Nightly    traZODone  100 mg Oral Nightly    sodium chloride flush  5-40 mL IntraVENous 2 times per day    insulin lispro  0-4 Units SubCUTAneous TID WC    insulin lispro  0-4 Units SubCUTAneous Nightly     HYDROcodone 5 mg - acetaminophen, glucose, dextrose bolus **OR** dextrose bolus, glucagon (rDNA), dextrose, sodium chloride flush, sodium chloride, ondansetron **OR** ondansetron, polyethylene glycol, melatonin  ADULT DIET; Dysphagia - Minced and Moist     Lab and other Data:     Recent Labs     07/24/22  1024 07/25/22  0219 07/26/22  0313   WBC 11.6* 9.5 9.4   HGB 12.2 10.6* 10.1*    209 201     Recent Labs     07/24/22  1024 07/25/22  0219 07/26/22  0313    140 139   K 4.9 4.2 5.3*    102 101   CO2 24 28 27   BUN 37* 32* 47*   CREATININE 1.5* 1.4* 2.7*   GLUCOSE 152* 109 155*     Recent Labs     07/24/22  1024   AST 24   ALT 19   BILITOT 0.4   ALKPHOS 102     Assessment/Plan   Principal Problem:    Closed displaced fracture of medial wall of left acetabulum, initial encounter ([facility])  Active Problems:    Chronic systolic (congestive) heart failure    Dementia with behavioral disturbance, unspecified dementia type ([facility])    Chronic anticoagulation    PAF (paroxysmal atrial fibrillation) ([facility])    Type 2 diabetes mellitus without complication, with long-term current use of insulin ([facility])    Palliative care patient    Stage 3b chronic kidney disease ([facility] [address]  Resolved Problems:    * No resolved hospital problems. *    Visit Summary:  Chart reviewed, patient discussed with nursing staff. Reviewed health issues, work up and treatment plan as well as factors that lead to hospitalization. I saw MsoY  Ty Michael at her bedside with her daughter Lyla Whaley present. The patient is wanting to get up and walk this AM. D/w her that she will need to wait for PT for assistance. Her daughter reports the patient slept well last night and there is no concern for agitation today. I spoke with Lyla Whaley again this afternoon and at this time they have decided to continue therapy. She and her sisters want to take patient home with assistance from home health. Home health order placed. Lyla Whaley states they will also need a lift and ambulance transport to get patient home. D/w social work. Recommendations:   Palliative care: [address] discharge home w/ home health. Code status: DNR ACP completed  Alzheimer's dementia-advanced, will be cared for at home by her 3 daughters  Closed fracture left acetabulum/left inferior/superior pubic rami-conservative mgmt, pain control  Pain 2/2 above-controlled  UTI-mgmt per Hospitalist     Thank you for consulting Palliative Care and allowing us to participate in the care of this patient.    Time Spent Counseling > 50%:  YES                                   Total Time Spent with patient/family counseling, workup/treatment review, counseling and placement of orders/preparation of this note: 36 minutes    Electronically signed by Mallory Bower PA-C on 7/26/2022 at 12:47 PM    (Please note that portions of this note were completed with a voice recognition program.  Chelsea Correa made to edit the dictations but occasionally words are mis-transcribed.)

## 2022-07-27 VITALS
OXYGEN SATURATION: 90 % | HEIGHT: 65 IN | BODY MASS INDEX: 33.99 KG/M2 | DIASTOLIC BLOOD PRESSURE: 99 MMHG | SYSTOLIC BLOOD PRESSURE: 168 MMHG | TEMPERATURE: 98.1 F | WEIGHT: 204 LBS | HEART RATE: 89 BPM | RESPIRATION RATE: 22 BRPM

## 2022-07-27 PROBLEM — N39.0 UTI DUE TO KLEBSIELLA SPECIES: Status: ACTIVE | Noted: 2022-07-27

## 2022-07-27 PROBLEM — B96.89 UTI DUE TO KLEBSIELLA SPECIES: Status: ACTIVE | Noted: 2022-07-27

## 2022-07-27 LAB
ANION GAP SERPL CALCULATED.3IONS-SCNC: 12 MMOL/L (ref 7–19)
BASOPHILS ABSOLUTE: 0.1 K/UL (ref 0–0.2)
BASOPHILS RELATIVE PERCENT: 0.7 % (ref 0–1)
BUN BLDV-MCNC: 60 MG/DL (ref 8–23)
CALCIUM SERPL-MCNC: 7.5 MG/DL (ref 8.8–10.2)
CHLORIDE BLD-SCNC: 99 MMOL/L (ref 98–111)
CO2: 24 MMOL/L (ref 22–29)
CREAT SERPL-MCNC: 2.5 MG/DL (ref 0.5–0.9)
EOSINOPHILS ABSOLUTE: 0.3 K/UL (ref 0–0.6)
EOSINOPHILS RELATIVE PERCENT: 2.9 % (ref 0–5)
GFR AFRICAN AMERICAN: 22
GFR NON-AFRICAN AMERICAN: 19
GLUCOSE BLD-MCNC: 100 MG/DL (ref 70–99)
GLUCOSE BLD-MCNC: 111 MG/DL (ref 74–109)
GLUCOSE BLD-MCNC: 173 MG/DL (ref 70–99)
GLUCOSE BLD-MCNC: 197 MG/DL (ref 70–99)
GLUCOSE BLD-MCNC: 231 MG/DL (ref 70–99)
HCT VFR BLD CALC: 31.3 % (ref 37–47)
HEMOGLOBIN: 9.4 G/DL (ref 12–16)
IMMATURE GRANULOCYTES #: 0.1 K/UL
LYMPHOCYTES ABSOLUTE: 0.9 K/UL (ref 1.1–4.5)
LYMPHOCYTES RELATIVE PERCENT: 10.3 % (ref 20–40)
MCH RBC QN AUTO: 29.9 PG (ref 27–31)
MCHC RBC AUTO-ENTMCNC: 30 G/DL (ref 33–37)
MCV RBC AUTO: 99.7 FL (ref 81–99)
MONOCYTES ABSOLUTE: 1 K/UL (ref 0–0.9)
MONOCYTES RELATIVE PERCENT: 11.8 % (ref 0–10)
NEUTROPHILS ABSOLUTE: 6.3 K/UL (ref 1.5–7.5)
NEUTROPHILS RELATIVE PERCENT: 73.4 % (ref 50–65)
ORGANISM: ABNORMAL
PDW BLD-RTO: 14.6 % (ref 11.5–14.5)
PERFORMED ON: ABNORMAL
PLATELET # BLD: 181 K/UL (ref 130–400)
PMV BLD AUTO: 9.8 FL (ref 9.4–12.3)
POTASSIUM REFLEX MAGNESIUM: 4.9 MMOL/L (ref 3.5–5)
RBC # BLD: 3.14 M/UL (ref 4.2–5.4)
SODIUM BLD-SCNC: 135 MMOL/L (ref 136–145)
URINE CULTURE, ROUTINE: ABNORMAL
URINE CULTURE, ROUTINE: ABNORMAL
WBC # BLD: 8.6 K/UL (ref 4.8–10.8)

## 2022-07-27 PROCEDURE — 97535 SELF CARE MNGMENT TRAINING: CPT

## 2022-07-27 PROCEDURE — 97530 THERAPEUTIC ACTIVITIES: CPT

## 2022-07-27 PROCEDURE — 6370000000 HC RX 637 (ALT 250 FOR IP): Performed by: NURSE PRACTITIONER

## 2022-07-27 PROCEDURE — 6360000002 HC RX W HCPCS: Performed by: HOSPITALIST

## 2022-07-27 PROCEDURE — 6370000000 HC RX 637 (ALT 250 FOR IP): Performed by: HOSPITALIST

## 2022-07-27 PROCEDURE — 80048 BASIC METABOLIC PNL TOTAL CA: CPT

## 2022-07-27 PROCEDURE — 85025 COMPLETE CBC W/AUTO DIFF WBC: CPT

## 2022-07-27 PROCEDURE — 2580000003 HC RX 258: Performed by: HOSPITALIST

## 2022-07-27 PROCEDURE — 2700000000 HC OXYGEN THERAPY PER DAY

## 2022-07-27 PROCEDURE — 36415 COLL VENOUS BLD VENIPUNCTURE: CPT

## 2022-07-27 PROCEDURE — 82947 ASSAY GLUCOSE BLOOD QUANT: CPT

## 2022-07-27 RX ORDER — HYDROCODONE BITARTRATE AND ACETAMINOPHEN 5; 325 MG/1; MG/1
1 TABLET ORAL EVERY 6 HOURS PRN
Qty: 12 TABLET | Refills: 0 | Status: SHIPPED | OUTPATIENT
Start: 2022-07-27 | End: 2022-07-30

## 2022-07-27 RX ORDER — CEFDINIR 300 MG/1
300 CAPSULE ORAL 2 TIMES DAILY
Qty: 14 CAPSULE | Refills: 0 | Status: SHIPPED | OUTPATIENT
Start: 2022-07-27 | End: 2022-08-03

## 2022-07-27 RX ADMIN — HYDROCODONE BITARTRATE AND ACETAMINOPHEN 1 TABLET: 5; 325 TABLET ORAL at 18:01

## 2022-07-27 RX ADMIN — WATER 1000 MG: 1 INJECTION INTRAMUSCULAR; INTRAVENOUS; SUBCUTANEOUS at 11:25

## 2022-07-27 RX ADMIN — MICONAZOLE NITRATE: 2 POWDER TOPICAL at 11:33

## 2022-07-27 RX ADMIN — DONEPEZIL HYDROCHLORIDE 10 MG: 5 TABLET, FILM COATED ORAL at 11:26

## 2022-07-27 RX ADMIN — HYDROCODONE BITARTRATE AND ACETAMINOPHEN 1 TABLET: 5; 325 TABLET ORAL at 11:26

## 2022-07-27 RX ADMIN — ARIPIPRAZOLE 2 MG: 2 TABLET ORAL at 18:01

## 2022-07-27 RX ADMIN — RIVAROXABAN 15 MG: 15 TABLET, FILM COATED ORAL at 18:01

## 2022-07-27 RX ADMIN — MULTIPLE VITAMINS W/ MINERALS TAB 1 TABLET: TAB at 11:27

## 2022-07-27 RX ADMIN — MEMANTINE 10 MG: 5 TABLET ORAL at 11:26

## 2022-07-27 RX ADMIN — PANTOPRAZOLE SODIUM 20 MG: 20 TABLET, DELAYED RELEASE ORAL at 11:25

## 2022-07-27 RX ADMIN — FUROSEMIDE 40 MG: 40 TABLET ORAL at 11:26

## 2022-07-27 RX ADMIN — FERROUS SULFATE TAB 325 MG (65 MG ELEMENTAL FE) 325 MG: 325 (65 FE) TAB at 11:26

## 2022-07-27 ASSESSMENT — PAIN DESCRIPTION - DESCRIPTORS
DESCRIPTORS: PATIENT UNABLE TO DESCRIBE
DESCRIPTORS: ACHING

## 2022-07-27 ASSESSMENT — PAIN DESCRIPTION - LOCATION
LOCATION: GENERALIZED;BACK;LEG
LOCATION: HIP

## 2022-07-27 ASSESSMENT — PAIN - FUNCTIONAL ASSESSMENT
PAIN_FUNCTIONAL_ASSESSMENT: PREVENTS OR INTERFERES SOME ACTIVE ACTIVITIES AND ADLS
PAIN_FUNCTIONAL_ASSESSMENT: PREVENTS OR INTERFERES SOME ACTIVE ACTIVITIES AND ADLS

## 2022-07-27 ASSESSMENT — PAIN SCALES - GENERAL
PAINLEVEL_OUTOF10: 5
PAINLEVEL_OUTOF10: 8

## 2022-07-27 ASSESSMENT — PAIN DESCRIPTION - ORIENTATION: ORIENTATION: LEFT

## 2022-07-27 ASSESSMENT — PAIN SCALES - WONG BAKER: WONGBAKER_NUMERICALRESPONSE: 10

## 2022-07-27 NOTE — DISCHARGE INSTRUCTIONS
PRIMARY CARE PHYSICIAN (PCP) FOLLOW UP . .. PATIENT INSTRUCTIONS:    Please make sure you follow-up with your primary care physician within 2 business days. when you call, ask for a \" Elana Sinclairdo Acevedo \" . .. and take this paperwork with you. Please fill, TODAY, all the prescriptions provided/e-scribed in the hospital upon discharge. Please take all of your new prescription meds to your primary care physician to update your medical record . .. and to get any refills. Advised patient to follow-up closely with orthopedics as an outpatient as per their recommendations    Patient and family requesting to be discharged home. Patient has a bump in serum creatinine to 2.7 yesterday which is slowly trending down. We will hold off on patient's diuretics and would request the PCP to monitor her renal functions closely and restart diuretics as needed. I also put an order for repeat BMPs on Friday 7/29/2021 and Monday 8/1/2021 by home health care with results to PCP for follow-up and review.

## 2022-07-27 NOTE — DISCHARGE SUMMARY
[place], [place], [address]    DEPARTMENT OF HOSPITALIST MEDICINE      DISCHARGE SUMMARY:      PATIENT NAME:  Rosemary Nicole  :  1940  MRN:  [id number]    Admission Date:   2022  9:55 AM Attending: Shirley Corbin MD   Discharge Date:   2022              PCP: Xochitl Maxwell MD  Length of Stay: 3 days     Chief Complaint on Admission:   Chief Complaint   Patient presents with    Hip Pain     Right side fell last night. Shortened and rotated       Consultants:     Lavinia Johnston TO PALLIATIVE CARE  IP CONSULT TO SOCIAL WORK  IP CONSULT TO ORTHOPEDIC SURGERY  IP CONSULT TO HOME CARE NEEDS  IP CONSULT TO HOME CARE NEEDS       Discharge Problem List:   Principal Problem:    Closed displaced fracture of medial wall of left acetabulum, initial encounter ([facility]  Active Problems:    Chronic systolic (congestive) heart failure    Dementia with behavioral disturbance, unspecified dementia type ([facility]    UTI due to Klebsiella species    Chronic anticoagulation    PAF (paroxysmal atrial fibrillation) ([facility])    Type 2 diabetes mellitus without complication, with long-term current use of insulin ([facility])    CPAP (continuous positive airway pressure) dependence    Obstructive sleep apnea    Acute kidney injury superimposed on CKD ([facility]    Palliative care patient    Neuropathy    Stage 3b chronic kidney disease ([facility]    Alzheimer's disease, unspecified  Resolved Problems:    * No resolved hospital problems. *         Last dated Assessment and Plan . .. 2022:    Assessment & Plan        Close displaced fracture of left acetabulum status post fall prior to admission  And inferior pubic rami fracture  Chronic systolic heart failure  Dementia with behavioral disturbance  Paroxysmal atrial fibrillation with chronic anticoagulation  Type 2 diabetes  CKD stage III  UTI        Plan:  Evaluated by orthopedic team and conservative management recommended.   Pain management  PT OT following  Ceftriaxone for UTI  Continue other chronic medication as currently ordered. Disposition: Home tomorrow after confirmation of DME equipment delivered. CUMULATIVE  HOSPITAL  COURSE  AND  TREATMENT:    80 y.o. female with a significant history of Alzheimer's disease, atrial fibrillation on chronic anticoagulation with Xarelto, CHF, diabetes, hypertension, hyperlipidemia, and restless leg syndrome who presented to [facility] ED via EMS complaining of right leg pain after fall. x-ray of right tib-fib with diffuse soft tissue swelling without fracture, left tib-fib no acute fracture, left foot with hammertoe deformity and soft tissue swelling, right femur with a chronic appearing avulsion fracture of the ischial tuberosity, right foot degenerative changes without acute fracture, left femur superior and inferior left pubic rami fractures, right pelvis with fractures of the left medial acetabulum and inferior pubic rami, old fracture of the right inferior pubic rami. CT head indicated atrophy with nonspecific but presumed microvascular changes. CT pelvis without contrast indicated fractures of the left anterior acetabulum, anterior medial left iliac wing, and left inferior pubic rami. Seen in house by ortho team, recc non-operative, conservative management. Urinalysis positive and initiated on ceftriaxone. Urine cultures positive for Klebsiella aerogenes with good sensitivity to major antibiotics. Patient received 3 doses of IV ceftriaxone in the hospital.  She would be discharged on 7 days of oral Omnicef to complete 10 days course of antibiotics. Pt seen by therapy in-house, was very difficult assessment as patient needed 3 people for movement and a total maximum assist.  Discussion was held with family was present in room and they have decided to take patient home on discharge.  has made arrangements and her DME's are delivered to patient home. Patient and family requesting to be discharged home.   Patient has a bump in serum creatinine to 2.7 yesterday which is slowly trending down. We will hold off on patient's diuretics and would request the PCP to monitor her renal functions closely and restart diuretics as needed. I also put an order for repeat BMPs on Friday 7/29/2021 and Monday 8/1/2021 by home health care with results to PCP for follow-up and review. OBJECTIVE:  BP (!) 144/97   Pulse 89   Temp 98.6 °F (37 °C) (Temporal)   Resp 20   Ht 5' 5\" (1.651 m)   Wt 204 lb (92.5 kg)   SpO2 90%   BMI 33.95 kg/m²       Heart: RRR   Lungs: Bilateral decreased air entry   Abdomen: Soft, non-tender   Extremities: No edema   Neurologic: Alert and oriented   Skin: Warm and dry          Laboratory Data:  Recent Labs     07/25/22 0219 07/26/22  0313 07/27/22  0304   WBC 9.5 9.4 8.6   HGB 10.6* 10.1* 9.4*    201 181     Recent Labs     07/25/22 0219 07/26/22  0313 07/27/22  0304    139 135*   K 4.2 5.3* 4.9    101 99   CO2 28 27 24   BUN 32* 47* 60*   CREATININE 1.4* 2.7* 2.5*   GLUCOSE 109 155* 111*     No results for input(s): AST, ALT, ALB, BILITOT, ALKPHOS in the last 72 hours. Troponin T: No results for input(s): TROPONINI in the last 72 hours. Pro-BNP: No results for input(s): BNP in the last 72 hours. INR: No results for input(s): INR in the last 72 hours. UA:No results for input(s): NITRITE, COLORU, PHUR, LABCAST, WBCUA, RBCUA, MUCUS, TRICHOMONAS, YEAST, BACTERIA, CLARITYU, SPECGRAV, LEUKOCYTESUR, UROBILINOGEN, BILIRUBINUR, BLOODU, GLUCOSEU, AMORPHOUS in the last 72 hours. Invalid input(s): Colette Rodrigues  A1C: No results for input(s): LABA1C in the last 72 hours. ABG:No results for input(s): PHART, HBZ5ULG, PO2ART, APF6GTG, BEART, HGBAE, C4RNWOIG, CARBOXHGBART in the last 72 hours. Impressions of imaging performed in 48 hours before discharge:    No results found.         Medication List        START taking these medications      cefdinir 300 MG capsule  Commonly known as: OMNICEF  Take 1 capsule by mouth in the morning and 1 capsule before bedtime. Do all this for 7 days. colesevelam 625 MG tablet  Commonly known as: WELCHOL  Take 1 tablet by mouth 2 times daily (with meals)     HYDROcodone-acetaminophen 5-325 MG per tablet  Commonly known as: NORCO  Take 1 tablet by mouth every 6 hours as needed for Pain for up to 3 days. CONTINUE taking these medications      ARIPiprazole 2 MG tablet  Commonly known as: ABILIFY  Take 1 tablet by mouth every evening     atorvastatin 40 MG tablet  Commonly known as: LIPITOR  Take 1 tablet by mouth nightly     blood glucose monitor kit and supplies  QD E11.9     donepezil 10 MG tablet  Commonly known as: ARICEPT  Take 1 tablet by mouth 2 times daily     FeroSul 325 (65 Fe) MG tablet  Generic drug: ferrous sulfate  TAKE 1 TABLET BY MOUTH EVERY DAY WITH BREAKFAST     glipiZIDE 2.5 MG extended release tablet  Commonly known as: Glucotrol XL  Take 1 tablet by mouth daily     melatonin 3 MG Tabs tablet     memantine 10 MG tablet  Commonly known as: NAMENDA  Take 1 tablet by mouth 2 times daily     metoprolol succinate 25 MG extended release tablet  Commonly known as:  Toprol XL  Take 1 tablet by mouth nightly     nystatin 918800 UNIT/GM powder  Commonly known as: MYCOSTATIN     pantoprazole 20 MG tablet  Commonly known as: PROTONIX     rOPINIRole 0.25 MG tablet  Commonly known as: Requip  Take 2 tablets by mouth nightly     SITagliptin 100 MG tablet  Commonly known as: Januvia  Take 1 tablet by mouth daily     therapeutic multivitamin-minerals tablet  Take 1 tablet by mouth daily     traZODone 100 MG tablet  Commonly known as: DESYREL  TAKE 1 TABLET BY MOUTH NIGHTLY     Xarelto 15 MG Tabs tablet  Generic drug: rivaroxaban  TAKE 1 TABLET BY MOUTH DAILY (WITH BREAKFAST)     Zoloft 100 MG tablet  Generic drug: sertraline            STOP taking these medications      bumetanide 1 MG tablet  Commonly known as: BUMEX     furosemide 40 MG tablet  Commonly known as: LASIX Where to Get Your Medications        These medications were sent to [facility] [place], [place] - [address] [place]  [address], [address] [facility]      Phone: [phone]   cefdinir 300 MG capsule       You can get these medications from any pharmacy    Bring a paper prescription for each of these medications  HYDROcodone-acetaminophen 5-325 MG per tablet           ISSUES TO BE ADDRESSED AT HOSPITAL FOLLOW-UP VISIT:    Advised patient to follow-up closely with PCP upon discharge for management of chronic medical issues  Please see the detailed discharge directions delivered from earlier today! Condition on Discharge: gradually improving  [facility]    Recommended Follow Up:  No follow-up provider specified. Followup Appointments Scheduled at Time of Discharge:  Future Appointments   Date Time Provider Ga Lange   9/23/2022 10:15 AM [facility]        Discharge Instructions:   Please see the discharge paperwork. Patient was seen at bedside today, and the examination shows improvement since yesterday. Detailed discharge directions delivered to the patient by myself and our nursing staff, who verbalizes understanding and is very happy and satisfied with the plan. Patient has been advised to continue all medications as prescribed and advised, and f/u with PCP within 1 week. Patient is stable from medical standpoint to be discharged. Total time spent during patient evaluation and assessment, discussion with the nurse/family, addressing discharge medications/scripts and coordination of care for safe discharge was in excess of 40 minutes.       Signed Electronically:    Gail Bishop MD  6:58 PM 7/27/2022

## 2022-07-27 NOTE — PROGRESS NOTES
Sebastián Castro  [address] [place] [address]    October 27, 2018      Dear Sebastián Castro,    [facility] wants to ensure your discharge home is safe and you or your loved ones have had all of your questions answered regarding your care after you leave the hospital.    Our discharge team was unsuccessful in our attempts to contact you telephonically and we wanted to be sure that you had a list of resources and contact information should you have any questions regarding your hospital stay, follow-up instructions, or active medical symptoms.    Questions or Concerns Regarding… Contact   Medical Questions Related to Your Discharge  (7 days a week, 8am-5pm) Contact a Nurse Care Coordinator   [phone]   Medical Questions Not Related to Your Discharge  (24 hours a day / 7 days a week)  Contact the Nurse Health Line   [phone]    Medications or Discharge Instructions Refer to your discharge packet   or contact your -[phone]   Follow-up Appointment(s) Schedule your appointment via [employer]   or contact Scheduling [phone]   Billing Review your statement via [employer]  or contact Billing [phone]   Medical Records Review your records via [employer]   or contact Medical Records [phone]     You can also easily access your medical information, test results and upcoming appointments via the [employer] free online health management tool. You can learn more and sign up at [employer]. For assistance setting up your [employer] account, please call [phone].    Once again, we want to ensure your discharge home is safe and that you have a clear understanding of any next steps in your care. If you have any questions or concerns, please do not hesitate to contact us, we are here for you. Thank you for choosing [facility] for your healthcare needs.    Sincerely,      Your [facility] Healthcare Team      Assessment   Assessment Tx focused on bed mobility, sitting EOB to perform dressing tasks; 2 attempts at standing at RW level; and returned to bed with repositioning.    Activity Tolerance Treatment limited secondary to decreased cognition;Patient limited by pain   Discharge Recommendations Patient would benefit from continued therapy after discharge;24 hour supervision or assist   Plan   Times per Week 3-5x/week   Times per Day Daily   OT Plan of Care   Wednesday X   Electronically signed by DONYA Johnson on 7/27/2022 at 4:28 PM

## 2022-07-27 NOTE — PROGRESS NOTES
Physical Therapy  Name: Juan Birch  MRN:  [id number]  Date of service:  7/27/2022 07/27/22 1407   Restrictions/Precautions   Restrictions/Precautions Fall Risk   Subjective   Subjective Pt confused/agitated throughout tx. Bed Mobility   Supine to Sit Maximal assistance;Dependent/Total  (x2)   Sit to Supine Maximal assistance;Dependent/Total  (x2)   Scooting Dependent/Total   Comment Scooted on buttocks toward HOB   Transfers   Sit to Stand Maximum Assistance;2 Person Assistance   Stand to sit Maximum Assistance;2 Person Assistance   Short Term Goals   Time Frame for Short term goals 14 DAYS   Short term goal 1 BED MOB MIN ASSIST   Short term goal 2 TRANSFERS MIN ASSIST   Conditions Requiring Skilled Therapeutic Intervention   Body Structures, Functions, Activity Limitations Requiring Skilled Therapeutic Intervention Decreased functional mobility ; Decreased ADL status; Decreased ROM; Decreased strength;Decreased safe awareness;Decreased cognition;Decreased balance;Decreased posture; Increased pain   Assessment Worked with pt on bed mobility and sitting balance at EOB. Pt able to stand briefly to change pad underneath pt. Tx limited primarily by decreased cognition in addition to increased pain with any movement.    Activity Tolerance   Activity Tolerance Treatment limited secondary to decreased cognition   PT Plan of Care   Wednesday X   Safety Devices   Type of Devices Bed alarm in place;Call light within reach;Gait belt;Left in bed         Electronically signed by Lesly Schmitz PTA on 7/27/2022 at 2:12 PM

## 2022-07-28 ENCOUNTER — TELEPHONE (OUTPATIENT)
Dept: INTERNAL MEDICINE | Age: 82
End: 2022-07-28

## 2022-07-28 NOTE — TELEPHONE ENCOUNTER
[facility] Transitions Initial Follow Up Call    Outreach made within 2 business days of discharge: Yes    Patient: Pato Schulz   Patient : 1940 MRN: [id number]    Reason for Admission: fall, hip pain    Discharge Date: 22      Discharge Problem List:   Principal Problem:    Closed displaced fracture of medial wall of left acetabulum, initial encounter ([facility]  Active Problems:    Chronic systolic (congestive) heart failure    Dementia with behavioral disturbance, unspecified dementia type ([facility]    UTI due to Klebsiella species    Chronic anticoagulation    PAF (paroxysmal atrial fibrillation) ([facility]    Type 2 diabetes mellitus without complication, with long-term current use of insulin ([facility]    CPAP (continuous positive airway pressure) dependence    Obstructive sleep apnea    Acute kidney injury superimposed on CKD ([facility]    Palliative care patient    Neuropathy    Stage 3b chronic kidney disease ([facility]    Alzheimer's disease, unspecified  Resolved Problems:    * No resolved hospital problems. *      Spoke with: Attempted to make contact with patient/caregiver for an initial transitions of care follow up call post discharge without success. I will reach out again at a later time. Any previously scheduled hospital follow up appointments noted below.        [facility] [place]    Scheduled appointment with PCP within 7-14 days    Follow Up  Future Appointments   Date Time Provider Ga Lange   2022 10:15 AM MD SANGEETA Gallagher EDITA-JOHNN[facility] [place], [address]
detailed exam

## 2022-07-29 ENCOUNTER — TELEPHONE (OUTPATIENT)
Dept: INTERNAL MEDICINE | Age: 82
End: 2022-07-29

## 2022-07-29 ASSESSMENT — ENCOUNTER SYMPTOMS: SHORTNESS OF BREATH: 1

## 2022-07-29 NOTE — TELEPHONE ENCOUNTER
Pts daughter Santy Parish stated that after taking the nail polish off, her O2 did not get above 88. This [facility] tried to schedule a face to face for Oxygen, but the daughter refused. She stated that the pt can not get out of bed and that there is absolutely no way to get her into the office. I explained to Santy Parish that this is not our policy bt Medicare. If they want it to be covered by their insurance she will have to come in. Santy Parish stated that she will just have low oxygen then. This [facility] recommended that she call medicare and see if they allow exceptions due to health, and what they recommend for them to do. Santy Parish stated understanding.

## 2022-07-29 NOTE — TELEPHONE ENCOUNTER
I talked to Kofi and she said that the pt has a very thick nail polish on ( like a gel or shellac ) and was not sure that they can get a good reading - she said the first reading that she got was 83   % but she did not think that was correct reading so she re positioned the monitor and she was reading 88 % when she was there     She said she discussed with the dgt that they would get a better reading with the nail polish off as it was very thick and could cause incorrect reading

## 2022-07-29 NOTE — TELEPHONE ENCOUNTER
Gaylan Spatz [facility] nurse from [facility] ([facility]) stated that the plan of care for the pt is 2 times a week for 3 weeks and then 1 time a week for 3 weeks.

## 2022-07-29 NOTE — TELEPHONE ENCOUNTER
Pts daughter Hamlet Cole is calling, she states that pts O2 keeps dropping to 82, 84, and  85. She stated that it was 83 this morning when [facility] went out to see her. I did not get the number of Haydee Escobar when she called this morning to give us her Plan of Care. Can you reach out and ask her if the pts O2 was really 83 this morning please.

## 2022-07-29 NOTE — TELEPHONE ENCOUNTER
[facility] Transitions Initial Follow Up Call    Outreach made within 2 business days of discharge: Yes    Patient: Samantha Davies   Patient : 1940 MRN: [id number]    Reason for Admission: Fall, hip pain    Discharge Date: 22      Discharge Problem List:   Principal Problem:    Closed displaced fracture of medial wall of left acetabulum, initial encounter ([place] [address].)  Active Problems:    Chronic systolic (congestive) heart failure    Dementia with behavioral disturbance, unspecified dementia type ([place] [address].)    UTI due to Klebsiella species    Chronic anticoagulation    PAF (paroxysmal atrial fibrillation) ([place] [address].)    Type 2 diabetes mellitus without complication, with long-term current use of insulin ([facility])    CPAP (continuous positive airway pressure) dependence    Obstructive sleep apnea    Acute kidney injury superimposed on CKD ([place] [address].)    Palliative care patient    Neuropathy    Stage 3b chronic kidney disease ([place] [address].)    Alzheimer's disease, unspecified  Resolved Problems:    * No resolved hospital problems. *     Spoke with: Francine Montes    Discharge department/facility: [address] Interactive Patient Contact:  Was patient able to fill all prescriptions: Yes  Was patient instructed to bring all medications to the follow-up visit: Yes  Is patient taking all medications as directed in the discharge summary? Yes  Does patient understand their discharge instructions: Yes  Does patient have questions or concerns that need addressed prior to 7-14 day follow up office visit: no    Spoke to Francine Montes patients daughter, she said that home health was there now for intake. She said things are going very slow for her. She has all her medications and she could not think of anything that she needed. I was able to get her scheduled for a VV for next week. Pt is unable to come into the office, Francine Montes states she had to have the ambulance to bring her home.      Scheduled appointment with PCP within 7-14 days    Follow Up  Future Appointments Date Time Provider Ga Lange   9/23/2022 10:15 AM Nestor Andres MD [facility]       [place], [place]

## 2022-07-29 NOTE — TELEPHONE ENCOUNTER
We can try or wait to see what medicare tells her to do. If we send it and it is denied there is a wait period with medicare to retry to get something approved.

## 2022-08-01 ENCOUNTER — PATIENT MESSAGE (OUTPATIENT)
Dept: INTERNAL MEDICINE | Age: 82
End: 2022-08-01

## 2022-08-01 DIAGNOSIS — G89.29 OTHER CHRONIC PAIN: Primary | ICD-10-CM

## 2022-08-01 RX ORDER — TRAMADOL HYDROCHLORIDE 50 MG/1
50 TABLET ORAL EVERY 6 HOURS PRN
Qty: 12 TABLET | Refills: 0 | Status: SHIPPED | OUTPATIENT
Start: 2022-08-01 | End: 2022-08-04

## 2022-08-01 NOTE — TELEPHONE ENCOUNTER
From: Jose Victoria  To: Dr. Ritesh Ham: 8/1/2022 12:18 PM CDT  Subject: Pain medicine     Is there something else you can give her for pain. They gave her 12 pain pills and thats it. She in so much pain when we get her up in the chair and walk her.

## 2022-08-02 ENCOUNTER — TELEPHONE (OUTPATIENT)
Dept: INTERNAL MEDICINE CLINIC | Age: 82
End: 2022-08-02

## 2022-08-02 NOTE — TELEPHONE ENCOUNTER
[address] [address] OT reporting  :   Pt seen for OT evaluation 8/1/22. Pt continues to require Max A x1-2 for all ADLs. Pt does demonstrate increased BUE weakness this date. Pt's family is comfortable with and doing well with established ADL routines.    Recommendation : :  Skilled OT is medically necessary and recommended for 2 wk 2 to increase BUE strength to promote pt ability to participate in transfers and ADL tasks    Please advise if OT visits approved

## 2022-08-04 RX ORDER — ROPINIROLE 0.25 MG/1
0.5 TABLET, FILM COATED ORAL NIGHTLY
Qty: 30 TABLET | Refills: 11 | Status: SHIPPED | OUTPATIENT
Start: 2022-08-04 | End: 2022-08-11 | Stop reason: SDUPTHER

## 2022-08-04 RX ORDER — SITAGLIPTIN 100 MG/1
TABLET, FILM COATED ORAL
Qty: 30 TABLET | Refills: 11 | Status: SHIPPED | OUTPATIENT
Start: 2022-08-04

## 2022-08-04 NOTE — TELEPHONE ENCOUNTER
Patients daughter still had not picked up the tramadol that was called in for her mother. She stated she would go pick it up at the pharmacy.

## 2022-08-04 NOTE — TELEPHONE ENCOUNTER
Jose Victoria called to request a refill on her medication.       Last office visit : 6/17/2022   Next office visit : 8/5/2022     Requested Prescriptions     Pending Prescriptions Disp Refills    rOPINIRole (REQUIP) 0.25 MG tablet [Pharmacy Med Name: ROPINIROLE HYDROCHLORIDE 0.25MG TABLET] 30 tablet 11     Sig: TAKE 2 TABLETS BY MOUTH NIGHTLY    JANUVIA 100 MG tablet [Pharmacy Med Name: JANUVIA 100MG TABLET] 30 tablet 11     Sig: TAKE 1 TABLET BY MOUTH EVERY DAY            Gabriel Bliss MA

## 2022-08-05 ENCOUNTER — TELEMEDICINE (OUTPATIENT)
Dept: INTERNAL MEDICINE | Age: 82
End: 2022-08-05
Payer: MEDICARE

## 2022-08-05 DIAGNOSIS — E11.21 TYPE II DIABETES MELLITUS WITH NEPHROPATHY (HCC): ICD-10-CM

## 2022-08-05 DIAGNOSIS — I10 PRIMARY HYPERTENSION: ICD-10-CM

## 2022-08-05 DIAGNOSIS — F32.89 OTHER DEPRESSION: ICD-10-CM

## 2022-08-05 DIAGNOSIS — Z79.4 TYPE 2 DIABETES MELLITUS WITHOUT COMPLICATION, WITH LONG-TERM CURRENT USE OF INSULIN (HCC): ICD-10-CM

## 2022-08-05 DIAGNOSIS — N39.0 URINARY TRACT INFECTION WITHOUT HEMATURIA, SITE UNSPECIFIED: ICD-10-CM

## 2022-08-05 DIAGNOSIS — Z09 HOSPITAL DISCHARGE FOLLOW-UP: ICD-10-CM

## 2022-08-05 DIAGNOSIS — I48.91 ATRIAL FIBRILLATION, UNSPECIFIED TYPE (HCC): ICD-10-CM

## 2022-08-05 DIAGNOSIS — E11.9 TYPE 2 DIABETES MELLITUS WITHOUT COMPLICATION, WITH LONG-TERM CURRENT USE OF INSULIN (HCC): ICD-10-CM

## 2022-08-05 DIAGNOSIS — S32.455S: Primary | ICD-10-CM

## 2022-08-05 DIAGNOSIS — N17.9 AKI (ACUTE KIDNEY INJURY) (HCC): ICD-10-CM

## 2022-08-05 PROCEDURE — 1111F DSCHRG MED/CURRENT MED MERGE: CPT | Performed by: INTERNAL MEDICINE

## 2022-08-05 PROCEDURE — 99496 TRANSJ CARE MGMT HIGH F2F 7D: CPT | Performed by: INTERNAL MEDICINE

## 2022-08-05 RX ORDER — ARIPIPRAZOLE 2 MG/1
2 TABLET ORAL EVERY EVENING
Qty: 30 TABLET | Refills: 11 | Status: SHIPPED | OUTPATIENT
Start: 2022-08-05 | End: 2022-08-11 | Stop reason: SDUPTHER

## 2022-08-05 NOTE — PROGRESS NOTES
Physician Progress Note      Sydney Smith  CSN #:                  [id number]  :                       1940  ADMIT DATE:       2022 9:55 AM  DISCH DATE:        2022 8:00 PM  RESPONDING  PROVIDER #:        Katerina Jewell MD          QUERY TEXT:    Patient admitted with fracture L ilium. Noted documentation of pelvic ring   injury in ortho consult. After study can the pelvic ring fracture be further   specified as: The medical record reflects the following:  Risk Factors: Fall, ilium fx at [facility] joint  Clinical Indicators: CT pelvis Fractures are seen involving the left anterior   acetabulum and junction of superior pubic ramus, anteromedial aspect of left   iliac wing at the level of the SI joint, as well as the left inferior pubic   ramus. Treatment: Wt bearing as tolerated, non-surgical  PT/OT eval and treat  Stormy Segovia, BSN  [phone]  Options provided:  -- Fractures without disruption of pelvic ring  -- Fractures with disruption of pelvic ring  -- Other - I will add my own diagnosis  -- Disagree - Not applicable / Not valid  -- Disagree - Clinically unable to determine / Unknown  -- Refer to Clinical Documentation Reviewer    PROVIDER RESPONSE TEXT:    She has a lateral compression type pelvic fracture- she has a fracture of her   pelvic ring but it is stable.     Query created by: Reuben Vigil on 8/3/2022 12:39 PM      Electronically signed by:  Katerina Jewell MD 2022 8:55 AM

## 2022-08-07 ENCOUNTER — TELEPHONE (OUTPATIENT)
Dept: INTERNAL MEDICINE | Age: 82
End: 2022-08-07

## 2022-08-07 ASSESSMENT — ENCOUNTER SYMPTOMS
VOICE CHANGE: 0
RHINORRHEA: 0
WHEEZING: 0
BACK PAIN: 1
EYE REDNESS: 0
SORE THROAT: 0
TROUBLE SWALLOWING: 0
COLOR CHANGE: 0
CHEST TIGHTNESS: 0
ABDOMINAL PAIN: 0
CONSTIPATION: 0
NAUSEA: 0
COUGH: 0
SINUS PRESSURE: 0
CHOKING: 0
ANAL BLEEDING: 0
EYE PAIN: 0
FACIAL SWELLING: 0
SHORTNESS OF BREATH: 0
DIARRHEA: 0
EYE ITCHING: 0
RECTAL PAIN: 0
BLOOD IN STOOL: 0
PHOTOPHOBIA: 0
ABDOMINAL DISTENTION: 0
EYE DISCHARGE: 0
VOMITING: 0

## 2022-08-07 NOTE — TELEPHONE ENCOUNTER
According to the hospitalist discharge summary, it looks like she is most of gotten a BMP done on 2 occasions after discharge. She should have had 1 done on July 29 and 1 on August 1. Can they go out and do a BMP on this patient? We also probably need to get a urinalysis.

## 2022-08-07 NOTE — PROGRESS NOTES
Post-Discharge Transitional Care Follow Up      Yonis Fall   YOB: 1940    Date of Office Visit:  8/5/2022  Date of Hospital Admission: 7/24/22  Date of Hospital Discharge: 7/27/22  Readmission Risk Score (high >=14%. Medium >=10%):Readmission Risk Score: 25.7      Care management risk score Rising risk (score 2-5) and Complex Care (Scores >=6): No Risk Score On File     Non face to face  following discharge, date last encounter closed (first attempt may have been earlier): 07/29/2022     Call initiated 2 business days of discharge: Yes     Closed nondisplaced transverse fracture of left acetabulum, sequela  Urinary tract infection without hematuria, site unspecified  KANCHAN (acute kidney injury) ([facility] [address]  Primary hypertension  Other depression  Atrial fibrillation, unspecified type ([facility] [address]  Type 2 diabetes mellitus without complication, with long-term current use of insulin ([facility]  Type II diabetes mellitus with nephropathy [facility])    Medical Decision Making: high complexity  No follow-ups on file. On this date 8/5/2022 I have spent 30 minutes reviewing previous notes, test results and face to face with the patient discussing the diagnosis and importance of compliance with the treatment plan as well as documenting on the day of the visit. Subjective:   HPI    Inpatient course: Discharge summary reviewed- see chart. Interval history/Current status: Ms. Rhianna Mendez is a 62-year-old woman who presents to the office via telehealth for hospital follow-up. She was recently admitted to [facility] after presenting to the emergency room via EMS complaining of right leg pain after a fall. An x-ray of the right tibia-fibula showed diffuse soft tissue swelling without fracture.   However, a right femur fracture showed a chronic appearing avulsion fracture of the ischial tuberosity, left femur superior and inferior pubic rami fractures, right pelvic fractures and fractures of the left medial acetabulum and inferior pubic rami, old fracture of the right inferior pubic rami. A CT of the pelvis without contrast indicated fractures of the left anterior acetabulum, anterior medial left iliac wing, and left inferior pubic rami. Admitted to the hospitalist service for further evaluation and treatment. She was seen per orthopedics in house. They recommended nonoperative care and conservative management. Is also noted to have a urinary tract infection. Her culture grew out Klebsiella Gentry's. She was placed on ceftriaxone therapy. She received 3 doses of IV ceftriaxone while hospitalized and was transitioned to oral Omnicef. She was also seen per physical therapy in house. It is very difficult to assess her and she needed 3 people for movement with maximum assist.  It was recommended that she placed in a nursing home for rehabilitation. However, the final decided to take her home. She did have a rise in her serum creatinine. Home health has not yet been out to draw lab work on her. They have been advised to do so. She is posted had a BMP done on 29 July and again on August 1. However, it does not appear that this has been done. She is eating and drinking well. Her pain is fairly well controlled. She does complain of a lot of pain when she stands up. She does get short of breath with exertion. Blood pressures running about 138/72. Now that she is on tramadol, her mood has improved. She does have a lift chair at home. However, she has upper extremity weakness and is very difficult for her to push herself up out of the chair. Her blood sugars are running about 152. She does have a history of atrial fibrillation. Her heart rate did jump up to 97 on 1 day. Denies any complaints of chest pain, chest pressure or heart palpitations. The patient is present on the video screen. However, her daughter is providing most of the history.     Patient Active Problem List   Diagnosis    History of colon cancer    Chronic anticoagulation    PAF (paroxysmal atrial fibrillation) ([facility])    History of bradycardia    Hypoglycemia due to insulin    Type 2 diabetes mellitus without complication, with long-term current use of insulin ([facility])    Pneumonia    Hypoglycemic encephalopathy    Abnormal chest x-ray    Hypokalemia    Hypomagnesemia    Essential hypertension    Mixed hyperlipidemia    CPAP (continuous positive airway pressure) dependence    Somnolence, daytime    Restless leg syndrome    Hypoxemia    Obstructive sleep apnea    BiPAP (biphasic positive airway pressure) dependence    Hypochloremia    CHF (congestive heart failure) ([facility])    Macrocytic anemia    Acute kidney injury superimposed on CKD ([facility] [facility]    Acute on chronic diastolic heart failure ([facility] [address]    Palliative care patient    Acute hypoxemic respiratory failure ([facility])    Chronic anemia    History of adenomatous polyp of colon    Iron deficiency anemia    Heme positive stool    AMS (altered mental status)    Acute cystitis    Toxic metabolic encephalopathy    Diabetic ulcer of left heel associated with type 2 diabetes mellitus, with fat layer exposed ([facility]    Neuropathy    PVD (peripheral vascular disease) ([facility])    Avulsion fracture of right ischial tuberosity ([facility])    Stage 3b chronic kidney disease ([facility] [facility]    Alzheimer's disease, unspecified    Acute on chronic diastolic (congestive) heart failure ([facility])    Chronic systolic (congestive) heart failure    Dementia with behavioral disturbance, unspecified dementia type ([facility])    Closed displaced fracture of medial wall of left acetabulum, initial encounter ([facility] [address]    UTI due to Klebsiella species       Medications listed as ordered at the time of discharge from hospital     Medication List            Accurate as of August 5, 2022 11:59 PM. If you have any questions, ask your nurse or doctor.                 CONTINUE taking these medications      ARIPiprazole 2 MG tablet  Commonly known as: ABILIFY  TAKE 1 TABLET BY MOUTH EVERY EVENING     atorvastatin 40 MG tablet  Commonly known as: LIPITOR  Take 1 tablet by mouth nightly     blood glucose monitor kit and supplies  QD E11.9     colesevelam 625 MG tablet  Commonly known as: WELCHOL  Take 1 tablet by mouth 2 times daily (with meals)     donepezil 10 MG tablet  Commonly known as: ARICEPT  Take 1 tablet by mouth 2 times daily     FeroSul 325 (65 Fe) MG tablet  Generic drug: ferrous sulfate  TAKE 1 TABLET BY MOUTH EVERY DAY WITH BREAKFAST     glipiZIDE 2.5 MG extended release tablet  Commonly known as: Glucotrol XL  Take 1 tablet by mouth daily     Januvia 100 MG tablet  Generic drug: SITagliptin  TAKE 1 TABLET BY MOUTH EVERY DAY     melatonin 3 MG Tabs tablet     memantine 10 MG tablet  Commonly known as: NAMENDA  Take 1 tablet by mouth 2 times daily     metoprolol succinate 25 MG extended release tablet  Commonly known as:  Toprol XL  Take 1 tablet by mouth nightly     nystatin 833724 UNIT/GM powder  Commonly known as: MYCOSTATIN     pantoprazole 20 MG tablet  Commonly known as: PROTONIX     rOPINIRole 0.25 MG tablet  Commonly known as: REQUIP  TAKE 2 TABLETS BY MOUTH NIGHTLY     sertraline 100 MG tablet  Commonly known as: ZOLOFT     therapeutic multivitamin-minerals tablet  Take 1 tablet by mouth daily     traZODone 100 MG tablet  Commonly known as: DESYREL  TAKE 1 TABLET BY MOUTH NIGHTLY     Xarelto 15 MG Tabs tablet  Generic drug: rivaroxaban  TAKE 1 TABLET BY MOUTH DAILY (WITH BREAKFAST)               Medications marked \"taking\" at this time  Outpatient Medications Marked as Taking for the 8/5/22 encounter (Telemedicine) with Vahid Giron MD   Medication Sig Dispense Refill    ARIPiprazole (ABILIFY) 2 MG tablet TAKE 1 TABLET BY MOUTH EVERY EVENING 30 tablet 11    rOPINIRole (REQUIP) 0.25 MG tablet TAKE 2 TABLETS BY MOUTH NIGHTLY 30 tablet 11    JANUVIA 100 MG tablet TAKE 1 TABLET BY MOUTH EVERY DAY 30 tablet 11    XARELTO 15 MG TABS tablet TAKE 1 TABLET BY MOUTH DAILY (WITH BREAKFAST) 90 tablet 1    glipiZIDE (GLUCOTROL XL) 2.5 MG extended release tablet Take 1 tablet by mouth daily 30 tablet 3    traZODone (DESYREL) 100 MG tablet TAKE 1 TABLET BY MOUTH NIGHTLY 90 tablet 1    FEROSUL 325 (65 Fe) MG tablet TAKE 1 TABLET BY MOUTH EVERY DAY WITH BREAKFAST 90 tablet 1    melatonin 3 MG TABS tablet Take 10 mg by mouth nightly as needed      donepezil (ARICEPT) 10 MG tablet Take 1 tablet by mouth 2 times daily 60 tablet 5    memantine (NAMENDA) 10 MG tablet Take 1 tablet by mouth 2 times daily 60 tablet 5    atorvastatin (LIPITOR) 40 MG tablet Take 1 tablet by mouth nightly 30 tablet 5    blood glucose monitor kit and supplies QD E11.9 1 kit 0    metoprolol succinate (TOPROL XL) 25 MG extended release tablet Take 1 tablet by mouth nightly 90 tablet 3    pantoprazole (PROTONIX) 20 MG tablet Take 20 mg by mouth daily       nystatin (MYCOSTATIN) [id number] UNIT/GM powder Apply topically 4 times daily Apply topically 4 times daily. Multiple Vitamins-Minerals (THERAPEUTIC MULTIVITAMIN-MINERALS) tablet Take 1 tablet by mouth daily 30 tablet 0    sertraline (ZOLOFT) 100 MG tablet Take 50 mg by mouth at bedtime           Medications patient taking as of now reconciled against medications ordered at time of hospital discharge: Yes    Review of Systems   Constitutional:  Negative for activity change, appetite change, chills, diaphoresis, fatigue, fever and unexpected weight change. HENT:  Negative for congestion, ear pain, facial swelling, hearing loss, mouth sores, nosebleeds, postnasal drip, rhinorrhea, sinus pressure, sneezing, sore throat, tinnitus, trouble swallowing and voice change. Eyes:  Negative for photophobia, pain, discharge, redness, itching and visual disturbance. Respiratory:  Negative for cough, choking, chest tightness, shortness of breath and wheezing. Cardiovascular:  Negative for chest pain, palpitations and leg swelling.    Gastrointestinal:  Negative for abdominal distention, abdominal pain, anal bleeding, blood in stool, constipation, diarrhea, nausea, rectal pain and vomiting. Endocrine: Negative for cold intolerance, heat intolerance, polydipsia, polyphagia and polyuria. Genitourinary:  Negative for decreased urine volume, difficulty urinating, dysuria, flank pain, frequency, hematuria and urgency. Musculoskeletal:  Positive for back pain. Negative for arthralgias, gait problem, joint swelling, myalgias, neck pain and neck stiffness. Right hip pain. Skin:  Negative for color change, pallor and rash. Allergic/Immunologic: Negative for environmental allergies and food allergies. Neurological:  Negative for dizziness, tremors, syncope, weakness, light-headedness, numbness and headaches. Hematological:  Negative for adenopathy. Does not bruise/bleed easily. Psychiatric/Behavioral:  Positive for confusion and dysphoric mood. Negative for agitation, behavioral problems, decreased concentration, hallucinations, self-injury, sleep disturbance and suicidal ideas. The patient is not nervous/anxious and is not hyperactive. Objective: There were no vitals taken for this visit. Physical Exam  Vitals reviewed. Constitutional:       General: She is not in acute distress. Appearance: Normal appearance. She is obese. She is not ill-appearing, toxic-appearing or diaphoretic. HENT:      Head: Normocephalic and atraumatic. Right Ear: External ear normal.      Left Ear: External ear normal.      Nose: Nose normal.   Eyes:      General: No scleral icterus. Right eye: No discharge. Left eye: No discharge. Extraocular Movements: Extraocular movements intact. Conjunctiva/sclera: Conjunctivae normal.      Pupils: Pupils are equal, round, and reactive to light. Pulmonary:      Effort: Pulmonary effort is normal.   Musculoskeletal:         General: Normal range of motion. Cervical back: Normal range of motion.    Skin: Coloration: Skin is not jaundiced or pale. Findings: No bruising, erythema, lesion or rash. Neurological:      General: No focal deficit present. Mental Status: She is alert and oriented to person, place, and time. Psychiatric:         Mood and Affect: Mood normal.         Behavior: Behavior normal.         Thought Content: Thought content normal.         Judgment: Judgment normal.       31-year-old woman here for follow-up after being admitted with a fracture of the left acetabulum. 1.  Left acetabular fracture: As per orthopedics. Continue physical therapy    2. UTI: She does have a catheter in place. Daughter does not think that the catheter can come out at this time. Patient is having a lot of difficulty with mobility. The urine is clear in the catheter. We will get home health out to do a urinalysis. She recently had a UTI while she was in the hospital.    3.  Acute kidney injury: BMP is currently pending    4. Hypertension: Blood pressure currently well controlled    5. Depression: Stable    6. Atrial fibrillation: Rate controlled on current medication regimen    7. Type 2 diabetes. Blood sugars are running about 152. An electronic signature was used to authenticate this note.   --Jae Walden MD

## 2022-08-08 NOTE — TELEPHONE ENCOUNTER
[facility] and gets a urine on the pt next time they are at her home.  Thank you

## 2022-08-10 ENCOUNTER — TELEPHONE (OUTPATIENT)
Dept: INTERNAL MEDICINE | Age: 82
End: 2022-08-10

## 2022-08-10 NOTE — TELEPHONE ENCOUNTER
Notified patients daughter that home health was coming to listen to her. She wanted to know how to go about getting a referral or information on getting her mom admitted to a nursing facility. She stated she had one picked out, her sister does not want to help with her and she is needing to do something different.

## 2022-08-10 NOTE — TELEPHONE ENCOUNTER
Patients daughter Nan Carrasco called and stated patient pulled out her catheter. Daughter stated she was building up fluid in her legs & feet. She stated she also could hear a gurgling sound when she breath in and out. She was wanting to know if it was okay to give her a extra fluid pill to help with it. Daughter also stated patient was with home health with [facility]. I spoke with Abhilash QUEVEDO verbally and she stated that pulling out catheter would not cause the fluid build up. She asked me to get a hold of home health and see if someone could go by there and listen her to, and possibly place another catheter, due to patient having a pelvic fracture. I called and spoke with Bridgett Reid an she was going to send her manger the message and see about getting someone out there this evening. I asked Bridgett Reid to get back in touch with me to let me know if someone was going, and EMY Moody said if not it was ok to go ahead and give another fluid tablet if needed.

## 2022-08-11 ENCOUNTER — TELEPHONE (OUTPATIENT)
Dept: INTERNAL MEDICINE | Age: 82
End: 2022-08-11

## 2022-08-11 DIAGNOSIS — D50.9 IRON DEFICIENCY ANEMIA, UNSPECIFIED IRON DEFICIENCY ANEMIA TYPE: ICD-10-CM

## 2022-08-11 DIAGNOSIS — S32.9XXS CLOSED NONDISPLACED FRACTURE OF PELVIS, UNSPECIFIED PART OF PELVIS, SEQUELA: Primary | ICD-10-CM

## 2022-08-11 DIAGNOSIS — E11.21 TYPE II DIABETES MELLITUS WITH NEPHROPATHY (HCC): ICD-10-CM

## 2022-08-11 RX ORDER — FERROUS SULFATE 325(65) MG
TABLET ORAL
Qty: 90 TABLET | Refills: 0 | Status: SHIPPED | OUTPATIENT
Start: 2022-08-11

## 2022-08-11 RX ORDER — NYSTATIN 100000 [USP'U]/G
POWDER TOPICAL 4 TIMES DAILY
Qty: 60 G | Refills: 0 | Status: SHIPPED | OUTPATIENT
Start: 2022-08-11

## 2022-08-11 RX ORDER — DONEPEZIL HYDROCHLORIDE 10 MG/1
10 TABLET, FILM COATED ORAL 2 TIMES DAILY
Qty: 60 TABLET | Refills: 0 | Status: SHIPPED | OUTPATIENT
Start: 2022-08-11

## 2022-08-11 RX ORDER — PANTOPRAZOLE SODIUM 20 MG/1
20 TABLET, DELAYED RELEASE ORAL DAILY
Qty: 30 TABLET | Refills: 0 | Status: SHIPPED | OUTPATIENT
Start: 2022-08-11

## 2022-08-11 RX ORDER — SERTRALINE HYDROCHLORIDE 100 MG/1
50 TABLET, FILM COATED ORAL NIGHTLY
Qty: 30 TABLET | Refills: 0 | Status: SHIPPED | OUTPATIENT
Start: 2022-08-11

## 2022-08-11 RX ORDER — ARIPIPRAZOLE 2 MG/1
2 TABLET ORAL EVERY EVENING
Qty: 30 TABLET | Refills: 0 | Status: SHIPPED | OUTPATIENT
Start: 2022-08-11

## 2022-08-11 RX ORDER — HYDROCODONE BITARTRATE AND ACETAMINOPHEN 5; 325 MG/1; MG/1
1 TABLET ORAL EVERY 6 HOURS PRN
Qty: 90 TABLET | Refills: 0 | Status: SHIPPED | OUTPATIENT
Start: 2022-08-11 | End: 2022-09-10

## 2022-08-11 RX ORDER — MEMANTINE HYDROCHLORIDE 10 MG/1
10 TABLET ORAL 2 TIMES DAILY
Qty: 60 TABLET | Refills: 0 | Status: SHIPPED | OUTPATIENT
Start: 2022-08-11

## 2022-08-11 RX ORDER — METOPROLOL SUCCINATE 25 MG/1
25 TABLET, EXTENDED RELEASE ORAL NIGHTLY
Qty: 90 TABLET | Refills: 0 | Status: SHIPPED | OUTPATIENT
Start: 2022-08-11

## 2022-08-11 RX ORDER — ROPINIROLE 0.25 MG/1
0.5 TABLET, FILM COATED ORAL NIGHTLY
Qty: 30 TABLET | Refills: 0 | Status: SHIPPED | OUTPATIENT
Start: 2022-08-11

## 2022-08-11 RX ORDER — GLIPIZIDE 2.5 MG/1
2.5 TABLET, EXTENDED RELEASE ORAL DAILY
Qty: 30 TABLET | Refills: 0 | Status: SHIPPED | OUTPATIENT
Start: 2022-08-11

## 2022-08-11 RX ORDER — TRAMADOL HYDROCHLORIDE 50 MG/1
50 TABLET ORAL EVERY 6 HOURS PRN
Qty: 12 TABLET | Refills: 0 | OUTPATIENT
Start: 2022-08-11 | End: 2022-08-14

## 2022-08-11 RX ORDER — ATORVASTATIN CALCIUM 40 MG/1
40 TABLET, FILM COATED ORAL NIGHTLY
Qty: 30 TABLET | Refills: 0 | Status: SHIPPED | OUTPATIENT
Start: 2022-08-11

## 2022-08-11 RX ORDER — TRAZODONE HYDROCHLORIDE 100 MG/1
100 TABLET ORAL NIGHTLY
Qty: 90 TABLET | Refills: 0 | Status: SHIPPED | OUTPATIENT
Start: 2022-08-11

## 2022-08-11 RX ORDER — LANOLIN ALCOHOL/MO/W.PET/CERES
10 CREAM (GRAM) TOPICAL NIGHTLY PRN
Qty: 105 TABLET | Refills: 0 | Status: SHIPPED | OUTPATIENT
Start: 2022-08-11 | End: 2022-09-10

## 2022-08-11 RX ORDER — M-VIT,TX,IRON,MINS/CALC/FOLIC 27MG-0.4MG
1 TABLET ORAL DAILY
Qty: 30 TABLET | Refills: 0 | Status: SHIPPED | OUTPATIENT
Start: 2022-08-11 | End: 2022-09-10

## 2023-10-03 PROBLEM — F03.918 DEMENTIA WITH BEHAVIORAL DISTURBANCE (HCC): Status: ACTIVE | Noted: 2022-06-17

## (undated) DEVICE — ENDO KIT,LOURDES HOSPITAL: Brand: MEDLINE INDUSTRIES, INC.

## (undated) DEVICE — SNARE ENDOSCP L240CM LOOP W13MM SHTH DIA2.4MM SM OVL FLX

## (undated) DEVICE — FORCEPS BX L240CM JAW DIA2.4MM ORNG L CAP W/ NDL DISP RAD

## (undated) DEVICE — FORCEPS BX OVL CUP FEN DISPOSABLE CAP L 160CM RAD JAW 4